# Patient Record
Sex: MALE | Race: BLACK OR AFRICAN AMERICAN | NOT HISPANIC OR LATINO | Employment: OTHER | URBAN - METROPOLITAN AREA
[De-identification: names, ages, dates, MRNs, and addresses within clinical notes are randomized per-mention and may not be internally consistent; named-entity substitution may affect disease eponyms.]

---

## 2017-01-06 ENCOUNTER — LAB VISIT (OUTPATIENT)
Dept: LAB | Facility: HOSPITAL | Age: 67
End: 2017-01-06
Attending: INTERNAL MEDICINE
Payer: MEDICARE

## 2017-01-06 DIAGNOSIS — D75.839 THROMBOCYTOSIS: ICD-10-CM

## 2017-01-06 LAB
ALBUMIN SERPL BCP-MCNC: 4.2 G/DL
ALP SERPL-CCNC: 54 U/L
ALT SERPL W/O P-5'-P-CCNC: 24 U/L
ANION GAP SERPL CALC-SCNC: 10 MMOL/L
AST SERPL-CCNC: 31 U/L
BASOPHILS # BLD AUTO: 0.01 K/UL
BASOPHILS NFR BLD: 0.1 %
BILIRUB SERPL-MCNC: 0.5 MG/DL
BUN SERPL-MCNC: 16 MG/DL
CALCIUM SERPL-MCNC: 10 MG/DL
CHLORIDE SERPL-SCNC: 97 MMOL/L
CO2 SERPL-SCNC: 34 MMOL/L
CREAT SERPL-MCNC: 0.8 MG/DL
DIFFERENTIAL METHOD: ABNORMAL
EOSINOPHIL # BLD AUTO: 0.4 K/UL
EOSINOPHIL NFR BLD: 4.5 %
ERYTHROCYTE [DISTWIDTH] IN BLOOD BY AUTOMATED COUNT: 14.6 %
EST. GFR  (AFRICAN AMERICAN): >60 ML/MIN/1.73 M^2
EST. GFR  (NON AFRICAN AMERICAN): >60 ML/MIN/1.73 M^2
GLUCOSE SERPL-MCNC: 91 MG/DL
HCT VFR BLD AUTO: 42.4 %
HGB BLD-MCNC: 14.3 G/DL
LDH SERPL L TO P-CCNC: 217 U/L
LYMPHOCYTES # BLD AUTO: 2.1 K/UL
LYMPHOCYTES NFR BLD: 22.4 %
MCH RBC QN AUTO: 27 PG
MCHC RBC AUTO-ENTMCNC: 33.7 %
MCV RBC AUTO: 80 FL
MONOCYTES # BLD AUTO: 0.9 K/UL
MONOCYTES NFR BLD: 9.2 %
NEUTROPHILS # BLD AUTO: 5.9 K/UL
NEUTROPHILS NFR BLD: 63.4 %
PLATELET # BLD AUTO: 616 K/UL
PMV BLD AUTO: 10.3 FL
POTASSIUM SERPL-SCNC: 3.1 MMOL/L
PROT SERPL-MCNC: 7.9 G/DL
RBC # BLD AUTO: 5.3 M/UL
SODIUM SERPL-SCNC: 141 MMOL/L
URATE SERPL-MCNC: 3.3 MG/DL
WBC # BLD AUTO: 9.36 K/UL

## 2017-01-06 PROCEDURE — 88291 CYTO/MOLECULAR REPORT: CPT | Mod: 91

## 2017-01-06 PROCEDURE — 85025 COMPLETE CBC W/AUTO DIFF WBC: CPT

## 2017-01-06 PROCEDURE — 80053 COMPREHEN METABOLIC PANEL: CPT

## 2017-01-06 PROCEDURE — 81270 JAK2 GENE: CPT

## 2017-01-06 PROCEDURE — 36415 COLL VENOUS BLD VENIPUNCTURE: CPT

## 2017-01-06 PROCEDURE — 88274 CYTOGENETICS 25-99: CPT | Mod: 91

## 2017-01-06 PROCEDURE — 88275 CYTOGENETICS 100-300: CPT

## 2017-01-06 PROCEDURE — 88274 CYTOGENETICS 25-99: CPT

## 2017-01-06 PROCEDURE — 88271 CYTOGENETICS DNA PROBE: CPT

## 2017-01-06 PROCEDURE — 83615 LACTATE (LD) (LDH) ENZYME: CPT

## 2017-01-06 PROCEDURE — 88271 CYTOGENETICS DNA PROBE: CPT | Mod: 91

## 2017-01-06 PROCEDURE — 84550 ASSAY OF BLOOD/URIC ACID: CPT

## 2017-01-10 ENCOUNTER — TELEPHONE (OUTPATIENT)
Dept: OPTOMETRY | Facility: CLINIC | Age: 67
End: 2017-01-10

## 2017-01-10 DIAGNOSIS — H40.9 GLAUCOMA: ICD-10-CM

## 2017-01-10 LAB
JAK2 P.V617F BLD/T QL: NORMAL
JAK2 V617F MUTATION DETECTION BLD RESULT: NORMAL

## 2017-01-10 RX ORDER — TIMOLOL MALEATE 5 MG/ML
1 SOLUTION/ DROPS OPHTHALMIC 2 TIMES DAILY
Qty: 15 ML | Refills: 2 | Status: SHIPPED | OUTPATIENT
Start: 2017-01-10 | End: 2017-03-08 | Stop reason: SDUPTHER

## 2017-01-10 RX ORDER — LATANOPROST 50 UG/ML
1 SOLUTION/ DROPS OPHTHALMIC NIGHTLY
Qty: 7.5 ML | Refills: 2 | Status: SHIPPED | OUTPATIENT
Start: 2017-01-10 | End: 2017-09-25 | Stop reason: SDUPTHER

## 2017-01-12 LAB
CLINICAL CYTOGENETICIST REVIEW: NORMAL
CLINICAL CYTOGENETICIST REVIEW: NORMAL
MBCR DISCLAIMER: NORMAL
MBCR REASON FOR REFERRAL: NORMAL
MBCR RELEASED BY: NORMAL
MBCR RESULT SUMMARY: NORMAL
MBCR RESULT TABLE: NORMAL
MBCR SPECIMEN: NORMAL
PDGFRB/TEL  FOR CMML, REASON FOR REFERRAL: NORMAL
PDGFRB/TEL  FOR CMML, SPECIMEN: NORMAL
PDGFRB/TEL DISCLAIMER: NORMAL
PDGFRB/TEL RELEASED BY: NORMAL
REF LAB TEST METHOD: NORMAL
REF LAB TEST METHOD: NORMAL
SERVICE CMNT-IMP: NORMAL
SERVICE CMNT-IMP: NORMAL
SPECIMEN SOURCE: NORMAL
SPECIMEN SOURCE: NORMAL
T(5;12)(PDGFRB,ETV6) BLD/T QL: NORMAL
T(9;22)(ABL1,BCR) BLD/T FISH: NORMAL

## 2017-01-13 LAB
CHIC2, 4Q12 DELETION DISCLAIMER: NORMAL
CHIC2, 4Q12 DELETION RELEASED BY: NORMAL
CHIC2, 4Q12 DELETION RESULT SUMMARY: NORMAL
CHIC2, 4Q12 DELETION RESULT TABLE: NORMAL
CHIC2, 4Q12 DELETION, INTERPRETATION: NORMAL
CHIC2, 4Q12 DELETION, REASON FOR REFERRAL: NORMAL
CHIC2, 4Q12 DELETION, RESULTS: NORMAL
CHIC2, 4Q12 DELETION, SPECIMEN: NORMAL
REF LAB TEST METHOD: NORMAL
REF LAB TEST METHOD: NORMAL
SPECIMEN SOURCE: NORMAL

## 2017-03-08 RX ORDER — TIMOLOL MALEATE 5 MG/ML
SOLUTION/ DROPS OPHTHALMIC
Qty: 15 ML | Refills: 2 | Status: SHIPPED | OUTPATIENT
Start: 2017-03-08 | End: 2017-05-17 | Stop reason: SDUPTHER

## 2017-03-23 ENCOUNTER — OFFICE VISIT (OUTPATIENT)
Dept: OPHTHALMOLOGY | Facility: CLINIC | Age: 67
End: 2017-03-23
Payer: MEDICARE

## 2017-03-23 DIAGNOSIS — H40.1133 PRIMARY OPEN ANGLE GLAUCOMA OF BOTH EYES, SEVERE STAGE: Primary | ICD-10-CM

## 2017-03-23 DIAGNOSIS — H25.13 NUCLEAR SCLEROSIS, BILATERAL: ICD-10-CM

## 2017-03-23 DIAGNOSIS — H52.7 REFRACTIVE ERROR: ICD-10-CM

## 2017-03-23 DIAGNOSIS — H54.40 BLIND LEFT EYE: ICD-10-CM

## 2017-03-23 PROBLEM — G89.4 CHRONIC PAIN SYNDROME: Status: ACTIVE | Noted: 2017-03-23

## 2017-03-23 PROBLEM — M15.3 POST-TRAUMATIC OSTEOARTHRITIS OF MULTIPLE JOINTS: Status: ACTIVE | Noted: 2017-03-23

## 2017-03-23 PROCEDURE — 99499 UNLISTED E&M SERVICE: CPT | Mod: S$GLB,,, | Performed by: OPHTHALMOLOGY

## 2017-03-23 PROCEDURE — 99999 PR PBB SHADOW E&M-EST. PATIENT-LVL II: CPT | Mod: PBBFAC,,, | Performed by: OPHTHALMOLOGY

## 2017-03-23 PROCEDURE — 92012 INTRM OPH EXAM EST PATIENT: CPT | Mod: S$GLB,,, | Performed by: OPHTHALMOLOGY

## 2017-03-23 NOTE — MR AVS SNAPSHOT
[facility]  [address]  [place] [address]  Phone: [phone]  Fax: [phone]                  Mark Church   3/23/2017 10:30 AM   Office Visit    Description:  Male : 1950   Provider:  Rikki Tyson MD   Department:  [facility]           Reason for Visit     Glaucoma           Diagnoses this Visit        Comments    Primary open angle glaucoma of both eyes, severe stage    -  Primary     Nuclear sclerosis, bilateral         Blind left eye         Refractive error                To Do List           Goals (5 Years of Data)     None      Follow-Up and Disposition     Return in about 6 months (around 2017) for IOP's, HVF's & DFE..      [facility]     [facility] On Call Nurse Care Line -  Assistance  Registered nurses in the [facility] On Call Center provide clinical advisement, health education, appointment booking, and other advisory services.  Call for this free service at [phone].             Medications           Message regarding Medications     Verify the changes and/or additions to your medication regime listed below are the same as discussed with your clinician today.  If any of these changes or additions are incorrect, please notify your healthcare provider.             Verify that the below list of medications is an accurate representation of the medications you are currently taking.  If none reported, the list may be blank. If incorrect, please contact your healthcare provider. Carry this list with you in case of emergency.           Current Medications     latanoprost 0.005 % ophthalmic solution Place 1 drop into both eyes every evening.    potassium chloride (MICRO-K) 10 MEQ CpSR Two capsules BID    timolol maleate 0.5% (TIMOPTIC) 0.5 % Drop INSTILL 1 DROP INTO BOTH EYES TWICE DAILY    celecoxib (CELEBREX) 100 MG capsule Take 1 capsule (100 mg total) by mouth 2 (two) times daily.    cephALEXin (KEFLEX) 500 MG capsule      cyclobenzaprine (FLEXERIL) 10 MG tablet     hydrocodone-acetaminophen 10-325mg (NORCO)  mg Tab Take 1 tablet by mouth every 8 (eight) hours as needed for Pain.    ketoconazole (NIZORAL) 2 % shampoo Apply topically twice a week.           Clinical Reference Information           Allergies as of 3/23/2017     Compazine [Prochlorperazine Edisylate]      Immunizations Administered on Date of Encounter - 3/23/2017     None      Orders Placed During Today's Visit     Future Labs/Procedures Expected by Expires    Ruth Visual Field - OU - Extended - Both Eyes  As directed 3/23/2018      [facility] Sign-Up     Activating your [facility] account is as easy as 1-2-3!     1) Visit [email or web address], select Sign Up Now, enter this activation code and your date of birth, then select Next.  Activation code not generated  Current Patient Portal Status: Account disabled      2) Create a username and password to use when you visit [facility] in the future and select a security question in case you lose your password and select Next.    3) Enter your e-mail address and click Sign Up!    Additional Information  If you have questions, please e-mail [email or web address] or call [phone] to talk to our [facility] staff. Remember, [facility] is NOT to be used for urgent needs. For medical emergencies, dial 911.         Language Assistance Services     ATTENTION: Language assistance services are available, free of charge. Please call [phone].      ATENCIÓN: Si habla donaldo, tiene a sanchez disposición servicios gratuitos de asistencia lingüística. Llame al [phone].     CHÚ Ý: N?u b?n nói Ti?ng Vi?t, có các d?ch v? h? tr? ngôn ng? mi?n phí dành cho b?n. G?i s? [phone].         [facility] complies with applicable Federal civil rights laws and does not discriminate on the basis of race, color, national origin, age, disability, or sex.

## 2017-03-23 NOTE — PROGRESS NOTES
Subjective:       Patient ID: Mark Church is a 67 y.o. male.    Chief Complaint: Glaucoma (POAG both eyes, severe stage.)    HPI  Review of Systems    Objective:      Physical Exam    Assessment:       1. Primary open angle glaucoma of both eyes, severe stage    2. Nuclear sclerosis, bilateral    3. Blind left eye    4. Refractive error        Plan:       POAG OS>>OD-IOP's acceptable OU.  Cataracts- Not visually significant.  Blind eye OS-Due to POAG.  RE-Pt wants MRx.        Cont Xalatan & T1/2 OU.  RTC 6 mos for IOP's, HVF's & DFE.

## 2017-05-17 RX ORDER — TIMOLOL MALEATE 5 MG/ML
SOLUTION/ DROPS OPHTHALMIC
Qty: 25 ML | Refills: 2 | Status: SHIPPED | OUTPATIENT
Start: 2017-05-17 | End: 2017-12-08 | Stop reason: SDUPTHER

## 2017-07-12 DIAGNOSIS — E26.81 BARTTER SYNDROME: ICD-10-CM

## 2017-07-12 RX ORDER — POTASSIUM CHLORIDE 750 MG/1
CAPSULE, EXTENDED RELEASE ORAL
Qty: 360 CAPSULE | Refills: 3 | Status: SHIPPED | OUTPATIENT
Start: 2017-07-12 | End: 2018-05-02 | Stop reason: SDUPTHER

## 2017-07-24 PROBLEM — R60.0 BILATERAL LEG EDEMA: Status: ACTIVE | Noted: 2017-07-24

## 2017-08-31 PROBLEM — M25.462 SWELLING OF LEFT KNEE JOINT: Status: ACTIVE | Noted: 2017-08-31

## 2017-08-31 PROBLEM — M19.012 PRIMARY OSTEOARTHRITIS OF LEFT SHOULDER: Status: ACTIVE | Noted: 2017-08-31

## 2017-08-31 PROBLEM — D75.839 THROMBOCYTHEMIA: Status: ACTIVE | Noted: 2017-08-31

## 2017-08-31 PROBLEM — M19.90 JOINT INFLAMMATION: Status: ACTIVE | Noted: 2017-08-31

## 2017-09-25 DIAGNOSIS — H40.9 GLAUCOMA: ICD-10-CM

## 2017-09-26 RX ORDER — LATANOPROST 50 UG/ML
SOLUTION/ DROPS OPHTHALMIC
Qty: 3 BOTTLE | Refills: 2 | Status: SHIPPED | OUTPATIENT
Start: 2017-09-26 | End: 2018-04-18 | Stop reason: SDUPTHER

## 2017-11-07 PROBLEM — Z23 FLU VACCINE NEED: Status: ACTIVE | Noted: 2017-11-07

## 2017-12-09 RX ORDER — TIMOLOL MALEATE 5 MG/ML
SOLUTION/ DROPS OPHTHALMIC
Qty: 15 ML | Refills: 2 | Status: SHIPPED | OUTPATIENT
Start: 2017-12-09 | End: 2018-02-12 | Stop reason: SDUPTHER

## 2017-12-14 ENCOUNTER — OFFICE VISIT (OUTPATIENT)
Dept: FAMILY MEDICINE | Facility: CLINIC | Age: 67
End: 2017-12-14
Payer: MEDICARE

## 2017-12-14 VITALS
SYSTOLIC BLOOD PRESSURE: 116 MMHG | OXYGEN SATURATION: 99 % | BODY MASS INDEX: 24.19 KG/M2 | WEIGHT: 145.19 LBS | HEART RATE: 76 BPM | HEIGHT: 65 IN | DIASTOLIC BLOOD PRESSURE: 70 MMHG

## 2017-12-14 DIAGNOSIS — D75.839 THROMBOCYTHEMIA: ICD-10-CM

## 2017-12-14 DIAGNOSIS — E26.81 BARTTER SYNDROME: ICD-10-CM

## 2017-12-14 DIAGNOSIS — B34.9 ACUTE VIRAL SYNDROME: ICD-10-CM

## 2017-12-14 DIAGNOSIS — Z00.00 ENCOUNTER FOR PREVENTIVE HEALTH EXAMINATION: Primary | ICD-10-CM

## 2017-12-14 DIAGNOSIS — H40.1130 PRIMARY OPEN ANGLE GLAUCOMA OF BOTH EYES, UNSPECIFIED GLAUCOMA STAGE: ICD-10-CM

## 2017-12-14 DIAGNOSIS — Z85.46 H/O PROSTATE CANCER: ICD-10-CM

## 2017-12-14 PROBLEM — Z23 FLU VACCINE NEED: Status: RESOLVED | Noted: 2017-11-07 | Resolved: 2017-12-14

## 2017-12-14 PROCEDURE — G0438 PPPS, INITIAL VISIT: HCPCS | Mod: S$GLB,,, | Performed by: NURSE PRACTITIONER

## 2017-12-14 PROCEDURE — 99999 PR PBB SHADOW E&M-EST. PATIENT-LVL IV: CPT | Mod: PBBFAC,,, | Performed by: NURSE PRACTITIONER

## 2017-12-14 PROCEDURE — 99499 UNLISTED E&M SERVICE: CPT | Mod: S$GLB,,, | Performed by: NURSE PRACTITIONER

## 2017-12-14 NOTE — PROGRESS NOTES
"Mark Church presented for a  Medicare AWV and comprehensive Health Risk Assessment today. The following components were reviewed and updated:    · Medical history  · Family History  · Social history  · Allergies and Current Medications  · Health Risk Assessment  · Health Maintenance  · Care Team     ** See Completed Assessments for Annual Wellness Visit within the encounter summary.**       The following assessments were completed:  · Living Situation  · CAGE  · Depression Screening  · Timed Get Up and Go  · Whisper Test  · Cognitive Function Screening  · Nutrition Screening  · ADL Screening  · PAQ Screening    Vitals:    12/14/17 1115   BP: 116/70   BP Location: Left arm   Patient Position: Sitting   BP Method: Medium (Automatic)   Pulse: 76   SpO2: 99%   Weight: 65.9 kg (145 lb 2.8 oz)   Height: 5' 5" (1.651 m)     Body mass index is 24.16 kg/m².  Physical Exam   Constitutional: He is oriented to person, place, and time.   Cardiovascular: Normal rate, regular rhythm and normal heart sounds.    Pulmonary/Chest: Effort normal. He has wheezes (bilateral expiratory wheeze auscultated ).   Musculoskeletal: Normal range of motion.   Neurological: He is alert and oriented to person, place, and time.   Skin: Skin is warm.   Vitals reviewed.        Diagnoses and health risks identified today and associated recommendations/orders:    1. Encounter for preventive health examination  Education provided about preventive health examinations and procedures; addressed and discussed patient's health concerns. Additionally, reviewed medical record for risk factors and documented the results during this encounter.    2. 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  He's taking potassium supplements. Continue as advised.     3. Thrombocythemia  Stable, monitored by hematology dept, he has orders for follow up CBC.  His most recent CBC was completed August 2017. " "    4. Primary open angle glaucoma of both eyes, unspecified glaucoma stage  Stable, followed by [facility], denies worsening symptoms; continue as advised.     5. H/O prostate cancer  Stable, followed by [facility] hematology/oncology dept. Denies worsening symptoms; continue as advised.     6. Acute viral syndrome  Discussed with patient his concerns regarding sinus congestion, intermittent coughing with gastric irritation, feeling "sick at times and at other times I'm ok."  Advised about symptoms to seek emergency medical attention.     Reviewed health maintenance with patient, educated about recommended examinations, procedures (labs & images), and immunizations.     Provided Mark with a 5-10 year written screening schedule and personal prevention plan. Recommendations were developed using the USPSTF age appropriate recommendations. Education, counseling, and referrals were provided as needed. After Visit Summary printed and given to patient which includes a list of additional screenings\tests needed.    Return in about 1 year (around 12/14/2018) for risk assessment .    Eugenio Sanz Jr NP  "

## 2017-12-14 NOTE — PATIENT INSTRUCTIONS
Counseling and Referral of Other Preventative  (Italic type indicates deductible and co-insurance are waived)    Patient Name: Mark Church  Today's Date: 12/14/2017      SERVICE LIMITATIONS RECOMMENDATION    Vaccines    · Pneumococcal (once after 65)    · Influenza (annually)    · Hepatitis B (if medium/high risk)    · Prevnar 13      Hepatitis B medium/high risk factors:       - End-stage renal disease       - Hemophiliacs who received Factor VII or         IX concentrates       - Clients of institutions for the mentally             retarded       - Persons who live in the same house as          a HepB carrier       - Homosexual men       - Illicit injectable drug abusers     Pneumococcal: vaccination due March 2018     Influenza: Done, repeat in one year     Hepatitis B: N/A     Prevnar 13: Done, no repeat necessary    Prostate cancer screening (annually to age 75)     Prostate specific antigen (PSA) Shared decision making with Provider. Sometimes a co-pay may be required if the patient decides to have this test. The USPSTF no longer recommends prostate cancer screening routinely in medicine:     Colorectal cancer screening (to age 75)    · Fecal occult blood test (annual)  · Flexible sigmoidoscopy (5y)  · Screening colonoscopy (10y)  · Barium enema   discussed with patient      Diabetes self-management training (no USPSTF recommendations)  Requires referral by treating physician for patient with diabetes or renal disease. 10 hours of initial DSMT sessions of no less than 30 minutes each in a continuous 12-month period. 2 hours of follow-up DSMT in subsequent years.  N/A    Glaucoma screening (no USPSTF recommendation)  Diabetes mellitus, family history   , age 50 or over    American, age 65 or over  Done this year, repeat every year    Medical nutrition therapy for diabetes or renal disease (no recommended schedule)  Requires referral by treating physician for patient with diabetes or  renal disease or kidney transplant within the past 3 years.  Can be provided in same year as diabetes self-management training (DSMT), and CMS recommends medical nutrition therapy take place after DSMT. Up to 3 hours for initial year and 2 hours in subsequent years.  N/A    Cardiovascular screening blood tests (every 5 years)  · Fasting lipid panel  Order as a panel if possible  Last done 3/2017, recommend to repeat every 5  years    Diabetes screening tests (at least every 3 years, Medicare covers annually or at 6-month intervals for prediabetic patients)  · Fasting blood sugar (FBS) or glucose tolerance test (GTT)  Patient must be diagnosed with one of the following:       - Hypertension       - Dyslipidemia       - Obesity (BMI 30kg/m2)       - Previous elevated impaired FBS or GTT       ... or any two of the following:       - Overweight (BMI 25 but <30)       - Family history of diabetes       - Age 65 or older       - History of gestational diabetes or birth of baby weighing more than 9 pounds      Abdominal aortic aneurysm screening (once)  · Sonogram   Limited to patients who meet one of the following criteria:       - Men who are 65-75 years old and have smoked more than 100 cigarette in their lifetime       - Anyone with a family history of abdominal aortic aneurysm       - Anyone recommended for screening by the USPSTF      HIV screening (annually for increased risk patients)  · HIV-1 and HIV-2 by EIA, or JOO, rapid antibody test or oral mucosa transudate  Patients must be at increased risk for HIV infection per USPSTF guidelines or pregnant. Tests covered annually for patient at increased risk or as requested by the patient. Pregnant patients may receive up to 3 tests during pregnancy.  no clinical risk factors      Smoking cessation counseling (up to 8 sessions per year)  Patients must be asymptomatic of tobacco-related conditions to receive as a preventative service.  Non-smoker    Subsequent annual  wellness visit  At least 12 months since last AWV  Return in one year     The following information is provided to all patients.  This information is to help you find resources for any of the problems found today that may be affecting your health:                Living healthy guide: www.[facility]      Understanding Diabetes: www.diabetes.org      Eating healthy: www.cdc.gov/healthyweight      CDC home safety checklist: www.cdc.gov/steadi/patient.html      Agency on Aging: www.goea.[place].[facility]      Alcoholics anonymous (AA): www.aa.org      Physical Activity: www.cally.nih.gov/av8ucog      Tobacco use: www.quitwithusla.org

## 2018-01-08 PROBLEM — M25.462 EFFUSION OF LEFT KNEE: Status: ACTIVE | Noted: 2018-01-08

## 2018-02-12 RX ORDER — TIMOLOL MALEATE 5 MG/ML
SOLUTION/ DROPS OPHTHALMIC
Qty: 15 ML | Refills: 2 | Status: SHIPPED | OUTPATIENT
Start: 2018-02-12 | End: 2018-04-18 | Stop reason: SDUPTHER

## 2018-03-07 PROBLEM — M25.552 LEFT HIP PAIN: Status: ACTIVE | Noted: 2018-03-07

## 2018-04-18 DIAGNOSIS — H40.9 GLAUCOMA: ICD-10-CM

## 2018-04-18 RX ORDER — TIMOLOL MALEATE 5 MG/ML
1 SOLUTION/ DROPS OPHTHALMIC 2 TIMES DAILY
Qty: 15 ML | Refills: 2 | Status: SHIPPED | OUTPATIENT
Start: 2018-04-18 | End: 2018-05-02 | Stop reason: SDUPTHER

## 2018-04-18 RX ORDER — TIMOLOL MALEATE 5 MG/ML
SOLUTION/ DROPS OPHTHALMIC
Qty: 25 ML | Refills: 2 | OUTPATIENT
Start: 2018-04-18

## 2018-04-18 RX ORDER — LATANOPROST 50 UG/ML
SOLUTION/ DROPS OPHTHALMIC
Qty: 3 BOTTLE | Refills: 2 | Status: SHIPPED | OUTPATIENT
Start: 2018-04-18 | End: 2018-05-18 | Stop reason: SDUPTHER

## 2018-05-02 DIAGNOSIS — E26.81 BARTTER SYNDROME: ICD-10-CM

## 2018-05-02 RX ORDER — POTASSIUM CHLORIDE 750 MG/1
CAPSULE, EXTENDED RELEASE ORAL
Qty: 360 CAPSULE | Refills: 3 | Status: SHIPPED | OUTPATIENT
Start: 2018-05-02 | End: 2019-02-11 | Stop reason: SDUPTHER

## 2018-05-02 RX ORDER — TIMOLOL MALEATE 5 MG/ML
SOLUTION/ DROPS OPHTHALMIC
Qty: 15 ML | Refills: 2 | Status: SHIPPED | OUTPATIENT
Start: 2018-05-02 | End: 2018-07-07 | Stop reason: SDUPTHER

## 2018-05-18 ENCOUNTER — OFFICE VISIT (OUTPATIENT)
Dept: OPHTHALMOLOGY | Facility: CLINIC | Age: 68
End: 2018-05-18
Payer: MEDICARE

## 2018-05-18 ENCOUNTER — CLINICAL SUPPORT (OUTPATIENT)
Dept: OPHTHALMOLOGY | Facility: CLINIC | Age: 68
End: 2018-05-18
Payer: MEDICARE

## 2018-05-18 DIAGNOSIS — H40.1133 PRIMARY OPEN ANGLE GLAUCOMA (POAG) OF BOTH EYES, SEVERE STAGE: ICD-10-CM

## 2018-05-18 DIAGNOSIS — H25.13 NUCLEAR SCLEROSIS, BILATERAL: ICD-10-CM

## 2018-05-18 DIAGNOSIS — H54.40 BLIND LEFT EYE: ICD-10-CM

## 2018-05-18 DIAGNOSIS — H40.1133 PRIMARY OPEN ANGLE GLAUCOMA OF BOTH EYES, SEVERE STAGE: Primary | ICD-10-CM

## 2018-05-18 DIAGNOSIS — H52.7 REFRACTIVE ERROR: ICD-10-CM

## 2018-05-18 PROCEDURE — 99999 PR PBB SHADOW E&M-EST. PATIENT-LVL II: CPT | Mod: PBBFAC,,, | Performed by: OPHTHALMOLOGY

## 2018-05-18 PROCEDURE — 99499 UNLISTED E&M SERVICE: CPT | Mod: S$GLB,,, | Performed by: OPHTHALMOLOGY

## 2018-05-18 PROCEDURE — 92014 COMPRE OPH EXAM EST PT 1/>: CPT | Mod: S$GLB,,, | Performed by: OPHTHALMOLOGY

## 2018-05-18 PROCEDURE — 92083 EXTENDED VISUAL FIELD XM: CPT | Mod: S$GLB,,, | Performed by: OPHTHALMOLOGY

## 2018-05-18 RX ORDER — LATANOPROST 50 UG/ML
SOLUTION/ DROPS OPHTHALMIC
Qty: 3 BOTTLE | Refills: 2 | Status: SHIPPED | OUTPATIENT
Start: 2018-05-18 | End: 2018-12-05 | Stop reason: SDUPTHER

## 2018-05-18 NOTE — PROGRESS NOTES
Subjective:       Patient ID: Mrak Church is a 68 y.o. male.    Chief Complaint: Glaucoma (POAG of both eyes, severe stage. )    HPI     Glaucoma    Additional comments: POAG of both eyes, severe stage.            Comments   68 y.o. Male is here today for 6 months IOP's, HVF and DFE. H/o POAG of   both eyes, severe stage. Denies eye pain and f/f. No noticeable VA changes   since last visit. Do have trouble with glare. No itching, burning or   tearing.     Meds: Latanoprost qhs OU             T 1/2 BID OU        Last edited by JEFFRY Mayo on 5/18/2018  2:59 PM. (History)             Assessment:       1. Primary open angle glaucoma of both eyes, severe stage    2. Primary open angle glaucoma (POAG) of both eyes, severe stage    3. Nuclear sclerosis, bilateral    4. Blind left eye    5. Refractive error        Plan:       POAG OS>>OD-IOP's excellent OU. ON's are stable OU. HVF OD with nonspecific defects (stable).  Cataracts- Not visually significant.  Blind eye OS-Due to POAG. Stable.  RE-Pt does not want MRx.        Cont T1/2 & Xalatan OU.  RTC 6 mos for IOP's.

## 2018-07-07 RX ORDER — TIMOLOL MALEATE 5 MG/ML
SOLUTION/ DROPS OPHTHALMIC
Qty: 15 ML | Refills: 2 | Status: SHIPPED | OUTPATIENT
Start: 2018-07-07 | End: 2018-09-11 | Stop reason: SDUPTHER

## 2018-07-10 ENCOUNTER — PES CALL (OUTPATIENT)
Dept: ADMINISTRATIVE | Facility: CLINIC | Age: 68
End: 2018-07-10

## 2018-07-24 ENCOUNTER — OFFICE VISIT (OUTPATIENT)
Dept: FAMILY MEDICINE | Facility: CLINIC | Age: 68
End: 2018-07-24
Payer: MEDICARE

## 2018-07-24 VITALS
HEIGHT: 65 IN | HEART RATE: 68 BPM | BODY MASS INDEX: 25.23 KG/M2 | DIASTOLIC BLOOD PRESSURE: 70 MMHG | OXYGEN SATURATION: 99 % | WEIGHT: 151.44 LBS | SYSTOLIC BLOOD PRESSURE: 110 MMHG

## 2018-07-24 DIAGNOSIS — D75.839 THROMBOCYTHEMIA: ICD-10-CM

## 2018-07-24 DIAGNOSIS — H40.1130 PRIMARY OPEN ANGLE GLAUCOMA OF BOTH EYES, UNSPECIFIED GLAUCOMA STAGE: ICD-10-CM

## 2018-07-24 DIAGNOSIS — E26.81 BARTTER SYNDROME: ICD-10-CM

## 2018-07-24 DIAGNOSIS — G89.4 CHRONIC PAIN SYNDROME: ICD-10-CM

## 2018-07-24 DIAGNOSIS — Z00.00 ENCOUNTER FOR PREVENTIVE HEALTH EXAMINATION: Primary | ICD-10-CM

## 2018-07-24 PROBLEM — M25.462 SWELLING OF LEFT KNEE JOINT: Status: RESOLVED | Noted: 2017-08-31 | Resolved: 2018-07-24

## 2018-07-24 PROBLEM — M25.462 EFFUSION OF LEFT KNEE: Status: RESOLVED | Noted: 2018-01-08 | Resolved: 2018-07-24

## 2018-07-24 PROCEDURE — G0439 PPPS, SUBSEQ VISIT: HCPCS | Mod: S$GLB,,, | Performed by: NURSE PRACTITIONER

## 2018-07-24 PROCEDURE — 99999 PR PBB SHADOW E&M-EST. PATIENT-LVL III: CPT | Mod: PBBFAC,,, | Performed by: NURSE PRACTITIONER

## 2018-07-24 PROCEDURE — 99499 UNLISTED E&M SERVICE: CPT | Mod: S$GLB,,, | Performed by: NURSE PRACTITIONER

## 2018-07-24 NOTE — PATIENT INSTRUCTIONS
Counseling and Referral of Other Preventative  (Italic type indicates deductible and co-insurance are waived)    Patient Name: Mark Church  Today's Date: 7/24/2018    Health Maintenance       Date Due Completion Date    Pneumococcal (65+) (2 of 2 - PPSV23) 03/20/2018 3/20/2017, Patient aware of recommendation for pneumococcal vaccine.     Influenza Vaccine 08/01/2018 11/7/2017    Override on 3/14/2016: Declined    Lipid Panel 05/06/2021 5/6/2016    TETANUS VACCINE 12/14/2027 12/14/2017 (Declined)    Override on 12/14/2017: Declined    Colonoscopy 12/14/2027 12/14/2017 (ClinicallyNA)    Override on 12/14/2017: Not Clinically Appropriate (patient mailed FOBT Kit)        No orders of the defined types were placed in this encounter.    The following information is provided to all patients.  This information is to help you find resources for any of the problems found today that may be affecting your health:                Living healthy guide: www.[facility].[place].[facility]      Understanding Diabetes: www.diabetes.org      Eating healthy: www.cdc.gov/healthyweight      CDC home safety checklist: www.cdc.gov/steadi/patient.html      Agency on Aging: www.goea.[place].gov      Alcoholics anonymous (AA): www.aa.org      Physical Activity: www.cally.nih.gov/yi8kpac      Tobacco use: www.quitwithusla.org

## 2018-07-24 NOTE — PROGRESS NOTES
"Mark Church presented for a  Medicare AWV and comprehensive Health Risk Assessment today. The following components were reviewed and updated:    · Medical history  · Family History  · Social history  · Allergies and Current Medications  · Health Risk Assessment  · Health Maintenance  · Care Team         ** See Completed Assessments for Annual Wellness Visit within the encounter summary.**       The following assessments were completed:  · Living Situation  · CAGE  · Depression Screening  · Timed Get Up and Go  · Whisper Test  · Cognitive Function Screening  · Nutrition Screening  · ADL Screening  · PAQ Screening    Vitals:    07/24/18 0854   BP: 110/70   BP Location: Left arm   Patient Position: Sitting   BP Method: Large (Manual)   Pulse: 68   SpO2: 99%   Weight: 68.7 kg (151 lb 7.3 oz)   Height: 5' 5" (1.651 m)     Body mass index is 25.2 kg/m².  Physical Exam   Constitutional: He is oriented to person, place, and time.   Cardiovascular: Normal rate, regular rhythm and normal heart sounds.    Pulmonary/Chest: Effort normal and breath sounds normal.   Musculoskeletal: Normal range of motion.   Neurological: He is alert and oriented to person, place, and time.   Ambulating with cane    Skin: Skin is warm.   Psychiatric: He has a normal mood and affect. His behavior is normal. Thought content normal.   Vitals reviewed.        Diagnoses and health risks identified today and associated recommendations/orders:    1. Encounter for preventive health examination  Education provided about preventive health examinations and procedures; addressed and discussed patient's health concerns. Additionally, reviewed medical record for risk factors and documented the results during this encounter.    2. 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  He's taking potassium supplements. Continue as advised.     3. Thrombocythemia  Stable, monitored by hematology dept, he " has orders for follow up CBC.  His most recent CBC was completed March 2018.     4. Chronic pain syndrome  Stable, patient evaluated/monitored by pain management (Kapil Browning MD); continue as advised.     5. Primary open angle glaucoma of both eyes, unspecified glaucoma stage  Stable, followed by [facility] de[facility], denies worsening symptoms; continue as advised.     Reviewed health maintenance with patient, educated about recommended examinations, procedures (labs & images), and immunizations.     Patient aware of recommendation for pneumococcal vaccination.    Provided Mark with a 5-10 year written screening schedule and personal prevention plan. Recommendations were developed using the USPSTF age appropriate recommendations. Education, counseling, and referrals were provided as needed. After Visit Summary printed and given to patient which includes a list of additional screenings\tests needed.    Follow-up in about 1 year (around 7/24/2019) for assessment .    Eugenio Sanz Jr, NP

## 2018-09-11 RX ORDER — TIMOLOL MALEATE 5 MG/ML
SOLUTION/ DROPS OPHTHALMIC
Qty: 15 ML | Refills: 2 | Status: SHIPPED | OUTPATIENT
Start: 2018-09-11 | End: 2018-11-14 | Stop reason: SDUPTHER

## 2018-11-14 RX ORDER — TIMOLOL MALEATE 5 MG/ML
SOLUTION/ DROPS OPHTHALMIC
Qty: 15 ML | Refills: 2 | Status: SHIPPED | OUTPATIENT
Start: 2018-11-14 | End: 2019-01-16 | Stop reason: SDUPTHER

## 2018-12-05 DIAGNOSIS — H40.1133 PRIMARY OPEN ANGLE GLAUCOMA (POAG) OF BOTH EYES, SEVERE STAGE: ICD-10-CM

## 2018-12-05 RX ORDER — LATANOPROST 50 UG/ML
SOLUTION/ DROPS OPHTHALMIC
Qty: 3 BOTTLE | Refills: 2 | Status: SHIPPED | OUTPATIENT
Start: 2018-12-05 | End: 2021-08-19 | Stop reason: SDUPTHER

## 2019-01-16 RX ORDER — TIMOLOL MALEATE 5 MG/ML
SOLUTION/ DROPS OPHTHALMIC
Qty: 25 ML | Refills: 2 | Status: SHIPPED | OUTPATIENT
Start: 2019-01-16 | End: 2019-08-19 | Stop reason: SDUPTHER

## 2019-02-11 DIAGNOSIS — E26.81 BARTTER SYNDROME: ICD-10-CM

## 2019-02-12 RX ORDER — POTASSIUM CHLORIDE 750 MG/1
CAPSULE, EXTENDED RELEASE ORAL
Qty: 360 CAPSULE | Refills: 3 | Status: SHIPPED | OUTPATIENT
Start: 2019-02-12

## 2019-03-21 ENCOUNTER — PES CALL (OUTPATIENT)
Dept: ADMINISTRATIVE | Facility: CLINIC | Age: 69
End: 2019-03-21

## 2019-05-17 ENCOUNTER — TELEPHONE (OUTPATIENT)
Dept: OPHTHALMOLOGY | Facility: CLINIC | Age: 69
End: 2019-05-17

## 2019-05-23 ENCOUNTER — OFFICE VISIT (OUTPATIENT)
Dept: OPHTHALMOLOGY | Facility: CLINIC | Age: 69
End: 2019-05-23
Payer: MEDICARE

## 2019-05-23 DIAGNOSIS — H52.7 REFRACTIVE ERROR: ICD-10-CM

## 2019-05-23 DIAGNOSIS — H40.1133 PRIMARY OPEN ANGLE GLAUCOMA OF BOTH EYES, SEVERE STAGE: Primary | ICD-10-CM

## 2019-05-23 DIAGNOSIS — H25.13 NUCLEAR SCLEROSIS, BILATERAL: ICD-10-CM

## 2019-05-23 DIAGNOSIS — H54.40 BLIND LEFT EYE: ICD-10-CM

## 2019-05-23 PROCEDURE — 99499 UNLISTED E&M SERVICE: CPT | Mod: S$GLB,,, | Performed by: OPHTHALMOLOGY

## 2019-05-23 PROCEDURE — 99999 PR PBB SHADOW E&M-EST. PATIENT-LVL I: ICD-10-PCS | Mod: PBBFAC,,, | Performed by: OPHTHALMOLOGY

## 2019-05-23 PROCEDURE — 99499 RISK ADDL DX/OHS AUDIT: ICD-10-PCS | Mod: S$GLB,,, | Performed by: OPHTHALMOLOGY

## 2019-05-23 PROCEDURE — 92014 COMPRE OPH EXAM EST PT 1/>: CPT | Mod: S$GLB,,, | Performed by: OPHTHALMOLOGY

## 2019-05-23 PROCEDURE — 99999 PR PBB SHADOW E&M-EST. PATIENT-LVL I: CPT | Mod: PBBFAC,,, | Performed by: OPHTHALMOLOGY

## 2019-05-23 PROCEDURE — 92014 PR EYE EXAM, EST PATIENT,COMPREHESV: ICD-10-PCS | Mod: S$GLB,,, | Performed by: OPHTHALMOLOGY

## 2019-05-23 NOTE — PROGRESS NOTES
Subjective:       Patient ID: Mark Church is a 69 y.o. male.    Chief Complaint: Glaucoma (Pt is here for POAG of both eyes, severe stage. IOP check and DFE. )    HPI     Glaucoma      Additional comments: Pt is here for POAG of both eyes, severe stage. IOP   check and DFE.               Comments     69 y.o. Male is here for POAG of both eyes, severe stage. IOP check and   DFE. Denies eye pain and f/f. No noticeable VA changes right eye. Eyes   burn and tear sometimes. No itching. Glare is bothersome.     Eye Meds: T 1/2 BID OU                     Latanoprost qhs OU           Last edited by JEFFRY Mayo on 5/23/2019  8:20 AM. (History)             Assessment:       1. Primary open angle glaucoma of both eyes, severe stage    2. Nuclear sclerosis, bilateral    3. Blind left eye    4. Refractive error        Plan:       POAG OS>>OD-IOP's excellent OU. ON's appear stable OU.  Cataracts- Not visually significant.  Blind eye OS from POAG-Stable.  RE-Pt does not want MRx.      Cont Xalatan & T1/2 OU.  RTC 6 mos for IOP's, HVF's & OCT's.

## 2019-07-29 ENCOUNTER — PES CALL (OUTPATIENT)
Dept: ADMINISTRATIVE | Facility: CLINIC | Age: 69
End: 2019-07-29

## 2019-08-20 RX ORDER — TIMOLOL MALEATE 5 MG/ML
SOLUTION/ DROPS OPHTHALMIC
Qty: 15 ML | Refills: 1 | Status: SHIPPED | OUTPATIENT
Start: 2019-08-20 | End: 2019-08-26 | Stop reason: SDUPTHER

## 2019-08-26 RX ORDER — TIMOLOL MALEATE 5 MG/ML
SOLUTION/ DROPS OPHTHALMIC
Qty: 15 ML | Refills: 1 | Status: SHIPPED | OUTPATIENT
Start: 2019-08-26 | End: 2021-07-16 | Stop reason: SDUPTHER

## 2019-08-28 ENCOUNTER — TELEPHONE (OUTPATIENT)
Dept: OPHTHALMOLOGY | Facility: CLINIC | Age: 69
End: 2019-08-28

## 2019-08-28 NOTE — TELEPHONE ENCOUNTER
----- Message from Jaja Dixon sent at 8/28/2019  4:01 PM CDT -----  Contact: ALEX Church   The pharmacy([facility])  would like to speak with  nurse about the eye drop prescription ,can be reached at [phone] please thank you .

## 2019-10-07 ENCOUNTER — TELEPHONE (OUTPATIENT)
Dept: OPHTHALMOLOGY | Facility: CLINIC | Age: 69
End: 2019-10-07

## 2019-10-07 NOTE — TELEPHONE ENCOUNTER
----- Message from Giselle Correa sent at 10/7/2019  7:48 AM CDT -----  Contact: Mark   tel:   [phone]  Caller wants to schedule his testing and f/u w/ Dr. Tyson .   Pls call to set this up.

## 2019-11-14 ENCOUNTER — TELEPHONE (OUTPATIENT)
Dept: OPHTHALMOLOGY | Facility: CLINIC | Age: 69
End: 2019-11-14

## 2020-01-27 DIAGNOSIS — E26.81 BARTTER SYNDROME: ICD-10-CM

## 2020-01-28 RX ORDER — POTASSIUM CHLORIDE 750 MG/1
CAPSULE, EXTENDED RELEASE ORAL
Qty: 360 CAPSULE | Refills: 3 | OUTPATIENT
Start: 2020-01-28

## 2020-02-14 ENCOUNTER — OFFICE VISIT (OUTPATIENT)
Dept: OPHTHALMOLOGY | Facility: CLINIC | Age: 70
End: 2020-02-14
Payer: MEDICARE

## 2020-02-14 ENCOUNTER — CLINICAL SUPPORT (OUTPATIENT)
Dept: OPHTHALMOLOGY | Facility: CLINIC | Age: 70
End: 2020-02-14
Payer: MEDICARE

## 2020-02-14 DIAGNOSIS — H54.40 BLINDNESS OF LEFT EYE WITH NORMAL VISION IN CONTRALATERAL EYE: ICD-10-CM

## 2020-02-14 DIAGNOSIS — H40.1133 PRIMARY OPEN ANGLE GLAUCOMA OF BOTH EYES, SEVERE STAGE: Primary | ICD-10-CM

## 2020-02-14 DIAGNOSIS — H25.13 NUCLEAR SCLEROSIS, BILATERAL: ICD-10-CM

## 2020-02-14 PROCEDURE — 92133 POSTERIOR SEGMENT OCT OPTIC NERVE(OCULAR COHERENCE TOMOGRAPHY) - OU - BOTH EYES: ICD-10-PCS | Mod: S$GLB,,, | Performed by: OPHTHALMOLOGY

## 2020-02-14 PROCEDURE — 92083 EXTENDED VISUAL FIELD XM: CPT | Mod: S$GLB,,, | Performed by: OPHTHALMOLOGY

## 2020-02-14 PROCEDURE — 99999 PR PBB SHADOW E&M-EST. PATIENT-LVL II: CPT | Mod: PBBFAC,,, | Performed by: OPHTHALMOLOGY

## 2020-02-14 PROCEDURE — 92133 CPTRZD OPH DX IMG PST SGM ON: CPT | Mod: S$GLB,,, | Performed by: OPHTHALMOLOGY

## 2020-02-14 PROCEDURE — 92083 HUMPHREY VISUAL FIELD - OU - BOTH EYES: ICD-10-PCS | Mod: S$GLB,,, | Performed by: OPHTHALMOLOGY

## 2020-02-14 PROCEDURE — 92012 INTRM OPH EXAM EST PATIENT: CPT | Mod: S$GLB,,, | Performed by: OPHTHALMOLOGY

## 2020-02-14 PROCEDURE — 99999 PR PBB SHADOW E&M-EST. PATIENT-LVL II: ICD-10-PCS | Mod: PBBFAC,,, | Performed by: OPHTHALMOLOGY

## 2020-02-14 PROCEDURE — 92012 PR EYE EXAM, EST PATIENT,INTERMED: ICD-10-PCS | Mod: S$GLB,,, | Performed by: OPHTHALMOLOGY

## 2020-02-14 RX ORDER — OXYBUTYNIN CHLORIDE 5 MG/1
5 TABLET ORAL DAILY
COMMUNITY
Start: 2020-01-20

## 2020-02-14 NOTE — PROGRESS NOTES
Subjective:       Patient ID: Mark Church is a 69 y.o. male.    Chief Complaint: Glaucoma    HPI     DLS: 5/23/2019 Dr. Tyson    69 y.o. Male is here for 6 months for IOP's, HVF's & OCT's. Denies eye   pain and f/f. No noticeable VA changes since last visit.     Eye Meds: Latanoprost qhs OU                    T 1/2 BID OU     Last edited by JEFFRY Mayo on 2/14/2020  2:11 PM. (History)             Assessment:       1. Primary open angle glaucoma of both eyes, severe stage    2. Nuclear sclerosis, bilateral    3. Blindness of left eye with normal vision in contralateral eye        Plan:       POAG OS>>OD-IOP's are a little higher OU today but Pt forgot to use his T1/2 OU this AM. OCT & HVF are abnl but stable OD.  Cataracts- Not visually significant.  Blind eye OS from POAG-Stable.      CPM OU.  RTC 6 mos for IOP's & DFE.

## 2021-05-03 ENCOUNTER — TELEPHONE (OUTPATIENT)
Dept: OPHTHALMOLOGY | Facility: CLINIC | Age: 71
End: 2021-05-03

## 2021-06-07 ENCOUNTER — OFFICE VISIT (OUTPATIENT)
Dept: OPHTHALMOLOGY | Facility: CLINIC | Age: 71
End: 2021-06-07
Payer: MEDICARE

## 2021-06-07 DIAGNOSIS — H54.40 BLINDNESS OF LEFT EYE WITH NORMAL VISION IN CONTRALATERAL EYE: ICD-10-CM

## 2021-06-07 DIAGNOSIS — H52.7 REFRACTIVE ERROR: ICD-10-CM

## 2021-06-07 DIAGNOSIS — H40.1133 PRIMARY OPEN ANGLE GLAUCOMA OF BOTH EYES, SEVERE STAGE: Primary | ICD-10-CM

## 2021-06-07 DIAGNOSIS — H25.13 NUCLEAR SCLEROSIS, BILATERAL: ICD-10-CM

## 2021-06-07 PROCEDURE — 92014 COMPRE OPH EXAM EST PT 1/>: CPT | Mod: S$GLB,,, | Performed by: OPHTHALMOLOGY

## 2021-06-07 PROCEDURE — 1100F PR PT FALLS ASSESS DOC 2+ FALLS/FALL W/INJURY/YR: ICD-10-PCS | Mod: CPTII,S$GLB,, | Performed by: OPHTHALMOLOGY

## 2021-06-07 PROCEDURE — 1126F PR PAIN SEVERITY QUANTIFIED, NO PAIN PRESENT: ICD-10-PCS | Mod: S$GLB,,, | Performed by: OPHTHALMOLOGY

## 2021-06-07 PROCEDURE — 1126F AMNT PAIN NOTED NONE PRSNT: CPT | Mod: S$GLB,,, | Performed by: OPHTHALMOLOGY

## 2021-06-07 PROCEDURE — 99999 PR PBB SHADOW E&M-EST. PATIENT-LVL II: CPT | Mod: PBBFAC,,, | Performed by: OPHTHALMOLOGY

## 2021-06-07 PROCEDURE — 92014 PR EYE EXAM, EST PATIENT,COMPREHESV: ICD-10-PCS | Mod: S$GLB,,, | Performed by: OPHTHALMOLOGY

## 2021-06-07 PROCEDURE — 99999 PR PBB SHADOW E&M-EST. PATIENT-LVL II: ICD-10-PCS | Mod: PBBFAC,,, | Performed by: OPHTHALMOLOGY

## 2021-06-07 PROCEDURE — 3288F FALL RISK ASSESSMENT DOCD: CPT | Mod: CPTII,S$GLB,, | Performed by: OPHTHALMOLOGY

## 2021-06-07 PROCEDURE — 1100F PTFALLS ASSESS-DOCD GE2>/YR: CPT | Mod: CPTII,S$GLB,, | Performed by: OPHTHALMOLOGY

## 2021-06-07 PROCEDURE — 3288F PR FALLS RISK ASSESSMENT DOCUMENTED: ICD-10-PCS | Mod: CPTII,S$GLB,, | Performed by: OPHTHALMOLOGY

## 2021-07-16 RX ORDER — TIMOLOL MALEATE 5 MG/ML
1 SOLUTION/ DROPS OPHTHALMIC 2 TIMES DAILY
Qty: 15 ML | Refills: 1 | Status: SHIPPED | OUTPATIENT
Start: 2021-07-16 | End: 2022-06-29

## 2021-08-19 DIAGNOSIS — H40.1133 PRIMARY OPEN ANGLE GLAUCOMA (POAG) OF BOTH EYES, SEVERE STAGE: ICD-10-CM

## 2021-08-19 RX ORDER — LATANOPROST 50 UG/ML
1 SOLUTION/ DROPS OPHTHALMIC NIGHTLY
Qty: 7.5 ML | Refills: 2 | Status: SHIPPED | OUTPATIENT
Start: 2021-08-19 | End: 2022-04-29 | Stop reason: SDUPTHER

## 2022-04-22 ENCOUNTER — CLINICAL SUPPORT (OUTPATIENT)
Dept: OPHTHALMOLOGY | Facility: CLINIC | Age: 72
End: 2022-04-22
Payer: MEDICARE

## 2022-04-22 ENCOUNTER — OFFICE VISIT (OUTPATIENT)
Dept: OPHTHALMOLOGY | Facility: CLINIC | Age: 72
End: 2022-04-22
Payer: MEDICARE

## 2022-04-22 DIAGNOSIS — H40.1133 PRIMARY OPEN ANGLE GLAUCOMA OF BOTH EYES, SEVERE STAGE: ICD-10-CM

## 2022-04-22 DIAGNOSIS — H54.40 BLINDNESS OF LEFT EYE WITH NORMAL VISION IN CONTRALATERAL EYE: ICD-10-CM

## 2022-04-22 DIAGNOSIS — H25.13 NUCLEAR SCLEROSIS, BILATERAL: ICD-10-CM

## 2022-04-22 DIAGNOSIS — H40.1133 PRIMARY OPEN ANGLE GLAUCOMA OF BOTH EYES, SEVERE STAGE: Primary | ICD-10-CM

## 2022-04-22 DIAGNOSIS — H52.7 REFRACTIVE ERROR: ICD-10-CM

## 2022-04-22 PROCEDURE — 1160F PR REVIEW ALL MEDS BY PRESCRIBER/CLIN PHARMACIST DOCUMENTED: ICD-10-PCS | Mod: CPTII,S$GLB,, | Performed by: OPHTHALMOLOGY

## 2022-04-22 PROCEDURE — 3288F FALL RISK ASSESSMENT DOCD: CPT | Mod: CPTII,S$GLB,, | Performed by: OPHTHALMOLOGY

## 2022-04-22 PROCEDURE — 1126F PR PAIN SEVERITY QUANTIFIED, NO PAIN PRESENT: ICD-10-PCS | Mod: CPTII,S$GLB,, | Performed by: OPHTHALMOLOGY

## 2022-04-22 PROCEDURE — 3288F PR FALLS RISK ASSESSMENT DOCUMENTED: ICD-10-PCS | Mod: CPTII,S$GLB,, | Performed by: OPHTHALMOLOGY

## 2022-04-22 PROCEDURE — 99999 PR PBB SHADOW E&M-EST. PATIENT-LVL II: ICD-10-PCS | Mod: PBBFAC,,, | Performed by: OPHTHALMOLOGY

## 2022-04-22 PROCEDURE — 1101F PT FALLS ASSESS-DOCD LE1/YR: CPT | Mod: CPTII,S$GLB,, | Performed by: OPHTHALMOLOGY

## 2022-04-22 PROCEDURE — 1159F MED LIST DOCD IN RCRD: CPT | Mod: CPTII,S$GLB,, | Performed by: OPHTHALMOLOGY

## 2022-04-22 PROCEDURE — 1101F PR PT FALLS ASSESS DOC 0-1 FALLS W/OUT INJ PAST YR: ICD-10-PCS | Mod: CPTII,S$GLB,, | Performed by: OPHTHALMOLOGY

## 2022-04-22 PROCEDURE — 1160F RVW MEDS BY RX/DR IN RCRD: CPT | Mod: CPTII,S$GLB,, | Performed by: OPHTHALMOLOGY

## 2022-04-22 PROCEDURE — 1159F PR MEDICATION LIST DOCUMENTED IN MEDICAL RECORD: ICD-10-PCS | Mod: CPTII,S$GLB,, | Performed by: OPHTHALMOLOGY

## 2022-04-22 PROCEDURE — 99999 PR PBB SHADOW E&M-EST. PATIENT-LVL II: CPT | Mod: PBBFAC,,, | Performed by: OPHTHALMOLOGY

## 2022-04-22 PROCEDURE — 92014 PR EYE EXAM, EST PATIENT,COMPREHESV: ICD-10-PCS | Mod: S$GLB,,, | Performed by: OPHTHALMOLOGY

## 2022-04-22 PROCEDURE — 1126F AMNT PAIN NOTED NONE PRSNT: CPT | Mod: CPTII,S$GLB,, | Performed by: OPHTHALMOLOGY

## 2022-04-22 PROCEDURE — 92014 COMPRE OPH EXAM EST PT 1/>: CPT | Mod: S$GLB,,, | Performed by: OPHTHALMOLOGY

## 2022-04-22 NOTE — PROGRESS NOTES
Visual field test done.  Patient stated no latex allergies used coverlet      Prescription     Sphere Cylinder Axis Add   Right -0.50 +2.00 077 +2.75   Left Balance             Ordered, Authorized, and Signed By: Rikki Tyson MD

## 2022-04-22 NOTE — PROGRESS NOTES
Subjective:       Patient ID: Mark Church is a 72 y.o. male.    Chief Complaint: Glaucoma    HPI     DSL- 6/7/2021     73 y/o male is here for HVF/OCT review. H/o of POAG, bilateral. Pt reports   no vision change. Occasional flashes of light. Refills needed.     Eyemeds  Latanoprost OU QHS  T 1/2 BID OU     Last edited by Cecile Taylor on 4/22/2022  2:47 PM. (History)             Assessment:       1. Primary open angle glaucoma of both eyes, severe stage    2. Blindness of left eye with normal vision in contralateral eye    3. Nuclear sclerosis, bilateral    4. Refractive error        Plan:       POAG OS>>OD-IOP's are acceptable OU. ON's appear stable OU. OCT is abnl OD & unattainable OS. HVF OD show nonspecific defectcts. HVF OS is unattainable due to blindness.  Cataracts- Not visually significant.  RE-Pt does not want MRx.      CPM OU.  RTC 6 mos for IOP's.

## 2022-04-29 DIAGNOSIS — H40.1133 PRIMARY OPEN ANGLE GLAUCOMA (POAG) OF BOTH EYES, SEVERE STAGE: ICD-10-CM

## 2022-04-29 RX ORDER — LATANOPROST 50 UG/ML
1 SOLUTION/ DROPS OPHTHALMIC NIGHTLY
Qty: 7.5 ML | Refills: 2 | Status: SHIPPED | OUTPATIENT
Start: 2022-04-29 | End: 2022-12-05

## 2022-08-10 RX ORDER — TIMOLOL MALEATE 5 MG/ML
1 SOLUTION/ DROPS OPHTHALMIC DAILY
Qty: 20 ML | Refills: 1 | Status: SHIPPED | OUTPATIENT
Start: 2022-08-10 | End: 2022-08-12 | Stop reason: SDUPTHER

## 2022-08-12 ENCOUNTER — TELEPHONE (OUTPATIENT)
Dept: OPHTHALMOLOGY | Facility: CLINIC | Age: 72
End: 2022-08-12
Payer: MEDICARE

## 2022-08-12 RX ORDER — TIMOLOL MALEATE 5 MG/ML
1 SOLUTION/ DROPS OPHTHALMIC 2 TIMES DAILY
Qty: 20 ML | Refills: 1 | Status: SHIPPED | OUTPATIENT
Start: 2022-08-12 | End: 2022-12-08 | Stop reason: SDUPTHER

## 2022-08-12 NOTE — TELEPHONE ENCOUNTER
Clinic spoke to Mrs. Church who stated that [facility] informed them that dosage for Timolol 0.5% BID OU has been cut in half. Clinic informed Mrs. Church that Dr. Tyson would like for him to continue taking medication as instructed and for pharmacy to contact clinic with any questions. Mrs. Church noted understanding. Mrs. Church stated that she would have pt to call back to schedule October 2022 6 month IOP check at Located within [facility] location.

## 2022-08-12 NOTE — TELEPHONE ENCOUNTER
----- Message from Pearl Bose sent at 8/12/2022  4:31 PM CDT -----  Regarding: FW: Prescription Inquiry    ----- Message -----  From: Ramya Kimbrough  Sent: 8/12/2022   1:49 PM CDT  To: [facility] Staff  Subject: Prescription Inquiry                             Pt asked to speak to office about his timolol maleate 0.5% (TIMOPTIC) 0.5 % Drop. Pt states that [facility] pharmacy informed him his dosage was cut in half.     Requesting Call back: [phone]

## 2022-10-02 ENCOUNTER — HOSPITAL ENCOUNTER (INPATIENT)
Facility: HOSPITAL | Age: 72
LOS: 19 days | Discharge: HOME HEALTH | DRG: 854 | End: 2022-10-21
Attending: EMERGENCY MEDICINE | Admitting: EMERGENCY MEDICINE
Payer: MEDICARE

## 2022-10-02 DIAGNOSIS — R00.0 TACHYCARDIA: ICD-10-CM

## 2022-10-02 DIAGNOSIS — E87.6 HYPOKALEMIA: ICD-10-CM

## 2022-10-02 DIAGNOSIS — L03.90 CELLULITIS, UNSPECIFIED CELLULITIS SITE: ICD-10-CM

## 2022-10-02 DIAGNOSIS — L03.90 CELLULITIS: Primary | ICD-10-CM

## 2022-10-02 DIAGNOSIS — M79.89 LEG SWELLING: ICD-10-CM

## 2022-10-02 DIAGNOSIS — I73.9 PAD (PERIPHERAL ARTERY DISEASE): ICD-10-CM

## 2022-10-02 DIAGNOSIS — L03.90 CELLULITIS: ICD-10-CM

## 2022-10-02 DIAGNOSIS — D75.839 THROMBOCYTHEMIA: ICD-10-CM

## 2022-10-02 PROBLEM — R05.3 CHRONIC COUGH: Status: ACTIVE | Noted: 2022-10-02

## 2022-10-02 PROBLEM — L03.116 CELLULITIS OF LEFT LOWER EXTREMITY: Status: ACTIVE | Noted: 2022-10-02

## 2022-10-02 PROBLEM — D64.9 NORMOCYTIC ANEMIA: Status: ACTIVE | Noted: 2022-10-02

## 2022-10-02 PROBLEM — M1A.9XX0 CHRONIC GOUT: Status: ACTIVE | Noted: 2022-10-02

## 2022-10-02 PROBLEM — A41.9 SEPSIS: Status: ACTIVE | Noted: 2022-10-02

## 2022-10-02 PROBLEM — R53.81 DEBILITY: Status: ACTIVE | Noted: 2022-10-02

## 2022-10-02 PROBLEM — E87.1 HYPONATREMIA: Status: ACTIVE | Noted: 2022-10-02

## 2022-10-02 LAB
ALBUMIN SERPL BCP-MCNC: 2.2 G/DL (ref 3.5–5.2)
ALLENS TEST: NORMAL
ALP SERPL-CCNC: 89 U/L (ref 55–135)
ALT SERPL W/O P-5'-P-CCNC: 18 U/L (ref 10–44)
AMPHET+METHAMPHET UR QL: NEGATIVE
ANION GAP SERPL CALC-SCNC: 7 MMOL/L (ref 8–16)
ANISOCYTOSIS BLD QL SMEAR: SLIGHT
AST SERPL-CCNC: 19 U/L (ref 10–40)
BACTERIA #/AREA URNS HPF: NORMAL /HPF
BARBITURATES UR QL SCN>200 NG/ML: NEGATIVE
BASOPHILS # BLD AUTO: ABNORMAL K/UL (ref 0–0.2)
BASOPHILS NFR BLD: 0 % (ref 0–1.9)
BENZODIAZ UR QL SCN>200 NG/ML: NEGATIVE
BILIRUB SERPL-MCNC: 1.7 MG/DL (ref 0.1–1)
BILIRUB UR QL STRIP: NEGATIVE
BNP SERPL-MCNC: 229 PG/ML (ref 0–99)
BUN SERPL-MCNC: 26 MG/DL (ref 8–23)
BZE UR QL SCN: NEGATIVE
CALCIUM SERPL-MCNC: 8.9 MG/DL (ref 8.7–10.5)
CANNABINOIDS UR QL SCN: NEGATIVE
CHLORIDE SERPL-SCNC: 101 MMOL/L (ref 95–110)
CLARITY UR: CLEAR
CO2 SERPL-SCNC: 26 MMOL/L (ref 23–29)
COLOR UR: YELLOW
CREAT SERPL-MCNC: 0.9 MG/DL (ref 0.5–1.4)
CREAT UR-MCNC: 43.8 MG/DL (ref 23–375)
CRP SERPL-MCNC: 397 MG/L (ref 0–8.2)
CTP QC/QA: YES
CTP QC/QA: YES
DIFFERENTIAL METHOD: ABNORMAL
EOSINOPHIL # BLD AUTO: ABNORMAL K/UL (ref 0–0.5)
EOSINOPHIL NFR BLD: 0 % (ref 0–8)
ERYTHROCYTE [DISTWIDTH] IN BLOOD BY AUTOMATED COUNT: 13.1 % (ref 11.5–14.5)
ERYTHROCYTE [SEDIMENTATION RATE] IN BLOOD BY WESTERGREN METHOD: 117 MM/HR (ref 0–10)
EST. GFR  (NO RACE VARIABLE): >60 ML/MIN/1.73 M^2
FERRITIN SERPL-MCNC: 1939 NG/ML (ref 20–300)
GLUCOSE SERPL-MCNC: 125 MG/DL (ref 70–110)
GLUCOSE UR QL STRIP: NEGATIVE
HCT VFR BLD AUTO: 34.8 % (ref 40–54)
HGB BLD-MCNC: 12.1 G/DL (ref 14–18)
HGB UR QL STRIP: NEGATIVE
HYALINE CASTS #/AREA URNS LPF: 0 /LPF
HYPOCHROMIA BLD QL SMEAR: ABNORMAL
IMM GRANULOCYTES # BLD AUTO: ABNORMAL K/UL (ref 0–0.04)
IMM GRANULOCYTES NFR BLD AUTO: ABNORMAL % (ref 0–0.5)
IRON SERPL-MCNC: 16 UG/DL (ref 45–160)
KETONES UR QL STRIP: NEGATIVE
LACTATE SERPL-SCNC: 1.7 MMOL/L (ref 0.5–2.2)
LACTATE SERPL-SCNC: 2 MMOL/L (ref 0.5–2.2)
LDH SERPL L TO P-CCNC: 1.31 MMOL/L (ref 0.5–2.2)
LEUKOCYTE ESTERASE UR QL STRIP: NEGATIVE
LYMPHOCYTES # BLD AUTO: ABNORMAL K/UL (ref 1–4.8)
LYMPHOCYTES NFR BLD: 4 % (ref 18–48)
MAGNESIUM SERPL-MCNC: 1.1 MG/DL (ref 1.6–2.6)
MCH RBC QN AUTO: 28.1 PG (ref 27–31)
MCHC RBC AUTO-ENTMCNC: 34.8 G/DL (ref 32–36)
MCV RBC AUTO: 81 FL (ref 82–98)
METAMYELOCYTES NFR BLD MANUAL: 3 %
METHADONE UR QL SCN>300 NG/ML: NEGATIVE
MICROSCOPIC COMMENT: NORMAL
MONOCYTES # BLD AUTO: ABNORMAL K/UL (ref 0.3–1)
MONOCYTES NFR BLD: 0 % (ref 4–15)
MYELOCYTES NFR BLD MANUAL: 1 %
NEUTROPHILS NFR BLD: 53 % (ref 38–73)
NEUTS BAND NFR BLD MANUAL: 39 %
NITRITE UR QL STRIP: NEGATIVE
NRBC BLD-RTO: 0 /100 WBC
OPIATES UR QL SCN: ABNORMAL
PCP UR QL SCN>25 NG/ML: NEGATIVE
PH UR STRIP: 6 [PH] (ref 5–8)
PLATELET # BLD AUTO: 311 K/UL (ref 150–450)
PLATELET BLD QL SMEAR: ABNORMAL
PMV BLD AUTO: 11 FL (ref 9.2–12.9)
POC MOLECULAR INFLUENZA A AGN: NEGATIVE
POC MOLECULAR INFLUENZA B AGN: NEGATIVE
POTASSIUM SERPL-SCNC: 3.1 MMOL/L (ref 3.5–5.1)
PROCALCITONIN SERPL IA-MCNC: 3.06 NG/ML
PROT SERPL-MCNC: 6.6 G/DL (ref 6–8.4)
PROT UR QL STRIP: ABNORMAL
RBC # BLD AUTO: 4.31 M/UL (ref 4.6–6.2)
RBC #/AREA URNS HPF: 1 /HPF (ref 0–4)
SAMPLE: NORMAL
SARS-COV-2 RDRP RESP QL NAA+PROBE: NEGATIVE
SATURATED IRON: 12 % (ref 20–50)
SITE: NORMAL
SODIUM SERPL-SCNC: 134 MMOL/L (ref 136–145)
SP GR UR STRIP: 1.03 (ref 1–1.03)
TOTAL IRON BINDING CAPACITY: 135 UG/DL (ref 250–450)
TOXIC GRANULES BLD QL SMEAR: PRESENT
TOXICOLOGY INFORMATION: ABNORMAL
TRANSFERRIN SERPL-MCNC: 91 MG/DL (ref 200–375)
TSH SERPL DL<=0.005 MIU/L-ACNC: 1.06 UIU/ML (ref 0.4–4)
URATE SERPL-MCNC: 1.3 MG/DL (ref 3.4–7)
URATE SERPL-MCNC: 1.8 MG/DL (ref 3.4–7)
URN SPEC COLLECT METH UR: ABNORMAL
UROBILINOGEN UR STRIP-ACNC: NEGATIVE EU/DL
WBC # BLD AUTO: 25.54 K/UL (ref 3.9–12.7)
WBC #/AREA URNS HPF: 2 /HPF (ref 0–5)
WBC TOXIC VACUOLES BLD QL SMEAR: PRESENT

## 2022-10-02 PROCEDURE — 83735 ASSAY OF MAGNESIUM: CPT | Performed by: HOSPITALIST

## 2022-10-02 PROCEDURE — 82607 VITAMIN B-12: CPT | Performed by: HOSPITALIST

## 2022-10-02 PROCEDURE — 93010 ELECTROCARDIOGRAM REPORT: CPT | Mod: ,,, | Performed by: INTERNAL MEDICINE

## 2022-10-02 PROCEDURE — 85007 BL SMEAR W/DIFF WBC COUNT: CPT | Performed by: EMERGENCY MEDICINE

## 2022-10-02 PROCEDURE — 84443 ASSAY THYROID STIM HORMONE: CPT | Performed by: HOSPITALIST

## 2022-10-02 PROCEDURE — 25000003 PHARM REV CODE 250: Performed by: HOSPITALIST

## 2022-10-02 PROCEDURE — 82746 ASSAY OF FOLIC ACID SERUM: CPT | Performed by: HOSPITALIST

## 2022-10-02 PROCEDURE — 83605 ASSAY OF LACTIC ACID: CPT | Performed by: EMERGENCY MEDICINE

## 2022-10-02 PROCEDURE — 83605 ASSAY OF LACTIC ACID: CPT | Mod: 91 | Performed by: HOSPITALIST

## 2022-10-02 PROCEDURE — 84145 PROCALCITONIN (PCT): CPT | Performed by: EMERGENCY MEDICINE

## 2022-10-02 PROCEDURE — 87040 BLOOD CULTURE FOR BACTERIA: CPT | Mod: 59 | Performed by: EMERGENCY MEDICINE

## 2022-10-02 PROCEDURE — 63600175 PHARM REV CODE 636 W HCPCS: Performed by: EMERGENCY MEDICINE

## 2022-10-02 PROCEDURE — 80307 DRUG TEST PRSMV CHEM ANLYZR: CPT | Performed by: HOSPITALIST

## 2022-10-02 PROCEDURE — 96365 THER/PROPH/DIAG IV INF INIT: CPT

## 2022-10-02 PROCEDURE — 82728 ASSAY OF FERRITIN: CPT | Performed by: HOSPITALIST

## 2022-10-02 PROCEDURE — 83605 ASSAY OF LACTIC ACID: CPT

## 2022-10-02 PROCEDURE — 83036 HEMOGLOBIN GLYCOSYLATED A1C: CPT | Performed by: HOSPITALIST

## 2022-10-02 PROCEDURE — 84550 ASSAY OF BLOOD/URIC ACID: CPT | Mod: 91 | Performed by: HOSPITALIST

## 2022-10-02 PROCEDURE — 63600175 PHARM REV CODE 636 W HCPCS: Performed by: HOSPITALIST

## 2022-10-02 PROCEDURE — 80053 COMPREHEN METABOLIC PANEL: CPT | Performed by: EMERGENCY MEDICINE

## 2022-10-02 PROCEDURE — 11000001 HC ACUTE MED/SURG PRIVATE ROOM

## 2022-10-02 PROCEDURE — 93010 EKG 12-LEAD: ICD-10-PCS | Mod: ,,, | Performed by: INTERNAL MEDICINE

## 2022-10-02 PROCEDURE — 81000 URINALYSIS NONAUTO W/SCOPE: CPT | Mod: 59 | Performed by: EMERGENCY MEDICINE

## 2022-10-02 PROCEDURE — 85027 COMPLETE CBC AUTOMATED: CPT | Performed by: EMERGENCY MEDICINE

## 2022-10-02 PROCEDURE — 99291 CRITICAL CARE FIRST HOUR: CPT | Mod: 25

## 2022-10-02 PROCEDURE — 93005 ELECTROCARDIOGRAM TRACING: CPT

## 2022-10-02 PROCEDURE — 99900035 HC TECH TIME PER 15 MIN (STAT)

## 2022-10-02 PROCEDURE — 36415 COLL VENOUS BLD VENIPUNCTURE: CPT | Performed by: HOSPITALIST

## 2022-10-02 PROCEDURE — 84466 ASSAY OF TRANSFERRIN: CPT | Performed by: HOSPITALIST

## 2022-10-02 PROCEDURE — 86140 C-REACTIVE PROTEIN: CPT | Performed by: EMERGENCY MEDICINE

## 2022-10-02 PROCEDURE — U0002 COVID-19 LAB TEST NON-CDC: HCPCS | Performed by: EMERGENCY MEDICINE

## 2022-10-02 PROCEDURE — 85652 RBC SED RATE AUTOMATED: CPT | Performed by: EMERGENCY MEDICINE

## 2022-10-02 PROCEDURE — 25500020 PHARM REV CODE 255: Performed by: HOSPITALIST

## 2022-10-02 PROCEDURE — 96375 TX/PRO/DX INJ NEW DRUG ADDON: CPT | Mod: 59

## 2022-10-02 PROCEDURE — 25000003 PHARM REV CODE 250: Performed by: EMERGENCY MEDICINE

## 2022-10-02 PROCEDURE — 83880 ASSAY OF NATRIURETIC PEPTIDE: CPT | Performed by: HOSPITALIST

## 2022-10-02 RX ORDER — SODIUM CHLORIDE 9 MG/ML
INJECTION, SOLUTION INTRAVENOUS CONTINUOUS
Status: DISCONTINUED | OUTPATIENT
Start: 2022-10-02 | End: 2022-10-04

## 2022-10-02 RX ORDER — HYDROCODONE BITARTRATE AND ACETAMINOPHEN 5; 325 MG/1; MG/1
1 TABLET ORAL EVERY 8 HOURS PRN
Status: ON HOLD | COMMUNITY
Start: 2022-09-30 | End: 2022-10-17 | Stop reason: HOSPADM

## 2022-10-02 RX ORDER — CETIRIZINE HYDROCHLORIDE 5 MG/1
5 TABLET ORAL NIGHTLY
Status: DISCONTINUED | OUTPATIENT
Start: 2022-10-02 | End: 2022-10-21 | Stop reason: HOSPADM

## 2022-10-02 RX ORDER — NAPROXEN SODIUM 220 MG/1
81 TABLET, FILM COATED ORAL EVERY OTHER DAY
Status: DISCONTINUED | OUTPATIENT
Start: 2022-10-03 | End: 2022-10-21 | Stop reason: HOSPADM

## 2022-10-02 RX ORDER — GLUCAGON 1 MG
1 KIT INJECTION
Status: DISCONTINUED | OUTPATIENT
Start: 2022-10-02 | End: 2022-10-21 | Stop reason: HOSPADM

## 2022-10-02 RX ORDER — HYDROCODONE BITARTRATE AND ACETAMINOPHEN 5; 325 MG/1; MG/1
1 TABLET ORAL
Status: COMPLETED | OUTPATIENT
Start: 2022-10-02 | End: 2022-10-02

## 2022-10-02 RX ORDER — TALC
6 POWDER (GRAM) TOPICAL NIGHTLY PRN
Status: DISCONTINUED | OUTPATIENT
Start: 2022-10-02 | End: 2022-10-21 | Stop reason: HOSPADM

## 2022-10-02 RX ORDER — AZELASTINE 1 MG/ML
1 SPRAY, METERED NASAL 2 TIMES DAILY
Status: DISCONTINUED | OUTPATIENT
Start: 2022-10-02 | End: 2022-10-21 | Stop reason: HOSPADM

## 2022-10-02 RX ORDER — DOCUSATE SODIUM 100 MG/1
100 CAPSULE, LIQUID FILLED ORAL 2 TIMES DAILY
Status: DISCONTINUED | OUTPATIENT
Start: 2022-10-02 | End: 2022-10-07

## 2022-10-02 RX ORDER — ALLOPURINOL 100 MG/1
100 TABLET ORAL DAILY
Status: DISCONTINUED | OUTPATIENT
Start: 2022-10-02 | End: 2022-10-21 | Stop reason: HOSPADM

## 2022-10-02 RX ORDER — CEFEPIME HYDROCHLORIDE 1 G/50ML
2 INJECTION, SOLUTION INTRAVENOUS
Status: DISCONTINUED | OUTPATIENT
Start: 2022-10-02 | End: 2022-10-05

## 2022-10-02 RX ORDER — TIMOLOL MALEATE 5 MG/ML
1 SOLUTION/ DROPS OPHTHALMIC 2 TIMES DAILY
Status: DISCONTINUED | OUTPATIENT
Start: 2022-10-02 | End: 2022-10-21 | Stop reason: HOSPADM

## 2022-10-02 RX ORDER — HYDROCODONE BITARTRATE AND ACETAMINOPHEN 10; 325 MG/1; MG/1
1 TABLET ORAL EVERY 6 HOURS PRN
Status: DISCONTINUED | OUTPATIENT
Start: 2022-10-02 | End: 2022-10-12

## 2022-10-02 RX ORDER — POTASSIUM CHLORIDE 750 MG/1
50 TABLET, EXTENDED RELEASE ORAL ONCE
Status: COMPLETED | OUTPATIENT
Start: 2022-10-02 | End: 2022-10-02

## 2022-10-02 RX ORDER — CLINDAMYCIN HYDROCHLORIDE 300 MG/1
300 CAPSULE ORAL EVERY 12 HOURS
Status: ON HOLD | COMMUNITY
Start: 2022-09-30 | End: 2022-10-17 | Stop reason: HOSPADM

## 2022-10-02 RX ORDER — HYDROCODONE BITARTRATE AND ACETAMINOPHEN 5; 325 MG/1; MG/1
1 TABLET ORAL EVERY 6 HOURS PRN
Status: DISCONTINUED | OUTPATIENT
Start: 2022-10-02 | End: 2022-10-15

## 2022-10-02 RX ORDER — ENOXAPARIN SODIUM 100 MG/ML
40 INJECTION SUBCUTANEOUS EVERY 24 HOURS
Status: DISCONTINUED | OUTPATIENT
Start: 2022-10-02 | End: 2022-10-21 | Stop reason: HOSPADM

## 2022-10-02 RX ORDER — SODIUM CHLORIDE 0.9 % (FLUSH) 0.9 %
10 SYRINGE (ML) INJECTION
Status: DISCONTINUED | OUTPATIENT
Start: 2022-10-02 | End: 2022-10-10

## 2022-10-02 RX ORDER — IBUPROFEN 200 MG
16 TABLET ORAL
Status: DISCONTINUED | OUTPATIENT
Start: 2022-10-02 | End: 2022-10-21 | Stop reason: HOSPADM

## 2022-10-02 RX ORDER — ACETAMINOPHEN 325 MG/1
325 TABLET ORAL
Status: COMPLETED | OUTPATIENT
Start: 2022-10-02 | End: 2022-10-02

## 2022-10-02 RX ORDER — MUPIROCIN 20 MG/G
OINTMENT TOPICAL 3 TIMES DAILY
Status: ON HOLD | COMMUNITY
Start: 2022-09-30 | End: 2022-10-17 | Stop reason: HOSPADM

## 2022-10-02 RX ORDER — ACETAMINOPHEN 325 MG/1
650 TABLET ORAL EVERY 6 HOURS PRN
Status: DISCONTINUED | OUTPATIENT
Start: 2022-10-02 | End: 2022-10-15

## 2022-10-02 RX ORDER — L. ACIDOPHILUS/L.BULGARICUS 100MM CELL
1 GRANULES IN PACKET (EA) ORAL 2 TIMES DAILY
Status: DISCONTINUED | OUTPATIENT
Start: 2022-10-02 | End: 2022-10-21 | Stop reason: HOSPADM

## 2022-10-02 RX ORDER — OXYBUTYNIN CHLORIDE 5 MG/1
5 TABLET ORAL 2 TIMES DAILY
Status: DISCONTINUED | OUTPATIENT
Start: 2022-10-02 | End: 2022-10-21 | Stop reason: HOSPADM

## 2022-10-02 RX ORDER — CLINDAMYCIN PHOSPHATE 600 MG/50ML
600 INJECTION, SOLUTION INTRAVENOUS
Status: DISCONTINUED | OUTPATIENT
Start: 2022-10-02 | End: 2022-10-05

## 2022-10-02 RX ORDER — LOPERAMIDE HYDROCHLORIDE 2 MG/1
2 CAPSULE ORAL 4 TIMES DAILY PRN
Status: DISCONTINUED | OUTPATIENT
Start: 2022-10-02 | End: 2022-10-21 | Stop reason: HOSPADM

## 2022-10-02 RX ORDER — LATANOPROST 50 UG/ML
1 SOLUTION/ DROPS OPHTHALMIC NIGHTLY
Status: DISCONTINUED | OUTPATIENT
Start: 2022-10-02 | End: 2022-10-21 | Stop reason: HOSPADM

## 2022-10-02 RX ORDER — BENZONATATE 100 MG/1
100 CAPSULE ORAL 3 TIMES DAILY PRN
Status: DISCONTINUED | OUTPATIENT
Start: 2022-10-02 | End: 2022-10-21 | Stop reason: HOSPADM

## 2022-10-02 RX ORDER — ONDANSETRON 2 MG/ML
4 INJECTION INTRAMUSCULAR; INTRAVENOUS EVERY 4 HOURS PRN
Status: DISCONTINUED | OUTPATIENT
Start: 2022-10-02 | End: 2022-10-21 | Stop reason: HOSPADM

## 2022-10-02 RX ORDER — IBUPROFEN 200 MG
24 TABLET ORAL
Status: DISCONTINUED | OUTPATIENT
Start: 2022-10-02 | End: 2022-10-21 | Stop reason: HOSPADM

## 2022-10-02 RX ADMIN — IOHEXOL 75 ML: 350 INJECTION, SOLUTION INTRAVENOUS at 05:10

## 2022-10-02 RX ADMIN — PIPERACILLIN AND TAZOBACTAM 4.5 G: 4; .5 INJECTION, POWDER, LYOPHILIZED, FOR SOLUTION INTRAVENOUS; PARENTERAL at 12:10

## 2022-10-02 RX ADMIN — CETIRIZINE HYDROCHLORIDE 5 MG: 5 TABLET ORAL at 09:10

## 2022-10-02 RX ADMIN — LATANOPROST 1 DROP: 50 SOLUTION OPHTHALMIC at 09:10

## 2022-10-02 RX ADMIN — SODIUM CHLORIDE: 0.9 INJECTION, SOLUTION INTRAVENOUS at 04:10

## 2022-10-02 RX ADMIN — ENOXAPARIN SODIUM 40 MG: 100 INJECTION SUBCUTANEOUS at 04:10

## 2022-10-02 RX ADMIN — CEFEPIME 2 G: 2 INJECTION, POWDER, FOR SOLUTION INTRAVENOUS at 04:10

## 2022-10-02 RX ADMIN — TIMOLOL MALEATE 1 DROP: 5 SOLUTION/ DROPS OPHTHALMIC at 09:10

## 2022-10-02 RX ADMIN — CEFEPIME 2 G: 2 INJECTION, POWDER, FOR SOLUTION INTRAVENOUS at 11:10

## 2022-10-02 RX ADMIN — HYDROCODONE BITARTRATE AND ACETAMINOPHEN 1 TABLET: 5; 325 TABLET ORAL at 12:10

## 2022-10-02 RX ADMIN — OXYBUTYNIN CHLORIDE 5 MG: 5 TABLET ORAL at 09:10

## 2022-10-02 RX ADMIN — AZELASTINE 137 MCG: 1 SPRAY, METERED NASAL at 10:10

## 2022-10-02 RX ADMIN — POTASSIUM CHLORIDE 50 MEQ: 750 TABLET, EXTENDED RELEASE ORAL at 04:10

## 2022-10-02 RX ADMIN — SODIUM CHLORIDE, SODIUM LACTATE, POTASSIUM CHLORIDE, AND CALCIUM CHLORIDE 1770 ML: .6; .31; .03; .02 INJECTION, SOLUTION INTRAVENOUS at 02:10

## 2022-10-02 RX ADMIN — VANCOMYCIN HYDROCHLORIDE 1500 MG: 1.5 INJECTION, POWDER, LYOPHILIZED, FOR SOLUTION INTRAVENOUS at 01:10

## 2022-10-02 RX ADMIN — DOCUSATE SODIUM 100 MG: 100 CAPSULE, LIQUID FILLED ORAL at 09:10

## 2022-10-02 RX ADMIN — LACTOBACILLUS ACIDOPHILUS / LACTOBACILLUS BULGARICUS 1 EACH: 100 MILLION CFU STRENGTH GRANULES at 09:10

## 2022-10-02 RX ADMIN — CLINDAMYCIN IN 5 PERCENT DEXTROSE 600 MG: 12 INJECTION, SOLUTION INTRAVENOUS at 06:10

## 2022-10-02 RX ADMIN — ACETAMINOPHEN 325 MG: 325 TABLET ORAL at 01:10

## 2022-10-02 RX ADMIN — ALLOPURINOL 100 MG: 100 TABLET ORAL at 04:10

## 2022-10-02 NOTE — CONSULTS
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  He states this all started about 4 days ago when he was walking in a field. He states he felt some significant itching in his lower leg.  Later that night his leg started to swell and hurt.  He assumed this was caused by an insect bite on his leg, but he never saw a specific bite or insect.  He states that the 2nd night the swelling and pain became severe and he was having fevers and chills which prompted him to visit his primary care provider the next day.  He was prescribed an antibiotic, not sure which one, and despite taking this his leg has continued to get worse.  He notes he has been having fevers and chills and last night he "slid or fell" out of his chair when he was trying to get up because the pain was so bad.  He notes he uses a walker to ambulate at home.     At arrival to the ED he was tachycardic, labs showed a WBC of 25, elevated CPR, normal sodium, normal Cr, and normal lactate. A Leg x-ray was done that didn't show any air, just diffuse soft tissue thickening.     He received Abx and IVF, since then he is not longer tachycardic and his blood pressure has stabalized. General surgery was consulted for evaluation. He states that he is still really active and walks around his neighborhood multiples times a week to  trash. States that he feels better since the start of the abx.     Does not take any blood thinners    No current facility-administered medications on file prior to encounter.     Current Outpatient Medications on File Prior to Encounter   Medication Sig "    allopurinol (ZYLOPRIM) 100 MG tablet TAKE 1 TABLET EVERY DAY    clindamycin (CLEOCIN) 300 MG capsule Take 300 mg by mouth every 12 (twelve) hours.    HYDROcodone-acetaminophen (NORCO) 5-325 mg per tablet Take 1 tablet by mouth every 8 (eight) hours as needed.    latanoprost 0.005 % ophthalmic solution Place 1 drop into both eyes every evening.    multivit-minerals/folic acid (MULTIVITAMIN GUMMIES ORAL) Take by mouth.    mupirocin (BACTROBAN) 2 % ointment Apply topically 3 (three) times daily.    oxybutynin (DITROPAN) 5 MG Tab 5 mg once daily.     potassium chloride (MICRO-K) 10 MEQ CpSR TAKE 2 CAPSULES TWICE DAILY    timolol maleate 0.5% (TIMOPTIC) 0.5 % Drop Place 1 drop into both eyes 2 (two) times daily.    aspirin 81 MG Chew Take 81 mg by mouth every other day.     azelastine (ASTELIN) 137 mcg (0.1 %) nasal spray 1 spray (137 mcg total) by Nasal route 2 (two) times daily.    levocetirizine (XYZAL) 5 MG tablet Take 1 tablet (5 mg total) by mouth every evening.       Review of patient's allergies indicates:   Allergen Reactions    Compazine [prochlorperazine edisylate]        Past Medical History:   Diagnosis Date    Arthritis     Bartter syndrome     Blind left eye     Cancer     Cataract     Glaucoma     Normocytic anemia 10/2/2022    Prostate cancer     Urinary incontinence      Past Surgical History:   Procedure Laterality Date    CHOLECYSTECTOMY      JOINT REPLACEMENT      PROSTATE SURGERY      REFRACTIVE SURGERY Bilateral     ROTATOR CUFF REPAIR Right     sinus polyp       Family History       Problem Relation (Age of Onset)    Blindness Maternal Aunt    Cataracts Father    Glaucoma Maternal Aunt    No Known Problems Mother, Sister, Brother, Sister, Sister, Brother, Maternal Uncle, Paternal Aunt, Paternal Uncle, Maternal Grandmother, Maternal Grandfather, Paternal Grandmother, Paternal Grandfather          Tobacco Use    Smoking status: Never    Smokeless tobacco: Never   Substance and Sexual Activity     Alcohol use: No     Alcohol/week: 0.0 standard drinks    Drug use: No    Sexual activity: Not on file     Review of Systems   Constitutional:  Negative for activity change and appetite change.   HENT:  Negative for congestion and dental problem.    Respiratory:  Negative for apnea and chest tightness.    Cardiovascular:  Negative for chest pain and leg swelling.   Gastrointestinal:  Negative for abdominal distention and abdominal pain.   Objective:     Vital Signs (Most Recent):  Temp: 98.2 °F (36.8 °C) (10/02/22 1558)  Pulse: 77 (10/02/22 1558)  Resp: 18 (10/02/22 1558)  BP: (!) 103/51 (10/02/22 1558)  SpO2: 97 % (10/02/22 1558)   Vital Signs (24h Range):  Temp:  [98.2 °F (36.8 °C)-100.2 °F (37.9 °C)] 98.2 °F (36.8 °C)  Pulse:  [] 77  Resp:  [18-33] 18  SpO2:  [97 %-99 %] 97 %  BP: ()/(51-59) 103/51     Weight: 61.6 kg (135 lb 12.9 oz)  Body mass index is 21.27 kg/m².      Intake/Output Summary (Last 24 hours) at 10/2/2022 1721  Last data filed at 10/2/2022 1349  Gross per 24 hour   Intake 200 ml   Output --   Net 200 ml       Physical Exam  Constitutional:       Appearance: Normal appearance.   Cardiovascular:      Rate and Rhythm: Normal rate.   Pulmonary:      Effort: Pulmonary effort is normal. No respiratory distress.   Abdominal:      General: Abdomen is flat. There is no distension.      Palpations: Abdomen is soft.      Tenderness: There is no abdominal tenderness. There is no guarding.   Skin:     Comments: LLE:   Pitting edema all the way up to his knee  Erythema   No crepitus  No skin sloughing  A small blister on the medial side of the ankle  Dopplerable signals      Neurological:      General: No focal deficit present.      Mental Status: He is alert.       Significant Labs:  BMP:   Recent Labs   Lab 10/02/22  1209   *   *   K 3.1*      CO2 26   BUN 26*   CREATININE 0.9   CALCIUM 8.9   MG 1.1*     CBC:   Recent Labs   Lab 10/02/22  1209   WBC 25.54*   RBC 4.31*   HGB  12.1*   HCT 34.8*      MCV 81*   MCH 28.1   MCHC 34.8     CMP:   Recent Labs   Lab 10/02/22  1209   *   CALCIUM 8.9   ALBUMIN 2.2*   PROT 6.6   *   K 3.1*   CO2 26      BUN 26*   CREATININE 0.9   ALKPHOS 89   ALT 18   AST 19   BILITOT 1.7*       Significant Diagnostics:  I have reviewed all pertinent imaging results/findings within the past 24 hours.    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    - Edema and blister on the medial side of the leg probably related to cellulitis  - Labs are worrisome, however physical exam (no crepitus, no foul smelling, no sloughing of the skin) and CT scan findings of just edema of the superficial compartments, no abscess or air, support the diagnosis of cellulitis.   - Patient already improving with first dose of antibiotic therapy and IVF  - Low concern for compartment syndrome, no paresthesia, poikilothermia, pallor, paralysis, or pulselessness.  - Will try to continue with antibiotic therapy   - If he deteriorates please call general surgery.   - If his exam changes will plan for I&D vs. amputation  - Continue abx, please add clindamycin     Thank you for your consult. I will follow-up with patient. Please contact us if you have any additional questions.    Mynor Tenorio MD  General Surgery  [facility] - Med Surg

## 2022-10-02 NOTE — HPI
"Mr. 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  He states this all started about 4 days ago when he was walking in a field. He states he felt some significant itching in his lower leg.  Later that night his leg started to swell and hurt.  He assumed this was caused by an insect bite on his leg, but he never saw a specific bite or insect.  He states that the 2nd night the swelling and pain became severe and he was having fevers and chills which prompted him to visit his primary care provider the next day.  He was prescribed an antibiotic, not sure which one, and despite taking this his leg has continued to get worse.  He notes he has been having fevers and chills and last night he "slid or fell" out of his chair when he was trying to get up because the pain was so bad.  He notes he uses a walker to ambulate at home.  He also notes a chronic cough ascociated wiith allergic rhinitis and had an episode of nausea and vomiting one week ago.  He reports diminished oral intake due to decreased appetite, but no nausea or vomiting since this all started 4 days ago.  He denies chest pain, shortness of breath, headache, palpitations, dysuria and diarrhea.  "

## 2022-10-02 NOTE — ED PROVIDER NOTES
Encounter Date: 10/2/2022       History     Chief Complaint   Patient presents with    Insect Bite     EMS called to 71yo male that was bit by unknown insect on Wednesday. Has warmth, swelling and redness to right lower extremity since the bite but the pain is increasing. Saw  on Friday and was given oral antibiotics but no improvement.      72-year-old male with history of Bartter syndrome and glaucoma presents to the emergency department with possible skin infection.  The patient thinks he was bit by an insect.  He saw his primary care physician on Friday and was started on antibiotics.  Unfortunately, he does not know the name of the antibiotic.  The patient states he has had progressive redness to the left lower extremity since that time.  He has been having some vomiting, fever and chills.  He denies any fresh water or salt water exposure to his legs.      Review of patient's allergies indicates:   Allergen Reactions    Compazine [prochlorperazine edisylate]      Past Medical History:   Diagnosis Date    Arthritis     Bartter syndrome     Blind left eye     Cancer     Cataract     Glaucoma     Normocytic anemia 10/2/2022    Prostate cancer     Urinary incontinence      Past Surgical History:   Procedure Laterality Date    CHOLECYSTECTOMY      JOINT REPLACEMENT      PROSTATE SURGERY      REFRACTIVE SURGERY Bilateral     ROTATOR CUFF REPAIR Right     sinus polyp       Family History   Problem Relation Age of Onset    Cataracts Father     No Known Problems Mother     Blindness Maternal Aunt     Glaucoma Maternal Aunt     No Known Problems Sister     No Known Problems Brother     No Known Problems Sister     No Known Problems Sister     No Known Problems Brother     No Known Problems Maternal Uncle     No Known Problems Paternal Aunt     No Known Problems Paternal Uncle     No Known Problems Maternal Grandmother     No Known Problems Maternal Grandfather     No Known Problems Paternal Grandmother     No Known  Problems Paternal Grandfather     Amblyopia Neg Hx     Macular degeneration Neg Hx     Retinal detachment Neg Hx     Strabismus Neg Hx     Cancer Neg Hx     Diabetes Neg Hx     Hypertension Neg Hx     Stroke Neg Hx     Thyroid disease Neg Hx      Social History     Tobacco Use    Smoking status: Never    Smokeless tobacco: Never   Substance Use Topics    Alcohol use: No     Alcohol/week: 0.0 standard drinks    Drug use: No     Review of Systems   Constitutional:  Positive for chills and fever.   HENT:  Negative for congestion.    Eyes:  Negative for discharge.   Respiratory:  Positive for cough (patient has had a cough but states this is not new).    Cardiovascular:  Positive for leg swelling. Negative for chest pain.   Gastrointestinal:  Positive for nausea and vomiting. Negative for abdominal pain and diarrhea.   Genitourinary:  Negative for dysuria.   Skin:  Positive for color change and wound.   Neurological:  Negative for headaches.     Physical Exam     Initial Vitals [10/02/22 1132]   BP Pulse Resp Temp SpO2   (!) 123/59 (!) 120 (!) 28 99.1 °F (37.3 °C) 99 %      MAP       --         Physical Exam    Constitutional: He appears well-developed and well-nourished. He is not diaphoretic. No distress.   HENT:   Head: Normocephalic and atraumatic.   Eyes: Conjunctivae are normal.   Neck: Neck supple.   Cardiovascular:  Regular rhythm.   Tachycardia present.         Pulses:       Dorsalis pedis pulses are 2+ on the right side.   Unable to palpate left DP pulse but it is found easily with Doppler.   Pulmonary/Chest: Breath sounds normal. No respiratory distress.   Abdominal: Abdomen is soft. Bowel sounds are normal. He exhibits no distension. There is no abdominal tenderness.   Musculoskeletal:         General: Edema present.      Cervical back: Neck supple.      Comments: For left lower extremity is diffusely swollen. patient is able to range the left knee.  The left lower extremity is tender to palpation.       Neurological: He is alert. GCS score is 15. GCS eye subscore is 4. GCS verbal subscore is 5. GCS motor subscore is 6.   Sensation to light touch is intact in bilateral lower extremities   Skin:   Erythema extends from ankle to just above the left knee.  There is no crepitus.  The patient does have bullae noted to the medial and lateral ankle.  There is some abrasions noted to the anterior ankle.  There is no active drainage.     Psychiatric: He has a normal mood and affect.         ED Course   Critical Care    Date/Time: 10/2/2022 2:09 PM  Performed by: Rajwinder Tam MD  Authorized by: Braydon Abbott MD   Total critical care time (exclusive of procedural time) : 0 minutes  Comments: Please put in 40 minutes of critical care due to patient having a high risk of sepsis.   Separate from teaching and exclusive of procedure and ekg time  Includes:  Time at bedside  Time reviewing test results  Time discussing case with staff  Time documenting the medical record  Time spent with family members  Time spent with consults  Management          Labs Reviewed   CBC W/ AUTO DIFFERENTIAL - Abnormal; Notable for the following components:       Result Value    WBC 25.54 (*)     RBC 4.31 (*)     Hemoglobin 12.1 (*)     Hematocrit 34.8 (*)     MCV 81 (*)     Lymph % 4.0 (*)     Mono % 0.0 (*)     All other components within normal limits   COMPREHENSIVE METABOLIC PANEL - Abnormal; Notable for the following components:    Sodium 134 (*)     Potassium 3.1 (*)     Glucose 125 (*)     BUN 26 (*)     Albumin 2.2 (*)     Total Bilirubin 1.7 (*)     Anion Gap 7 (*)     All other components within normal limits   PROCALCITONIN - Abnormal; Notable for the following components:    Procalcitonin 3.06 (*)     All other components within normal limits   LACTIC ACID, PLASMA   POCT INFLUENZA A/B MOLECULAR   SARS-COV-2 RDRP GENE   ISTAT LACTATE   POCT GLUCOSE MONITORING CONTINUOUS          Imaging Results              US Lower Extremity  Arteries Bilateral (Final result)  Result time 10/02/22 18:22:05   Procedure changed from US Lower Extrem Arteries Bilat with WILLIE (xpd)     Final result by Atilio Clement DO (10/02/22 18:22:05)                   Impression:      No vessel occlusion.    Mildly elevated velocity within the left popliteal artery with monophasic waveforms in the calf arteries, concerning for stenosis of the popliteal artery.      Electronically signed by: Atilio Clement  Date:    10/02/2022  Time:    18:22               Narrative:    EXAMINATION:  US LOWER EXTREMITY ARTERIES BILATERAL    CLINICAL HISTORY:  Eval for PVD;    TECHNIQUE:  Ultrasound arterial lower extremity bilateral.    COMPARISON:  None    FINDINGS:  Right lower extremity.    CFA: 138 cm/sec, triphasic.    DFA: 100 cm/sec, biphasic.    Prox SFA: 110 cm/sec, triphasic.    Mid SFA: 113 cm/sec, triphasic.    Dist SFA: 97 cm/sec, triphasic.    Prox pop A: 8 cm/sec, triphasic.    Distal pop A: 76 cm/sec, triphasic.    ALLA: 112 cm/sec, triphasic.    Peroneal A: 54 cm/sec, biphasic.    PTA: 65 cm/sec, triphasic.    DPA: 94 cm/sec, triphasic    Left lower extremity    CFA: 160 cm/sec, triphasic.    DFA: 123 cm/sec, biphasic.    Prox SFA: 131 cm/sec, triphasic.    Mid SFA: 140 cm/sec, triphasic.    Dist SFA: 109 cm/sec, triphasic.    Prox pop A: 190 cm/sec, triphasic.    Distal pop A: 123 cm/sec, triphasic.    ALLA: 139 cm/sec, monophasic.    Peroneal A: 34 cm/sec, monophasic.    PTA: 104 cm/sec, monophasic.    DPA: 167 cm/sec, monophasic.                                       US Lower Extremity Veins Left (Final result)  Result time 10/02/22 13:19:03      Final result by Jose Gregory MD (10/02/22 13:19:03)                   Impression:      No evidence of deep venous thrombosis in the left lower extremity.      Electronically signed by: Jose Gregory MD  Date:    10/02/2022  Time:    13:19               Narrative:    EXAMINATION:  US LOWER EXTREMITY VEINS  LEFT    CLINICAL HISTORY:  Other specified soft tissue disorders    TECHNIQUE:  Duplex and color flow Doppler evaluation and graded compression of the left lower extremity veins was performed.    COMPARISON:  None    FINDINGS:  Left thigh veins: The common femoral, femoral, popliteal, upper greater saphenous, and deep femoral veins are patent and free of thrombus. The veins are normally compressible and have normal phasic flow and augmentation response.    Left calf veins: The visualized calf veins are patent.    Contralateral CFV: The contralateral (right) common femoral vein is patent and free of thrombus.    Miscellaneous: Edema in the left lower extremity soft tissue.                                       X-Ray Tibia Fibula 2 View Left (Final result)  Result time 10/02/22 12:40:15      Final result by Jose Gregory MD (10/02/22 12:40:15)                   Impression:      Diffuse soft tissue thickening throughout the left lower extremity which may be seen with cellulitis.  No large amount of air is visualized.  Follow-up/further evaluation as clinically indicated.      Electronically signed by: Jose Gregory MD  Date:    10/02/2022  Time:    12:40               Narrative:    EXAMINATION:  XR TIBIA FIBULA 2 VIEW LEFT    CLINICAL HISTORY:  Cellulitis, unspecified    TECHNIQUE:  AP and lateral views of the left tibia and fibula were performed.    COMPARISON:  None.    FINDINGS:  Diffuse soft tissue thickening throughout the left lower extremity.  No large amount of air is visualized.  Small amount of subcutaneous emphysema is not excluded.  No evidence of acute fracture or dislocation.  Spurring of the inferior and posterior calcaneus.  Atherosclerosis noted.                                       X-Ray Chest AP Portable (Final result)  Result time 10/02/22 12:20:54      Final result by Jose Gregory MD (10/02/22 12:20:54)                   Impression:      No acute abnormality.      Electronically  signed by: Jose Gregory MD  Date:    10/02/2022  Time:    12:20               Narrative:    EXAMINATION:  XR CHEST AP PORTABLE    CLINICAL HISTORY:  Sepsis;    TECHNIQUE:  Single frontal view of the chest was performed.    COMPARISON:  Chest radiograph from 07/01/2010    FINDINGS:  The lungs are clear, with normal appearance of pulmonary vasculature and no pleural effusion or pneumothorax.    The cardiac silhouette is normal in size. The hilar and mediastinal contours are unremarkable.    Bones are intact.                                       Medications   sodium chloride 0.9% bolus 1,770 mL (1,770 mLs Intravenous Not Given 10/2/22 1145)   vancomycin - pharmacy to dose (has no administration in time range)   allopurinoL tablet 100 mg (100 mg Oral Given 10/2/22 1600)   aspirin chewable tablet 81 mg (has no administration in time range)   azelastine 137 mcg (0.1 %) nasal spray 137 mcg (has no administration in time range)   latanoprost 0.005 % ophthalmic solution 1 drop (has no administration in time range)   cetirizine tablet 5 mg (has no administration in time range)   timolol maleate 0.5% ophthalmic solution 1 drop (has no administration in time range)   sodium chloride 0.9% flush 10 mL (has no administration in time range)   glucose chewable tablet 16 g (has no administration in time range)   glucose chewable tablet 24 g (has no administration in time range)   glucagon (human recombinant) injection 1 mg (has no administration in time range)   dextrose 10% bolus 125 mL (has no administration in time range)   dextrose 10% bolus 250 mL (has no administration in time range)   0.9%  NaCl infusion ( Intravenous Restarted 10/2/22 1900)   enoxaparin injection 40 mg (40 mg Subcutaneous Given 10/2/22 1601)   melatonin tablet 6 mg (has no administration in time range)   loperamide capsule 2 mg (has no administration in time range)   acetaminophen tablet 650 mg (has no administration in time range)   ondansetron  injection 4 mg (has no administration in time range)   vancomycin - pharmacy to dose (has no administration in time range)   cefepime in dextrose 5 % IVPB 2 g (0 g Intravenous Stopped 10/2/22 1631)   HYDROcodone-acetaminophen 5-325 mg per tablet 1 tablet (has no administration in time range)   lactobacillus acidophilus & bulgar 100 million cell packet 1 each (has no administration in time range)   docusate sodium capsule 100 mg (has no administration in time range)   HYDROcodone-acetaminophen  mg per tablet 1 tablet (has no administration in time range)   vancomycin 1.5 g in dextrose 5 % 250 mL IVPB (ready to mix) (has no administration in time range)   benzonatate capsule 100 mg (has no administration in time range)   oxybutynin tablet 5 mg (has no administration in time range)   clindamycin in D5W 600 mg/50 mL IVPB 600 mg ( Intravenous Verify Only 10/2/22 1845)   piperacillin-tazobactam 4.5 g in dextrose 5 % 100 mL IVPB (ready to mix system) (0 g Intravenous Stopped 10/2/22 1349)   vancomycin 1.5 g in dextrose 5 % 250 mL IVPB (ready to mix) (0 mg Intravenous Stopped 10/2/22 1523)   HYDROcodone-acetaminophen 5-325 mg per tablet 1 tablet (1 tablet Oral Given 10/2/22 1252)   acetaminophen tablet 325 mg (325 mg Oral Given 10/2/22 1317)   lactated ringers bolus 1,770 mL (0 mLs Intravenous Stopped 10/2/22 1535)   potassium chloride SA CR tablet 50 mEq (50 mEq Oral Given 10/2/22 1600)   iohexoL (OMNIPAQUE 350) injection 75 mL (75 mLs Intravenous Given 10/2/22 1726)     Medical Decision Making:   Initial Assessment:   72-year-old male presents the emergency department with left lower leg swelling, redness.  The patient was started on antibiotic on Friday but symptoms have worsened.  On exam, his left lower extremity is diffusely swollen, tender, erythematous.  No crepitus noted but he does have some scattered bullae about the ankle.  My overall impression is patient is cellulitis versus necrotizing fasciitis  "(patient does not appear toxic), low suspicion for DVT.  Sepsis alert initiated pawn arrival.  Will cover with vanc, Zosyn.  Get ultrasound of the left lower extremity to assess for DVT, x-rays to assess for any subcutaneous emphysema.  Anticipate admission.  Clinical Tests:   Sepsis Perfusion Assessment: "I attest a sepsis perfusion exam was performed within 6 hours of sepsis, severe sepsis, or septic shock presentation, following fluid resuscitation."           ED Course as of 10/02/22 1921   [facility] Oct 02, 2022   1405 Case reviewed with Dr. Abbott for hospital admission. Patient reassessed, feeling improved, resting comfortably. Reviewed lab and imaging and care plan. . [LH]      ED Course User Index  [LH] Rajwinder Tam MD                 Clinical Impression:   Final diagnoses:  [R00.0] Tachycardia  [L03.90] Cellulitis (Primary)  [M79.89] Leg swelling  [L03.90] Cellulitis - assess for subcutaneous emphysema  [E87.6] Hypokalemia  [L03.90] Cellulitis, unspecified cellulitis site   This dictation has been generated using M-Modal Fluency Direct dictation; some phonetic errors may occur.             Rajwinder Tam MD  10/02/22 1921    "

## 2022-10-02 NOTE — ASSESSMENT & PLAN NOTE
-Admitted to inpatient status  -On admit he was tachycardic and tachypneic with WBC 25k and normal lactic acid  -Source of sepsis is obviously a severe cellulitis of his left lower extremity.  -In the ER blood cultures were collected and he received sepsis bolus of fluids, vancomycin, zosyn and norco.  -Pulses in left foot are dopplerable, but not palpable  -I'm concerned he is at risk for necrotizing infection and possible compartment syndrome so called and discussed case with General Surgery and they will see him today.  Given that this spans both his ankle and knee, we could end up needing orthopedics involved, but will start with general surgery evaluation.  -Sepsis reperfusion exam completed - will continue IV fluids  -Monitor pulses in leg q4h with doppler and check arterial US of his leg with mae.  -Will treat with vancomycin and cefepime for now.

## 2022-10-02 NOTE — ASSESSMENT & PLAN NOTE
"-At home ambulates with a walker  -Notes he "Fell or slid" out of a chair on night prior to admit  -When more stable will consult PT/OT for evaluation.  "

## 2022-10-02 NOTE — H&P
"[facility] Medicine  History & Physical    Patient Name: Mark Church  MRN: [id number]  Patient Class: IP- Inpatient  Admission Date: 10/2/2022  Attending Physician: Braydon Abbott MD  Primary Care Provider: Mathew Koenig MD         Patient information was obtained from patient, relative(s), past medical records and ER records.     Subjective:     Principal Problem:Sepsis    Chief Complaint:   Chief Complaint   Patient presents with    Insect Bite     EMS called to 71yo male that was bit by unknown insect on Wednesday. Has warmth, swelling and redness to right lower extremity since the bite but the pain is increasing. Saw dr on Friday and was given oral antibiotics but no improvement.         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  He states this all started about 4 days ago when he was walking in a field. He states he felt some significant itching in his lower leg.  Later that night his leg started to swell and hurt.  He assumed this was caused by an insect bite on his leg, but he never saw a specific bite or insect.  He states that the 2nd night the swelling and pain became severe and he was having fevers and chills which prompted him to visit his primary care provider the next day.  He was prescribed an antibiotic, not sure which one, and despite taking this his leg has continued to get worse.  He notes he has been having fevers and chills and last night he "slid or fell" out of his chair when he was trying to get up because the pain was so bad.  He notes he uses a walker to ambulate at home.  He also notes a chronic cough ascociated wiith allergic rhinitis and had an episode of nausea and vomiting one week ago.  He reports diminished oral intake due to decreased appetite, but no nausea or vomiting since this all started 4 days ago.  He denies chest pain, shortness of " breath, headache, palpitations, dysuria and diarrhea.      Past Medical History:   Diagnosis Date    Arthritis     Bartter syndrome     Blind left eye     Cancer     Cataract     Glaucoma     Normocytic anemia 10/2/2022    Prostate cancer     Urinary incontinence        Past Surgical History:   Procedure Laterality Date    CHOLECYSTECTOMY      JOINT REPLACEMENT      PROSTATE SURGERY      REFRACTIVE SURGERY Bilateral     ROTATOR CUFF REPAIR Right     sinus polyp         Review of patient's allergies indicates:   Allergen Reactions    Compazine [prochlorperazine edisylate]        No current facility-administered medications on file prior to encounter.     Current Outpatient Medications on File Prior to Encounter   Medication Sig    allopurinol (ZYLOPRIM) 100 MG tablet TAKE 1 TABLET EVERY DAY    clindamycin (CLEOCIN) 300 MG capsule Take 300 mg by mouth every 12 (twelve) hours.    HYDROcodone-acetaminophen (NORCO) 5-325 mg per tablet Take 1 tablet by mouth every 8 (eight) hours as needed.    latanoprost 0.005 % ophthalmic solution Place 1 drop into both eyes every evening.    multivit-minerals/folic acid (MULTIVITAMIN GUMMIES ORAL) Take by mouth.    mupirocin (BACTROBAN) 2 % ointment Apply topically 3 (three) times daily.    oxybutynin (DITROPAN) 5 MG Tab 5 mg once daily.     potassium chloride (MICRO-K) 10 MEQ CpSR TAKE 2 CAPSULES TWICE DAILY    timolol maleate 0.5% (TIMOPTIC) 0.5 % Drop Place 1 drop into both eyes 2 (two) times daily.    aspirin 81 MG Chew Take 81 mg by mouth every other day.     azelastine (ASTELIN) 137 mcg (0.1 %) nasal spray 1 spray (137 mcg total) by Nasal route 2 (two) times daily.    levocetirizine (XYZAL) 5 MG tablet Take 1 tablet (5 mg total) by mouth every evening.     Family History       Problem Relation (Age of Onset)    Blindness Maternal Aunt    Cataracts Father    Glaucoma Maternal Aunt    No Known Problems Mother, Sister, Brother, Sister, Sister, Brother, Maternal Uncle, Paternal Aunt,  Paternal Uncle, Maternal Grandmother, Maternal Grandfather, Paternal Grandmother, Paternal Grandfather          Tobacco Use    Smoking status: Never    Smokeless tobacco: Never   Substance and Sexual Activity    Alcohol use: No     Alcohol/week: 0.0 standard drinks    Drug use: No    Sexual activity: Not on file     Review of Systems   Constitutional:  Positive for activity change, appetite change, chills, fatigue and fever.   HENT:  Negative for congestion and dental problem.    Eyes:  Negative for discharge and itching.   Respiratory:  Positive for cough. Negative for apnea, chest tightness and shortness of breath.    Cardiovascular:  Negative for chest pain and leg swelling.   Gastrointestinal:  Negative for abdominal distention and abdominal pain.   Endocrine: Negative for cold intolerance and heat intolerance.   Genitourinary:  Negative for difficulty urinating and dysuria.   Musculoskeletal:  Positive for joint swelling and myalgias. Negative for arthralgias and back pain.   Skin:  Positive for color change and rash.   Allergic/Immunologic: Negative for environmental allergies and food allergies.   Neurological:  Positive for weakness. Negative for dizziness, facial asymmetry and light-headedness.   Hematological:  Negative for adenopathy. Does not bruise/bleed easily.   Psychiatric/Behavioral:  Negative for agitation and behavioral problems.    Objective:     Vital Signs (Most Recent):  Temp: 98.2 °F (36.8 °C) (10/02/22 1558)  Pulse: 77 (10/02/22 1558)  Resp: 18 (10/02/22 1558)  BP: (!) 103/51 (10/02/22 1558)  SpO2: 97 % (10/02/22 1558)   Vital Signs (24h Range):  Temp:  [98.2 °F (36.8 °C)-100.2 °F (37.9 °C)] 98.2 °F (36.8 °C)  Pulse:  [] 77  Resp:  [18-33] 18  SpO2:  [97 %-99 %] 97 %  BP: ()/(51-59) 103/51     Weight: 59 kg (130 lb)  Body mass index is 20.36 kg/m².    Physical Exam  Vitals and nursing note reviewed.   Constitutional:       General: He is not in acute distress.     Appearance: He  "is well-developed. He is ill-appearing and toxic-appearing. He is not diaphoretic.   HENT:      Head: Normocephalic and atraumatic.      Right Ear: External ear normal.      Left Ear: External ear normal.      Nose: Nose normal.      Mouth/Throat:      Mouth: Mucous membranes are moist.   Eyes:      Extraocular Movements: Extraocular movements intact.      Conjunctiva/sclera: Conjunctivae normal.   Cardiovascular:      Rate and Rhythm: Normal rate and regular rhythm.      Heart sounds: Normal heart sounds.   Pulmonary:      Effort: Pulmonary effort is normal. No respiratory distress.      Breath sounds: Normal breath sounds.   Abdominal:      General: Bowel sounds are normal. There is no distension.      Palpations: Abdomen is soft.      Tenderness: There is no abdominal tenderness.   Musculoskeletal:      Cervical back: Normal range of motion. No rigidity.      Comments: Severe swelling, redness and warmth to left leg from just above knee to tips of toes.  There is a 1" bullae near medial malleolus and an area of hyperpigmentation on anterior angle of ankle which looks like there was a bullae there as well which has already drained.  There are a few tiny marks on his shin which may be puncture marks but otherwise no clear insect bite or etiology.  There is no purulent drainage.  I cannot palpate his DP or PT pulses in his left leg.  The leg is quite tender to touch, but otherwise he is comfortable and pain does not appear out of proportion   Skin:     General: Skin is warm and dry.   Neurological:      Mental Status: He is alert and oriented to person, place, and time.      Cranial Nerves: No cranial nerve deficit.      Coordination: Coordination normal.   Psychiatric:         Behavior: Behavior normal.                         Significant Labs: All pertinent labs within the past 24 hours have been reviewed.    Significant Imaging: I have reviewed all pertinent imaging results/findings within the past 24 " "hours.    Assessment/Plan:     * Sepsis  -Admitted to inpatient status  -On admit he was tachycardic and tachypneic with WBC 25k and normal lactic acid  -Source of sepsis is obviously a severe cellulitis of his left lower extremity.  -In the ER blood cultures were collected and he received sepsis bolus of fluids, vancomycin, zosyn and norco.  -Pulses in left foot are dopplerable, but not palpable  -I'm concerned he is at risk for necrotizing infection and possible compartment syndrome so called and discussed case with General Surgery and they will see him today.  Given that this spans both his ankle and knee, we could end up needing orthopedics involved, but will start with general surgery evaluation.  -Sepsis reperfusion exam completed - will continue IV fluids  -Monitor pulses in leg q4h with doppler and check arterial US of his leg with mae.  -Will treat with vancomycin and cefepime for now.    Cellulitis of left lower extremity  -Treatment as above.    Normocytic anemia  -Hb 12.1 on admit  -No bleeding  -Check iron, ferritin, b12 and folate  -Repeat cbc in AM    Hyponatremia  -On admit mild and likely due to diminished oral intake and mild dehydration  -Continue IV fluids  -Repeat BMP in AM.    Hypokalemia  -Due to Bartter syndrome and diminished oral intake  -Replace potassium today.  -Will need daily replacement  -Check BMP and Mag in AM.    Bartter syndrome  -At home takes KCl 10mEq daily and prior labs show his K is usually normal on this  -Treatment as above.    Debility  -At home ambulates with a walker  -Notes he "Fell or slid" out of a chair on night prior to admit  -When more stable will consult PT/OT for evaluation.    Chronic cough  -Presumed due to allergic rhinitis  -Continue home anti-histamines  -Add tessalon PRN    Chronic gout  -Check uric acid now  -Continue home allopurinol.    Primary open angle glaucoma of both eyes, severe stage  -History noted  -Continue home timolol and lantanoprost " drops    H/O prostate cancer  -history noted  -Continue home oxybutynin    VTE Risk Mitigation (From admission, onward)           Ordered     enoxaparin injection 40 mg  Daily         10/02/22 1416     IP VTE HIGH RISK PATIENT  Once         10/02/22 1416     Place sequential compression device  Until discontinued         10/02/22 1416                       Braydon Abbott MD  Department of Hospital Medicine   [facility] [place] [facility]

## 2022-10-02 NOTE — NURSING
MEWS monitoring with MEWS score of 4 but pt just arrived to room 418 from ED. I will con't to monitor.

## 2022-10-02 NOTE — ASSESSMENT & PLAN NOTE
-Due to Bartter syndrome and diminished oral intake  -Replace potassium today.  -Will need daily replacement  -Check BMP and Mag in AM.

## 2022-10-02 NOTE — NURSING
Patient arrived to floor via transporter tech from ED. Patient transferred to bed independently. AAOX4. Patient was oriented to room, information on communication board, and medication regimen. Bed low adequate lighting provided, side rails x2 up, call bell in reach. Admission assessment completed. IV fluids started. Tele monitor on and monitor tech notified. [address]. Vitals per chart. Patient denied having any acute distress at this time. None observed. Sister at bedside. Dr. Abbott at bedside discussing plan of care with patient.

## 2022-10-02 NOTE — ED TRIAGE NOTES
BIB EMS after presumed insect bite 4 days ago. Pt presents with left dorsum foot swellin, redness and pain. Pt went to his Dr and was commenced on oral a/bs but symptoms have continued. Pt now also febrile and nauseated.

## 2022-10-02 NOTE — SUBJECTIVE & OBJECTIVE
Past Medical History:   Diagnosis Date    Arthritis     Bartter syndrome     Blind left eye     Cancer     Cataract     Glaucoma     Normocytic anemia 10/2/2022    Prostate cancer     Urinary incontinence        Past Surgical History:   Procedure Laterality Date    CHOLECYSTECTOMY      JOINT REPLACEMENT      PROSTATE SURGERY      REFRACTIVE SURGERY Bilateral     ROTATOR CUFF REPAIR Right     sinus polyp         Review of patient's allergies indicates:   Allergen Reactions    Compazine [prochlorperazine edisylate]        No current facility-administered medications on file prior to encounter.     Current Outpatient Medications on File Prior to Encounter   Medication Sig    allopurinol (ZYLOPRIM) 100 MG tablet TAKE 1 TABLET EVERY DAY    clindamycin (CLEOCIN) 300 MG capsule Take 300 mg by mouth every 12 (twelve) hours.    HYDROcodone-acetaminophen (NORCO) 5-325 mg per tablet Take 1 tablet by mouth every 8 (eight) hours as needed.    latanoprost 0.005 % ophthalmic solution Place 1 drop into both eyes every evening.    multivit-minerals/folic acid (MULTIVITAMIN GUMMIES ORAL) Take by mouth.    mupirocin (BACTROBAN) 2 % ointment Apply topically 3 (three) times daily.    oxybutynin (DITROPAN) 5 MG Tab 5 mg once daily.     potassium chloride (MICRO-K) 10 MEQ CpSR TAKE 2 CAPSULES TWICE DAILY    timolol maleate 0.5% (TIMOPTIC) 0.5 % Drop Place 1 drop into both eyes 2 (two) times daily.    aspirin 81 MG Chew Take 81 mg by mouth every other day.     azelastine (ASTELIN) 137 mcg (0.1 %) nasal spray 1 spray (137 mcg total) by Nasal route 2 (two) times daily.    levocetirizine (XYZAL) 5 MG tablet Take 1 tablet (5 mg total) by mouth every evening.     Family History       Problem Relation (Age of Onset)    Blindness Maternal Aunt    Cataracts Father    Glaucoma Maternal Aunt    No Known Problems Mother, Sister, Brother, Sister, Sister, Brother, Maternal Uncle, Paternal Aunt, Paternal Uncle, Maternal Grandmother, Maternal  Grandfather, Paternal Grandmother, Paternal Grandfather          Tobacco Use    Smoking status: Never    Smokeless tobacco: Never   Substance and Sexual Activity    Alcohol use: No     Alcohol/week: 0.0 standard drinks    Drug use: No    Sexual activity: Not on file     Review of Systems   Constitutional:  Positive for activity change, appetite change, chills, fatigue and fever.   HENT:  Negative for congestion and dental problem.    Eyes:  Negative for discharge and itching.   Respiratory:  Positive for cough. Negative for apnea, chest tightness and shortness of breath.    Cardiovascular:  Negative for chest pain and leg swelling.   Gastrointestinal:  Negative for abdominal distention and abdominal pain.   Endocrine: Negative for cold intolerance and heat intolerance.   Genitourinary:  Negative for difficulty urinating and dysuria.   Musculoskeletal:  Positive for joint swelling and myalgias. Negative for arthralgias and back pain.   Skin:  Positive for color change and rash.   Allergic/Immunologic: Negative for environmental allergies and food allergies.   Neurological:  Positive for weakness. Negative for dizziness, facial asymmetry and light-headedness.   Hematological:  Negative for adenopathy. Does not bruise/bleed easily.   Psychiatric/Behavioral:  Negative for agitation and behavioral problems.    Objective:     Vital Signs (Most Recent):  Temp: 98.2 °F (36.8 °C) (10/02/22 1558)  Pulse: 77 (10/02/22 1558)  Resp: 18 (10/02/22 1558)  BP: (!) 103/51 (10/02/22 1558)  SpO2: 97 % (10/02/22 1558)   Vital Signs (24h Range):  Temp:  [98.2 °F (36.8 °C)-100.2 °F (37.9 °C)] 98.2 °F (36.8 °C)  Pulse:  [] 77  Resp:  [18-33] 18  SpO2:  [97 %-99 %] 97 %  BP: ()/(51-59) 103/51     Weight: 59 kg (130 lb)  Body mass index is 20.36 kg/m².    Physical Exam  Vitals and nursing note reviewed.   Constitutional:       General: He is not in acute distress.     Appearance: He is well-developed. He is ill-appearing and  "toxic-appearing. He is not diaphoretic.   HENT:      Head: Normocephalic and atraumatic.      Right Ear: External ear normal.      Left Ear: External ear normal.      Nose: Nose normal.      Mouth/Throat:      Mouth: Mucous membranes are moist.   Eyes:      Extraocular Movements: Extraocular movements intact.      Conjunctiva/sclera: Conjunctivae normal.   Cardiovascular:      Rate and Rhythm: Normal rate and regular rhythm.      Heart sounds: Normal heart sounds.   Pulmonary:      Effort: Pulmonary effort is normal. No respiratory distress.      Breath sounds: Normal breath sounds.   Abdominal:      General: Bowel sounds are normal. There is no distension.      Palpations: Abdomen is soft.      Tenderness: There is no abdominal tenderness.   Musculoskeletal:      Cervical back: Normal range of motion. No rigidity.      Comments: Severe swelling, redness and warmth to left leg from just above knee to tips of toes.  There is a 1" bullae near medial malleolus and an area of hyperpigmentation on anterior angle of ankle which looks like there was a bullae there as well which has already drained.  There are a few tiny marks on his shin which may be puncture marks but otherwise no clear insect bite or etiology.  There is no purulent drainage.  I cannot palpate his DP or PT pulses in his left leg.  The leg is quite tender to touch, but otherwise he is comfortable and pain does not appear out of proportion   Skin:     General: Skin is warm and dry.   Neurological:      Mental Status: He is alert and oriented to person, place, and time.      Cranial Nerves: No cranial nerve deficit.      Coordination: Coordination normal.   Psychiatric:         Behavior: Behavior normal.           Significant Labs: All pertinent labs within the past 24 hours have been reviewed.    Significant Imaging: I have reviewed all pertinent imaging results/findings within the past 24 hours.  "

## 2022-10-02 NOTE — ASSESSMENT & PLAN NOTE
-On admit mild and likely due to diminished oral intake and mild dehydration  -Continue IV fluids  -Repeat BMP in AM.

## 2022-10-02 NOTE — PROGRESS NOTES
Pharmacokinetic Initial Assessment: IV Vancomycin    Assessment/Plan:    Initiate intravenous vancomycin with loading dose of 1500 mg once followed by a maintenance dose of vancomycin 1500 mg IV every 24 hours.  Desired empiric serum trough concentration is 10 to 20 mcg/mL.  Draw vancomycin trough level 60 min prior to third dose on Tuesday 10/4 at approximately 1300.   Pharmacy will continue to follow and monitor vancomycin.      Please contact pharmacy at extension [phone] with any questions regarding this assessment.     Thank you for the consult,   Alexis Martinez       Patient brief summary:  Mark Church is a 72 y.o. male initiated on antimicrobial therapy with IV Vancomycin for treatment of suspected sepsis    Drug Allergies:   Review of patient's allergies indicates:   Allergen Reactions    Compazine [prochlorperazine edisylate]        Actual Body Weight:   59 kg    Renal Function:   Estimated Creatinine Clearance: 61.9 mL/min (based on SCr of 0.9 mg/dL).,     Dialysis Method (if applicable):  N/A    CBC (last 72 hours):  Recent Labs   Lab Result Units 10/02/22  1209   WBC K/uL 25.54*   Hemoglobin g/dL 12.1*   Hematocrit % 34.8*   Platelets K/uL 311       Metabolic Panel (last 72 hours):  Recent Labs   Lab Result Units 10/02/22  1209   Sodium mmol/L 134*   Potassium mmol/L 3.1*   Chloride mmol/L 101   CO2 mmol/L 26   Glucose mg/dL 125*   BUN mg/dL 26*   Creatinine mg/dL 0.9   Albumin g/dL 2.2*   Total Bilirubin mg/dL 1.7*   Alkaline Phosphatase U/L 89   AST U/L 19   ALT U/L 18       Drug levels (last 3 results):  No results for input(s): VANCOMYCINRA, VANCORANDOM, VANCOMYCINPE, VANCOPEAK, VANCOMYCINTR, VANCOTROUGH in the last 72 hours.    Microbiologic Results:  Microbiology Results (last 7 days)       Procedure Component Value Units Date/Time    Blood culture x two cultures. Draw prior to antibiotics. [[id number]] Collected: 10/02/22 1216    Order Status: Sent Specimen: Blood from Peripheral, Antecubital,  Right Updated: 10/02/22 1228    Blood culture x two cultures. Draw prior to antibiotics. [[id number]] Collected: 10/02/22 1210    Order Status: Sent Specimen: Blood from Peripheral, Foot, Right Updated: 10/02/22 1228

## 2022-10-02 NOTE — ASSESSMENT & PLAN NOTE
-At home takes KCl 10mEq daily and prior labs show his K is usually normal on this  -Treatment as above.

## 2022-10-02 NOTE — PHARMACY MED REC
"Admission Medication History     The home medication history was taken by Mamie Mccabe.    You may go to "Admission" then "Reconcile Home Medications" tabs to review and/or act upon these items.     The home medication list has been updated by the Pharmacy department.   Please read ALL comments highlighted in yellow.   Please address this information as you see fit.    Feel free to contact us if you have any questions or require assistance.      The medications listed below were removed from the home medication list. Please reorder if appropriate:  Patient reports no longer taking the following medication(s):  Aspirin  Astelin  Xyzal    Medications listed below were obtained from: Patient/family and Analytic software- [employer] and added to home medication:   Clindamycin   Norco 5-325mg   Mupirocin ointment   Multivitamin gummies    The medication reconciliation was completed by the patient's bedside.      Mamie Mccabe  [phone]                    .        "

## 2022-10-03 PROBLEM — L03.90 CELLULITIS: Status: ACTIVE | Noted: 2022-10-03

## 2022-10-03 LAB
ALBUMIN SERPL BCP-MCNC: 1.5 G/DL (ref 3.5–5.2)
ALP SERPL-CCNC: 68 U/L (ref 55–135)
ALT SERPL W/O P-5'-P-CCNC: 15 U/L (ref 10–44)
ANION GAP SERPL CALC-SCNC: 5 MMOL/L (ref 8–16)
ANISOCYTOSIS BLD QL SMEAR: SLIGHT
AST SERPL-CCNC: 22 U/L (ref 10–40)
BASOPHILS NFR BLD: 0 % (ref 0–1.9)
BILIRUB SERPL-MCNC: 1.4 MG/DL (ref 0.1–1)
BUN SERPL-MCNC: 18 MG/DL (ref 8–23)
BURR CELLS BLD QL SMEAR: ABNORMAL
CALCIUM SERPL-MCNC: 7.5 MG/DL (ref 8.7–10.5)
CHLORIDE SERPL-SCNC: 106 MMOL/L (ref 95–110)
CO2 SERPL-SCNC: 23 MMOL/L (ref 23–29)
CREAT SERPL-MCNC: 0.7 MG/DL (ref 0.5–1.4)
DIFFERENTIAL METHOD: ABNORMAL
DOHLE BOD BLD QL SMEAR: PRESENT
EOSINOPHIL NFR BLD: 0 % (ref 0–8)
ERYTHROCYTE [DISTWIDTH] IN BLOOD BY AUTOMATED COUNT: 13.2 % (ref 11.5–14.5)
EST. GFR  (NO RACE VARIABLE): >60 ML/MIN/1.73 M^2
ESTIMATED AVG GLUCOSE: 105 MG/DL (ref 68–131)
FOLATE SERPL-MCNC: 6.7 NG/ML (ref 4–24)
GLUCOSE SERPL-MCNC: 99 MG/DL (ref 70–110)
HBA1C MFR BLD: 5.3 % (ref 4–5.6)
HCT VFR BLD AUTO: 26.8 % (ref 40–54)
HGB BLD-MCNC: 9.2 G/DL (ref 14–18)
HYPOCHROMIA BLD QL SMEAR: ABNORMAL
IMM GRANULOCYTES # BLD AUTO: ABNORMAL K/UL (ref 0–0.04)
IMM GRANULOCYTES NFR BLD AUTO: ABNORMAL % (ref 0–0.5)
LYMPHOCYTES NFR BLD: 12 % (ref 18–48)
MAGNESIUM SERPL-MCNC: 1.2 MG/DL (ref 1.6–2.6)
MCH RBC QN AUTO: 28 PG (ref 27–31)
MCHC RBC AUTO-ENTMCNC: 34.3 G/DL (ref 32–36)
MCV RBC AUTO: 82 FL (ref 82–98)
METAMYELOCYTES NFR BLD MANUAL: 3 %
MONOCYTES NFR BLD: 3 % (ref 4–15)
MYELOCYTES NFR BLD MANUAL: 1 %
NEUTROPHILS NFR BLD: 49 % (ref 38–73)
NEUTS BAND NFR BLD MANUAL: 32 %
NRBC BLD-RTO: 0 /100 WBC
PLATELET # BLD AUTO: ABNORMAL K/UL (ref 150–450)
PLATELET BLD QL SMEAR: ABNORMAL
PMV BLD AUTO: ABNORMAL FL (ref 9.2–12.9)
POCT GLUCOSE: 87 MG/DL (ref 70–110)
POIKILOCYTOSIS BLD QL SMEAR: SLIGHT
POTASSIUM SERPL-SCNC: 2.6 MMOL/L (ref 3.5–5.1)
PROT SERPL-MCNC: 4.8 G/DL (ref 6–8.4)
RBC # BLD AUTO: 3.28 M/UL (ref 4.6–6.2)
SODIUM SERPL-SCNC: 134 MMOL/L (ref 136–145)
TOXIC GRANULES BLD QL SMEAR: PRESENT
VIT B12 SERPL-MCNC: 1590 PG/ML (ref 210–950)
WBC # BLD AUTO: 21.64 K/UL (ref 3.9–12.7)

## 2022-10-03 PROCEDURE — 99232 SBSQ HOSP IP/OBS MODERATE 35: CPT | Mod: ,,, | Performed by: SURGERY

## 2022-10-03 PROCEDURE — 94761 N-INVAS EAR/PLS OXIMETRY MLT: CPT

## 2022-10-03 PROCEDURE — 36415 COLL VENOUS BLD VENIPUNCTURE: CPT | Performed by: HOSPITALIST

## 2022-10-03 PROCEDURE — 83735 ASSAY OF MAGNESIUM: CPT | Performed by: HOSPITALIST

## 2022-10-03 PROCEDURE — 80053 COMPREHEN METABOLIC PANEL: CPT | Performed by: HOSPITALIST

## 2022-10-03 PROCEDURE — 25000003 PHARM REV CODE 250: Performed by: STUDENT IN AN ORGANIZED HEALTH CARE EDUCATION/TRAINING PROGRAM

## 2022-10-03 PROCEDURE — 99233 SBSQ HOSP IP/OBS HIGH 50: CPT | Mod: ,,, | Performed by: SURGERY

## 2022-10-03 PROCEDURE — 85007 BL SMEAR W/DIFF WBC COUNT: CPT | Performed by: HOSPITALIST

## 2022-10-03 PROCEDURE — 99232 PR SUBSEQUENT HOSPITAL CARE,LEVL II: ICD-10-PCS | Mod: ,,, | Performed by: SURGERY

## 2022-10-03 PROCEDURE — 97116 GAIT TRAINING THERAPY: CPT

## 2022-10-03 PROCEDURE — 63600175 PHARM REV CODE 636 W HCPCS: Performed by: HOSPITALIST

## 2022-10-03 PROCEDURE — 99233 PR SUBSEQUENT HOSPITAL CARE,LEVL III: ICD-10-PCS | Mod: ,,, | Performed by: SURGERY

## 2022-10-03 PROCEDURE — 11000001 HC ACUTE MED/SURG PRIVATE ROOM

## 2022-10-03 PROCEDURE — 85027 COMPLETE CBC AUTOMATED: CPT | Performed by: HOSPITALIST

## 2022-10-03 PROCEDURE — 63600175 PHARM REV CODE 636 W HCPCS: Performed by: NURSE PRACTITIONER

## 2022-10-03 PROCEDURE — 25000003 PHARM REV CODE 250: Performed by: HOSPITALIST

## 2022-10-03 PROCEDURE — 97161 PT EVAL LOW COMPLEX 20 MIN: CPT

## 2022-10-03 RX ORDER — POTASSIUM CHLORIDE 7.45 MG/ML
10 INJECTION INTRAVENOUS
Status: COMPLETED | OUTPATIENT
Start: 2022-10-03 | End: 2022-10-03

## 2022-10-03 RX ORDER — MAGNESIUM SULFATE HEPTAHYDRATE 40 MG/ML
2 INJECTION, SOLUTION INTRAVENOUS ONCE
Status: COMPLETED | OUTPATIENT
Start: 2022-10-03 | End: 2022-10-03

## 2022-10-03 RX ORDER — POTASSIUM CHLORIDE 750 MG/1
10 TABLET, EXTENDED RELEASE ORAL DAILY
Status: DISCONTINUED | OUTPATIENT
Start: 2022-10-03 | End: 2022-10-05

## 2022-10-03 RX ADMIN — CEFEPIME 2 G: 2 INJECTION, POWDER, FOR SOLUTION INTRAVENOUS at 08:10

## 2022-10-03 RX ADMIN — ENOXAPARIN SODIUM 40 MG: 100 INJECTION SUBCUTANEOUS at 04:10

## 2022-10-03 RX ADMIN — POTASSIUM CHLORIDE 10 MEQ: 7.46 INJECTION, SOLUTION INTRAVENOUS at 08:10

## 2022-10-03 RX ADMIN — CLINDAMYCIN IN 5 PERCENT DEXTROSE 600 MG: 12 INJECTION, SOLUTION INTRAVENOUS at 10:10

## 2022-10-03 RX ADMIN — OXYBUTYNIN CHLORIDE 5 MG: 5 TABLET ORAL at 08:10

## 2022-10-03 RX ADMIN — SODIUM CHLORIDE: 0.9 INJECTION, SOLUTION INTRAVENOUS at 03:10

## 2022-10-03 RX ADMIN — LACTOBACILLUS ACIDOPHILUS / LACTOBACILLUS BULGARICUS 1 EACH: 100 MILLION CFU STRENGTH GRANULES at 08:10

## 2022-10-03 RX ADMIN — CEFEPIME 2 G: 2 INJECTION, POWDER, FOR SOLUTION INTRAVENOUS at 11:10

## 2022-10-03 RX ADMIN — HYDROCODONE BITARTRATE AND ACETAMINOPHEN 1 TABLET: 10; 325 TABLET ORAL at 04:10

## 2022-10-03 RX ADMIN — TIMOLOL MALEATE 1 DROP: 5 SOLUTION/ DROPS OPHTHALMIC at 08:10

## 2022-10-03 RX ADMIN — ASPIRIN 81 MG CHEWABLE TABLET 81 MG: 81 TABLET CHEWABLE at 08:10

## 2022-10-03 RX ADMIN — LATANOPROST 1 DROP: 50 SOLUTION OPHTHALMIC at 09:10

## 2022-10-03 RX ADMIN — SODIUM CHLORIDE: 0.9 INJECTION, SOLUTION INTRAVENOUS at 10:10

## 2022-10-03 RX ADMIN — VANCOMYCIN HYDROCHLORIDE 1500 MG: 1.5 INJECTION, POWDER, LYOPHILIZED, FOR SOLUTION INTRAVENOUS at 02:10

## 2022-10-03 RX ADMIN — HYDROCODONE BITARTRATE AND ACETAMINOPHEN 1 TABLET: 10; 325 TABLET ORAL at 09:10

## 2022-10-03 RX ADMIN — CEFEPIME 2 G: 2 INJECTION, POWDER, FOR SOLUTION INTRAVENOUS at 04:10

## 2022-10-03 RX ADMIN — AZELASTINE 137 MCG: 1 SPRAY, METERED NASAL at 09:10

## 2022-10-03 RX ADMIN — DOCUSATE SODIUM 100 MG: 100 CAPSULE, LIQUID FILLED ORAL at 09:10

## 2022-10-03 RX ADMIN — POTASSIUM CHLORIDE 10 MEQ: 7.46 INJECTION, SOLUTION INTRAVENOUS at 09:10

## 2022-10-03 RX ADMIN — MAGNESIUM SULFATE HEPTAHYDRATE 2 G: 40 INJECTION, SOLUTION INTRAVENOUS at 05:10

## 2022-10-03 RX ADMIN — ACETAMINOPHEN 650 MG: 325 TABLET ORAL at 12:10

## 2022-10-03 RX ADMIN — OXYBUTYNIN CHLORIDE 5 MG: 5 TABLET ORAL at 09:10

## 2022-10-03 RX ADMIN — POTASSIUM CHLORIDE 10 MEQ: 7.46 INJECTION, SOLUTION INTRAVENOUS at 05:10

## 2022-10-03 RX ADMIN — AZELASTINE 137 MCG: 1 SPRAY, METERED NASAL at 08:10

## 2022-10-03 RX ADMIN — ALLOPURINOL 100 MG: 100 TABLET ORAL at 08:10

## 2022-10-03 RX ADMIN — POTASSIUM CHLORIDE 10 MEQ: 750 TABLET, EXTENDED RELEASE ORAL at 11:10

## 2022-10-03 RX ADMIN — LACTOBACILLUS ACIDOPHILUS / LACTOBACILLUS BULGARICUS 1 EACH: 100 MILLION CFU STRENGTH GRANULES at 09:10

## 2022-10-03 RX ADMIN — CLINDAMYCIN IN 5 PERCENT DEXTROSE 600 MG: 12 INJECTION, SOLUTION INTRAVENOUS at 02:10

## 2022-10-03 RX ADMIN — TIMOLOL MALEATE 1 DROP: 5 SOLUTION/ DROPS OPHTHALMIC at 09:10

## 2022-10-03 RX ADMIN — POTASSIUM CHLORIDE 10 MEQ: 7.46 INJECTION, SOLUTION INTRAVENOUS at 06:10

## 2022-10-03 RX ADMIN — DOCUSATE SODIUM 100 MG: 100 CAPSULE, LIQUID FILLED ORAL at 08:10

## 2022-10-03 RX ADMIN — CETIRIZINE HYDROCHLORIDE 5 MG: 5 TABLET ORAL at 09:10

## 2022-10-03 NOTE — SUBJECTIVE & OBJECTIVE
Interval History: Patient seen and examined this morning. No acute events overnight. WBC downtrending. LLE with full range of motion, doppler signals at DP, PT.     Medications:  Continuous Infusions:   sodium chloride 0.9% 125 mL/hr at 10/03/22 0351     Scheduled Meds:   allopurinoL  100 mg Oral Daily    aspirin  81 mg Oral Every other day    azelastine  1 spray Nasal BID    ceFEPime (MAXIPIME) IVPB  2 g Intravenous Q8H    cetirizine  5 mg Oral QHS    clindamycin (CLEOCIN) IVPB  600 mg Intravenous Q8H    docusate sodium  100 mg Oral BID    enoxaparin  40 mg Subcutaneous Daily    lactobacillus acidophilus & bulgar  1 packet Oral BID    latanoprost  1 drop Both Eyes QHS    magnesium sulfate IVPB  2 g Intravenous Once    oxybutynin  5 mg Oral BID    potassium chloride  10 mEq Intravenous Q1H    timolol maleate 0.5%  1 drop Both Eyes BID    vancomycin (VANCOCIN) IVPB  1,500 mg Intravenous Q24H     PRN Meds:acetaminophen, benzonatate, dextrose 10%, dextrose 10%, glucagon (human recombinant), glucose, glucose, HYDROcodone-acetaminophen, HYDROcodone-acetaminophen, loperamide, melatonin, ondansetron, sodium chloride 0.9%, Pharmacy to dose Vancomycin consult **AND** vancomycin - pharmacy to dose     Review of patient's allergies indicates:   Allergen Reactions    Compazine [prochlorperazine edisylate]      Objective:     Vital Signs (Most Recent):  Temp: 98.6 °F (37 °C) (10/03/22 0732)  Pulse: 77 (10/03/22 0732)  Resp: 19 (10/03/22 0732)  BP: (!) 99/55 (10/03/22 0732)  SpO2: 97 % (10/03/22 0732)   Vital Signs (24h Range):  Temp:  [98.2 °F (36.8 °C)-101.3 °F (38.5 °C)] 98.6 °F (37 °C)  Pulse:  [] 77  Resp:  [18-33] 19  SpO2:  [95 %-99 %] 97 %  BP: ()/(50-62) 99/55     Weight: 61.6 kg (135 lb 12.9 oz)  Body mass index is 21.27 kg/m².    Intake/Output - Last 3 Shifts         10/01 0700  10/02 0659 10/02 0700  10/03 0659 10/03 0700  10/04 0659    P.O.  300     I.V. (mL/kg)  41.7 (0.7)     IV Piggyback  290     Total  Intake(mL/kg)  631.7 (10.3)     Urine (mL/kg/hr)  1075     Stool  0     Total Output  1075     Net  -443.3                    Physical Exam  Vitals and nursing note reviewed.   Constitutional:       General: He is not in acute distress.     Appearance: He is ill-appearing.   HENT:      Head: Normocephalic and atraumatic.      Nose: Nose normal.      Mouth/Throat:      Mouth: Mucous membranes are moist.   Cardiovascular:      Rate and Rhythm: Normal rate and regular rhythm.   Pulmonary:      Effort: Pulmonary effort is normal. No respiratory distress.   Abdominal:      General: Abdomen is flat. There is no distension.      Palpations: Abdomen is soft.      Tenderness: There is no abdominal tenderness.   Musculoskeletal:      Comments: Left lower extremity with erythema and swelling from knee down to the foot  No expansion of erythema beyond previously marked area  Doppler signals to DP, PT of LLE  Bullae noted to medial aspect near medial malleolus  Full range of motion of LLE without pain  Cellulitic skin warm to the touch, no appreciable fluctuance   Skin:     General: Skin is warm.   Neurological:      General: No focal deficit present.      Mental Status: He is alert.       Significant Labs:  I have reviewed all pertinent lab results within the past 24 hours.  CBC:   Recent Labs   Lab 10/03/22  0433   WBC 21.64*   RBC 3.28*   HGB 9.2*   HCT 26.8*   PLT SEE COMMENT   MCV 82   MCH 28.0   MCHC 34.3     CMP:   Recent Labs   Lab 10/03/22  0433   GLU 99   CALCIUM 7.5*   ALBUMIN 1.5*   PROT 4.8*   *   K 2.6*   CO2 23      BUN 18   CREATININE 0.7   ALKPHOS 68   ALT 15   AST 22   BILITOT 1.4*       Significant Diagnostics:  I have reviewed all pertinent imaging results/findings within the past 24 hours.

## 2022-10-03 NOTE — ASSESSMENT & PLAN NOTE
-Admitted to inpatient status  -On admit he was tachycardic and tachypneic with WBC 25k and normal lactic acid  -Source of sepsis is obviously a severe cellulitis of his left lower extremity.  -In the ER blood cultures were collected and he received sepsis bolus of fluids, vancomycin, zosyn and norco.  -Pulses in left foot are dopplerable, but not palpable  - General surgery evaluated - CT does not show evidence of abscess or gas  - continued with IV antibiotics  -Monitor pulses in leg q4h with doppler and check arterial US of his leg with mae. - reviewed  -Will treat with vancomycin, clindamycin and cefepime  - patient is improving

## 2022-10-03 NOTE — PLAN OF CARE
Problem: Physical Therapy  Goal: Physical Therapy Goal  Description: Goals to be met by: 10/10/22     Patient will increase functional independence with mobility by performin. Pt to be mod I with bed mobility.  2. Pt to transfer with mod I.  3. Pt to ambulate 150' w/RW and supervision.  4. Pt to be (I) with written HEP.    Outcome: Ongoing, Progressing   Intial eval completed.  See in chart for details.

## 2022-10-03 NOTE — PLAN OF CARE
Pt is AAOx4. Room air. Tele maintained.   Peripheral neurovascular checks performed q4.  No falls or new injuries reported during shift, safety precautions maintained.     Problem: Adult Inpatient Plan of Care  Goal: Plan of Care Review  Outcome: Ongoing, Progressing     Problem: Adult Inpatient Plan of Care  Goal: Optimal Comfort and Wellbeing  Outcome: Ongoing, Progressing

## 2022-10-03 NOTE — PROGRESS NOTES
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  He states this all started about 4 days ago when he was walking in a field. He states he felt some significant itching in his lower leg.  Later that night his leg started to swell and hurt.  He assumed this was caused by an insect bite on his leg, but he never saw a specific bite or insect.  He states that the 2nd night the swelling and pain became severe and he was having fevers and chills which prompted him to visit his primary care provider the next day.  He was prescribed an antibiotic, not sure which one, and despite taking this his leg has continued to get worse.  He notes he has been having fevers and chills and last night he "slid or fell" out of his chair when he was trying to get up because the pain was so bad.  He notes he uses a walker to ambulate at home.  He also notes a chronic cough ascociated wiith allergic rhinitis and had an episode of nausea and vomiting one week ago.  He reports diminished oral intake due to decreased appetite, but no nausea or vomiting since this all started 4 days ago.  He denies chest pain, shortness of breath, headache, palpitations, dysuria and diarrhea.      Overview/Hospital Course:  No notes on file    Interval History: feeling a little bit better, still having significant pain but swelling appears better.    Review of Systems   Constitutional:  Negative for chills and fever.   Cardiovascular:  Positive for leg swelling.   Musculoskeletal:  Positive for arthralgias and joint swelling.   Skin: "  Positive for color change.   Objective:     Vital Signs (Most Recent):  Temp: 98.6 °F (37 °C) (10/03/22 0732)  Pulse: 77 (10/03/22 0732)  Resp: 18 (10/03/22 0954)  BP: (!) 99/55 (10/03/22 0732)  SpO2: 97 % (10/03/22 0732)   Vital Signs (24h Range):  Temp:  [98.2 °F (36.8 °C)-101.3 °F (38.5 °C)] 98.6 °F (37 °C)  Pulse:  [] 77  Resp:  [18-33] 18  SpO2:  [95 %-99 %] 97 %  BP: ()/(50-62) 99/55     Weight: 61.6 kg (135 lb 12.9 oz)  Body mass index is 21.27 kg/m².    Intake/Output Summary (Last 24 hours) at 10/3/2022 1042  Last data filed at 10/3/2022 0946  Gross per 24 hour   Intake 631.69 ml   Output 1275 ml   Net -643.31 ml      Physical Exam  Vitals and nursing note reviewed.   Constitutional:       General: He is not in acute distress.     Appearance: Normal appearance.   HENT:      Head: Normocephalic and atraumatic.      Nose: Nose normal. No congestion or rhinorrhea.      Mouth/Throat:      Mouth: Mucous membranes are dry.   Eyes:      General: No scleral icterus.     Conjunctiva/sclera: Conjunctivae normal.   Cardiovascular:      Rate and Rhythm: Normal rate and regular rhythm.      Pulses: Normal pulses.      Heart sounds: Normal heart sounds. No murmur heard.  Pulmonary:      Effort: Pulmonary effort is normal. No respiratory distress.      Breath sounds: Normal breath sounds. No wheezing, rhonchi or rales.   Abdominal:      General: Bowel sounds are normal. There is no distension.      Palpations: Abdomen is soft.      Tenderness: There is no abdominal tenderness. There is no guarding.   Musculoskeletal:         General: No swelling or tenderness. Normal range of motion.      Cervical back: Neck supple.   Lymphadenopathy:      Cervical: No cervical adenopathy.   Skin:     General: Skin is warm and dry.      Capillary Refill: Capillary refill takes less than 2 seconds.      Coloration: Skin is not jaundiced or pale.      Comments: Left leg with significant swelling, erythema and noted blister to  "medial aspect of ankle. Warm and painful to touch   Neurological:      General: No focal deficit present.      Mental Status: He is alert and oriented to person, place, and time. Mental status is at baseline.      Sensory: No sensory deficit.      Motor: No weakness.   Psychiatric:         Mood and Affect: Mood normal.         Behavior: Behavior normal.         Thought Content: Thought content normal.         Judgment: Judgment normal.       Significant Labs: All pertinent labs within the past 24 hours have been reviewed.    Significant Imaging: I have reviewed all pertinent imaging results/findings within the past 24 hours.      Assessment/Plan:      * Sepsis  -Admitted to inpatient status  -On admit he was tachycardic and tachypneic with WBC 25k and normal lactic acid  -Source of sepsis is obviously a severe cellulitis of his left lower extremity.  -In the ER blood cultures were collected and he received sepsis bolus of fluids, vancomycin, zosyn and norco.  -Pulses in left foot are dopplerable, but not palpable  - General surgery evaluated - CT does not show evidence of abscess or gas  - continued with IV antibiotics  -Monitor pulses in leg q4h with doppler and check arterial US of his leg with mae. - reviewed  -Will treat with vancomycin, clindamycin and cefepime  - patient is improving    Debility  -At home ambulates with a walker  -Notes he "Fell or slid" out of a chair on night prior to admit  -When more stable will consult PT/OT for evaluation.    Chronic cough  -Presumed due to allergic rhinitis  -Continue home anti-histamines  -Add tessalon PRN    Chronic gout  -Check uric acid now  -Continue home allopurinol.    Hypokalemia  -Due to Bartter syndrome and diminished oral intake  -Replace potassium today.  -Will need daily replacement  -Check BMP and Mag in AM.        Hyponatremia  -On admit mild and likely due to diminished oral intake and mild dehydration  -Continue IV fluids  -stable    Normocytic " anemia  -Hb 12.1 on admit  -No bleeding  -Check iron, ferritin, b12 and folate - appears more ACD at this time with elevated ferritin  -Repeat cbc in AM    Cellulitis of left lower extremity  -Treatment as above.    Primary open angle glaucoma of both eyes, severe stage  -History noted  -Continue home timolol and lantanoprost drops    Bartter syndrome  -At home takes KCl 10mEq daily and prior labs show his K is usually normal on this  - received IV KCl this morning, restart home dose   -Treatment as above.    H/O prostate cancer  -history noted  -Continue home oxybutynin      VTE Risk Mitigation (From admission, onward)         Ordered     enoxaparin injection 40 mg  Daily         10/02/22 1416     IP VTE HIGH RISK PATIENT  Once         10/02/22 1416     Place sequential compression device  Until discontinued         10/02/22 1416                Discharge Planning   ROS: 10/7/2022     Code Status: Full Code   Is the patient medically ready for discharge?:     Reason for patient still in hospital (select all that apply): Treatment                     Christian Esquivel MD  Department of Hospital Medicine   [facility]urg

## 2022-10-03 NOTE — ASSESSMENT & PLAN NOTE
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     - patient seen and examined  - stable neurovascular exam to LLE  - Low concern for compartment syndrome, no paresthesia, poikilothermia, pallor, paralysis, or pulselessness.  - continue with conservative management  - If he deteriorates please call general surgery.   - If his exam changes will plan for I&D vs. amputation  - Continue abx

## 2022-10-03 NOTE — PT/OT/SLP EVAL
Physical Therapy Evaluation    Patient Name:  Mark Church   MRN:  [id number]    Recommendations:     Discharge Recommendations:  home   Discharge Equipment Recommendations: none   Assessment:     Mark Church is a 72 y.o. male admitted with a medical diagnosis of Sepsis.  He presents with the following impairments/functional limitations:  impaired functional mobility, gait instability, decreased lower extremity function, pain, decreased ROM, impaired skin .    Rehab Prognosis: Good; patient would benefit from acute skilled PT services to address these deficits and reach maximum level of function.    Recent Surgery: * No surgery found *      Plan:     During this hospitalization, patient to be seen 5 x/week to address the identified rehab impairments via gait training, therapeutic activities, therapeutic exercises and progress toward the following goals:    Plan of Care Expires:  10/10/22    Subjective     Chief Complaint: Fatigue; pt asleep upon arrival.  Patient/Family Comments/goals: Pt agreeable to PT eval and treat.  Pain/Comfort:  Pain Rating 1: 5/10  Location - Side 1: Left  Location 1: ankle (and calf)  Pain Addressed 1: Reposition    Patients cultural, spiritual, Zoroastrian conflicts given the current situation: no    Living Environment:  PTA pt lived with his wife and grandson in a 1 story house with ramp entry.  Prior to admission, patients level of function was mod I.  Equipment used at home: walker, rolling.  DME owned (not currently used): single point cane.  Upon discharge, patient will have assistance from family.    Objective:     Communicated with nursemony prior to session.  Patient found supine with peripheral IV  upon PT entry to room.    General Precautions: Standard,     Orthopedic Precautions:N/A   Braces: N/A  Respiratory Status: Room air    Exams:  Cognitive Exam:  Patient is oriented to Person, Place, and Situation  RLE ROM: WFL  RLE Strength: WFL  LLE ROM: WFL except ankle; edematous  LLE  Strength: WFL except ankle NT    Functional Mobility:  Bed Mobility:     Supine to Sit: supervision  Transfers:     Sit to Stand:  stand by assistance with rolling walker  Gait: 15' w/RW SBA; requested urinal during gait training, pt stood with CGA while using urinal.    Therapeutic Activities and Exercises:   Educated on role of PT and POC.    AM-PAC 6 CLICK MOBILITY  Total Score:20     Patient left  reclined in chair  with all lines intact, call button in reach, nurse notified, and case management present.    GOALS:   Multidisciplinary Problems       Physical Therapy Goals          Problem: Physical Therapy    Goal Priority Disciplines Outcome Goal Variances Interventions   Physical Therapy Goal     PT, PT/OT Ongoing, Progressing     Description: Goals to be met by: 10/10/22     Patient will increase functional independence with mobility by performin. Pt to be mod I with bed mobility.  2. Pt to transfer with mod I.  3. Pt to ambulate 150' w/RW and supervision.  4. Pt to be (I) with written HEP.                         History:     Past Medical History:   Diagnosis Date    Arthritis     Bartter syndrome     Blind left eye     Cancer     Cataract     Glaucoma     Normocytic anemia 10/2/2022    Prostate cancer     Urinary incontinence        Past Surgical History:   Procedure Laterality Date    CHOLECYSTECTOMY      JOINT REPLACEMENT      PROSTATE SURGERY      REFRACTIVE SURGERY Bilateral     ROTATOR CUFF REPAIR Right     sinus polyp         Time Tracking:     PT Received On: 10/03/22  PT Start Time: 1415     PT Stop Time: 1440  PT Total Time (min): 25 min     Billable Minutes: Evaluation 15 and Gait Training 10      10/03/2022

## 2022-10-03 NOTE — PROGRESS NOTES
Vancomycin consult follow-up:    Patient reviewed, renal function stable, no new levels, continue current therapy; Next levels due: trough due 10/4/2022 at 1300

## 2022-10-03 NOTE — ASSESSMENT & PLAN NOTE
-On admit mild and likely due to diminished oral intake and mild dehydration  -Continue IV fluids  -stable

## 2022-10-03 NOTE — ASSESSMENT & PLAN NOTE
-Hb 12.1 on admit  -No bleeding  -Check iron, ferritin, b12 and folate - appears more ACD at this time with elevated ferritin  -Repeat cbc in AM

## 2022-10-03 NOTE — ASSESSMENT & PLAN NOTE
-At home takes KCl 10mEq daily and prior labs show his K is usually normal on this  - received IV KCl this morning, restart home dose   -Treatment as above.

## 2022-10-03 NOTE — PROGRESS NOTES
[facility] Surg  General Surgery  Progress Note    Subjective:     History of Present Illness:  No notes on file    Post-Op Info:  * No surgery found *         Interval History: Patient seen and examined this morning. No acute events overnight. WBC downtrending. LLE with full range of motion, doppler signals at DP, PT.     Medications:  Continuous Infusions:   sodium chloride 0.9% 125 mL/hr at 10/03/22 0351     Scheduled Meds:   allopurinoL  100 mg Oral Daily    aspirin  81 mg Oral Every other day    azelastine  1 spray Nasal BID    ceFEPime (MAXIPIME) IVPB  2 g Intravenous Q8H    cetirizine  5 mg Oral QHS    clindamycin (CLEOCIN) IVPB  600 mg Intravenous Q8H    docusate sodium  100 mg Oral BID    enoxaparin  40 mg Subcutaneous Daily    lactobacillus acidophilus & bulgar  1 packet Oral BID    latanoprost  1 drop Both Eyes QHS    magnesium sulfate IVPB  2 g Intravenous Once    oxybutynin  5 mg Oral BID    potassium chloride  10 mEq Intravenous Q1H    timolol maleate 0.5%  1 drop Both Eyes BID    vancomycin (VANCOCIN) IVPB  1,500 mg Intravenous Q24H     PRN Meds:acetaminophen, benzonatate, dextrose 10%, dextrose 10%, glucagon (human recombinant), glucose, glucose, HYDROcodone-acetaminophen, HYDROcodone-acetaminophen, loperamide, melatonin, ondansetron, sodium chloride 0.9%, Pharmacy to dose Vancomycin consult **AND** vancomycin - pharmacy to dose     Review of patient's allergies indicates:   Allergen Reactions    Compazine [prochlorperazine edisylate]      Objective:     Vital Signs (Most Recent):  Temp: 98.6 °F (37 °C) (10/03/22 0732)  Pulse: 77 (10/03/22 0732)  Resp: 19 (10/03/22 0732)  BP: (!) 99/55 (10/03/22 0732)  SpO2: 97 % (10/03/22 0732)   Vital Signs (24h Range):  Temp:  [98.2 °F (36.8 °C)-101.3 °F (38.5 °C)] 98.6 °F (37 °C)  Pulse:  [] 77  Resp:  [18-33] 19  SpO2:  [95 %-99 %] 97 %  BP: ()/(50-62) 99/55     Weight: 61.6 kg (135 lb 12.9 oz)  Body mass index is 21.27  kg/m².    Intake/Output - Last 3 Shifts         10/01 0700  10/02 0659 10/02 0700  10/03 0659 10/03 0700  10/04 0659    P.O.  300     I.V. (mL/kg)  41.7 (0.7)     IV Piggyback  290     Total Intake(mL/kg)  631.7 (10.3)     Urine (mL/kg/hr)  1075     Stool  0     Total Output  1075     Net  -443.3                    Physical Exam  Vitals and nursing note reviewed.   Constitutional:       General: He is not in acute distress.     Appearance: He is ill-appearing.   HENT:      Head: Normocephalic and atraumatic.      Nose: Nose normal.      Mouth/Throat:      Mouth: Mucous membranes are moist.   Cardiovascular:      Rate and Rhythm: Normal rate and regular rhythm.   Pulmonary:      Effort: Pulmonary effort is normal. No respiratory distress.   Abdominal:      General: Abdomen is flat. There is no distension.      Palpations: Abdomen is soft.      Tenderness: There is no abdominal tenderness.   Musculoskeletal:      Comments: Left lower extremity with erythema and swelling from knee down to the foot  No expansion of erythema beyond previously marked area  Doppler signals to DP, PT of LLE  Bullae noted to medial aspect near medial malleolus  Full range of motion of LLE without pain  Cellulitic skin warm to the touch, no appreciable fluctuance   Skin:     General: Skin is warm.   Neurological:      General: No focal deficit present.      Mental Status: He is alert.       Significant Labs:  I have reviewed all pertinent lab results within the past 24 hours.  CBC:   Recent Labs   Lab 10/03/22  0433   WBC 21.64*   RBC 3.28*   HGB 9.2*   HCT 26.8*   PLT SEE COMMENT   MCV 82   MCH 28.0   MCHC 34.3     CMP:   Recent Labs   Lab 10/03/22  0433   GLU 99   CALCIUM 7.5*   ALBUMIN 1.5*   PROT 4.8*   *   K 2.6*   CO2 23      BUN 18   CREATININE 0.7   ALKPHOS 68   ALT 15   AST 22   BILITOT 1.4*       Significant Diagnostics:  I have reviewed all pertinent imaging results/findings within the past 24 hours.    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     - patient seen and examined  - stable neurovascular exam to LLE  - Low concern for compartment syndrome, no paresthesia, poikilothermia, pallor, paralysis, or pulselessness.  - continue with conservative management  - If he deteriorates please call general surgery.   - If his exam changes will plan for I&D vs. amputation  - Continue abx        Alexis Worthington MD  General Surgery  [facility] [place] [facility]

## 2022-10-03 NOTE — SUBJECTIVE & OBJECTIVE
Interval History: feeling a little bit better, still having significant pain but swelling appears better.    Review of Systems   Constitutional:  Negative for chills and fever.   Cardiovascular:  Positive for leg swelling.   Musculoskeletal:  Positive for arthralgias and joint swelling.   Skin:  Positive for color change.   Objective:     Vital Signs (Most Recent):  Temp: 98.6 °F (37 °C) (10/03/22 0732)  Pulse: 77 (10/03/22 0732)  Resp: 18 (10/03/22 0954)  BP: (!) 99/55 (10/03/22 0732)  SpO2: 97 % (10/03/22 0732)   Vital Signs (24h Range):  Temp:  [98.2 °F (36.8 °C)-101.3 °F (38.5 °C)] 98.6 °F (37 °C)  Pulse:  [] 77  Resp:  [18-33] 18  SpO2:  [95 %-99 %] 97 %  BP: ()/(50-62) 99/55     Weight: 61.6 kg (135 lb 12.9 oz)  Body mass index is 21.27 kg/m².    Intake/Output Summary (Last 24 hours) at 10/3/2022 1042  Last data filed at 10/3/2022 0946  Gross per 24 hour   Intake 631.69 ml   Output 1275 ml   Net -643.31 ml      Physical Exam  Vitals and nursing note reviewed.   Constitutional:       General: He is not in acute distress.     Appearance: Normal appearance.   HENT:      Head: Normocephalic and atraumatic.      Nose: Nose normal. No congestion or rhinorrhea.      Mouth/Throat:      Mouth: Mucous membranes are dry.   Eyes:      General: No scleral icterus.     Conjunctiva/sclera: Conjunctivae normal.   Cardiovascular:      Rate and Rhythm: Normal rate and regular rhythm.      Pulses: Normal pulses.      Heart sounds: Normal heart sounds. No murmur heard.  Pulmonary:      Effort: Pulmonary effort is normal. No respiratory distress.      Breath sounds: Normal breath sounds. No wheezing, rhonchi or rales.   Abdominal:      General: Bowel sounds are normal. There is no distension.      Palpations: Abdomen is soft.      Tenderness: There is no abdominal tenderness. There is no guarding.   Musculoskeletal:         General: No swelling or tenderness. Normal range of motion.      Cervical back: Neck supple.    Lymphadenopathy:      Cervical: No cervical adenopathy.   Skin:     General: Skin is warm and dry.      Capillary Refill: Capillary refill takes less than 2 seconds.      Coloration: Skin is not jaundiced or pale.      Comments: Left leg with significant swelling, erythema and noted blister to medial aspect of ankle. Warm and painful to touch   Neurological:      General: No focal deficit present.      Mental Status: He is alert and oriented to person, place, and time. Mental status is at baseline.      Sensory: No sensory deficit.      Motor: No weakness.   Psychiatric:         Mood and Affect: Mood normal.         Behavior: Behavior normal.         Thought Content: Thought content normal.         Judgment: Judgment normal.       Significant Labs: All pertinent labs within the past 24 hours have been reviewed.    Significant Imaging: I have reviewed all pertinent imaging results/findings within the past 24 hours.

## 2022-10-03 NOTE — PLAN OF CARE
[place] [facility]  Initial Discharge Assessment  SW Completed assessment with patient by his bedside. Patient shared that he does not receive home health care he does not have any DME equipments.       Primary Care Provider: Mathew Koenig MD    Admission Diagnosis: Hypokalemia [E87.6]  Cellulitis [L03.90]  Tachycardia [R00.0]  Leg swelling [M79.89]  Cellulitis, unspecified cellulitis site [L03.90]    Admission Date: 10/2/2022  Expected Discharge Date: 10/7/2022    Discharge Barriers Identified: (P) None    Payor: [employer] MANAGED MEDICARE / Plan: [email or web address] MEDICARE HMO / Product Type: Capitation /     Extended Emergency Contact Information  Primary Emergency Contact: Winifred Church  Address: [address] [place] [address] [facility]  Home Phone: [phone]  Relation: Spouse    Discharge Plan A: (P) Home with family  Discharge Plan B: (P) Home with family      [facility] [address] [address]  [address] [address]  Phone: [phone] Fax: [phone]    [facility] - [facility] [address]  [address] [facility] [address]  Phone: [phone] Fax: [phone]      Initial Assessment (most recent)       Adult Discharge Assessment - 10/03/22 1553          Discharge Assessment    Assessment Type Discharge Planning Assessment     Confirmed/corrected address, phone number and insurance Yes     Confirmed Demographics Correct on Facesheet     Source of Information patient     When was your last doctors appointment? 09/30/22     Communicated ROS with patient/caregiver Yes     Reason For Admission Sepsis (P)      Lives With other relative(s) (P)      Do you expect to return to your current living situation? Yes (P)      Do you have help at home or someone to help you manage your care at home? Yes (P)      Who are your caregiver(s) and their phone number(s)? Winifred Church (Spouse)   [phone] (Home  Phone (P)      Prior to hospitilization cognitive status: Alert/Oriented (P)      Current cognitive status: Alert/Oriented (P)      Walking or Climbing Stairs Difficulty none (P)      Dressing/Bathing Difficulty none (P)      Equipment Currently Used at Home none (P)      Readmission within 30 days? No (P)      Patient currently being followed by outpatient case management? No (P)      Do you currently have service(s) that help you manage your care at home? No (P)      Is the pt/caregiver preference to resume services with current agency No (P)      Do you take prescription medications? Yes (P)      Do you have prescription coverage? Yes (P)      Coverage [employer] (P)      Do you have any problems affording any of your prescribed medications? No (P)      Is the patient taking medications as prescribed? yes (P)      Who is going to help you get home at discharge? RigotierraWinifred (Spouse)   [phone] (Home Phone (P)      How do you get to doctors appointments? car, drives self (P)      Are you on dialysis? No (P)      Do you take coumadin? No (P)      Discharge Plan A Home with family (P)      Discharge Plan B Home with family (P)      Discharge Plan discussed with: Patient (P)      Discharge Barriers Identified None (P)         Physical Activity    On average, how many days per week do you engage in moderate to strenuous exercise (like a brisk walk)? 7 days (P)      On average, how many minutes do you engage in exercise at this level? 30 min (P)         Financial Resource Strain    How hard is it for you to pay for the very basics like food, housing, medical care, and heating? Somewhat hard (P)         Housing Stability    In the last 12 months, was there a time when you were not able to pay the mortgage or rent on time? No (P)      In the last 12 months, how many places have you lived? 1 (P)      In the last 12 months, was there a time when you did not have a steady place to sleep or slept in a shelter (including now)?  No (P)         Transportation Needs    In the past 12 months, has lack of transportation kept you from medical appointments or from getting medications? No (P)         Food Insecurity    Within the past 12 months, you worried that your food would run out before you got the money to buy more. Never true (P)      Within the past 12 months, the food you bought just didn't last and you didn't have money to get more. Never true (P)         Stress    Do you feel stress - tense, restless, nervous, or anxious, or unable to sleep at night because your mind is troubled all the time - these days? Very much (P)         Social Connections    In a typical week, how many times do you talk on the phone with family, friends, or neighbors? Twice a week (P)      How often do you get together with friends or relatives? Three times a week (P)      Do you belong to any clubs or organizations such as Scientologist groups, unions, fraternal or athletic groups, or school groups? No (P)      How often do you attend meetings of the clubs or organizations you belong to? Never (P)      Are you , , , , never , or living with a partner?  (P)

## 2022-10-04 LAB
ALBUMIN SERPL BCP-MCNC: 1.4 G/DL (ref 3.5–5.2)
ALP SERPL-CCNC: 109 U/L (ref 55–135)
ALT SERPL W/O P-5'-P-CCNC: 19 U/L (ref 10–44)
ANION GAP SERPL CALC-SCNC: 10 MMOL/L (ref 8–16)
ANISOCYTOSIS BLD QL SMEAR: SLIGHT
AST SERPL-CCNC: 30 U/L (ref 10–40)
BASOPHILS NFR BLD: 0 % (ref 0–1.9)
BILIRUB SERPL-MCNC: 1 MG/DL (ref 0.1–1)
BUN SERPL-MCNC: 19 MG/DL (ref 8–23)
CALCIUM SERPL-MCNC: 7.8 MG/DL (ref 8.7–10.5)
CHLORIDE SERPL-SCNC: 103 MMOL/L (ref 95–110)
CO2 SERPL-SCNC: 21 MMOL/L (ref 23–29)
CREAT SERPL-MCNC: 0.8 MG/DL (ref 0.5–1.4)
DIFFERENTIAL METHOD: ABNORMAL
EOSINOPHIL NFR BLD: 2 % (ref 0–8)
ERYTHROCYTE [DISTWIDTH] IN BLOOD BY AUTOMATED COUNT: 13.5 % (ref 11.5–14.5)
EST. GFR  (NO RACE VARIABLE): >60 ML/MIN/1.73 M^2
GLUCOSE SERPL-MCNC: 113 MG/DL (ref 70–110)
HCT VFR BLD AUTO: 29.4 % (ref 40–54)
HGB BLD-MCNC: 9.7 G/DL (ref 14–18)
IMM GRANULOCYTES # BLD AUTO: ABNORMAL K/UL (ref 0–0.04)
IMM GRANULOCYTES NFR BLD AUTO: ABNORMAL % (ref 0–0.5)
LYMPHOCYTES NFR BLD: 12 % (ref 18–48)
MAGNESIUM SERPL-MCNC: 1.4 MG/DL (ref 1.6–2.6)
MCH RBC QN AUTO: 26.9 PG (ref 27–31)
MCHC RBC AUTO-ENTMCNC: 33 G/DL (ref 32–36)
MCV RBC AUTO: 81 FL (ref 82–98)
METAMYELOCYTES NFR BLD MANUAL: 2 %
MONOCYTES NFR BLD: 3 % (ref 4–15)
MYELOCYTES NFR BLD MANUAL: 1 %
NEUTROPHILS NFR BLD: 63 % (ref 38–73)
NEUTS BAND NFR BLD MANUAL: 17 %
NRBC BLD-RTO: 0 /100 WBC
PLATELET # BLD AUTO: ABNORMAL K/UL (ref 150–450)
PLATELET BLD QL SMEAR: ABNORMAL
PMV BLD AUTO: ABNORMAL FL (ref 9.2–12.9)
POLYCHROMASIA BLD QL SMEAR: ABNORMAL
POTASSIUM SERPL-SCNC: 2.7 MMOL/L (ref 3.5–5.1)
PROT SERPL-MCNC: 5.2 G/DL (ref 6–8.4)
RBC # BLD AUTO: 3.61 M/UL (ref 4.6–6.2)
SODIUM SERPL-SCNC: 134 MMOL/L (ref 136–145)
TOXIC GRANULES BLD QL SMEAR: PRESENT
VANCOMYCIN TROUGH SERPL-MCNC: 6.2 UG/ML (ref 10–22)
WBC # BLD AUTO: 18.97 K/UL (ref 3.9–12.7)

## 2022-10-04 PROCEDURE — 94761 N-INVAS EAR/PLS OXIMETRY MLT: CPT

## 2022-10-04 PROCEDURE — 36415 COLL VENOUS BLD VENIPUNCTURE: CPT | Performed by: HOSPITALIST

## 2022-10-04 PROCEDURE — 85027 COMPLETE CBC AUTOMATED: CPT | Performed by: HOSPITALIST

## 2022-10-04 PROCEDURE — 99233 PR SUBSEQUENT HOSPITAL CARE,LEVL III: ICD-10-PCS | Mod: GC,,, | Performed by: SURGERY

## 2022-10-04 PROCEDURE — 99233 SBSQ HOSP IP/OBS HIGH 50: CPT | Mod: GC,,, | Performed by: SURGERY

## 2022-10-04 PROCEDURE — 85007 BL SMEAR W/DIFF WBC COUNT: CPT | Performed by: HOSPITALIST

## 2022-10-04 PROCEDURE — 11000001 HC ACUTE MED/SURG PRIVATE ROOM

## 2022-10-04 PROCEDURE — 83735 ASSAY OF MAGNESIUM: CPT | Performed by: HOSPITALIST

## 2022-10-04 PROCEDURE — 63600175 PHARM REV CODE 636 W HCPCS: Performed by: STUDENT IN AN ORGANIZED HEALTH CARE EDUCATION/TRAINING PROGRAM

## 2022-10-04 PROCEDURE — 25000003 PHARM REV CODE 250: Performed by: HOSPITALIST

## 2022-10-04 PROCEDURE — 80053 COMPREHEN METABOLIC PANEL: CPT | Performed by: HOSPITALIST

## 2022-10-04 PROCEDURE — 99223 1ST HOSP IP/OBS HIGH 75: CPT | Mod: ,,, | Performed by: INTERNAL MEDICINE

## 2022-10-04 PROCEDURE — 25000003 PHARM REV CODE 250: Performed by: STUDENT IN AN ORGANIZED HEALTH CARE EDUCATION/TRAINING PROGRAM

## 2022-10-04 PROCEDURE — 63600175 PHARM REV CODE 636 W HCPCS: Performed by: HOSPITALIST

## 2022-10-04 PROCEDURE — 80202 ASSAY OF VANCOMYCIN: CPT | Performed by: HOSPITALIST

## 2022-10-04 PROCEDURE — 99223 PR INITIAL HOSPITAL CARE,LEVL III: ICD-10-PCS | Mod: ,,, | Performed by: INTERNAL MEDICINE

## 2022-10-04 RX ORDER — SODIUM CHLORIDE AND POTASSIUM CHLORIDE 150; 900 MG/100ML; MG/100ML
INJECTION, SOLUTION INTRAVENOUS CONTINUOUS
Status: DISCONTINUED | OUTPATIENT
Start: 2022-10-04 | End: 2022-10-05

## 2022-10-04 RX ORDER — POTASSIUM CHLORIDE 7.45 MG/ML
10 INJECTION INTRAVENOUS
Status: COMPLETED | OUTPATIENT
Start: 2022-10-04 | End: 2022-10-04

## 2022-10-04 RX ORDER — MAGNESIUM SULFATE HEPTAHYDRATE 40 MG/ML
2 INJECTION, SOLUTION INTRAVENOUS ONCE
Status: COMPLETED | OUTPATIENT
Start: 2022-10-04 | End: 2022-10-04

## 2022-10-04 RX ADMIN — LACTOBACILLUS ACIDOPHILUS / LACTOBACILLUS BULGARICUS 1 EACH: 100 MILLION CFU STRENGTH GRANULES at 08:10

## 2022-10-04 RX ADMIN — AZELASTINE 137 MCG: 1 SPRAY, METERED NASAL at 08:10

## 2022-10-04 RX ADMIN — DOCUSATE SODIUM 100 MG: 100 CAPSULE, LIQUID FILLED ORAL at 08:10

## 2022-10-04 RX ADMIN — TIMOLOL MALEATE 1 DROP: 5 SOLUTION/ DROPS OPHTHALMIC at 08:10

## 2022-10-04 RX ADMIN — CEFEPIME 2 G: 2 INJECTION, POWDER, FOR SOLUTION INTRAVENOUS at 08:10

## 2022-10-04 RX ADMIN — SODIUM CHLORIDE AND POTASSIUM CHLORIDE: 9; 1.49 INJECTION, SOLUTION INTRAVENOUS at 06:10

## 2022-10-04 RX ADMIN — POTASSIUM CHLORIDE 10 MEQ: 7.46 INJECTION, SOLUTION INTRAVENOUS at 09:10

## 2022-10-04 RX ADMIN — MAGNESIUM SULFATE HEPTAHYDRATE 2 G: 40 INJECTION, SOLUTION INTRAVENOUS at 09:10

## 2022-10-04 RX ADMIN — POTASSIUM CHLORIDE 10 MEQ: 7.46 INJECTION, SOLUTION INTRAVENOUS at 08:10

## 2022-10-04 RX ADMIN — ACETAMINOPHEN 650 MG: 325 TABLET ORAL at 06:10

## 2022-10-04 RX ADMIN — POTASSIUM CHLORIDE 10 MEQ: 7.46 INJECTION, SOLUTION INTRAVENOUS at 11:10

## 2022-10-04 RX ADMIN — HYDROCODONE BITARTRATE AND ACETAMINOPHEN 1 TABLET: 10; 325 TABLET ORAL at 08:10

## 2022-10-04 RX ADMIN — OXYBUTYNIN CHLORIDE 5 MG: 5 TABLET ORAL at 08:10

## 2022-10-04 RX ADMIN — LATANOPROST 1 DROP: 50 SOLUTION OPHTHALMIC at 08:10

## 2022-10-04 RX ADMIN — CLINDAMYCIN IN 5 PERCENT DEXTROSE 600 MG: 12 INJECTION, SOLUTION INTRAVENOUS at 06:10

## 2022-10-04 RX ADMIN — SODIUM CHLORIDE AND POTASSIUM CHLORIDE: 9; 1.49 INJECTION, SOLUTION INTRAVENOUS at 09:10

## 2022-10-04 RX ADMIN — CEFEPIME 2 G: 2 INJECTION, POWDER, FOR SOLUTION INTRAVENOUS at 05:10

## 2022-10-04 RX ADMIN — ENOXAPARIN SODIUM 40 MG: 100 INJECTION SUBCUTANEOUS at 05:10

## 2022-10-04 RX ADMIN — ALLOPURINOL 100 MG: 100 TABLET ORAL at 08:10

## 2022-10-04 RX ADMIN — VANCOMYCIN HYDROCHLORIDE 1500 MG: 1.5 INJECTION, POWDER, LYOPHILIZED, FOR SOLUTION INTRAVENOUS at 03:10

## 2022-10-04 RX ADMIN — POTASSIUM CHLORIDE 10 MEQ: 750 TABLET, EXTENDED RELEASE ORAL at 08:10

## 2022-10-04 RX ADMIN — POTASSIUM CHLORIDE 10 MEQ: 7.46 INJECTION, SOLUTION INTRAVENOUS at 06:10

## 2022-10-04 RX ADMIN — CLINDAMYCIN IN 5 PERCENT DEXTROSE 600 MG: 12 INJECTION, SOLUTION INTRAVENOUS at 11:10

## 2022-10-04 RX ADMIN — CLINDAMYCIN IN 5 PERCENT DEXTROSE 600 MG: 12 INJECTION, SOLUTION INTRAVENOUS at 03:10

## 2022-10-04 RX ADMIN — CETIRIZINE HYDROCHLORIDE 5 MG: 5 TABLET ORAL at 08:10

## 2022-10-04 RX ADMIN — CEFEPIME 2 G: 2 INJECTION, POWDER, FOR SOLUTION INTRAVENOUS at 11:10

## 2022-10-04 NOTE — PROGRESS NOTES
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  He states this all started about 4 days ago when he was walking in a field. He states he felt some significant itching in his lower leg.  Later that night his leg started to swell and hurt.  He assumed this was caused by an insect bite on his leg, but he never saw a specific bite or insect.  He states that the 2nd night the swelling and pain became severe and he was having fevers and chills which prompted him to visit his primary care provider the next day.  He was prescribed an antibiotic, not sure which one, and despite taking this his leg has continued to get worse.  He notes he has been having fevers and chills and last night he "slid or fell" out of his chair when he was trying to get up because the pain was so bad.  He notes he uses a walker to ambulate at home.  He also notes a chronic cough ascociated wiith allergic rhinitis and had an episode of nausea and vomiting one week ago.  He reports diminished oral intake due to decreased appetite, but no nausea or vomiting since this all started 4 days ago.  He denies chest pain, shortness of breath, headache, palpitations, dysuria and diarrhea.      Overview/Hospital Course:  Admitted with sepsis secondary to left lower extremity cellulitis. Started on IV antibiotics, IVF and potassium replacement (has Bartter syndrome). Surgery consulted for concern for nec fasc/bone involvement. CT did not show any evidence of abscess/gas. Seems to be improving though still significant " swelling and blistering. ID consulted for antibiotic recommendations.      Interval History: note larger blistering on heel but still appears serous fluid and not purulent. Wife at bedside and updated.    Review of Systems   Constitutional:  Negative for chills and fever.   Cardiovascular:  Positive for leg swelling.   Musculoskeletal:  Positive for arthralgias and joint swelling.   Skin:  Positive for color change.   Objective:     Vital Signs (Most Recent):  Temp: 98.1 °F (36.7 °C) (10/04/22 1127)  Pulse: 78 (10/04/22 1127)  Resp: 16 (10/04/22 1127)  BP: (!) 108/56 (10/04/22 1127)  SpO2: (!) 94 % (10/04/22 1127)   Vital Signs (24h Range):  Temp:  [98.1 °F (36.7 °C)-100.5 °F (38.1 °C)] 98.1 °F (36.7 °C)  Pulse:  [] 78  Resp:  [16-28] 16  SpO2:  [94 %-97 %] 94 %  BP: (105-127)/(51-58) 108/56     Weight: 61.6 kg (135 lb 12.9 oz)  Body mass index is 21.27 kg/m².    Intake/Output Summary (Last 24 hours) at 10/4/2022 1140  Last data filed at 10/4/2022 0830  Gross per 24 hour   Intake 560 ml   Output 1100 ml   Net -540 ml        Physical Exam  Vitals and nursing note reviewed.   Constitutional:       General: He is not in acute distress.     Appearance: Normal appearance.   HENT:      Head: Normocephalic and atraumatic.      Nose: Nose normal. No congestion or rhinorrhea.      Mouth/Throat:      Mouth: Mucous membranes are dry.   Eyes:      General: No scleral icterus.     Conjunctiva/sclera: Conjunctivae normal.   Cardiovascular:      Rate and Rhythm: Normal rate and regular rhythm.      Pulses: Normal pulses.      Heart sounds: Normal heart sounds. No murmur heard.  Pulmonary:      Effort: Pulmonary effort is normal. No respiratory distress.      Breath sounds: Normal breath sounds. No wheezing, rhonchi or rales.   Abdominal:      General: Bowel sounds are normal. There is no distension.      Palpations: Abdomen is soft.      Tenderness: There is no abdominal tenderness. There is no guarding.   Musculoskeletal:          General: No swelling or tenderness. Normal range of motion.      Cervical back: Neck supple.   Lymphadenopathy:      Cervical: No cervical adenopathy.   Skin:     General: Skin is warm and dry.      Capillary Refill: Capillary refill takes less than 2 seconds.      Coloration: Skin is not jaundiced or pale.      Comments: Left leg with significant swelling, erythema and noted blister to medial aspect of ankle. Warm and painful to touch   Neurological:      General: No focal deficit present.      Mental Status: He is alert and oriented to person, place, and time. Mental status is at baseline.      Sensory: No sensory deficit.      Motor: No weakness.   Psychiatric:         Mood and Affect: Mood normal.         Behavior: Behavior normal.         Thought Content: Thought content normal.         Judgment: Judgment normal.         Significant Labs: All pertinent labs within the past 24 hours have been reviewed.    Significant Imaging: I have reviewed all pertinent imaging results/findings within the past 24 hours.      Assessment/Plan:      * Sepsis  -Admitted to inpatient status  -On admit he was tachycardic and tachypneic with WBC 25k and normal lactic acid  -Source of sepsis is obviously a severe cellulitis of his left lower extremity.  -In the ER blood cultures were collected and he received sepsis bolus of fluids, vancomycin, zosyn and norco.  -Pulses in left foot are dopplerable, but not palpable  - General surgery evaluated - CT does not show evidence of abscess or gas  - continued with IV antibiotics  -Monitor pulses in leg q4h with doppler and check arterial US of his leg with mae. - reviewed  -Will treat with vancomycin, clindamycin and cefepime    - Clinically, he is improving with improvement in WBC, decrease in fevers and slight improvement in pain. His blistering does appear slightly larger but no spread of erythema. ID consulted for recommendations.    Debility  -At home ambulates with a  "walker  -Notes he "Fell or slid" out of a chair on night prior to admit  -When more stable will consult PT/OT for evaluation.    Chronic cough  -Presumed due to allergic rhinitis  -Continue home anti-histamines  -Add tessalon PRN    Chronic gout  -Check uric acid now  -Continue home allopurinol.    Hypokalemia  -Due to Bartter syndrome and diminished oral intake  -Replace potassium today.  -Will need daily replacement  -Check BMP and Mag in AM.        Hyponatremia  -On admit mild and likely due to diminished oral intake and mild dehydration  -Continue IV fluids  -stable    Normocytic anemia  -Hb 12.1 on admit  -No bleeding  -Check iron, ferritin, b12 and folate - appears more ACD at this time with elevated ferritin  -Repeat cbc in AM    Cellulitis of left lower extremity  -Treatment as above.    Primary open angle glaucoma of both eyes, severe stage  -History noted  -Continue home timolol and lantanoprost drops    Bartter syndrome  -At home takes KCl 10mEq daily and prior labs show his K is usually normal on this  - received IV KCl this morning, restart home dose   -Treatment as above.    H/O prostate cancer  -history noted  -Continue home oxybutynin      VTE Risk Mitigation (From admission, onward)         Ordered     enoxaparin injection 40 mg  Daily         10/02/22 1416     IP VTE HIGH RISK PATIENT  Once         10/02/22 1416     Place sequential compression device  Until discontinued         10/02/22 1416                Discharge Planning   ROS: 10/7/2022     Code Status: Full Code   Is the patient medically ready for discharge?:     Reason for patient still in hospital (select all that apply): Treatment  Discharge Plan A: Home with family                  Christian Esquivel MD  Department of Hospital Medicine   [facility] [place] [facility]    "

## 2022-10-04 NOTE — PROGRESS NOTES
Pharmacokinetic Assessment Follow Up: IV Vancomycin    Vancomycin serum concentration assessment(s):    The trough level was drawn correctly and can be used to guide therapy at this time. The measurement is below the desired definitive target range of 10 to 20 mcg/mL.    Vancomycin Regimen Plan:    Change regimen to Vancomycin 1500 mg IV every 12 hours with next serum trough concentration measured at 1400 prior to 3rd dose on 10/5/2022    Drug levels (last 3 results):  Recent Labs   Lab Result Units 10/04/22  1309   Vancomycin-Trough ug/mL 6.2*       Pharmacy will continue to follow and monitor vancomycin.    Please contact pharmacy at extension   [phone] for questions regarding this assessment.    Thank you for the consult,   Dirk West Jr       Patient brief summary:  Mark Church is a 72 y.o. male initiated on antimicrobial therapy with IV Vancomycin for treatment of skin & soft tissue infection    Drug Allergies:   Review of patient's allergies indicates:   Allergen Reactions    Compazine [prochlorperazine edisylate]        Actual Body Weight:   61.6 kg    Renal Function:   Estimated Creatinine Clearance: 72.7 mL/min (based on SCr of 0.8 mg/dL).,     Dialysis Method (if applicable):  N/A    CBC (last 72 hours):  Recent Labs   Lab Result Units 10/02/22  1209 10/02/22  1647 10/03/22  0433 10/04/22  0351   WBC K/uL 25.54*  --  21.64* 18.97*   Hemoglobin g/dL 12.1*  --  9.2* 9.7*   Hemoglobin A1C %  --  5.3  --   --    Hematocrit % 34.8*  --  26.8* 29.4*   Platelets K/uL 311  --  SEE COMMENT SEE COMMENT   Gran % % 53.0  --  49.0 63.0   Lymph % % 4.0*  --  12.0* 12.0*   Mono % % 0.0*  --  3.0* 3.0*   Eosinophil % % 0.0  --  0.0 2.0   Basophil % % 0.0  --  0.0 0.0   Differential Method  Manual  --  Manual Manual       Metabolic Panel (last 72 hours):  Recent Labs   Lab Result Units 10/02/22  1209 10/02/22  1823 10/03/22  0433 10/04/22  0351   Sodium mmol/L 134*  --  134* 134*   Potassium mmol/L 3.1*  --  2.6* 2.7*    Chloride mmol/L 101  --  106 103   CO2 mmol/L 26  --  23 21*   Glucose mg/dL 125*  --  99 113*   Glucose, UA   --  Negative  --   --    BUN mg/dL 26*  --  18 19   Creatinine mg/dL 0.9  --  0.7 0.8   Creatinine, Urine mg/dL  --  43.8  --   --    Albumin g/dL 2.2*  --  1.5* 1.4*   Total Bilirubin mg/dL 1.7*  --  1.4* 1.0   Alkaline Phosphatase U/L 89  --  68 109   AST U/L 19  --  22 30   ALT U/L 18  --  15 19   Magnesium mg/dL 1.1*  --  1.2* 1.4*       Vancomycin Administrations:  vancomycin given in the last 96 hours                     vancomycin 1.5 g in dextrose 5 % 250 mL IVPB (ready to mix) (mg) 1,500 mg New Bag 10/03/22 1405    vancomycin 1.5 g in dextrose 5 % 250 mL IVPB (ready to mix) (mg) 1,500 mg New Bag 10/02/22 1353                    Microbiologic Results:  Microbiology Results (last 7 days)       Procedure Component Value Units Date/Time    Blood culture x two cultures. Draw prior to antibiotics. [[id number]] Collected: 10/02/22 1210    Order Status: Completed Specimen: Blood from Peripheral, Foot, Right Updated: 10/04/22 1303     Blood Culture, Routine No Growth to date      No Growth to date      No Growth to date    Narrative:      Aerobic and anaerobic    Blood culture x two cultures. Draw prior to antibiotics. [[id number]] Collected: 10/02/22 1216    Order Status: Completed Specimen: Blood from Peripheral, Antecubital, Right Updated: 10/04/22 1303     Blood Culture, Routine No Growth to date      No Growth to date      No Growth to date    Narrative:      Aerobic and anaerobic

## 2022-10-04 NOTE — HOSPITAL COURSE
"Admitted with sepsis secondary to left lower extremity cellulitis. Started on IV antibiotics, IVF and potassium replacement (has Bartter syndrome). Surgery consulted for concern for nec fasc/bone involvement. CT did not show any evidence of abscess/gas. Seems to be improving though still significant swelling and blistering. ID consulted for antibiotic recommendations. Repeat CT ordered for worsening white count which did not show a clear abscess. Patient still with poor improvement, he underwent I&D with Orthopedic surgery and however no abscess was found but he did have "murky fluid" in tissue planes which were irrigated. He has been doing well since, WBC resolved and pain improving. PT/OT has been consulted and patient has been having difficulty participating due to orthostatic hypotension. Fluid ws given and low dose midodrine added which seems to have improved some but still limited pt. We continued with ivf and were able to stop midodrine with adequate improvement in pts bp. Pt then developed encephalopathy and was aggressive toward nursing staff requiring restraint placement. Pts encephalopathy self resolved without need for medications and likely related to hospital acquired delirium which pt had in the past. Psychiatry evaluated pt. On day of discharge pt a&ox3 and wanting to go home. Pt d/c home with magnesium and potassium supplementation as well as augmentin to complete his abx course. Pt to have home health on discharge.     " will obtain EKG from PCP done in Sept 2018

## 2022-10-04 NOTE — ASSESSMENT & PLAN NOTE
-Admitted to inpatient status  -On admit he was tachycardic and tachypneic with WBC 25k and normal lactic acid  -Source of sepsis is obviously a severe cellulitis of his left lower extremity.  -In the ER blood cultures were collected and he received sepsis bolus of fluids, vancomycin, zosyn and norco.  -Pulses in left foot are dopplerable, but not palpable  - General surgery evaluated - CT does not show evidence of abscess or gas  - continued with IV antibiotics  -Monitor pulses in leg q4h with doppler and check arterial US of his leg with mae. - reviewed  -Will treat with vancomycin, clindamycin and cefepime    - Clinically, he is improving with improvement in WBC, decrease in fevers and slight improvement in pain. His blistering does appear slightly larger but no spread of erythema. ID consulted for recommendations.

## 2022-10-04 NOTE — PT/OT/SLP PROGRESS
Physical Therapy      Patient Name:  Mark Church   MRN:  [id number]    9:47 am Patient not seen today secondary to Other (Comment) (LOW Potassium level 2.7). Nurse Leia is working on administer medications to pt. Will follow-up when appropriate.

## 2022-10-04 NOTE — SUBJECTIVE & OBJECTIVE
Interval History: Patient seen and examined this morning. LLE erythema improving. WBC continues to downtrending. Remains neurovascularly intact.     Medications:  Continuous Infusions:   0/9% NACL & POTASSIUM CHLORIDE 20 MEQ/L 75 mL/hr at 10/04/22 0645     Scheduled Meds:   allopurinoL  100 mg Oral Daily    aspirin  81 mg Oral Every other day    azelastine  1 spray Nasal BID    ceFEPime (MAXIPIME) IVPB  2 g Intravenous Q8H    cetirizine  5 mg Oral QHS    clindamycin (CLEOCIN) IVPB  600 mg Intravenous Q8H    docusate sodium  100 mg Oral BID    enoxaparin  40 mg Subcutaneous Daily    lactobacillus acidophilus & bulgar  1 packet Oral BID    latanoprost  1 drop Both Eyes QHS    magnesium sulfate IVPB  2 g Intravenous Once    oxybutynin  5 mg Oral BID    potassium chloride  10 mEq Intravenous Q1H    potassium chloride  10 mEq Oral Daily    timolol maleate 0.5%  1 drop Both Eyes BID    vancomycin (VANCOCIN) IVPB  1,500 mg Intravenous Q24H     PRN Meds:acetaminophen, benzonatate, dextrose 10%, dextrose 10%, glucagon (human recombinant), glucose, glucose, HYDROcodone-acetaminophen, HYDROcodone-acetaminophen, loperamide, melatonin, ondansetron, sodium chloride 0.9%, Pharmacy to dose Vancomycin consult **AND** vancomycin - pharmacy to dose     Review of patient's allergies indicates:   Allergen Reactions    Compazine [prochlorperazine edisylate]      Objective:     Vital Signs (Most Recent):  Temp: (!) 100.5 °F (38.1 °C) (10/04/22 0502)  Pulse: 98 (10/04/22 0502)  Resp: (!) 28 (10/04/22 0502)  BP: (!) 118/58 (10/04/22 0502)  SpO2: 96 % (10/04/22 0502)   Vital Signs (24h Range):  Temp:  [98.1 °F (36.7 °C)-100.5 °F (38.1 °C)] 100.5 °F (38.1 °C)  Pulse:  [77-98] 98  Resp:  [18-28] 28  SpO2:  [94 %-97 %] 96 %  BP: ()/(51-58) 118/58     Weight: 61.6 kg (135 lb 12.9 oz)  Body mass index is 21.27 kg/m².    Intake/Output - Last 3 Shifts         10/02 0700  10/03 0659 10/03 0700  10/04 0659 10/04 0700  10/05 0659    P.O. 300 560      I.V. (mL/kg) 41.7 (0.7)      IV Piggyback 290      Total Intake(mL/kg) 631.7 (10.3) 560 (9.1)     Urine (mL/kg/hr) 1075 700 (0.5)     Stool 0      Total Output 1075 700     Net -443.3 -140                    Physical Exam  Vitals and nursing note reviewed.   Constitutional:       General: He is not in acute distress.     Appearance: Normal appearance.   HENT:      Head: Normocephalic and atraumatic.      Nose: Nose normal.      Mouth/Throat:      Mouth: Mucous membranes are moist.   Cardiovascular:      Rate and Rhythm: Normal rate and regular rhythm.   Pulmonary:      Effort: Pulmonary effort is normal. No respiratory distress.   Abdominal:      General: Abdomen is flat. There is no distension.      Palpations: Abdomen is soft.      Tenderness: There is no abdominal tenderness.   Musculoskeletal:      Comments: Left lower extremity with erythema and swelling from knee down to the foot  Erythema resolving  Palpable pulses to DP, PT of LLE  Bullae noted to medial aspect near medial malleolus  Full range of motion of LLE without pain  Cellulitic skin warm to the touch, no appreciable fluctuance   Skin:     General: Skin is warm.   Neurological:      General: No focal deficit present.      Mental Status: He is alert.       Significant Labs:  I have reviewed all pertinent lab results within the past 24 hours.  CBC:   Recent Labs   Lab 10/04/22  0351   WBC 18.97*   RBC 3.61*   HGB 9.7*   HCT 29.4*   PLT SEE COMMENT   MCV 81*   MCH 26.9*   MCHC 33.0     CMP:   Recent Labs   Lab 10/04/22  0351   *   CALCIUM 7.8*   ALBUMIN 1.4*   PROT 5.2*   *   K 2.7*   CO2 21*      BUN 19   CREATININE 0.8   ALKPHOS 109   ALT 19   AST 30   BILITOT 1.0       Significant Diagnostics:  I have reviewed all pertinent imaging results/findings within the past 24 hours.

## 2022-10-04 NOTE — PROGRESS NOTES
[facility] Surg  General Surgery  Progress Note    Subjective:     History of Present Illness:  No notes on file    Post-Op Info:  * No surgery found *         Interval History: Patient seen and examined this morning. LLE erythema improving. WBC continues to downtrending. Remains neurovascularly intact.     Medications:  Continuous Infusions:   0/9% NACL & POTASSIUM CHLORIDE 20 MEQ/L 75 mL/hr at 10/04/22 0645     Scheduled Meds:   allopurinoL  100 mg Oral Daily    aspirin  81 mg Oral Every other day    azelastine  1 spray Nasal BID    ceFEPime (MAXIPIME) IVPB  2 g Intravenous Q8H    cetirizine  5 mg Oral QHS    clindamycin (CLEOCIN) IVPB  600 mg Intravenous Q8H    docusate sodium  100 mg Oral BID    enoxaparin  40 mg Subcutaneous Daily    lactobacillus acidophilus & bulgar  1 packet Oral BID    latanoprost  1 drop Both Eyes QHS    magnesium sulfate IVPB  2 g Intravenous Once    oxybutynin  5 mg Oral BID    potassium chloride  10 mEq Intravenous Q1H    potassium chloride  10 mEq Oral Daily    timolol maleate 0.5%  1 drop Both Eyes BID    vancomycin (VANCOCIN) IVPB  1,500 mg Intravenous Q24H     PRN Meds:acetaminophen, benzonatate, dextrose 10%, dextrose 10%, glucagon (human recombinant), glucose, glucose, HYDROcodone-acetaminophen, HYDROcodone-acetaminophen, loperamide, melatonin, ondansetron, sodium chloride 0.9%, Pharmacy to dose Vancomycin consult **AND** vancomycin - pharmacy to dose     Review of patient's allergies indicates:   Allergen Reactions    Compazine [prochlorperazine edisylate]      Objective:     Vital Signs (Most Recent):  Temp: (!) 100.5 °F (38.1 °C) (10/04/22 0502)  Pulse: 98 (10/04/22 0502)  Resp: (!) 28 (10/04/22 0502)  BP: (!) 118/58 (10/04/22 0502)  SpO2: 96 % (10/04/22 0502)   Vital Signs (24h Range):  Temp:  [98.1 °F (36.7 °C)-100.5 °F (38.1 °C)] 100.5 °F (38.1 °C)  Pulse:  [77-98] 98  Resp:  [18-28] 28  SpO2:  [94 %-97 %] 96 %  BP: ()/(51-58) 118/58     Weight:  61.6 kg (135 lb 12.9 oz)  Body mass index is 21.27 kg/m².    Intake/Output - Last 3 Shifts         10/02 0700  10/03 0659 10/03 0700  10/04 0659 10/04 0700  10/05 0659    P.O. 300 560     I.V. (mL/kg) 41.7 (0.7)      IV Piggyback 290      Total Intake(mL/kg) 631.7 (10.3) 560 (9.1)     Urine (mL/kg/hr) 1075 700 (0.5)     Stool 0      Total Output 1075 700     Net -443.3 -140                    Physical Exam  Vitals and nursing note reviewed.   Constitutional:       General: He is not in acute distress.     Appearance: Normal appearance.   HENT:      Head: Normocephalic and atraumatic.      Nose: Nose normal.      Mouth/Throat:      Mouth: Mucous membranes are moist.   Cardiovascular:      Rate and Rhythm: Normal rate and regular rhythm.   Pulmonary:      Effort: Pulmonary effort is normal. No respiratory distress.   Abdominal:      General: Abdomen is flat. There is no distension.      Palpations: Abdomen is soft.      Tenderness: There is no abdominal tenderness.   Musculoskeletal:      Comments: Left lower extremity with erythema and swelling from knee down to the foot  Erythema resolving  Palpable pulses to DP, PT of LLE  Bullae noted to medial aspect near medial malleolus  Full range of motion of LLE without pain  Cellulitic skin warm to the touch, no appreciable fluctuance   Skin:     General: Skin is warm.   Neurological:      General: No focal deficit present.      Mental Status: He is alert.       Significant Labs:  I have reviewed all pertinent lab results within the past 24 hours.  CBC:   Recent Labs   Lab 10/04/22  0351   WBC 18.97*   RBC 3.61*   HGB 9.7*   HCT 29.4*   PLT SEE COMMENT   MCV 81*   MCH 26.9*   MCHC 33.0     CMP:   Recent Labs   Lab 10/04/22  0351   *   CALCIUM 7.8*   ALBUMIN 1.4*   PROT 5.2*   *   K 2.7*   CO2 21*      BUN 19   CREATININE 0.8   ALKPHOS 109   ALT 19   AST 30   BILITOT 1.0       Significant Diagnostics:  I have reviewed all pertinent imaging results/findings  within the past 24 hours.    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     - patient seen and examined  - WBC downtrending  - stable neurovascular exam to LLE  - Low concern for compartment syndrome-- no paresthesia, poikilothermia, pallor, paralysis, or pulselessness.  - continue with conservative management  - If he deteriorates please call general surgery.   - Continue abx        Alexis Worthington MD  General Surgery  [facility] - Med Surg

## 2022-10-04 NOTE — NURSING
Wound notified to sacral fold. Rash and skin tear wound, cleansed and applied meriplex foam dressing as ordered. Turned and repositoned.

## 2022-10-04 NOTE — ASSESSMENT & PLAN NOTE
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     - patient seen and examined  - WBC downtrending  - stable neurovascular exam to LLE  - Low concern for compartment syndrome-- no paresthesia, poikilothermia, pallor, paralysis, or pulselessness.  - continue with conservative management  - If he deteriorates please call general surgery.   - Continue abx

## 2022-10-04 NOTE — SUBJECTIVE & OBJECTIVE
Interval History: note larger blistering on heel but still appears serous fluid and not purulent. Wife at bedside and updated.    Review of Systems   Constitutional:  Negative for chills and fever.   Cardiovascular:  Positive for leg swelling.   Musculoskeletal:  Positive for arthralgias and joint swelling.   Skin:  Positive for color change.   Objective:     Vital Signs (Most Recent):  Temp: 98.1 °F (36.7 °C) (10/04/22 1127)  Pulse: 78 (10/04/22 1127)  Resp: 16 (10/04/22 1127)  BP: (!) 108/56 (10/04/22 1127)  SpO2: (!) 94 % (10/04/22 1127)   Vital Signs (24h Range):  Temp:  [98.1 °F (36.7 °C)-100.5 °F (38.1 °C)] 98.1 °F (36.7 °C)  Pulse:  [] 78  Resp:  [16-28] 16  SpO2:  [94 %-97 %] 94 %  BP: (105-127)/(51-58) 108/56     Weight: 61.6 kg (135 lb 12.9 oz)  Body mass index is 21.27 kg/m².    Intake/Output Summary (Last 24 hours) at 10/4/2022 1140  Last data filed at 10/4/2022 0830  Gross per 24 hour   Intake 560 ml   Output 1100 ml   Net -540 ml        Physical Exam  Vitals and nursing note reviewed.   Constitutional:       General: He is not in acute distress.     Appearance: Normal appearance.   HENT:      Head: Normocephalic and atraumatic.      Nose: Nose normal. No congestion or rhinorrhea.      Mouth/Throat:      Mouth: Mucous membranes are dry.   Eyes:      General: No scleral icterus.     Conjunctiva/sclera: Conjunctivae normal.   Cardiovascular:      Rate and Rhythm: Normal rate and regular rhythm.      Pulses: Normal pulses.      Heart sounds: Normal heart sounds. No murmur heard.  Pulmonary:      Effort: Pulmonary effort is normal. No respiratory distress.      Breath sounds: Normal breath sounds. No wheezing, rhonchi or rales.   Abdominal:      General: Bowel sounds are normal. There is no distension.      Palpations: Abdomen is soft.      Tenderness: There is no abdominal tenderness. There is no guarding.   Musculoskeletal:         General: No swelling or tenderness. Normal range of motion.       Cervical back: Neck supple.   Lymphadenopathy:      Cervical: No cervical adenopathy.   Skin:     General: Skin is warm and dry.      Capillary Refill: Capillary refill takes less than 2 seconds.      Coloration: Skin is not jaundiced or pale.      Comments: Left leg with significant swelling, erythema and noted blister to medial aspect of ankle. Warm and painful to touch   Neurological:      General: No focal deficit present.      Mental Status: He is alert and oriented to person, place, and time. Mental status is at baseline.      Sensory: No sensory deficit.      Motor: No weakness.   Psychiatric:         Mood and Affect: Mood normal.         Behavior: Behavior normal.         Thought Content: Thought content normal.         Judgment: Judgment normal.         Significant Labs: All pertinent labs within the past 24 hours have been reviewed.    Significant Imaging: I have reviewed all pertinent imaging results/findings within the past 24 hours.

## 2022-10-04 NOTE — HPI
"72M with h/o Bartter syndrome, gout admitted 10/2 with swelling and pain in his left leg. Reports about 4 days of progressive swelling and redness to L leg. Suspects he may have had an insect bite, but doesn't recall. Reports swelling and redeness worse and started having fevers so came to hospital. Usually ambulatory with walker. Denies indwelling hardware.     Bcx NGTD x 2 days    CT  1. Diffuse, circumferential subcutaneous soft tissue edema throughout the left tibia and fibula.  No soft tissue gas or rim enhancing fluid collection.  Correlate for cellulitis.  2. Cutaneous collection at the skin surface of the medial ankle compatible with a fluid-filled blister.  ID consulted for "antibx recommendations for cellulitis"    Day 4 vanc/cefepime/clindamycin    Tmax 101    Arterial studies- Mildly elevated velocity within the left popliteal artery with monophasic waveforms in the calf arteries, concerning for stenosis of the popliteal artery.    ID consulted for "antibx recommendations for cellulitis"  "

## 2022-10-04 NOTE — NURSING
Wife at bedside . Plan of care discussed with patient,wife and doctor. New orders for Infectious Disease r/o LLE and blister noted. Clindamycin abx continues as ordered

## 2022-10-04 NOTE — NURSING
Patient has a critical lab- potassium 2.7 per lab dept. He also had a low grade temp of 100.5. KRYSTYNA Tsang NP/ Dr. DENNYS Esquivel.

## 2022-10-04 NOTE — PLAN OF CARE
Problem: Adult Inpatient Plan of Care  Goal: Plan of Care Review  Outcome: Ongoing, Progressing  Goal: Absence of Hospital-Acquired Illness or Injury  Outcome: Ongoing, Progressing  Goal: Optimal Comfort and Wellbeing  Outcome: Ongoing, Progressing  Goal: Readiness for Transition of Care  Outcome: Ongoing, Progressing     Problem: Infection Progression (Sepsis/Septic Shock)  Goal: Absence of Infection Signs and Symptoms  Outcome: Ongoing, Progressing

## 2022-10-05 LAB
ALBUMIN SERPL BCP-MCNC: 1.3 G/DL (ref 3.5–5.2)
ALP SERPL-CCNC: 93 U/L (ref 55–135)
ALT SERPL W/O P-5'-P-CCNC: 24 U/L (ref 10–44)
ANION GAP SERPL CALC-SCNC: 9 MMOL/L (ref 8–16)
ANISOCYTOSIS BLD QL SMEAR: SLIGHT
AST SERPL-CCNC: 32 U/L (ref 10–40)
BASOPHILS NFR BLD: 0 % (ref 0–1.9)
BILIRUB SERPL-MCNC: 0.8 MG/DL (ref 0.1–1)
BUN SERPL-MCNC: 18 MG/DL (ref 8–23)
CALCIUM SERPL-MCNC: 7.6 MG/DL (ref 8.7–10.5)
CHLORIDE SERPL-SCNC: 101 MMOL/L (ref 95–110)
CO2 SERPL-SCNC: 24 MMOL/L (ref 23–29)
CREAT SERPL-MCNC: 0.7 MG/DL (ref 0.5–1.4)
DIFFERENTIAL METHOD: ABNORMAL
DOHLE BOD BLD QL SMEAR: PRESENT
EOSINOPHIL NFR BLD: 2 % (ref 0–8)
ERYTHROCYTE [DISTWIDTH] IN BLOOD BY AUTOMATED COUNT: 13.6 % (ref 11.5–14.5)
EST. GFR  (NO RACE VARIABLE): >60 ML/MIN/1.73 M^2
GLUCOSE SERPL-MCNC: 136 MG/DL (ref 70–110)
HCT VFR BLD AUTO: 28.6 % (ref 40–54)
HGB BLD-MCNC: 9.7 G/DL (ref 14–18)
IMM GRANULOCYTES # BLD AUTO: ABNORMAL K/UL (ref 0–0.04)
IMM GRANULOCYTES NFR BLD AUTO: ABNORMAL % (ref 0–0.5)
LYMPHOCYTES NFR BLD: 9 % (ref 18–48)
MAGNESIUM SERPL-MCNC: 1.6 MG/DL (ref 1.6–2.6)
MCH RBC QN AUTO: 27.8 PG (ref 27–31)
MCHC RBC AUTO-ENTMCNC: 33.9 G/DL (ref 32–36)
MCV RBC AUTO: 82 FL (ref 82–98)
METAMYELOCYTES NFR BLD MANUAL: 2 %
MONOCYTES NFR BLD: 3 % (ref 4–15)
NEUTROPHILS NFR BLD: 77 % (ref 38–73)
NEUTS BAND NFR BLD MANUAL: 7 %
NRBC BLD-RTO: 0 /100 WBC
PLATELET # BLD AUTO: 300 K/UL (ref 150–450)
PMV BLD AUTO: 10.7 FL (ref 9.2–12.9)
POLYCHROMASIA BLD QL SMEAR: ABNORMAL
POTASSIUM SERPL-SCNC: 2.6 MMOL/L (ref 3.5–5.1)
PROT SERPL-MCNC: 4.9 G/DL (ref 6–8.4)
RBC # BLD AUTO: 3.49 M/UL (ref 4.6–6.2)
SODIUM SERPL-SCNC: 134 MMOL/L (ref 136–145)
TOXIC GRANULES BLD QL SMEAR: PRESENT
VANCOMYCIN TROUGH SERPL-MCNC: 24.8 UG/ML (ref 10–22)
WBC # BLD AUTO: 22.5 K/UL (ref 3.9–12.7)

## 2022-10-05 PROCEDURE — 85007 BL SMEAR W/DIFF WBC COUNT: CPT | Performed by: HOSPITALIST

## 2022-10-05 PROCEDURE — 36415 COLL VENOUS BLD VENIPUNCTURE: CPT | Performed by: STUDENT IN AN ORGANIZED HEALTH CARE EDUCATION/TRAINING PROGRAM

## 2022-10-05 PROCEDURE — 83735 ASSAY OF MAGNESIUM: CPT | Performed by: HOSPITALIST

## 2022-10-05 PROCEDURE — 36415 COLL VENOUS BLD VENIPUNCTURE: CPT | Performed by: HOSPITALIST

## 2022-10-05 PROCEDURE — 25000003 PHARM REV CODE 250: Performed by: INTERNAL MEDICINE

## 2022-10-05 PROCEDURE — 80202 ASSAY OF VANCOMYCIN: CPT | Performed by: STUDENT IN AN ORGANIZED HEALTH CARE EDUCATION/TRAINING PROGRAM

## 2022-10-05 PROCEDURE — 85027 COMPLETE CBC AUTOMATED: CPT | Performed by: HOSPITALIST

## 2022-10-05 PROCEDURE — 97110 THERAPEUTIC EXERCISES: CPT | Mod: CQ

## 2022-10-05 PROCEDURE — 97530 THERAPEUTIC ACTIVITIES: CPT | Mod: CQ

## 2022-10-05 PROCEDURE — 63600175 PHARM REV CODE 636 W HCPCS: Performed by: STUDENT IN AN ORGANIZED HEALTH CARE EDUCATION/TRAINING PROGRAM

## 2022-10-05 PROCEDURE — 63600175 PHARM REV CODE 636 W HCPCS: Performed by: HOSPITALIST

## 2022-10-05 PROCEDURE — 99232 SBSQ HOSP IP/OBS MODERATE 35: CPT | Mod: ,,, | Performed by: SURGERY

## 2022-10-05 PROCEDURE — 25000003 PHARM REV CODE 250: Performed by: STUDENT IN AN ORGANIZED HEALTH CARE EDUCATION/TRAINING PROGRAM

## 2022-10-05 PROCEDURE — 63600175 PHARM REV CODE 636 W HCPCS: Performed by: INTERNAL MEDICINE

## 2022-10-05 PROCEDURE — 99233 PR SUBSEQUENT HOSPITAL CARE,LEVL III: ICD-10-PCS | Mod: ,,, | Performed by: INTERNAL MEDICINE

## 2022-10-05 PROCEDURE — 25000003 PHARM REV CODE 250: Performed by: HOSPITALIST

## 2022-10-05 PROCEDURE — 80053 COMPREHEN METABOLIC PANEL: CPT | Performed by: HOSPITALIST

## 2022-10-05 PROCEDURE — 11000001 HC ACUTE MED/SURG PRIVATE ROOM

## 2022-10-05 PROCEDURE — 99232 PR SUBSEQUENT HOSPITAL CARE,LEVL II: ICD-10-PCS | Mod: ,,, | Performed by: SURGERY

## 2022-10-05 PROCEDURE — 99233 SBSQ HOSP IP/OBS HIGH 50: CPT | Mod: ,,, | Performed by: INTERNAL MEDICINE

## 2022-10-05 RX ORDER — POTASSIUM CHLORIDE 20 MEQ/1
40 TABLET, EXTENDED RELEASE ORAL 2 TIMES DAILY
Status: DISCONTINUED | OUTPATIENT
Start: 2022-10-05 | End: 2022-10-07

## 2022-10-05 RX ORDER — CEFEPIME HYDROCHLORIDE 1 G/50ML
2 INJECTION, SOLUTION INTRAVENOUS
Status: DISCONTINUED | OUTPATIENT
Start: 2022-10-05 | End: 2022-10-13

## 2022-10-05 RX ORDER — POTASSIUM CHLORIDE 20 MEQ/1
40 TABLET, EXTENDED RELEASE ORAL DAILY
Status: DISCONTINUED | OUTPATIENT
Start: 2022-10-05 | End: 2022-10-05

## 2022-10-05 RX ORDER — SIMETHICONE 80 MG
1 TABLET,CHEWABLE ORAL 3 TIMES DAILY PRN
Status: DISCONTINUED | OUTPATIENT
Start: 2022-10-05 | End: 2022-10-21 | Stop reason: HOSPADM

## 2022-10-05 RX ORDER — LANOLIN ALCOHOL/MO/W.PET/CERES
400 CREAM (GRAM) TOPICAL DAILY
Status: DISCONTINUED | OUTPATIENT
Start: 2022-10-05 | End: 2022-10-13

## 2022-10-05 RX ADMIN — TIMOLOL MALEATE 1 DROP: 5 SOLUTION/ DROPS OPHTHALMIC at 08:10

## 2022-10-05 RX ADMIN — DOCUSATE SODIUM 100 MG: 100 CAPSULE, LIQUID FILLED ORAL at 08:10

## 2022-10-05 RX ADMIN — Medication 400 MG: at 09:10

## 2022-10-05 RX ADMIN — SODIUM CHLORIDE AND POTASSIUM CHLORIDE: 9; 1.49 INJECTION, SOLUTION INTRAVENOUS at 09:10

## 2022-10-05 RX ADMIN — AZELASTINE 137 MCG: 1 SPRAY, METERED NASAL at 08:10

## 2022-10-05 RX ADMIN — LATANOPROST 1 DROP: 50 SOLUTION OPHTHALMIC at 08:10

## 2022-10-05 RX ADMIN — CEFEPIME 2 G: 2 INJECTION, POWDER, FOR SOLUTION INTRAVENOUS at 04:10

## 2022-10-05 RX ADMIN — LACTOBACILLUS ACIDOPHILUS / LACTOBACILLUS BULGARICUS 1 EACH: 100 MILLION CFU STRENGTH GRANULES at 08:10

## 2022-10-05 RX ADMIN — CEFEPIME 2 G: 2 INJECTION, POWDER, FOR SOLUTION INTRAVENOUS at 08:10

## 2022-10-05 RX ADMIN — OXYBUTYNIN CHLORIDE 5 MG: 5 TABLET ORAL at 08:10

## 2022-10-05 RX ADMIN — ENOXAPARIN SODIUM 40 MG: 100 INJECTION SUBCUTANEOUS at 04:10

## 2022-10-05 RX ADMIN — HYDROCODONE BITARTRATE AND ACETAMINOPHEN 1 TABLET: 10; 325 TABLET ORAL at 12:10

## 2022-10-05 RX ADMIN — POTASSIUM CHLORIDE 40 MEQ: 1500 TABLET, EXTENDED RELEASE ORAL at 08:10

## 2022-10-05 RX ADMIN — ASPIRIN 81 MG CHEWABLE TABLET 81 MG: 81 TABLET CHEWABLE at 08:10

## 2022-10-05 RX ADMIN — CLINDAMYCIN IN 5 PERCENT DEXTROSE 600 MG: 12 INJECTION, SOLUTION INTRAVENOUS at 10:10

## 2022-10-05 RX ADMIN — VANCOMYCIN HYDROCHLORIDE 1500 MG: 1.5 INJECTION, POWDER, LYOPHILIZED, FOR SOLUTION INTRAVENOUS at 03:10

## 2022-10-05 RX ADMIN — SIMETHICONE 80 MG: 80 TABLET, CHEWABLE ORAL at 10:10

## 2022-10-05 RX ADMIN — CLINDAMYCIN IN 5 PERCENT DEXTROSE 600 MG: 12 INJECTION, SOLUTION INTRAVENOUS at 02:10

## 2022-10-05 RX ADMIN — CEFEPIME 2 G: 2 INJECTION, POWDER, FOR SOLUTION INTRAVENOUS at 11:10

## 2022-10-05 RX ADMIN — ALLOPURINOL 100 MG: 100 TABLET ORAL at 08:10

## 2022-10-05 RX ADMIN — CETIRIZINE HYDROCHLORIDE 5 MG: 5 TABLET ORAL at 08:10

## 2022-10-05 NOTE — ASSESSMENT & PLAN NOTE
-Admitted to inpatient status  -On admit he was tachycardic and tachypneic with WBC 25k and normal lactic acid  -Source of sepsis is obviously a severe cellulitis of his left lower extremity.  -In the ER blood cultures were collected and he received sepsis bolus of fluids, vancomycin, zosyn and norco.  -Pulses in left foot are dopplerable, but not palpable  - General surgery evaluated - CT does not show evidence of abscess or gas  - continued with IV antibiotics  -Monitor pulses in leg q4h with doppler and check arterial US of his leg with mae. - reviewed  -Will treat with vancomycin, clindamycin and cefepime    - Clinically, he is improving with improvement in WBC, decrease in fevers and slight improvement in pain. His blistering does appear slightly larger but no spread of erythema. ID consulted for recommendations.  - Stop clinda, continue vanc/cefepime for now. Appears to be improve but if worsens, plan to change antibx to meropenem and repeat imaging

## 2022-10-05 NOTE — ASSESSMENT & PLAN NOTE
"72M with h/o Bartter syndrome, gout admitted 10/2 with erythema, swelling, pain to L leg. Febrile 101. Bcx NGTD x 2 days. On vanc/zosyn/clindmycin and erythema receeding from demarcated margins. CT- no abscess. Arterial studies- Mildly elevated velocity within the left popliteal artery with monophasic waveforms in the calf arteries, concerning for stenosis of the popliteal artery. ID consulted for "antibx recommendations for cellulitis"    Unclear if just cellulitis or he has a vascular component to it as well. Most likely culprit strep vs polymicrobial/GNR infection. Fortunately erythema seems to be improving/receeding from demarcated margins, so unlikely nec fascitis. Typically best to avoid vanc/zosyn combo 2/2 VU risk. clindmycin can be used for strep antitoxin effect, but benefit is typically in first 48-72h    Recommendations:   - continue vanc/cefepime. Stop clinda/zosyn. If continues to improve, would consider stopping vancomycin in next day or two. If worsens, would consider changing cefepime to meropenem. If worsens, would also consider repeat imaging to eval for any interval development of fluid collection or gas  - consider vascular evaluation  - monitor for clinical improvement  - repeat procal tomorrow    "

## 2022-10-05 NOTE — SUBJECTIVE & OBJECTIVE
Past Medical History:   Diagnosis Date    Arthritis     Bartter syndrome     Blind left eye     Cancer     Cataract     Glaucoma     Normocytic anemia 10/2/2022    Prostate cancer     Urinary incontinence        Past Surgical History:   Procedure Laterality Date    CHOLECYSTECTOMY      JOINT REPLACEMENT      PROSTATE SURGERY      REFRACTIVE SURGERY Bilateral     ROTATOR CUFF REPAIR Right     sinus polyp         Review of patient's allergies indicates:   Allergen Reactions    Compazine [prochlorperazine edisylate]        No current facility-administered medications on file prior to encounter.     Current Outpatient Medications on File Prior to Encounter   Medication Sig    allopurinol (ZYLOPRIM) 100 MG tablet TAKE 1 TABLET EVERY DAY    clindamycin (CLEOCIN) 300 MG capsule Take 300 mg by mouth every 12 (twelve) hours.    HYDROcodone-acetaminophen (NORCO) 5-325 mg per tablet Take 1 tablet by mouth every 8 (eight) hours as needed.    latanoprost 0.005 % ophthalmic solution Place 1 drop into both eyes every evening.    multivit-minerals/folic acid (MULTIVITAMIN GUMMIES ORAL) Take by mouth.    mupirocin (BACTROBAN) 2 % ointment Apply topically 3 (three) times daily.    oxybutynin (DITROPAN) 5 MG Tab 5 mg once daily.     potassium chloride (MICRO-K) 10 MEQ CpSR TAKE 2 CAPSULES TWICE DAILY    timolol maleate 0.5% (TIMOPTIC) 0.5 % Drop Place 1 drop into both eyes 2 (two) times daily.    aspirin 81 MG Chew Take 81 mg by mouth every other day.     azelastine (ASTELIN) 137 mcg (0.1 %) nasal spray 1 spray (137 mcg total) by Nasal route 2 (two) times daily.    levocetirizine (XYZAL) 5 MG tablet Take 1 tablet (5 mg total) by mouth every evening.     Family History       Problem Relation (Age of Onset)    Blindness Maternal Aunt    Cataracts Father    Glaucoma Maternal Aunt    No Known Problems Mother, Sister, Brother, Sister, Sister, Brother, Maternal Uncle, Paternal Aunt, Paternal Uncle, Maternal Grandmother, Maternal  Grandfather, Paternal Grandmother, Paternal Grandfather          Tobacco Use    Smoking status: Never    Smokeless tobacco: Never   Substance and Sexual Activity    Alcohol use: No     Alcohol/week: 0.0 standard drinks    Drug use: No    Sexual activity: Not on file     Review of Systems   Constitutional:  Positive for activity change, appetite change, chills, fatigue and fever.   HENT:  Negative for congestion and dental problem.    Eyes:  Negative for discharge and itching.   Respiratory:  Positive for cough. Negative for apnea, chest tightness and shortness of breath.    Cardiovascular:  Negative for chest pain and leg swelling.   Gastrointestinal:  Negative for abdominal distention and abdominal pain.   Endocrine: Negative for cold intolerance and heat intolerance.   Genitourinary:  Negative for difficulty urinating and dysuria.   Musculoskeletal:  Positive for joint swelling and myalgias. Negative for arthralgias and back pain.   Skin:  Positive for color change and rash.   Allergic/Immunologic: Negative for environmental allergies and food allergies.   Neurological:  Positive for weakness. Negative for dizziness, facial asymmetry and light-headedness.   Hematological:  Negative for adenopathy. Does not bruise/bleed easily.   Psychiatric/Behavioral:  Negative for agitation and behavioral problems.    Objective:     Vital Signs (Most Recent):  Temp: 99.3 °F (37.4 °C) (10/05/22 0727)  Pulse: 73 (10/05/22 0727)  Resp: 18 (10/05/22 0727)  BP: (!) 117/58 (10/05/22 0727)  SpO2: (!) 93 % (10/05/22 0727)   Vital Signs (24h Range):  Temp:  [98.1 °F (36.7 °C)-100.7 °F (38.2 °C)] 99.3 °F (37.4 °C)  Pulse:  [] 73  Resp:  [16-18] 18  SpO2:  [93 %-96 %] 93 %  BP: ()/(51-63) 117/58     Weight: 61.6 kg (135 lb 12.9 oz)  Body mass index is 21.27 kg/m².    Physical Exam  Vitals and nursing note reviewed.   Constitutional:       General: He is not in acute distress.     Appearance: He is well-developed. He is  "ill-appearing and toxic-appearing. He is not diaphoretic.   HENT:      Head: Normocephalic and atraumatic.      Right Ear: External ear normal.      Left Ear: External ear normal.      Nose: Nose normal.      Mouth/Throat:      Mouth: Mucous membranes are moist.   Eyes:      Extraocular Movements: Extraocular movements intact.      Conjunctiva/sclera: Conjunctivae normal.   Cardiovascular:      Rate and Rhythm: Normal rate and regular rhythm.      Heart sounds: Normal heart sounds.   Pulmonary:      Effort: Pulmonary effort is normal. No respiratory distress.      Breath sounds: Normal breath sounds.   Abdominal:      General: Bowel sounds are normal. There is no distension.      Palpations: Abdomen is soft.      Tenderness: There is no abdominal tenderness.   Musculoskeletal:      Cervical back: Normal range of motion. No rigidity.      Comments: Severe swelling, redness and warmth to left leg from just above knee to tips of toes.  There is a 1" bullae near medial malleolus and an area of hyperpigmentation on anterior angle of ankle which looks like there was a bullae there as well which has already drained.  There are a few tiny marks on his shin which may be puncture marks but otherwise no clear insect bite or etiology.  There is no purulent drainage.  I cannot palpate his DP or PT pulses in his left leg.  The leg is quite tender to touch, but otherwise he is comfortable and pain does not appear out of proportion   Skin:     General: Skin is warm and dry.   Neurological:      Mental Status: He is alert and oriented to person, place, and time.      Cranial Nerves: No cranial nerve deficit.      Coordination: Coordination normal.   Psychiatric:         Behavior: Behavior normal.           Erythema receeding from demarcated margins    Significant Labs: All pertinent labs within the past 24 hours have been reviewed.    Significant Imaging: I have reviewed all pertinent imaging results/findings within the past 24 " hours.

## 2022-10-05 NOTE — PLAN OF CARE
Problem: Physical Therapy  Goal: Physical Therapy Goal  Description: Goals to be met by: 10/10/22     Patient will increase functional independence with mobility by performin. Pt to be mod I with bed mobility.  2. Pt to transfer with mod I.  3. Pt to ambulate 150' w/RW and supervision.  4. Pt to be (I) with written HEP.    Outcome: Ongoing, Progressing

## 2022-10-05 NOTE — CONSULTS
"[facility]  Infectious Disease  Consult Note    Patient Name: Mark Church  MRN: [id number]  Admission Date: 10/2/2022  Hospital Length of Stay: 3 days  Attending Physician: Chirstian Esquivel MD  Primary Care Provider: [facility] [place]      Isolation Status: No active isolations    Patient information was obtained from patient and ER records.      Inpatient consult to Infectious Diseases  Consult performed by: Mónica Miller MD  Consult ordered by: Christian Esquivel MD        Assessment/Plan:     Cellulitis of left lower extremity  72M with h/o Bartter syndrome, gout admitted 10/2 with erythema, swelling, pain to L leg. Febrile 101. Bcx NGTD x 2 days. On vanc/zosyn/clindmycin and erythema receeding from demarcated margins. CT- no abscess. Arterial studies- Mildly elevated velocity within the left popliteal artery with monophasic waveforms in the calf arteries, concerning for stenosis of the popliteal artery. ID consulted for "antibx recommendations for cellulitis"    Unclear if just cellulitis or he has a vascular component to it as well. Most likely culprit strep vs polymicrobial/GNR infection. Fortunately erythema seems to be improving/receeding from demarcated margins, so unlikely nec fascitis. Typically best to avoid vanc/zosyn combo 2/2 VU risk. clindmycin can be used for strep antitoxin effect, but benefit is typically in first 48-72h    Recommendations:   - continue vanc/cefepime. Stop clinda/zosyn. If continues to improve, would consider stopping vancomycin in next day or two. If worsens, would consider changing cefepime to meropenem. If worsens, would also consider repeat imaging to eval for any interval development of fluid collection or gas  - consider vascular evaluation  - monitor for clinical improvement  - repeat procal tomorrow          Thank you for your consult. I will follow-up with patient. Please contact us if you have any additional questions.    Mónica Miller MD  Infectious " "Disease  [facility] - Med Surg    Subjective:     Principal Problem: Sepsis    HPI: 72M with h/o Bartter syndrome, gout admitted 10/2 with swelling and pain in his left leg. Reports about 4 days of progressive swelling and redness to L leg. Suspects he may have had an insect bite, but doesn't recall. Reports swelling and redeness worse and started having fevers so came to hospital. Usually ambulatory with walker. Denies indwelling hardware.     Bcx NGTD x 2 days    CT  1. Diffuse, circumferential subcutaneous soft tissue edema throughout the left tibia and fibula.  No soft tissue gas or rim enhancing fluid collection.  Correlate for cellulitis.  2. Cutaneous collection at the skin surface of the medial ankle compatible with a fluid-filled blister.  ID consulted for "antibx recommendations for cellulitis"    Day 4 vanc/cefepime/clindamycin    Tmax 101    Arterial studies- Mildly elevated velocity within the left popliteal artery with monophasic waveforms in the calf arteries, concerning for stenosis of the popliteal artery.    ID consulted for "antibx recommendations for cellulitis"      Past Medical History:   Diagnosis Date    Arthritis     Bartter syndrome     Blind left eye     Cancer     Cataract     Glaucoma     Normocytic anemia 10/2/2022    Prostate cancer     Urinary incontinence        Past Surgical History:   Procedure Laterality Date    CHOLECYSTECTOMY      JOINT REPLACEMENT      PROSTATE SURGERY      REFRACTIVE SURGERY Bilateral     ROTATOR CUFF REPAIR Right     sinus polyp         Review of patient's allergies indicates:   Allergen Reactions    Compazine [prochlorperazine edisylate]        No current facility-administered medications on file prior to encounter.     Current Outpatient Medications on File Prior to Encounter   Medication Sig    allopurinol (ZYLOPRIM) 100 MG tablet TAKE 1 TABLET EVERY DAY    clindamycin (CLEOCIN) 300 MG capsule Take 300 mg by mouth every 12 (twelve) hours. "    HYDROcodone-acetaminophen (NORCO) 5-325 mg per tablet Take 1 tablet by mouth every 8 (eight) hours as needed.    latanoprost 0.005 % ophthalmic solution Place 1 drop into both eyes every evening.    multivit-minerals/folic acid (MULTIVITAMIN GUMMIES ORAL) Take by mouth.    mupirocin (BACTROBAN) 2 % ointment Apply topically 3 (three) times daily.    oxybutynin (DITROPAN) 5 MG Tab 5 mg once daily.     potassium chloride (MICRO-K) 10 MEQ CpSR TAKE 2 CAPSULES TWICE DAILY    timolol maleate 0.5% (TIMOPTIC) 0.5 % Drop Place 1 drop into both eyes 2 (two) times daily.    aspirin 81 MG Chew Take 81 mg by mouth every other day.     azelastine (ASTELIN) 137 mcg (0.1 %) nasal spray 1 spray (137 mcg total) by Nasal route 2 (two) times daily.    levocetirizine (XYZAL) 5 MG tablet Take 1 tablet (5 mg total) by mouth every evening.     Family History       Problem Relation (Age of Onset)    Blindness Maternal Aunt    Cataracts Father    Glaucoma Maternal Aunt    No Known Problems Mother, Sister, Brother, Sister, Sister, Brother, Maternal Uncle, Paternal Aunt, Paternal Uncle, Maternal Grandmother, Maternal Grandfather, Paternal Grandmother, Paternal Grandfather          Tobacco Use    Smoking status: Never    Smokeless tobacco: Never   Substance and Sexual Activity    Alcohol use: No     Alcohol/week: 0.0 standard drinks    Drug use: No    Sexual activity: Not on file     Review of Systems   Constitutional:  Positive for activity change, appetite change, chills, fatigue and fever.   HENT:  Negative for congestion and dental problem.    Eyes:  Negative for discharge and itching.   Respiratory:  Positive for cough. Negative for apnea, chest tightness and shortness of breath.    Cardiovascular:  Negative for chest pain and leg swelling.   Gastrointestinal:  Negative for abdominal distention and abdominal pain.   Endocrine: Negative for cold intolerance and heat intolerance.   Genitourinary:  Negative for difficulty  urinating and dysuria.   Musculoskeletal:  Positive for joint swelling and myalgias. Negative for arthralgias and back pain.   Skin:  Positive for color change and rash.   Allergic/Immunologic: Negative for environmental allergies and food allergies.   Neurological:  Positive for weakness. Negative for dizziness, facial asymmetry and light-headedness.   Hematological:  Negative for adenopathy. Does not bruise/bleed easily.   Psychiatric/Behavioral:  Negative for agitation and behavioral problems.    Objective:     Vital Signs (Most Recent):  Temp: 99.3 °F (37.4 °C) (10/05/22 0727)  Pulse: 73 (10/05/22 0727)  Resp: 18 (10/05/22 0727)  BP: (!) 117/58 (10/05/22 0727)  SpO2: (!) 93 % (10/05/22 0727)   Vital Signs (24h Range):  Temp:  [98.1 °F (36.7 °C)-100.7 °F (38.2 °C)] 99.3 °F (37.4 °C)  Pulse:  [] 73  Resp:  [16-18] 18  SpO2:  [93 %-96 %] 93 %  BP: ()/(51-63) 117/58     Weight: 61.6 kg (135 lb 12.9 oz)  Body mass index is 21.27 kg/m².    Physical Exam  Vitals and nursing note reviewed.   Constitutional:       General: He is not in acute distress.     Appearance: He is well-developed. He is ill-appearing and toxic-appearing. He is not diaphoretic.   HENT:      Head: Normocephalic and atraumatic.      Right Ear: External ear normal.      Left Ear: External ear normal.      Nose: Nose normal.      Mouth/Throat:      Mouth: Mucous membranes are moist.   Eyes:      Extraocular Movements: Extraocular movements intact.      Conjunctiva/sclera: Conjunctivae normal.   Cardiovascular:      Rate and Rhythm: Normal rate and regular rhythm.      Heart sounds: Normal heart sounds.   Pulmonary:      Effort: Pulmonary effort is normal. No respiratory distress.      Breath sounds: Normal breath sounds.   Abdominal:      General: Bowel sounds are normal. There is no distension.      Palpations: Abdomen is soft.      Tenderness: There is no abdominal tenderness.   Musculoskeletal:      Cervical back: Normal range of  "motion. No rigidity.      Comments: Severe swelling, redness and warmth to left leg from just above knee to tips of toes.  There is a 1" bullae near medial malleolus and an area of hyperpigmentation on anterior angle of ankle which looks like there was a bullae there as well which has already drained.  There are a few tiny marks on his shin which may be puncture marks but otherwise no clear insect bite or etiology.  There is no purulent drainage.  I cannot palpate his DP or PT pulses in his left leg.  The leg is quite tender to touch, but otherwise he is comfortable and pain does not appear out of proportion   Skin:     General: Skin is warm and dry.   Neurological:      Mental Status: He is alert and oriented to person, place, and time.      Cranial Nerves: No cranial nerve deficit.      Coordination: Coordination normal.   Psychiatric:         Behavior: Behavior normal.           Erythema receeding from demarcated margins    Significant Labs: All pertinent labs within the past 24 hours have been reviewed.    Significant Imaging: I have reviewed all pertinent imaging results/findings within the past 24 hours.              "

## 2022-10-05 NOTE — SUBJECTIVE & OBJECTIVE
Interval History: Patient seen and examined. No acute events overnight. LLE erythema continues to improve; bullae developing on anterior aspect. WBC bumped this morning.     Medications:  Continuous Infusions:   0/9% NACL & POTASSIUM CHLORIDE 20 MEQ/L 75 mL/hr at 10/05/22 0923     Scheduled Meds:   allopurinoL  100 mg Oral Daily    aspirin  81 mg Oral Every other day    azelastine  1 spray Nasal BID    cetirizine  5 mg Oral QHS    clindamycin (CLEOCIN) IVPB  600 mg Intravenous Q8H    docusate sodium  100 mg Oral BID    enoxaparin  40 mg Subcutaneous Daily    lactobacillus acidophilus & bulgar  1 packet Oral BID    latanoprost  1 drop Both Eyes QHS    magnesium oxide  400 mg Oral Daily    oxybutynin  5 mg Oral BID    piperacillin-tazobactam (ZOSYN) IVPB  4.5 g Intravenous Q8H    potassium chloride  40 mEq Oral BID    timolol maleate 0.5%  1 drop Both Eyes BID    vancomycin (VANCOCIN) IVPB  1,500 mg Intravenous Q12H     PRN Meds:acetaminophen, benzonatate, dextrose 10%, dextrose 10%, glucagon (human recombinant), glucose, glucose, HYDROcodone-acetaminophen, HYDROcodone-acetaminophen, loperamide, melatonin, ondansetron, sodium chloride 0.9%, Pharmacy to dose Vancomycin consult **AND** vancomycin - pharmacy to dose     Review of patient's allergies indicates:   Allergen Reactions    Compazine [prochlorperazine edisylate]      Objective:     Vital Signs (Most Recent):  Temp: 99.3 °F (37.4 °C) (10/05/22 0727)  Pulse: 73 (10/05/22 0727)  Resp: 18 (10/05/22 0727)  BP: (!) 117/58 (10/05/22 0727)  SpO2: (!) 93 % (10/05/22 0727)   Vital Signs (24h Range):  Temp:  [98.1 °F (36.7 °C)-100.7 °F (38.2 °C)] 99.3 °F (37.4 °C)  Pulse:  [] 73  Resp:  [16-18] 18  SpO2:  [93 %-96 %] 93 %  BP: ()/(51-63) 117/58     Weight: 61.6 kg (135 lb 12.9 oz)  Body mass index is 21.27 kg/m².    Intake/Output - Last 3 Shifts         10/03 0700  10/04 0659 10/04 0700  10/05 0659 10/05 0700  10/06 0659    P.O. 560 840     I.V. (mL/kg)  264.5  (4.3)     IV Piggyback  868.6     Total Intake(mL/kg) 560 (9.1) 1973 (32)     Urine (mL/kg/hr) 1000 (0.7) 2000 (1.4)     Stool       Total Output 1000 2000     Net -440 -27            Urine Occurrence  2 x             Physical Exam  Vitals and nursing note reviewed.   Constitutional:       General: He is not in acute distress.     Appearance: Normal appearance.   HENT:      Head: Normocephalic and atraumatic.      Nose: Nose normal.      Mouth/Throat:      Mouth: Mucous membranes are moist.   Cardiovascular:      Rate and Rhythm: Normal rate and regular rhythm.   Pulmonary:      Effort: Pulmonary effort is normal. No respiratory distress.   Abdominal:      General: Abdomen is flat. There is no distension.      Palpations: Abdomen is soft.      Tenderness: There is no abdominal tenderness.   Musculoskeletal:      Comments: Left lower extremity with erythema and swelling below knee down to foot; erythema resolving  Palpable pulses to DP, PT of LLE  Bullae noted to medial aspect near medial malleolus and anterior aspect of mid-shin; filled with serous fluid  Full range of motion of LLE without pain  Cellulitic skin warm to the touch, no appreciable fluctuance   Skin:     General: Skin is warm.   Neurological:      General: No focal deficit present.      Mental Status: He is alert.       Significant Labs:  I have reviewed all pertinent lab results within the past 24 hours.  CBC:   Recent Labs   Lab 10/05/22  0454   WBC 22.50*   RBC 3.49*   HGB 9.7*   HCT 28.6*      MCV 82   MCH 27.8   MCHC 33.9     CMP:   Recent Labs   Lab 10/05/22  0454   *   CALCIUM 7.6*   ALBUMIN 1.3*   PROT 4.9*   *   K 2.6*   CO2 24      BUN 18   CREATININE 0.7   ALKPHOS 93   ALT 24   AST 32   BILITOT 0.8       Significant Diagnostics:  I have reviewed all pertinent imaging results/findings within the past 24 hours.

## 2022-10-05 NOTE — CONSULTS
"[facility]  Infectious Disease  Consult Note    Patient Name: Mark Church  MRN: [id number]  Admission Date: 10/2/2022  Hospital Length of Stay: 3 days  Attending Physician: Christian Esquivel MD  Primary Care Provider: [facility]      Isolation Status: No active isolations    Patient information was obtained from patient and ER records.      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 Febrile 101. Bcx NGTD x 2 days. On vanc/zosyn/clindmycin and erythema receeding from demarcated margins. CT- no abscess. Arterial studies- Mildly elevated velocity within the left popliteal artery with monophasic waveforms in the calf arteries, concerning for stenosis of the popliteal artery. ID consulted for "antibx recommendations for cellulitis"    Unclear if just cellulitis or he has a vascular component to it as well. Most likely culprit strep vs polymicrobial/GNR infection. Fortunately erythema seems to be improving/receeding from demarcated margins, so unlikely nec fascitis. Typically best to avoid vanc/zosyn combo 2/2 VU risk. clindmycin can be used for strep antitoxin effect, but benefit is typically in first 48-72h    Recommendations:   - continue cefepime. stop vancomycin, unlikely mrsa  - consider vascular evaluation  - monitor for clinical improvement  - considering de-escalation to po levaquin on d/c, so hold off on picc   - anticipated duration of abx 10-14 days (est end date 10/11- 10/14)  - wound care as per primary team        discussed with hospitalist    Thank you for your consult. I will follow-up with patient. Please contact us if you have any additional questions.    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 Reports " "about 4 days of progressive swelling and redness to L leg. Suspects he may have had an insect bite, but doesn't recall. Reports swelling and redeness worse and started having fevers so came to hospital. Usually ambulatory with walker. Denies indwelling hardware.     Bcx NGTD x 2 days    CT  1. Diffuse, circumferential subcutaneous soft tissue edema throughout the left tibia and fibula.  No soft tissue gas or rim enhancing fluid collection.  Correlate for cellulitis.  2. Cutaneous collection at the skin surface of the medial ankle compatible with a fluid-filled blister.  ID consulted for "antibx recommendations for cellulitis"    Day 4 vanc/cefepime/clindamycin    Tmax 101    Arterial studies- Mildly elevated velocity within the left popliteal artery with monophasic waveforms in the calf arteries, concerning for stenosis of the popliteal artery.    ID consulted for "antibx recommendations for cellulitis"      Interval history: NAEO. Pain and swelling and erythema improving. Pt reports symtpoms started after walking his dog on an empty lot. Thinks he was bit by some ants and then had been scratching at ant bites with a back scratcher and fingernails.     Past Surgical History:   Procedure Laterality Date    CHOLECYSTECTOMY      JOINT REPLACEMENT      PROSTATE SURGERY      REFRACTIVE SURGERY Bilateral     ROTATOR CUFF REPAIR Right     sinus polyp         Review of patient's allergies indicates:   Allergen Reactions    Compazine [prochlorperazine edisylate]        No current facility-administered medications on file prior to encounter.     Current Outpatient Medications on File Prior to Encounter   Medication Sig    allopurinol (ZYLOPRIM) 100 MG tablet TAKE 1 TABLET EVERY DAY    clindamycin (CLEOCIN) 300 MG capsule Take 300 mg by mouth every 12 (twelve) hours.    HYDROcodone-acetaminophen (NORCO) 5-325 mg per tablet Take 1 tablet by mouth every 8 (eight) hours as needed.    latanoprost 0.005 % ophthalmic " solution Place 1 drop into both eyes every evening.    multivit-minerals/folic acid (MULTIVITAMIN GUMMIES ORAL) Take by mouth.    mupirocin (BACTROBAN) 2 % ointment Apply topically 3 (three) times daily.    oxybutynin (DITROPAN) 5 MG Tab 5 mg once daily.     potassium chloride (MICRO-K) 10 MEQ CpSR TAKE 2 CAPSULES TWICE DAILY    timolol maleate 0.5% (TIMOPTIC) 0.5 % Drop Place 1 drop into both eyes 2 (two) times daily.    aspirin 81 MG Chew Take 81 mg by mouth every other day.     azelastine (ASTELIN) 137 mcg (0.1 %) nasal spray 1 spray (137 mcg total) by Nasal route 2 (two) times daily.    levocetirizine (XYZAL) 5 MG tablet Take 1 tablet (5 mg total) by mouth every evening.     Family History       Problem Relation (Age of Onset)    Blindness Maternal Aunt    Cataracts Father    Glaucoma Maternal Aunt    No Known Problems Mother, Sister, Brother, Sister, Sister, Brother, Maternal Uncle, Paternal Aunt, Paternal Uncle, Maternal Grandmother, Maternal Grandfather, Paternal Grandmother, Paternal Grandfather          Tobacco Use    Smoking status: Never    Smokeless tobacco: Never   Substance and Sexual Activity    Alcohol use: No     Alcohol/week: 0.0 standard drinks    Drug use: No    Sexual activity: Not on file     Review of Systems   Constitutional:  Positive for activity change, appetite change, chills, fatigue and fever.   HENT:  Negative for congestion and dental problem.    Eyes:  Negative for discharge and itching.   Respiratory:  Positive for cough. Negative for apnea, chest tightness and shortness of breath.    Cardiovascular:  Negative for chest pain and leg swelling.   Gastrointestinal:  Negative for abdominal distention and abdominal pain.   Endocrine: Negative for cold intolerance and heat intolerance.   Genitourinary:  Negative for difficulty urinating and dysuria.   Musculoskeletal:  Positive for joint swelling and myalgias. Negative for arthralgias and back pain.   Skin:  Positive for  "color change and rash.   Allergic/Immunologic: Negative for environmental allergies and food allergies.   Neurological:  Positive for weakness. Negative for dizziness, facial asymmetry and light-headedness.   Hematological:  Negative for adenopathy. Does not bruise/bleed easily.   Psychiatric/Behavioral:  Negative for agitation and behavioral problems.    Objective:     Vital Signs (Most Recent):  Temp: 99.5 °F (37.5 °C) (10/05/22 1614)  Pulse: 92 (10/05/22 1614)  Resp: 18 (10/05/22 1614)  BP: (!) 124/57 (10/05/22 1614)  SpO2: 98 % (10/05/22 1614)   Vital Signs (24h Range):  Temp:  [98.5 °F (36.9 °C)-99.5 °F (37.5 °C)] 99.5 °F (37.5 °C)  Pulse:  [] 92  Resp:  [16-18] 18  SpO2:  [93 %-98 %] 98 %  BP: ()/(51-59) 124/57     Weight: 61.6 kg (135 lb 12.9 oz)  Body mass index is 21.27 kg/m².    Physical Exam  Vitals and nursing note reviewed.   Constitutional:       General: He is not in acute distress.     Appearance: He is well-developed. He is ill-appearing and toxic-appearing. He is not diaphoretic.   HENT:      Head: Normocephalic and atraumatic.      Right Ear: External ear normal.      Left Ear: External ear normal.      Nose: Nose normal.      Mouth/Throat:      Mouth: Mucous membranes are moist.   Eyes:      Extraocular Movements: Extraocular movements intact.      Conjunctiva/sclera: Conjunctivae normal.   Cardiovascular:      Rate and Rhythm: Normal rate and regular rhythm.      Heart sounds: Normal heart sounds.   Pulmonary:      Effort: Pulmonary effort is normal. No respiratory distress.      Breath sounds: Normal breath sounds.   Abdominal:      General: Bowel sounds are normal. There is no distension.      Palpations: Abdomen is soft.      Tenderness: There is no abdominal tenderness.   Musculoskeletal:      Cervical back: Normal range of motion. No rigidity.      Comments: Severe swelling, redness and warmth to left leg from just above knee to tips of toes.  There is a 1" bullae near medial " malleolus and an area of hyperpigmentation on anterior angle of ankle which looks like there was a bullae there as well which has already drained.  There are a few tiny marks on his shin which may be puncture marks but otherwise no clear insect bite or etiology.  There is no purulent drainage.  I cannot palpate his DP or PT pulses in his left leg.  The leg is quite tender to touch, but otherwise he is comfortable and pain does not appear out of proportion   Skin:     General: Skin is warm and dry.   Neurological:      Mental Status: He is alert and oriented to person, place, and time.      Cranial Nerves: No cranial nerve deficit.      Coordination: Coordination normal.   Psychiatric:         Behavior: Behavior normal.           Erythema receeding from demarcated margins    Significant Labs: All pertinent labs within the past 24 hours have been reviewed.    Significant Imaging: I have reviewed all pertinent imaging results/findings within the past 24 hours.

## 2022-10-05 NOTE — PLAN OF CARE
Problem: Adult Inpatient Plan of Care  Goal: Plan of Care Review  Outcome: Ongoing, Progressing     Problem: Infection Progression (Sepsis/Septic Shock)  Goal: Absence of Infection Signs and Symptoms  Outcome: Ongoing, Progressing     Problem: Impaired Wound Healing  Goal: Optimal Wound Healing  Outcome: Ongoing, Progressing

## 2022-10-05 NOTE — PROGRESS NOTES
"[facility] Medicine  Progress Note    Patient Name: Mark Church  MRN: [id number]  Patient Class: IP- Inpatient   Admission Date: 10/2/2022  Length of Stay: 3 days  Attending Physician: Christian Esquivel MD  Primary Care Provider: [facility]         Subjective:     Principal Problem:Sepsis        HPI:  Mr. 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  He states this all started about 4 days ago when he was walking in a field. He states he felt some significant itching in his lower leg.  Later that night his leg started to swell and hurt.  He assumed this was caused by an insect bite on his leg, but he never saw a specific bite or insect.  He states that the 2nd night the swelling and pain became severe and he was having fevers and chills which prompted him to visit his primary care provider the next day.  He was prescribed an antibiotic, not sure which one, and despite taking this his leg has continued to get worse.  He notes he has been having fevers and chills and last night he "slid or fell" out of his chair when he was trying to get up because the pain was so bad.  He notes he uses a walker to ambulate at home.  He also notes a chronic cough ascociated wiith allergic rhinitis and had an episode of nausea and vomiting one week ago.  He reports diminished oral intake due to decreased appetite, but no nausea or vomiting since this all started 4 days ago.  He denies chest pain, shortness of breath, headache, palpitations, dysuria and diarrhea.      Overview/Hospital Course:  Admitted with sepsis secondary to left lower extremity cellulitis. Started on IV antibiotics, IVF and potassium replacement (has Bartter syndrome). Surgery consulted for concern for nec fasc/bone involvement. CT did not show any evidence of abscess/gas. Seems to be improving though still significant " swelling and blistering. ID consulted for antibiotic recommendations.      Interval History: blister ruptured and swelling actually appears to be improving. Pain is similar    Review of Systems   Constitutional:  Negative for chills and fever.   Cardiovascular:  Positive for leg swelling.   Musculoskeletal:  Positive for arthralgias and joint swelling.   Skin:  Positive for color change.   Objective:     Vital Signs (Most Recent):  Temp: 99.3 °F (37.4 °C) (10/05/22 0727)  Pulse: 73 (10/05/22 0727)  Resp: 18 (10/05/22 0727)  BP: (!) 117/58 (10/05/22 0727)  SpO2: (!) 93 % (10/05/22 0727)   Vital Signs (24h Range):  Temp:  [98.1 °F (36.7 °C)-100.7 °F (38.2 °C)] 99.3 °F (37.4 °C)  Pulse:  [] 73  Resp:  [16-18] 18  SpO2:  [93 %-96 %] 93 %  BP: ()/(51-63) 117/58     Weight: 61.6 kg (135 lb 12.9 oz)  Body mass index is 21.27 kg/m².    Intake/Output Summary (Last 24 hours) at 10/5/2022 1120  Last data filed at 10/5/2022 0830  Gross per 24 hour   Intake 2453.02 ml   Output 1700 ml   Net 753.02 ml        Physical Exam  Vitals and nursing note reviewed.   Constitutional:       General: He is not in acute distress.     Appearance: Normal appearance.   HENT:      Head: Normocephalic and atraumatic.      Nose: Nose normal. No congestion or rhinorrhea.      Mouth/Throat:      Mouth: Mucous membranes are dry.   Eyes:      General: No scleral icterus.     Conjunctiva/sclera: Conjunctivae normal.   Cardiovascular:      Rate and Rhythm: Normal rate and regular rhythm.      Pulses: Normal pulses.      Heart sounds: Normal heart sounds. No murmur heard.  Pulmonary:      Effort: Pulmonary effort is normal. No respiratory distress.      Breath sounds: Normal breath sounds. No wheezing, rhonchi or rales.   Abdominal:      General: Bowel sounds are normal. There is no distension.      Palpations: Abdomen is soft.      Tenderness: There is no abdominal tenderness. There is no guarding.   Musculoskeletal:         General: No  swelling or tenderness. Normal range of motion.      Cervical back: Neck supple.   Lymphadenopathy:      Cervical: No cervical adenopathy.   Skin:     General: Skin is warm and dry.      Capillary Refill: Capillary refill takes less than 2 seconds.      Coloration: Skin is not jaundiced or pale.      Comments: Left leg with significant swelling, erythema and noted blister to medial aspect of ankle. Warm and painful to touch   Neurological:      General: No focal deficit present.      Mental Status: He is alert and oriented to person, place, and time. Mental status is at baseline.      Sensory: No sensory deficit.      Motor: No weakness.   Psychiatric:         Mood and Affect: Mood normal.         Behavior: Behavior normal.         Thought Content: Thought content normal.         Judgment: Judgment normal.         Significant Labs: All pertinent labs within the past 24 hours have been reviewed.    Significant Imaging: I have reviewed all pertinent imaging results/findings within the past 24 hours.      Assessment/Plan:      * Sepsis  -Admitted to inpatient status  -On admit he was tachycardic and tachypneic with WBC 25k and normal lactic acid  -Source of sepsis is obviously a severe cellulitis of his left lower extremity.  -In the ER blood cultures were collected and he received sepsis bolus of fluids, vancomycin, zosyn and norco.  -Pulses in left foot are dopplerable, but not palpable  - General surgery evaluated - CT does not show evidence of abscess or gas  - continued with IV antibiotics  -Monitor pulses in leg q4h with doppler and check arterial US of his leg with mae. - reviewed  -Will treat with vancomycin, clindamycin and cefepime    - Clinically, he is improving with improvement in WBC, decrease in fevers and slight improvement in pain. His blistering does appear slightly larger but no spread of erythema. ID consulted for recommendations.  - Stop clinda, continue vanc/cefepime for now. Appears to be improve  "but if worsens, plan to change antibx to meropenem and repeat imaging    Debility  -At home ambulates with a walker  -Notes he "Fell or slid" out of a chair on night prior to admit  -When more stable will consult PT/OT for evaluation.    Chronic cough  -Presumed due to allergic rhinitis  -Continue home anti-histamines  -Add tessalon PRN    Chronic gout  -Check uric acid now  -Continue home allopurinol.    Hypokalemia  -Due to Bartter syndrome and diminished oral intake  -Replace potassium today.  -Will need daily replacement  -Check BMP and Mag in AM.        Hyponatremia  -On admit mild and likely due to diminished oral intake and mild dehydration  -Continue IV fluids  -stable    Normocytic anemia  -Hb 12.1 on admit  -No bleeding  -Check iron, ferritin, b12 and folate - appears more ACD at this time with elevated ferritin  -Repeat cbc in AM    Cellulitis of left lower extremity  -Treatment as above.    Primary open angle glaucoma of both eyes, severe stage  -History noted  -Continue home timolol and lantanoprost drops    Bartter syndrome  -At home takes KCl 10mEq daily and prior labs show his K is usually normal on this  - received IV KCl yesterday and added to fluids  - Start KCl 40 mg PO bid for now, replace Mg  -Treatment as above.    H/O prostate cancer  -history noted  -Continue home oxybutynin      VTE Risk Mitigation (From admission, onward)         Ordered     enoxaparin injection 40 mg  Daily         10/02/22 1416     IP VTE HIGH RISK PATIENT  Once         10/02/22 1416     Place sequential compression device  Until discontinued         10/02/22 1416                Discharge Planning   ROS: 10/7/2022     Code Status: Full Code   Is the patient medically ready for discharge?:     Reason for patient still in hospital (select all that apply): Treatment  Discharge Plan A: Home with family                  Christian Esquivel MD  Department of Hospital Medicine   [facility] [place] [facility]    "

## 2022-10-05 NOTE — PLAN OF CARE
Problem: Infection Progression (Sepsis/Septic Shock)  Goal: Absence of Infection Signs and Symptoms  Intervention: Initiate Sepsis Management  Flowsheets (Taken 10/4/2022 1919)  Infection Management: aseptic technique maintained     Problem: Infection Progression (Sepsis/Septic Shock)  Goal: Absence of Infection Signs and Symptoms  Intervention: Promote Stabilization  Flowsheets (Taken 10/4/2022 1919)  Fluid/Electrolyte Management: intravenous fluids adjusted     Problem: Infection Progression (Sepsis/Septic Shock)  Goal: Absence of Infection Signs and Symptoms  Intervention: Promote Recovery  Flowsheets (Taken 10/4/2022 1919)  Sleep/Rest Enhancement: regular sleep/rest pattern promoted  Activity Management: Rolling - L1

## 2022-10-05 NOTE — SUBJECTIVE & OBJECTIVE
Interval History: blister ruptured and swelling actually appears to be improving. Pain is similar    Review of Systems   Constitutional:  Negative for chills and fever.   Cardiovascular:  Positive for leg swelling.   Musculoskeletal:  Positive for arthralgias and joint swelling.   Skin:  Positive for color change.   Objective:     Vital Signs (Most Recent):  Temp: 99.3 °F (37.4 °C) (10/05/22 0727)  Pulse: 73 (10/05/22 0727)  Resp: 18 (10/05/22 0727)  BP: (!) 117/58 (10/05/22 0727)  SpO2: (!) 93 % (10/05/22 0727)   Vital Signs (24h Range):  Temp:  [98.1 °F (36.7 °C)-100.7 °F (38.2 °C)] 99.3 °F (37.4 °C)  Pulse:  [] 73  Resp:  [16-18] 18  SpO2:  [93 %-96 %] 93 %  BP: ()/(51-63) 117/58     Weight: 61.6 kg (135 lb 12.9 oz)  Body mass index is 21.27 kg/m².    Intake/Output Summary (Last 24 hours) at 10/5/2022 1120  Last data filed at 10/5/2022 0830  Gross per 24 hour   Intake 2453.02 ml   Output 1700 ml   Net 753.02 ml        Physical Exam  Vitals and nursing note reviewed.   Constitutional:       General: He is not in acute distress.     Appearance: Normal appearance.   HENT:      Head: Normocephalic and atraumatic.      Nose: Nose normal. No congestion or rhinorrhea.      Mouth/Throat:      Mouth: Mucous membranes are dry.   Eyes:      General: No scleral icterus.     Conjunctiva/sclera: Conjunctivae normal.   Cardiovascular:      Rate and Rhythm: Normal rate and regular rhythm.      Pulses: Normal pulses.      Heart sounds: Normal heart sounds. No murmur heard.  Pulmonary:      Effort: Pulmonary effort is normal. No respiratory distress.      Breath sounds: Normal breath sounds. No wheezing, rhonchi or rales.   Abdominal:      General: Bowel sounds are normal. There is no distension.      Palpations: Abdomen is soft.      Tenderness: There is no abdominal tenderness. There is no guarding.   Musculoskeletal:         General: No swelling or tenderness. Normal range of motion.      Cervical back: Neck supple.    Lymphadenopathy:      Cervical: No cervical adenopathy.   Skin:     General: Skin is warm and dry.      Capillary Refill: Capillary refill takes less than 2 seconds.      Coloration: Skin is not jaundiced or pale.      Comments: Left leg with significant swelling, erythema and noted blister to medial aspect of ankle. Warm and painful to touch   Neurological:      General: No focal deficit present.      Mental Status: He is alert and oriented to person, place, and time. Mental status is at baseline.      Sensory: No sensory deficit.      Motor: No weakness.   Psychiatric:         Mood and Affect: Mood normal.         Behavior: Behavior normal.         Thought Content: Thought content normal.         Judgment: Judgment normal.         Significant Labs: All pertinent labs within the past 24 hours have been reviewed.    Significant Imaging: I have reviewed all pertinent imaging results/findings within the past 24 hours.

## 2022-10-05 NOTE — ASSESSMENT & PLAN NOTE
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     - patient seen and examined  - WBC bumped  - added zosyn; continue vanc, clindamycin  - stable neurovascular exam to LLE  - Low concern for compartment syndrome-- no paresthesia, poikilothermia, pallor, paralysis, or pulselessness.  - continue with conservative management  - If he deteriorates please call general surgery.

## 2022-10-05 NOTE — PROGRESS NOTES
Pharmacokinetic Assessment Follow Up: IV Vancomycin    Vancomycin serum concentration assessment(s):    The trough level was drawn correctly and can be used to guide therapy at this time. The measurement is above the desired definitive target range of 10 to 20 mcg/mL.    Vancomycin Regimen Plan:    Discontinue the scheduled vancomycin regimen and re-dose when the random level is less than 20 mcg/mL, next level to be drawn at 10/6 on 04:00.    Drug levels (last 3 results):  Recent Labs   Lab Result Units 10/04/22  1309 10/05/22  1357   Vancomycin-Trough ug/mL 6.2* 24.8*       Pharmacy will continue to follow and monitor vancomycin.    Please contact pharmacy at extension [phone] for questions regarding this assessment.    Thank you for the consult,   Charisma Coyne       Patient brief summary:  Mark Church is a 72 y.o. male initiated on antimicrobial therapy with IV Vancomycin for treatment of skin & soft tissue infection      Drug Allergies:   Review of patient's allergies indicates:   Allergen Reactions    Compazine [prochlorperazine edisylate]        Actual Body Weight:   61.6 kg    Renal Function:   Estimated Creatinine Clearance: 83.1 mL/min (based on SCr of 0.7 mg/dL).,     Dialysis Method (if applicable):  N/A    CBC (last 72 hours):  Recent Labs   Lab Result Units 10/02/22  1647 10/03/22  0433 10/04/22  0351 10/05/22  0454   WBC K/uL  --  21.64* 18.97* 22.50*   Hemoglobin g/dL  --  9.2* 9.7* 9.7*   Hemoglobin A1C % 5.3  --   --   --    Hematocrit %  --  26.8* 29.4* 28.6*   Platelets K/uL  --  SEE COMMENT SEE COMMENT 300   Gran % %  --  49.0 63.0 77.0*   Lymph % %  --  12.0* 12.0* 9.0*   Mono % %  --  3.0* 3.0* 3.0*   Eosinophil % %  --  0.0 2.0 2.0   Basophil % %  --  0.0 0.0 0.0   Differential Method   --  Manual Manual Manual       Metabolic Panel (last 72 hours):  Recent Labs   Lab Result Units 10/02/22  1823 10/03/22  0433 10/04/22  0351 10/05/22  0454   Sodium mmol/L  --  134* 134* 134*   Potassium mmol/L   --  2.6* 2.7* 2.6*   Chloride mmol/L  --  106 103 101   CO2 mmol/L  --  23 21* 24   Glucose mg/dL  --  99 113* 136*   Glucose, UA  Negative  --   --   --    BUN mg/dL  --  18 19 18   Creatinine mg/dL  --  0.7 0.8 0.7   Creatinine, Urine mg/dL 43.8  --   --   --    Albumin g/dL  --  1.5* 1.4* 1.3*   Total Bilirubin mg/dL  --  1.4* 1.0 0.8   Alkaline Phosphatase U/L  --  68 109 93   AST U/L  --  22 30 32   ALT U/L  --  15 19 24   Magnesium mg/dL  --  1.2* 1.4* 1.6       Vancomycin Administrations:  vancomycin given in the last 96 hours                     vancomycin 1.5 g in dextrose 5 % 250 mL IVPB (ready to mix) (mg) 1,500 mg New Bag 10/05/22 0312     1,500 mg New Bag 10/04/22 1500    vancomycin 1.5 g in dextrose 5 % 250 mL IVPB (ready to mix) (mg) 1,500 mg New Bag 10/03/22 1405    vancomycin 1.5 g in dextrose 5 % 250 mL IVPB (ready to mix) (mg) 1,500 mg New Bag 10/02/22 1353                    Microbiologic Results:  Microbiology Results (last 7 days)       Procedure Component Value Units Date/Time    Blood culture x two cultures. Draw prior to antibiotics. [[id number]] Collected: 10/02/22 1216    Order Status: Completed Specimen: Blood from Peripheral, Antecubital, Right Updated: 10/05/22 1303     Blood Culture, Routine No Growth to date      No Growth to date      No Growth to date      No Growth to date    Narrative:      Aerobic and anaerobic    Blood culture x two cultures. Draw prior to antibiotics. [[id number]] Collected: 10/02/22 1210    Order Status: Completed Specimen: Blood from Peripheral, Foot, Right Updated: 10/05/22 1303     Blood Culture, Routine No Growth to date      No Growth to date      No Growth to date      No Growth to date    Narrative:      Aerobic and anaerobic

## 2022-10-05 NOTE — PLAN OF CARE
Problem: Adult Inpatient Plan of Care  Goal: Plan of Care Review  Outcome: Ongoing, Progressing     Problem: Skin Injury Risk Increased  Goal: Skin Health and Integrity  Outcome: Ongoing, Progressing     Problem: Impaired Wound Healing  Goal: Optimal Wound Healing  Outcome: Ongoing, Progressing

## 2022-10-05 NOTE — ASSESSMENT & PLAN NOTE
-At home takes KCl 10mEq daily and prior labs show his K is usually normal on this  - received IV KCl yesterday and added to fluids  - Start KCl 40 mg PO bid for now, replace Mg  -Treatment as above.

## 2022-10-05 NOTE — SUBJECTIVE & OBJECTIVE
Interval history: NAEO. Pain and swelling and erythema improving. Pt reports symtpoms started after walking his dog on an empty lot. Thinks he was bit by some ants and then had been scratching at ant bites with a back scratcher and fingernails.     Past Surgical History:   Procedure Laterality Date    CHOLECYSTECTOMY      JOINT REPLACEMENT      PROSTATE SURGERY      REFRACTIVE SURGERY Bilateral     ROTATOR CUFF REPAIR Right     sinus polyp         Review of patient's allergies indicates:   Allergen Reactions    Compazine [prochlorperazine edisylate]        No current facility-administered medications on file prior to encounter.     Current Outpatient Medications on File Prior to Encounter   Medication Sig    allopurinol (ZYLOPRIM) 100 MG tablet TAKE 1 TABLET EVERY DAY    clindamycin (CLEOCIN) 300 MG capsule Take 300 mg by mouth every 12 (twelve) hours.    HYDROcodone-acetaminophen (NORCO) 5-325 mg per tablet Take 1 tablet by mouth every 8 (eight) hours as needed.    latanoprost 0.005 % ophthalmic solution Place 1 drop into both eyes every evening.    multivit-minerals/folic acid (MULTIVITAMIN GUMMIES ORAL) Take by mouth.    mupirocin (BACTROBAN) 2 % ointment Apply topically 3 (three) times daily.    oxybutynin (DITROPAN) 5 MG Tab 5 mg once daily.     potassium chloride (MICRO-K) 10 MEQ CpSR TAKE 2 CAPSULES TWICE DAILY    timolol maleate 0.5% (TIMOPTIC) 0.5 % Drop Place 1 drop into both eyes 2 (two) times daily.    aspirin 81 MG Chew Take 81 mg by mouth every other day.     azelastine (ASTELIN) 137 mcg (0.1 %) nasal spray 1 spray (137 mcg total) by Nasal route 2 (two) times daily.    levocetirizine (XYZAL) 5 MG tablet Take 1 tablet (5 mg total) by mouth every evening.     Family History       Problem Relation (Age of Onset)    Blindness Maternal Aunt    Cataracts Father    Glaucoma Maternal Aunt    No Known Problems Mother, Sister, Brother, Sister, Sister, Brother, Maternal Uncle, Paternal Aunt, Paternal Uncle,  Maternal Grandmother, Maternal Grandfather, Paternal Grandmother, Paternal Grandfather          Tobacco Use    Smoking status: Never    Smokeless tobacco: Never   Substance and Sexual Activity    Alcohol use: No     Alcohol/week: 0.0 standard drinks    Drug use: No    Sexual activity: Not on file     Review of Systems   Constitutional:  Positive for activity change, appetite change, chills, fatigue and fever.   HENT:  Negative for congestion and dental problem.    Eyes:  Negative for discharge and itching.   Respiratory:  Positive for cough. Negative for apnea, chest tightness and shortness of breath.    Cardiovascular:  Negative for chest pain and leg swelling.   Gastrointestinal:  Negative for abdominal distention and abdominal pain.   Endocrine: Negative for cold intolerance and heat intolerance.   Genitourinary:  Negative for difficulty urinating and dysuria.   Musculoskeletal:  Positive for joint swelling and myalgias. Negative for arthralgias and back pain.   Skin:  Positive for color change and rash.   Allergic/Immunologic: Negative for environmental allergies and food allergies.   Neurological:  Positive for weakness. Negative for dizziness, facial asymmetry and light-headedness.   Hematological:  Negative for adenopathy. Does not bruise/bleed easily.   Psychiatric/Behavioral:  Negative for agitation and behavioral problems.    Objective:     Vital Signs (Most Recent):  Temp: 99.5 °F (37.5 °C) (10/05/22 1614)  Pulse: 92 (10/05/22 1614)  Resp: 18 (10/05/22 1614)  BP: (!) 124/57 (10/05/22 1614)  SpO2: 98 % (10/05/22 1614)   Vital Signs (24h Range):  Temp:  [98.5 °F (36.9 °C)-99.5 °F (37.5 °C)] 99.5 °F (37.5 °C)  Pulse:  [] 92  Resp:  [16-18] 18  SpO2:  [93 %-98 %] 98 %  BP: ()/(51-59) 124/57     Weight: 61.6 kg (135 lb 12.9 oz)  Body mass index is 21.27 kg/m².    Physical Exam  Vitals and nursing note reviewed.   Constitutional:       General: He is not in acute distress.     Appearance: He is  "well-developed. He is ill-appearing and toxic-appearing. He is not diaphoretic.   HENT:      Head: Normocephalic and atraumatic.      Right Ear: External ear normal.      Left Ear: External ear normal.      Nose: Nose normal.      Mouth/Throat:      Mouth: Mucous membranes are moist.   Eyes:      Extraocular Movements: Extraocular movements intact.      Conjunctiva/sclera: Conjunctivae normal.   Cardiovascular:      Rate and Rhythm: Normal rate and regular rhythm.      Heart sounds: Normal heart sounds.   Pulmonary:      Effort: Pulmonary effort is normal. No respiratory distress.      Breath sounds: Normal breath sounds.   Abdominal:      General: Bowel sounds are normal. There is no distension.      Palpations: Abdomen is soft.      Tenderness: There is no abdominal tenderness.   Musculoskeletal:      Cervical back: Normal range of motion. No rigidity.      Comments: Severe swelling, redness and warmth to left leg from just above knee to tips of toes.  There is a 1" bullae near medial malleolus and an area of hyperpigmentation on anterior angle of ankle which looks like there was a bullae there as well which has already drained.  There are a few tiny marks on his shin which may be puncture marks but otherwise no clear insect bite or etiology.  There is no purulent drainage.  I cannot palpate his DP or PT pulses in his left leg.  The leg is quite tender to touch, but otherwise he is comfortable and pain does not appear out of proportion   Skin:     General: Skin is warm and dry.   Neurological:      Mental Status: He is alert and oriented to person, place, and time.      Cranial Nerves: No cranial nerve deficit.      Coordination: Coordination normal.   Psychiatric:         Behavior: Behavior normal.           Erythema receeding from demarcated margins    Significant Labs: All pertinent labs within the past 24 hours have been reviewed.    Significant Imaging: I have reviewed all pertinent imaging results/findings " within the past 24 hours.

## 2022-10-05 NOTE — PT/OT/SLP PROGRESS
Physical Therapy Treatment    Patient Name:  Mark Church   MRN:  [id number]    Recommendations:     Discharge Recommendations:  home health PT (family assistance)   Discharge Equipment Recommendations: none   Barriers to discharge: None    Assessment:     Mark Church is a 72 y.o. male admitted with a medical diagnosis of Sepsis.  He presents with the following impairments/functional limitations:  weakness, impaired endurance, impaired functional mobility, gait instability, impaired balance, decreased upper extremity function, decreased lower extremity function, decreased ROM, pain, decreased safety awareness, edema, impaired skin . Pt appears weak and fatigue easily . Pt with slow movementS , required extra time to complete tasks. Pt with c/o light HA upon sitting EOB ,BP : 124/57 ,HR : 92 , MAP 82.     Rehab Prognosis: Good; patient would benefit from acute skilled PT services to address these deficits and reach maximum level of function.    Recent Surgery: * No surgery found *      Plan:     During this hospitalization, patient to be seen 5 x/week to address the identified rehab impairments via gait training, therapeutic activities, therapeutic exercises and progress toward the following goals:    Plan of Care Expires:  10/10/22    Subjective     Chief Complaint: LLE pain and swollen , weakness and light HA   Patient/Family Comments/goals: Pt is pleasant and motivated to participate in therapy   Pain/Comfort:  Pain Rating 1: 6/10  Location - Side 1: Left  Location 1: leg  Pain Addressed 1: Pre-medicate for activity, Nurse notified, Reposition, Cessation of Activity  Pain Rating Post-Intervention 1: 6/10      Objective:     Communicated with nurse SHEIKH prior to session.  Patient found right sidelying with bed alarm, peripheral IV, telemetry, Condom Catheter upon PT entry to room.     General Precautions: Standard, fall   Orthopedic Precautions:N/A   Braces: N/A  Respiratory Status: Room air     Functional  Mobility:  Bed Mobility: performed rolling/turning in bed for brief placement 2* to pt afraid of condom cath will come out with OOB activity     Rolling Left:  stand by assistance  Rolling Right: stand by assistance  Scooting: stand by assistance and contact guard assistance  Supine to Sit: contact guard assistance and minimum assistance  Transfers:     Sit to Stand: X 2 trials from bed  contact guard assistance with rolling walker  Bed to Chair: contact guard assistance with  rolling walker  using  Step Transfer  Gait:  pt ambulated 8 steps from bed to chair with RW, CGA. Limited with further gait training 2* to pt fatigue and c/o light HA. Nurse notified.   Balance: good in sitting, fair in standing.       AM-PAC 6 CLICK MOBILITY  Turning over in bed (including adjusting bedclothes, sheets and blankets)?: 4  Sitting down on and standing up from a chair with arms (e.g., wheelchair, bedside commode, etc.): 3  Moving from lying on back to sitting on the side of the bed?: 3  Moving to and from a bed to a chair (including a wheelchair)?: 3  Need to walk in hospital room?: 3  Climbing 3-5 steps with a railing?: 3  Basic Mobility Total Score: 19       Therapeutic Activities and Exercises:   Lower Extremity Exercises.   Patient educated on the purpose of therapeutic exercise.    Patient verbalized acceptance/understanding of instructions, expectations, and limitations(for safety).  Patient performed: 2 sets of 10 reps (each) of B LE There Ex: AP, LAQ, Hip abd/add, Hip flexion while sitting up on EOB.       Patient required  verbal cues/tactile cues to ensure correct sequence, to maintain proper form, and to allow for self-correction.  Pt reported no complaints or problems with exercise activity.    BLE HEP given with instructions and demonstrations (pt verbalized understanding). Encouraged pt to perform BLE ex's per handout throughout the day.      Patient left up in reclined chair on green air cushion, BLE elevated,  heels offloading  with all lines intact, call button in reach, and nurse KORI present..    GOALS:   Multidisciplinary Problems       Physical Therapy Goals          Problem: Physical Therapy    Goal Priority Disciplines Outcome Goal Variances Interventions   Physical Therapy Goal     PT, PT/OT Ongoing, Progressing     Description: Goals to be met by: 10/10/22     Patient will increase functional independence with mobility by performin. Pt to be mod I with bed mobility.  2. Pt to transfer with mod I.  3. Pt to ambulate 150' w/RW and supervision.  4. Pt to be (I) with written HEP.                         Time Tracking:     PT Received On: 10/05/22  PT Start Time: 1556     PT Stop Time: 1634  PT Total Time (min): 38 min     Billable Minutes: Therapeutic Activity 23 and Therapeutic Exercise 15    Treatment Type: Treatment  PT/PTA: PTA     PTA Visit Number: 1     10/05/2022

## 2022-10-05 NOTE — ASSESSMENT & PLAN NOTE
"72M with h/o Bartter syndrome, gout admitted 10/2 with erythema, swelling, pain to L leg. Febrile 101. Bcx NGTD x 2 days. On vanc/zosyn/clindmycin and erythema receeding from demarcated margins. CT- no abscess. Arterial studies- Mildly elevated velocity within the left popliteal artery with monophasic waveforms in the calf arteries, concerning for stenosis of the popliteal artery. ID consulted for "antibx recommendations for cellulitis"    Unclear if just cellulitis or he has a vascular component to it as well. Most likely culprit strep vs polymicrobial/GNR infection. Fortunately erythema seems to be improving/receeding from demarcated margins, so unlikely nec fascitis. Typically best to avoid vanc/zosyn combo 2/2 VU risk. clindmycin can be used for strep antitoxin effect, but benefit is typically in first 48-72h    Recommendations:   - continue cefepime. stop vancomycin, unlikely mrsa  - consider vascular evaluation  - monitor for clinical improvement  - considering de-escalation to po levaquin on d/c, so hold off on picc   - anticipated duration of abx 10-14 days (est end date 10/11- 10/14)  - wound care as per primary team      "

## 2022-10-05 NOTE — PROGRESS NOTES
[facility]  General Surgery  Progress Note    Subjective:     History of Present Illness:  No notes on file    Post-Op Info:  * No surgery found *         Interval History: Patient seen and examined. No acute events overnight. LLE erythema continues to improve; bullae developing on anterior aspect. WBC bumped this morning.     Medications:  Continuous Infusions:   0/9% NACL & POTASSIUM CHLORIDE 20 MEQ/L 75 mL/hr at 10/05/22 0923     Scheduled Meds:   allopurinoL  100 mg Oral Daily    aspirin  81 mg Oral Every other day    azelastine  1 spray Nasal BID    cetirizine  5 mg Oral QHS    clindamycin (CLEOCIN) IVPB  600 mg Intravenous Q8H    docusate sodium  100 mg Oral BID    enoxaparin  40 mg Subcutaneous Daily    lactobacillus acidophilus & bulgar  1 packet Oral BID    latanoprost  1 drop Both Eyes QHS    magnesium oxide  400 mg Oral Daily    oxybutynin  5 mg Oral BID    piperacillin-tazobactam (ZOSYN) IVPB  4.5 g Intravenous Q8H    potassium chloride  40 mEq Oral BID    timolol maleate 0.5%  1 drop Both Eyes BID    vancomycin (VANCOCIN) IVPB  1,500 mg Intravenous Q12H     PRN Meds:acetaminophen, benzonatate, dextrose 10%, dextrose 10%, glucagon (human recombinant), glucose, glucose, HYDROcodone-acetaminophen, HYDROcodone-acetaminophen, loperamide, melatonin, ondansetron, sodium chloride 0.9%, Pharmacy to dose Vancomycin consult **AND** vancomycin - pharmacy to dose     Review of patient's allergies indicates:   Allergen Reactions    Compazine [prochlorperazine edisylate]      Objective:     Vital Signs (Most Recent):  Temp: 99.3 °F (37.4 °C) (10/05/22 0727)  Pulse: 73 (10/05/22 0727)  Resp: 18 (10/05/22 0727)  BP: (!) 117/58 (10/05/22 0727)  SpO2: (!) 93 % (10/05/22 0727)   Vital Signs (24h Range):  Temp:  [98.1 °F (36.7 °C)-100.7 °F (38.2 °C)] 99.3 °F (37.4 °C)  Pulse:  [] 73  Resp:  [16-18] 18  SpO2:  [93 %-96 %] 93 %  BP: ()/(51-63) 117/58     Weight: 61.6 kg (135 lb 12.9  oz)  Body mass index is 21.27 kg/m².    Intake/Output - Last 3 Shifts         10/03 0700  10/04 0659 10/04 0700  10/05 0659 10/05 0700  10/06 0659    P.O. 560 840     I.V. (mL/kg)  264.5 (4.3)     IV Piggyback  868.6     Total Intake(mL/kg) 560 (9.1) 1973 (32)     Urine (mL/kg/hr) 1000 (0.7) 2000 (1.4)     Stool       Total Output 1000 2000     Net -440 -27            Urine Occurrence  2 x             Physical Exam  Vitals and nursing note reviewed.   Constitutional:       General: He is not in acute distress.     Appearance: Normal appearance.   HENT:      Head: Normocephalic and atraumatic.      Nose: Nose normal.      Mouth/Throat:      Mouth: Mucous membranes are moist.   Cardiovascular:      Rate and Rhythm: Normal rate and regular rhythm.   Pulmonary:      Effort: Pulmonary effort is normal. No respiratory distress.   Abdominal:      General: Abdomen is flat. There is no distension.      Palpations: Abdomen is soft.      Tenderness: There is no abdominal tenderness.   Musculoskeletal:      Comments: Left lower extremity with erythema and swelling below knee down to foot; erythema resolving  Palpable pulses to DP, PT of LLE  Bullae noted to medial aspect near medial malleolus and anterior aspect of mid-shin; filled with serous fluid  Full range of motion of LLE without pain  Cellulitic skin warm to the touch, no appreciable fluctuance   Skin:     General: Skin is warm.   Neurological:      General: No focal deficit present.      Mental Status: He is alert.       Significant Labs:  I have reviewed all pertinent lab results within the past 24 hours.  CBC:   Recent Labs   Lab 10/05/22  0454   WBC 22.50*   RBC 3.49*   HGB 9.7*   HCT 28.6*      MCV 82   MCH 27.8   MCHC 33.9     CMP:   Recent Labs   Lab 10/05/22  0454   *   CALCIUM 7.6*   ALBUMIN 1.3*   PROT 4.9*   *   K 2.6*   CO2 24      BUN 18   CREATININE 0.7   ALKPHOS 93   ALT 24   AST 32   BILITOT 0.8       Significant Diagnostics:  I  have reviewed all pertinent imaging results/findings within the past 24 hours.    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     - patient seen and examined  - WBC bumped  - added zosyn; continue vanc, clindamycin  - stable neurovascular exam to LLE  - Low concern for compartment syndrome-- no paresthesia, poikilothermia, pallor, paralysis, or pulselessness.  - continue with conservative management  - If he deteriorates please call general surgery.         Alexis Worthington MD  General Surgery  [facility] [place] [facility]

## 2022-10-06 LAB
ALBUMIN SERPL BCP-MCNC: 1.3 G/DL (ref 3.5–5.2)
ALP SERPL-CCNC: 105 U/L (ref 55–135)
ALT SERPL W/O P-5'-P-CCNC: 35 U/L (ref 10–44)
ANION GAP SERPL CALC-SCNC: 8 MMOL/L (ref 8–16)
AST SERPL-CCNC: 51 U/L (ref 10–40)
BACTERIA BLD CULT: NORMAL
BACTERIA BLD CULT: NORMAL
BASOPHILS NFR BLD: 0 % (ref 0–1.9)
BILIRUB SERPL-MCNC: 0.8 MG/DL (ref 0.1–1)
BUN SERPL-MCNC: 20 MG/DL (ref 8–23)
CALCIUM SERPL-MCNC: 7.8 MG/DL (ref 8.7–10.5)
CHLORIDE SERPL-SCNC: 101 MMOL/L (ref 95–110)
CO2 SERPL-SCNC: 27 MMOL/L (ref 23–29)
CREAT SERPL-MCNC: 0.7 MG/DL (ref 0.5–1.4)
DIFFERENTIAL METHOD: ABNORMAL
EOSINOPHIL NFR BLD: 7 % (ref 0–8)
ERYTHROCYTE [DISTWIDTH] IN BLOOD BY AUTOMATED COUNT: 13.7 % (ref 11.5–14.5)
EST. GFR  (NO RACE VARIABLE): >60 ML/MIN/1.73 M^2
GLUCOSE SERPL-MCNC: 115 MG/DL (ref 70–110)
HCT VFR BLD AUTO: 27.5 % (ref 40–54)
HGB BLD-MCNC: 9.3 G/DL (ref 14–18)
IMM GRANULOCYTES # BLD AUTO: ABNORMAL K/UL (ref 0–0.04)
IMM GRANULOCYTES NFR BLD AUTO: ABNORMAL % (ref 0–0.5)
LYMPHOCYTES NFR BLD: 9 % (ref 18–48)
MAGNESIUM SERPL-MCNC: 1.6 MG/DL (ref 1.6–2.6)
MCH RBC QN AUTO: 27 PG (ref 27–31)
MCHC RBC AUTO-ENTMCNC: 33.8 G/DL (ref 32–36)
MCV RBC AUTO: 80 FL (ref 82–98)
METAMYELOCYTES NFR BLD MANUAL: 4 %
MONOCYTES NFR BLD: 3 % (ref 4–15)
MYELOCYTES NFR BLD MANUAL: 1 %
NEUTROPHILS NFR BLD: 72 % (ref 38–73)
NEUTS BAND NFR BLD MANUAL: 4 %
NRBC BLD-RTO: 0 /100 WBC
PLATELET # BLD AUTO: 371 K/UL (ref 150–450)
PMV BLD AUTO: 10.6 FL (ref 9.2–12.9)
POTASSIUM SERPL-SCNC: 3 MMOL/L (ref 3.5–5.1)
PROCALCITONIN SERPL IA-MCNC: 0.8 NG/ML
PROT SERPL-MCNC: 5.2 G/DL (ref 6–8.4)
RBC # BLD AUTO: 3.45 M/UL (ref 4.6–6.2)
SODIUM SERPL-SCNC: 136 MMOL/L (ref 136–145)
VANCOMYCIN SERPL-MCNC: 14.6 UG/ML
WBC # BLD AUTO: 24.45 K/UL (ref 3.9–12.7)

## 2022-10-06 PROCEDURE — 85027 COMPLETE CBC AUTOMATED: CPT | Performed by: HOSPITALIST

## 2022-10-06 PROCEDURE — 99232 PR SUBSEQUENT HOSPITAL CARE,LEVL II: ICD-10-PCS | Mod: ,,, | Performed by: SURGERY

## 2022-10-06 PROCEDURE — 83735 ASSAY OF MAGNESIUM: CPT | Performed by: HOSPITALIST

## 2022-10-06 PROCEDURE — 25000003 PHARM REV CODE 250: Performed by: INTERNAL MEDICINE

## 2022-10-06 PROCEDURE — 63600175 PHARM REV CODE 636 W HCPCS: Performed by: INTERNAL MEDICINE

## 2022-10-06 PROCEDURE — 25500020 PHARM REV CODE 255: Performed by: STUDENT IN AN ORGANIZED HEALTH CARE EDUCATION/TRAINING PROGRAM

## 2022-10-06 PROCEDURE — 80053 COMPREHEN METABOLIC PANEL: CPT | Performed by: HOSPITALIST

## 2022-10-06 PROCEDURE — 97110 THERAPEUTIC EXERCISES: CPT | Mod: CQ

## 2022-10-06 PROCEDURE — 63600175 PHARM REV CODE 636 W HCPCS: Performed by: STUDENT IN AN ORGANIZED HEALTH CARE EDUCATION/TRAINING PROGRAM

## 2022-10-06 PROCEDURE — 63600175 PHARM REV CODE 636 W HCPCS: Performed by: HOSPITALIST

## 2022-10-06 PROCEDURE — 25000003 PHARM REV CODE 250: Performed by: STUDENT IN AN ORGANIZED HEALTH CARE EDUCATION/TRAINING PROGRAM

## 2022-10-06 PROCEDURE — 80202 ASSAY OF VANCOMYCIN: CPT | Performed by: STUDENT IN AN ORGANIZED HEALTH CARE EDUCATION/TRAINING PROGRAM

## 2022-10-06 PROCEDURE — 97116 GAIT TRAINING THERAPY: CPT | Mod: CQ

## 2022-10-06 PROCEDURE — 25000003 PHARM REV CODE 250: Performed by: HOSPITALIST

## 2022-10-06 PROCEDURE — 84145 PROCALCITONIN (PCT): CPT | Performed by: INTERNAL MEDICINE

## 2022-10-06 PROCEDURE — 11000001 HC ACUTE MED/SURG PRIVATE ROOM

## 2022-10-06 PROCEDURE — 99232 SBSQ HOSP IP/OBS MODERATE 35: CPT | Mod: ,,, | Performed by: SURGERY

## 2022-10-06 PROCEDURE — 85007 BL SMEAR W/DIFF WBC COUNT: CPT | Performed by: HOSPITALIST

## 2022-10-06 RX ORDER — METRONIDAZOLE 500 MG/1
500 TABLET ORAL EVERY 8 HOURS
Status: DISCONTINUED | OUTPATIENT
Start: 2022-10-06 | End: 2022-10-13

## 2022-10-06 RX ADMIN — POTASSIUM CHLORIDE 40 MEQ: 1500 TABLET, EXTENDED RELEASE ORAL at 09:10

## 2022-10-06 RX ADMIN — AZELASTINE 137 MCG: 1 SPRAY, METERED NASAL at 09:10

## 2022-10-06 RX ADMIN — LATANOPROST 1 DROP: 50 SOLUTION OPHTHALMIC at 09:10

## 2022-10-06 RX ADMIN — OXYBUTYNIN CHLORIDE 5 MG: 5 TABLET ORAL at 09:10

## 2022-10-06 RX ADMIN — TIMOLOL MALEATE 1 DROP: 5 SOLUTION/ DROPS OPHTHALMIC at 09:10

## 2022-10-06 RX ADMIN — CETIRIZINE HYDROCHLORIDE 5 MG: 5 TABLET ORAL at 09:10

## 2022-10-06 RX ADMIN — LACTOBACILLUS ACIDOPHILUS / LACTOBACILLUS BULGARICUS 1 EACH: 100 MILLION CFU STRENGTH GRANULES at 09:10

## 2022-10-06 RX ADMIN — Medication 400 MG: at 09:10

## 2022-10-06 RX ADMIN — HYDROCODONE BITARTRATE AND ACETAMINOPHEN 1 TABLET: 10; 325 TABLET ORAL at 04:10

## 2022-10-06 RX ADMIN — CEFEPIME 2 G: 2 INJECTION, POWDER, FOR SOLUTION INTRAVENOUS at 04:10

## 2022-10-06 RX ADMIN — ENOXAPARIN SODIUM 40 MG: 100 INJECTION SUBCUTANEOUS at 04:10

## 2022-10-06 RX ADMIN — CEFEPIME 2 G: 2 INJECTION, POWDER, FOR SOLUTION INTRAVENOUS at 11:10

## 2022-10-06 RX ADMIN — HYDROCODONE BITARTRATE AND ACETAMINOPHEN 1 TABLET: 10; 325 TABLET ORAL at 06:10

## 2022-10-06 RX ADMIN — METRONIDAZOLE 500 MG: 500 TABLET ORAL at 09:10

## 2022-10-06 RX ADMIN — IOHEXOL 75 ML: 350 INJECTION, SOLUTION INTRAVENOUS at 08:10

## 2022-10-06 RX ADMIN — CEFEPIME 2 G: 2 INJECTION, POWDER, FOR SOLUTION INTRAVENOUS at 09:10

## 2022-10-06 RX ADMIN — VANCOMYCIN HYDROCHLORIDE 750 MG: 750 INJECTION, POWDER, LYOPHILIZED, FOR SOLUTION INTRAVENOUS at 06:10

## 2022-10-06 RX ADMIN — ALLOPURINOL 100 MG: 100 TABLET ORAL at 09:10

## 2022-10-06 RX ADMIN — DOCUSATE SODIUM 100 MG: 100 CAPSULE, LIQUID FILLED ORAL at 09:10

## 2022-10-06 NOTE — PROGRESS NOTES
Pharmacokinetic Assessment Follow Up: IV Vancomycin    Vancomycin serum concentration assessment(s):    The random level was drawn correctly and can be used to guide therapy at this time. The measurement is within the desired definitive target range of 10 to 20 mcg/mL.    Vancomycin Regimen Plan:    Change regimen to Vancomycin 750 mg IV every 12 hours with next serum trough concentration measured at 1830 prior to 4th dose on 10/7/22    Drug levels (last 3 results):  Recent Labs   Lab Result Units 10/04/22  1309 10/05/22  1357 10/06/22  0403   Vancomycin, Random ug/mL  --   --  14.6   Vancomycin-Trough ug/mL 6.2* 24.8*  --        Pharmacy will continue to follow and monitor vancomycin.    Please contact pharmacy at extension [phone] for questions regarding this assessment.    Thank you for the consult,   Geoffrey Hanna       Patient brief summary:  Mark Church is a 72 y.o. male initiated on antimicrobial therapy with IV Vancomycin for treatment of sepsis    The patient's current regimen is Vancomycin 750mg q12h    Drug Allergies:   Review of patient's allergies indicates:   Allergen Reactions    Compazine [prochlorperazine edisylate]        Actual Body Weight:   61.6 kg    Renal Function:   Estimated Creatinine Clearance: 83.1 mL/min (based on SCr of 0.7 mg/dL).,     Dialysis Method (if applicable):  N/A    CBC (last 72 hours):  Recent Labs   Lab Result Units 10/04/22  0351 10/05/22  0454 10/06/22  0403   WBC K/uL 18.97* 22.50* 24.45*   Hemoglobin g/dL 9.7* 9.7* 9.3*   Hematocrit % 29.4* 28.6* 27.5*   Platelets K/uL SEE COMMENT 300 371   Gran % % 63.0 77.0* 72.0   Lymph % % 12.0* 9.0* 9.0*   Mono % % 3.0* 3.0* 3.0*   Eosinophil % % 2.0 2.0 7.0   Basophil % % 0.0 0.0 0.0   Differential Method  Manual Manual Manual       Metabolic Panel (last 72 hours):  Recent Labs   Lab Result Units 10/04/22  0351 10/05/22  0454   Sodium mmol/L 134* 134*   Potassium mmol/L 2.7* 2.6*   Chloride mmol/L 103 101   CO2 mmol/L 21* 24    Glucose mg/dL 113* 136*   BUN mg/dL 19 18   Creatinine mg/dL 0.8 0.7   Albumin g/dL 1.4* 1.3*   Total Bilirubin mg/dL 1.0 0.8   Alkaline Phosphatase U/L 109 93   AST U/L 30 32   ALT U/L 19 24   Magnesium mg/dL 1.4* 1.6       Vancomycin Administrations:  vancomycin given in the last 96 hours                     vancomycin 1.5 g in dextrose 5 % 250 mL IVPB (ready to mix) (mg) 1,500 mg New Bag 10/05/22 0312     1,500 mg New Bag 10/04/22 1500    vancomycin 1.5 g in dextrose 5 % 250 mL IVPB (ready to mix) (mg) 1,500 mg New Bag 10/03/22 1405    vancomycin 1.5 g in dextrose 5 % 250 mL IVPB (ready to mix) (mg) 1,500 mg New Bag 10/02/22 1353                    Microbiologic Results:  Microbiology Results (last 7 days)       Procedure Component Value Units Date/Time    Blood culture x two cultures. Draw prior to antibiotics. [[id number]] Collected: 10/02/22 1216    Order Status: Completed Specimen: Blood from Peripheral, Antecubital, Right Updated: 10/05/22 1303     Blood Culture, Routine No Growth to date      No Growth to date      No Growth to date      No Growth to date    Narrative:      Aerobic and anaerobic    Blood culture x two cultures. Draw prior to antibiotics. [[id number]] Collected: 10/02/22 1210    Order Status: Completed Specimen: Blood from Peripheral, Foot, Right Updated: 10/05/22 1303     Blood Culture, Routine No Growth to date      No Growth to date      No Growth to date      No Growth to date    Narrative:      Aerobic and anaerobic

## 2022-10-06 NOTE — ASSESSMENT & PLAN NOTE
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     - patient seen and examined  - WBC climbing  - ID on board, guiding antibiotic management  - stable neurovascular exam to LLE  - Low concern for compartment syndrome-- no paresthesia, poikilothermia, pallor, paralysis, or pulselessness.  - continue with conservative management  - repeat CT scan to see if any organizing fluid collections are driving white count  - If he deteriorates please call general surgery.

## 2022-10-06 NOTE — PLAN OF CARE
Problem: Physical Therapy  Goal: Physical Therapy Goal  Description: Goals to be met by: 10/10/22     Patient will increase functional independence with mobility by performin. Pt to be mod I with bed mobility.  2. Pt to transfer with mod I.  3. Pt to ambulate 150' w/RW and supervision.  4. Pt to be (I) with written HEP.    Outcome: Ongoing, Progressing   Pt ambulated ~40 ft x 2 trials with RW, CGA.

## 2022-10-06 NOTE — SUBJECTIVE & OBJECTIVE
Interval History: Patient seen and examined. No acute events overnight. WBC continues to rise. Exam remains stable. Plan to repeat CT scan today.     Medications:  Continuous Infusions:  Scheduled Meds:   allopurinoL  100 mg Oral Daily    aspirin  81 mg Oral Every other day    azelastine  1 spray Nasal BID    ceFEPime (MAXIPIME) IVPB  2 g Intravenous Q8H    cetirizine  5 mg Oral QHS    docusate sodium  100 mg Oral BID    enoxaparin  40 mg Subcutaneous Daily    lactobacillus acidophilus & bulgar  1 packet Oral BID    latanoprost  1 drop Both Eyes QHS    magnesium oxide  400 mg Oral Daily    oxybutynin  5 mg Oral BID    potassium chloride  40 mEq Oral BID    timolol maleate 0.5%  1 drop Both Eyes BID    vancomycin (VANCOCIN) IVPB  750 mg Intravenous Q12H     PRN Meds:acetaminophen, benzonatate, dextrose 10%, dextrose 10%, glucagon (human recombinant), glucose, glucose, HYDROcodone-acetaminophen, HYDROcodone-acetaminophen, loperamide, melatonin, ondansetron, simethicone, sodium chloride 0.9%, Pharmacy to dose Vancomycin consult **AND** vancomycin - pharmacy to dose     Review of patient's allergies indicates:   Allergen Reactions    Compazine [prochlorperazine edisylate]      Objective:     Vital Signs (Most Recent):  Temp: 99.3 °F (37.4 °C) (10/06/22 0659)  Pulse: 87 (10/06/22 0659)  Resp: 18 (10/06/22 0659)  BP: (!) 148/67 (10/06/22 0659)  SpO2: 98 % (10/06/22 0659)   Vital Signs (24h Range):  Temp:  [98.5 °F (36.9 °C)-99.8 °F (37.7 °C)] 99.3 °F (37.4 °C)  Pulse:  [86-94] 87  Resp:  [18] 18  SpO2:  [92 %-98 %] 98 %  BP: (114-148)/(53-67) 148/67     Weight: 61.6 kg (135 lb 12.9 oz)  Body mass index is 21.27 kg/m².    Intake/Output - Last 3 Shifts         10/04 0700  10/05 0659 10/05 0700  10/06 0659 10/06 0700  10/07 0659    P.O. 840 1437     I.V. (mL/kg) 264.5 (4.3)      IV Piggyback 868.6      Total Intake(mL/kg) 1973 (32) 1437 (23.3)     Urine (mL/kg/hr) 2000 (1.4) 1200 (0.8)     Total Output 2000 1200     Net -27  +237            Urine Occurrence 2 x              Physical Exam  Vitals and nursing note reviewed.   Constitutional:       General: He is not in acute distress.     Appearance: Normal appearance.   HENT:      Head: Normocephalic and atraumatic.      Nose: Nose normal.      Mouth/Throat:      Mouth: Mucous membranes are moist.   Cardiovascular:      Rate and Rhythm: Normal rate and regular rhythm.   Pulmonary:      Effort: Pulmonary effort is normal. No respiratory distress.   Abdominal:      General: Abdomen is flat. There is no distension.      Palpations: Abdomen is soft.      Tenderness: There is no abdominal tenderness.   Musculoskeletal:      Comments: Left lower extremity with erythema and swelling below knee down to foot; erythema stable  Palpable pulses to DP, PT of LLE  Bullae noted to medial aspect near medial malleolus and anterior aspect of mid-shin; draining serous fluid  Full range of motion of LLE without pain  Cellulitic skin warm to the touch, no appreciable fluctuance   Skin:     General: Skin is warm.   Neurological:      General: No focal deficit present.      Mental Status: He is alert.       Significant Labs:  I have reviewed all pertinent lab results within the past 24 hours.  CBC:   Recent Labs   Lab 10/06/22  0403   WBC 24.45*   RBC 3.45*   HGB 9.3*   HCT 27.5*      MCV 80*   MCH 27.0   MCHC 33.8     CMP:   Recent Labs   Lab 10/06/22  0403   *   CALCIUM 7.8*   ALBUMIN 1.3*   PROT 5.2*      K 3.0*   CO2 27      BUN 20   CREATININE 0.7   ALKPHOS 105   ALT 35   AST 51*   BILITOT 0.8       Significant Diagnostics:  I have reviewed all pertinent imaging results/findings within the past 24 hours.

## 2022-10-06 NOTE — PT/OT/SLP PROGRESS
"Physical Therapy Treatment    Patient Name:  Mark Church   MRN:  [id number]    Recommendations:     Discharge Recommendations:  home health PT (with family assistance)   Discharge Equipment Recommendations: none   Barriers to discharge: None    Assessment:     Mark Church is a 72 y.o. male admitted with a medical diagnosis of Sepsis.  He presents with the following impairments/functional limitations:  weakness, impaired endurance, impaired balance, gait instability, decreased lower extremity function, decreased ROM, impaired functional mobility, decreased coordination, pain, decreased safety awareness, edema, impaired skin. Pt is pleasant and agreeable to therapy treatment. Pt appears fatigue and weakness  , pt will benefit from further therapy in order to return to PLOF.     Rehab Prognosis: Good; patient would benefit from acute skilled PT services to address these deficits and reach maximum level of function.    Recent Surgery: * No surgery found *      Plan:     During this hospitalization, patient to be seen 5 x/week to address the identified rehab impairments via gait training, therapeutic activities, therapeutic exercises and progress toward the following goals:    Plan of Care Expires:  10/10/22    Subjective     Chief Complaint: weakness , LLE edema/pain and constipation ( haven't had a BM for 8 days)  . Nurse Mancia notified.   Patient/Family Comments/goals: pt is pleasant and motivated to participate in therapy, " I had a rough morning ."    Pain/Comfort: " 12/10"  Pain Rating 1: other (see comments)  Location - Side 1: Left  Location 1: leg  Pain Addressed 1: Nurse notified, Reposition, Cessation of Activity      Objective:     Communicated with nurse Mancia  prior to session.  Patient found HOB elevated with BLE elevated  telemetry, bed alarm, peripheral IV, Condom Catheter, pressure relief boots , spouse and granddaughter present upon PT entry to room.     General Precautions: Standard, fall   Orthopedic " Precautions:N/A   Braces: N/A  Respiratory Status: Room air     Functional Mobility:  Bed Mobility:   Scooting: stand by assistance   Supine to Sit: contact guard assistance , HOB elevated, bedside rail   Sit to Supine: CGA/MIN A   Transfers:     Sit to Stand: x 2 trials from bed  contact guard assistance with rolling walker. V/T cues for safety, proper technique and walker management. Noted with increased BUE shakiness probably from weakness  when performing sit <>stand with BUE push up from bed from bed initially however subsided with further therapy activities .   Bed to Chair: contact guard assistance with  rolling walker  using  Step Transfer  Gait:  pt ambulated ~ 40 ft x 2 trials with RW (RLE non skid sock and shoe-cover on LLE 2* to  edema unable to place sock) , CGA. Noted with decreased zeynep, decreased steps length, decreased weight shifting and weight bearing to LLE 2* to pain, no LOB. Pt required VC's for safety , proper technique, walker management and to improve neck/trunk extension. SpO2: 97% on RA, HR : 117 bpm.   Balance: good in sitting, fair in standing.       AM-PAC 6 CLICK MOBILITY  Turning over in bed (including adjusting bedclothes, sheets and blankets)?: 4  Sitting down on and standing up from a chair with arms (e.g., wheelchair, bedside commode, etc.): 3  Moving from lying on back to sitting on the side of the bed?: 3  Moving to and from a bed to a chair (including a wheelchair)?: 3  Need to walk in hospital room?: 3  Climbing 3-5 steps with a railing?: 3  Basic Mobility Total Score: 19       Therapeutic Activities and Exercises:   Lower Extremity Exercises.   Patient educated on the purpose of therapeutic exercise.    Patient verbalized acceptance/understanding of instructions, expectations, and limitations(for safety).  Patient performed: 2 sets of 10 reps (each) of B LE There Ex: AP, LAQ, Hip abd/add, Hip flexion while sitting up on EOB.       Patient still requires verbal  cues/tactile cues to ensure correct sequence, to maintain proper form, and to allow for self-correction.  Encouraged pt to perform BLE ex's per handout throughout the day.      Patient left with bed in chair position (pt declined to sit up in chair today , requested to be back in bed for BLE elevation and to rest ) with BLE elevated , pressure relief boots to BLE  all lines intact, call button in reach, bed alarm on, nurse KORI  notified, and spouse and granddaughter  present..    GOALS:   Multidisciplinary Problems       Physical Therapy Goals          Problem: Physical Therapy    Goal Priority Disciplines Outcome Goal Variances Interventions   Physical Therapy Goal     PT, PT/OT Ongoing, Progressing     Description: Goals to be met by: 10/10/22     Patient will increase functional independence with mobility by performin. Pt to be mod I with bed mobility.  2. Pt to transfer with mod I.  3. Pt to ambulate 150' w/RW and supervision.  4. Pt to be (I) with written HEP.                         Time Tracking:     PT Received On: 10/06/22  PT Start Time: 1548     PT Stop Time: 1619  PT Total Time (min): 31 min     Billable Minutes: Gait Training 15 and Therapeutic Exercise 16    Treatment Type: Treatment  PT/PTA: PTA     PTA Visit Number: 2     10/06/2022

## 2022-10-06 NOTE — PLAN OF CARE
[facility] - Med Surg  Discharge Reassessment    Patient condition is not improving. Planning for him to go home with family upon discharge.    Primary Care Provider: [facility]     Expected Discharge Date: 10/7/2022    Reassessment (most recent)       Discharge Reassessment - 10/06/22 1514          Discharge Reassessment    Did the patient's condition or plan change since previous assessment? No (P)      Discharge Plan discussed with: Patient (P)      Communicated ROS with patient/caregiver Yes (P)      Discharge Plan A Home with family (P)      Discharge Plan B Home with family (P)      DME Needed Upon Discharge  none (P)      Discharge Barriers Identified None (P)      Why the patient remains in the hospital Requires continued medical care (P)         Post-Acute Status    Coverage Medicare ([employer]) (P)      Hospital Resources/Appts/Education Provided Provided patient/caregiver with written discharge plan information;Appointments scheduled and added to AVS (P)      Patient choice form signed by patient/caregiver List with quality metrics by geographic area provided (P)      Discharge Delays None known at this time (P)

## 2022-10-06 NOTE — ASSESSMENT & PLAN NOTE
Cellulitis, likely point of bacterial entry medial ankle. WBC uptrending despite appropriate antibiotic therapy. Repeat CT scan with possible early abscess formation.  - appreciate ID, general surgery involvement  - debridement of possible early abscess per general surgery  - on cefepime/vancomycin   - will add flagyl while clinical picture evolves  - abnormal arterial US noted   - ABIs ordered, will consider further workup/consultation pending the result

## 2022-10-06 NOTE — SUBJECTIVE & OBJECTIVE
Interval History: Swelling persists in LLE    Review of Systems   Constitutional:  Negative for chills and fever.   Respiratory:  Negative for cough and shortness of breath.    Cardiovascular:  Negative for chest pain and leg swelling.   Gastrointestinal:  Negative for abdominal pain.   Skin:  Positive for color change and wound.   Objective:     Vital Signs (Most Recent):  Temp: 100 °F (37.8 °C) (10/06/22 1545)  Pulse: 96 (10/06/22 1545)  Resp: 18 (10/06/22 1545)  BP: 132/61 (10/06/22 1545)  SpO2: 95 % (10/06/22 1545) Vital Signs (24h Range):  Temp:  [97.9 °F (36.6 °C)-100 °F (37.8 °C)] 100 °F (37.8 °C)  Pulse:  [82-96] 96  Resp:  [18] 18  SpO2:  [92 %-98 %] 95 %  BP: (114-148)/(53-67) 132/61     Weight: 61.6 kg (135 lb 12.9 oz)  Body mass index is 21.27 kg/m².    Intake/Output Summary (Last 24 hours) at 10/6/2022 1554  Last data filed at 10/6/2022 1330  Gross per 24 hour   Intake 840 ml   Output 1400 ml   Net -560 ml      Physical Exam  Constitutional:       General: He is not in acute distress.     Appearance: He is ill-appearing. He is not toxic-appearing or diaphoretic.   Cardiovascular:      Rate and Rhythm: Normal rate and regular rhythm.      Heart sounds: No murmur heard.    No gallop.   Pulmonary:      Effort: Pulmonary effort is normal. No respiratory distress.      Breath sounds: Normal breath sounds. No wheezing.   Abdominal:      General: Bowel sounds are normal. There is no distension.      Palpations: Abdomen is soft.      Tenderness: There is no abdominal tenderness.   Skin:     Comments: LLE swollen, erythematous. Mildly warm and tender to touch. Area of ulceration by R medial ankle. Appears to have receded from prior marking of margins       Significant Labs: All pertinent labs within the past 24 hours have been reviewed.    Significant Imaging: I have reviewed all pertinent imaging results/findings within the past 24 hours.

## 2022-10-06 NOTE — PROGRESS NOTES
"[facility] Medicine  Progress Note    Patient Name: Mark Church  MRN: [id number]  Patient Class: IP- Inpatient   Admission Date: 10/2/2022  Length of Stay: 4 days  Attending Physician: David Sadler MD  Primary Care Provider: 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  He states this all started about 4 days ago when he was walking in a field. He states he felt some significant itching in his lower leg.  Later that night his leg started to swell and hurt.  He assumed this was caused by an insect bite on his leg, but he never saw a specific bite or insect.  He states that the 2nd night the swelling and pain became severe and he was having fevers and chills which prompted him to visit his primary care provider the next day.  He was prescribed an antibiotic, not sure which one, and despite taking this his leg has continued to get worse.  He notes he has been having fevers and chills and last night he "slid or fell" out of his chair when he was trying to get up because the pain was so bad.  He notes he uses a walker to ambulate at home.  He also notes a chronic cough ascociated wiith allergic rhinitis and had an episode of nausea and vomiting one week ago.  He reports diminished oral intake due to decreased appetite, but no nausea or vomiting since this all started 4 days ago.  He denies chest pain, shortness of breath, headache, palpitations, dysuria and diarrhea.      Overview/Hospital Course:  Admitted with sepsis secondary to left lower extremity cellulitis. Started on IV antibiotics, IVF and potassium replacement (has Bartter syndrome). Surgery consulted for concern for nec fasc/bone involvement. CT did not show any evidence of abscess/gas. Seems to be improving though still significant " swelling and blistering. ID consulted for antibiotic recommendations. Repeat CT ordered for worsening white count.      Interval History: Swelling persists in LLE    Review of Systems   Constitutional:  Negative for chills and fever.   Respiratory:  Negative for cough and shortness of breath.    Cardiovascular:  Negative for chest pain and leg swelling.   Gastrointestinal:  Negative for abdominal pain.   Skin:  Positive for color change and wound.   Objective:     Vital Signs (Most Recent):  Temp: 100 °F (37.8 °C) (10/06/22 1545)  Pulse: 96 (10/06/22 1545)  Resp: 18 (10/06/22 1545)  BP: 132/61 (10/06/22 1545)  SpO2: 95 % (10/06/22 1545) Vital Signs (24h Range):  Temp:  [97.9 °F (36.6 °C)-100 °F (37.8 °C)] 100 °F (37.8 °C)  Pulse:  [82-96] 96  Resp:  [18] 18  SpO2:  [92 %-98 %] 95 %  BP: (114-148)/(53-67) 132/61     Weight: 61.6 kg (135 lb 12.9 oz)  Body mass index is 21.27 kg/m².    Intake/Output Summary (Last 24 hours) at 10/6/2022 1554  Last data filed at 10/6/2022 1330  Gross per 24 hour   Intake 840 ml   Output 1400 ml   Net -560 ml      Physical Exam  Constitutional:       General: He is not in acute distress.     Appearance: He is ill-appearing. He is not toxic-appearing or diaphoretic.   Cardiovascular:      Rate and Rhythm: Normal rate and regular rhythm.      Heart sounds: No murmur heard.    No gallop.   Pulmonary:      Effort: Pulmonary effort is normal. No respiratory distress.      Breath sounds: Normal breath sounds. No wheezing.   Abdominal:      General: Bowel sounds are normal. There is no distension.      Palpations: Abdomen is soft.      Tenderness: There is no abdominal tenderness.   Skin:     Comments: LLE swollen, erythematous. Mildly warm and tender to touch. Area of ulceration by R medial ankle. Appears to have receded from prior marking of margins       Significant Labs: All pertinent labs within the past 24 hours have been reviewed.    Significant Imaging: I have reviewed all pertinent  "imaging results/findings within the past 24 hours.      Assessment/Plan:      * Sepsis  Cellulitis, likely point of bacterial entry medial ankle. WBC uptrending despite appropriate antibiotic therapy. Repeat CT scan with possible early abscess formation.  - appreciate ID, general surgery involvement  - debridement of possible early abscess per general surgery  - on cefepime/vancomycin   - will add flagyl while clinical picture evolves  - abnormal arterial US noted   - ABIs ordered, will consider further workup/consultation pending the result      Cellulitis        Debility  -At home ambulates with a walker  -Notes he "Fell or slid" out of a chair on night prior to admit  -When more stable will consult PT/OT for evaluation.    Chronic cough  -Presumed due to allergic rhinitis  -Continue home anti-histamines  -Add tessalon PRN    Chronic gout  -Check uric acid now  -Continue home allopurinol.    Hypokalemia  -Due to Bartter syndrome and diminished oral intake  -Replace potassium today.  -Will need daily replacement  -Check BMP and Mag in AM.        Hyponatremia  -On admit mild and likely due to diminished oral intake and mild dehydration  -Continue IV fluids  -stable    Normocytic anemia  -Hb 12.1 on admit  -No bleeding  -Check iron, ferritin, b12 and folate - appears more ACD at this time with elevated ferritin  -Repeat cbc in AM    Cellulitis of left lower extremity  -Treatment as above.    Primary open angle glaucoma of both eyes, severe stage  -History noted  -Continue home timolol and lantanoprost drops    Bartter syndrome  -At home takes KCl 10mEq daily and prior labs show his K is usually normal on this  - received IV KCl yesterday and added to fluids  - Start KCl 40 mg PO bid for now, replace Mg  -Treatment as above.    H/O prostate cancer  -history noted  -Continue home oxybutynin      VTE Risk Mitigation (From admission, onward)         Ordered     enoxaparin injection 40 mg  Daily         10/02/22 1416     " IP VTE HIGH RISK PATIENT  Once         10/02/22 1416     Place sequential compression device  Until discontinued         10/02/22 1416                Discharge Planning   ROS: 10/7/2022     Code Status: Full Code   Is the patient medically ready for discharge?:     Reason for patient still in hospital (select all that apply): Patient trending condition, Laboratory test, Treatment, Consult recommendations and Pending disposition  Discharge Plan A: Home with family   Discharge Delays: None known at this time              David Sadler MD  Department of Hospital Medicine   [facility]

## 2022-10-06 NOTE — PROGRESS NOTES
[facility] Surg  General Surgery  Progress Note    Subjective:     History of Present Illness:  No notes on file    Post-Op Info:  * No surgery found *         Interval History: Patient seen and examined. No acute events overnight. WBC continues to rise. Exam remains stable. Plan to repeat CT scan today.     Medications:  Continuous Infusions:  Scheduled Meds:   allopurinoL  100 mg Oral Daily    aspirin  81 mg Oral Every other day    azelastine  1 spray Nasal BID    ceFEPime (MAXIPIME) IVPB  2 g Intravenous Q8H    cetirizine  5 mg Oral QHS    docusate sodium  100 mg Oral BID    enoxaparin  40 mg Subcutaneous Daily    lactobacillus acidophilus & bulgar  1 packet Oral BID    latanoprost  1 drop Both Eyes QHS    magnesium oxide  400 mg Oral Daily    oxybutynin  5 mg Oral BID    potassium chloride  40 mEq Oral BID    timolol maleate 0.5%  1 drop Both Eyes BID    vancomycin (VANCOCIN) IVPB  750 mg Intravenous Q12H     PRN Meds:acetaminophen, benzonatate, dextrose 10%, dextrose 10%, glucagon (human recombinant), glucose, glucose, HYDROcodone-acetaminophen, HYDROcodone-acetaminophen, loperamide, melatonin, ondansetron, simethicone, sodium chloride 0.9%, Pharmacy to dose Vancomycin consult **AND** vancomycin - pharmacy to dose     Review of patient's allergies indicates:   Allergen Reactions    Compazine [prochlorperazine edisylate]      Objective:     Vital Signs (Most Recent):  Temp: 99.3 °F (37.4 °C) (10/06/22 0659)  Pulse: 87 (10/06/22 0659)  Resp: 18 (10/06/22 0659)  BP: (!) 148/67 (10/06/22 0659)  SpO2: 98 % (10/06/22 0659)   Vital Signs (24h Range):  Temp:  [98.5 °F (36.9 °C)-99.8 °F (37.7 °C)] 99.3 °F (37.4 °C)  Pulse:  [86-94] 87  Resp:  [18] 18  SpO2:  [92 %-98 %] 98 %  BP: (114-148)/(53-67) 148/67     Weight: 61.6 kg (135 lb 12.9 oz)  Body mass index is 21.27 kg/m².    Intake/Output - Last 3 Shifts         10/04 0700  10/05 0659 10/05 0700  10/06 0659 10/06 0700  10/07 0659    P.O. 849 0685      I.V. (mL/kg) 264.5 (4.3)      IV Piggyback 868.6      Total Intake(mL/kg) 1973 (32) 1437 (23.3)     Urine (mL/kg/hr) 2000 (1.4) 1200 (0.8)     Total Output 2000 1200     Net -27 +237            Urine Occurrence 2 x              Physical Exam  Vitals and nursing note reviewed.   Constitutional:       General: He is not in acute distress.     Appearance: Normal appearance.   HENT:      Head: Normocephalic and atraumatic.      Nose: Nose normal.      Mouth/Throat:      Mouth: Mucous membranes are moist.   Cardiovascular:      Rate and Rhythm: Normal rate and regular rhythm.   Pulmonary:      Effort: Pulmonary effort is normal. No respiratory distress.   Abdominal:      General: Abdomen is flat. There is no distension.      Palpations: Abdomen is soft.      Tenderness: There is no abdominal tenderness.   Musculoskeletal:      Comments: Left lower extremity with erythema and swelling below knee down to foot; erythema stable  Palpable pulses to DP, PT of LLE  Bullae noted to medial aspect near medial malleolus and anterior aspect of mid-shin; draining serous fluid  Full range of motion of LLE without pain  Cellulitic skin warm to the touch, no appreciable fluctuance   Skin:     General: Skin is warm.   Neurological:      General: No focal deficit present.      Mental Status: He is alert.       Significant Labs:  I have reviewed all pertinent lab results within the past 24 hours.  CBC:   Recent Labs   Lab 10/06/22  0403   WBC 24.45*   RBC 3.45*   HGB 9.3*   HCT 27.5*      MCV 80*   MCH 27.0   MCHC 33.8     CMP:   Recent Labs   Lab 10/06/22  0403   *   CALCIUM 7.8*   ALBUMIN 1.3*   PROT 5.2*      K 3.0*   CO2 27      BUN 20   CREATININE 0.7   ALKPHOS 105   ALT 35   AST 51*   BILITOT 0.8       Significant Diagnostics:  I have reviewed all pertinent imaging results/findings within the past 24 hours.    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     - patient seen and examined  - WBC climbing  - ID on board, guiding antibiotic management  - stable neurovascular exam to LLE  - Low concern for compartment syndrome-- no paresthesia, poikilothermia, pallor, paralysis, or pulselessness.  - continue with conservative management  - repeat CT scan to see if any organizing fluid collections are driving white count  - If he deteriorates please call general surgery.         Alexis Worthington MD  General Surgery  [facility]

## 2022-10-07 LAB
ALBUMIN SERPL BCP-MCNC: 1.3 G/DL (ref 3.5–5.2)
ALP SERPL-CCNC: 100 U/L (ref 55–135)
ALT SERPL W/O P-5'-P-CCNC: 58 U/L (ref 10–44)
ANION GAP SERPL CALC-SCNC: 8 MMOL/L (ref 8–16)
AST SERPL-CCNC: 74 U/L (ref 10–40)
BASOPHILS NFR BLD: 0 % (ref 0–1.9)
BILIRUB SERPL-MCNC: 0.6 MG/DL (ref 0.1–1)
BUN SERPL-MCNC: 17 MG/DL (ref 8–23)
BURR CELLS BLD QL SMEAR: ABNORMAL
CALCIUM SERPL-MCNC: 8.1 MG/DL (ref 8.7–10.5)
CHLORIDE SERPL-SCNC: 99 MMOL/L (ref 95–110)
CO2 SERPL-SCNC: 29 MMOL/L (ref 23–29)
CREAT SERPL-MCNC: 0.7 MG/DL (ref 0.5–1.4)
DIFFERENTIAL METHOD: ABNORMAL
EOSINOPHIL NFR BLD: 6 % (ref 0–8)
ERYTHROCYTE [DISTWIDTH] IN BLOOD BY AUTOMATED COUNT: 13.7 % (ref 11.5–14.5)
EST. GFR  (NO RACE VARIABLE): >60 ML/MIN/1.73 M^2
GLUCOSE SERPL-MCNC: 146 MG/DL (ref 70–110)
HCT VFR BLD AUTO: 28.9 % (ref 40–54)
HGB BLD-MCNC: 9.6 G/DL (ref 14–18)
HYPOCHROMIA BLD QL SMEAR: ABNORMAL
IMM GRANULOCYTES # BLD AUTO: ABNORMAL K/UL (ref 0–0.04)
IMM GRANULOCYTES NFR BLD AUTO: ABNORMAL % (ref 0–0.5)
LEFT ABI: 1.18
LEFT ARM BP: 131 MMHG
LEFT DORSALIS PEDIS: 155 MMHG
LEFT POSTERIOR TIBIAL: 86 MMHG
LEFT TBI: 0.56
LEFT TOE PRESSURE: 73 MMHG
LYMPHOCYTES NFR BLD: 8 % (ref 18–48)
MAGNESIUM SERPL-MCNC: 1.5 MG/DL (ref 1.6–2.6)
MCH RBC QN AUTO: 26.7 PG (ref 27–31)
MCHC RBC AUTO-ENTMCNC: 33.2 G/DL (ref 32–36)
MCV RBC AUTO: 81 FL (ref 82–98)
METAMYELOCYTES NFR BLD MANUAL: 2 %
MONOCYTES NFR BLD: 3 % (ref 4–15)
NEUTROPHILS NFR BLD: 76 % (ref 38–73)
NEUTS BAND NFR BLD MANUAL: 5 %
NRBC BLD-RTO: 0 /100 WBC
PLATELET # BLD AUTO: 446 K/UL (ref 150–450)
PLATELET BLD QL SMEAR: ABNORMAL
PMV BLD AUTO: 10.5 FL (ref 9.2–12.9)
POIKILOCYTOSIS BLD QL SMEAR: SLIGHT
POLYCHROMASIA BLD QL SMEAR: ABNORMAL
POTASSIUM SERPL-SCNC: 3.7 MMOL/L (ref 3.5–5.1)
PROT SERPL-MCNC: 5.6 G/DL (ref 6–8.4)
RBC # BLD AUTO: 3.59 M/UL (ref 4.6–6.2)
RIGHT ABI: 1.28
RIGHT ARM BP: 130 MMHG
RIGHT DORSALIS PEDIS: 168 MMHG
RIGHT POSTERIOR TIBIAL: 88 MMHG
RIGHT TBI: 0.7
RIGHT TOE PRESSURE: 92 MMHG
SODIUM SERPL-SCNC: 136 MMOL/L (ref 136–145)
TOXIC GRANULES BLD QL SMEAR: PRESENT
VANCOMYCIN TROUGH SERPL-MCNC: 24.7 UG/ML (ref 10–22)
WBC # BLD AUTO: 22.62 K/UL (ref 3.9–12.7)

## 2022-10-07 PROCEDURE — 99233 PR SUBSEQUENT HOSPITAL CARE,LEVL III: ICD-10-PCS | Mod: GC,,, | Performed by: SURGERY

## 2022-10-07 PROCEDURE — 99232 PR SUBSEQUENT HOSPITAL CARE,LEVL II: ICD-10-PCS | Mod: ,,, | Performed by: INTERNAL MEDICINE

## 2022-10-07 PROCEDURE — 25000003 PHARM REV CODE 250: Performed by: HOSPITALIST

## 2022-10-07 PROCEDURE — 99233 SBSQ HOSP IP/OBS HIGH 50: CPT | Mod: GC,,, | Performed by: SURGERY

## 2022-10-07 PROCEDURE — 36415 COLL VENOUS BLD VENIPUNCTURE: CPT | Performed by: HOSPITALIST

## 2022-10-07 PROCEDURE — 25000003 PHARM REV CODE 250: Performed by: INTERNAL MEDICINE

## 2022-10-07 PROCEDURE — 63600175 PHARM REV CODE 636 W HCPCS: Performed by: INTERNAL MEDICINE

## 2022-10-07 PROCEDURE — 11000001 HC ACUTE MED/SURG PRIVATE ROOM

## 2022-10-07 PROCEDURE — 85027 COMPLETE CBC AUTOMATED: CPT | Performed by: HOSPITALIST

## 2022-10-07 PROCEDURE — 83735 ASSAY OF MAGNESIUM: CPT | Performed by: HOSPITALIST

## 2022-10-07 PROCEDURE — 94761 N-INVAS EAR/PLS OXIMETRY MLT: CPT

## 2022-10-07 PROCEDURE — 25000003 PHARM REV CODE 250: Performed by: STUDENT IN AN ORGANIZED HEALTH CARE EDUCATION/TRAINING PROGRAM

## 2022-10-07 PROCEDURE — 63600175 PHARM REV CODE 636 W HCPCS: Performed by: HOSPITALIST

## 2022-10-07 PROCEDURE — 80202 ASSAY OF VANCOMYCIN: CPT | Performed by: STUDENT IN AN ORGANIZED HEALTH CARE EDUCATION/TRAINING PROGRAM

## 2022-10-07 PROCEDURE — 85007 BL SMEAR W/DIFF WBC COUNT: CPT | Performed by: HOSPITALIST

## 2022-10-07 PROCEDURE — 80053 COMPREHEN METABOLIC PANEL: CPT | Performed by: HOSPITALIST

## 2022-10-07 PROCEDURE — 36415 COLL VENOUS BLD VENIPUNCTURE: CPT | Performed by: STUDENT IN AN ORGANIZED HEALTH CARE EDUCATION/TRAINING PROGRAM

## 2022-10-07 PROCEDURE — 63600175 PHARM REV CODE 636 W HCPCS: Performed by: STUDENT IN AN ORGANIZED HEALTH CARE EDUCATION/TRAINING PROGRAM

## 2022-10-07 PROCEDURE — 99232 SBSQ HOSP IP/OBS MODERATE 35: CPT | Mod: ,,, | Performed by: INTERNAL MEDICINE

## 2022-10-07 RX ORDER — SENNOSIDES 8.6 MG/1
8.6 TABLET ORAL 2 TIMES DAILY
Status: DISCONTINUED | OUTPATIENT
Start: 2022-10-07 | End: 2022-10-21 | Stop reason: HOSPADM

## 2022-10-07 RX ORDER — POLYETHYLENE GLYCOL 3350 17 G/17G
17 POWDER, FOR SOLUTION ORAL DAILY
Status: DISCONTINUED | OUTPATIENT
Start: 2022-10-07 | End: 2022-10-21 | Stop reason: HOSPADM

## 2022-10-07 RX ORDER — POTASSIUM CHLORIDE 20 MEQ/1
40 TABLET, EXTENDED RELEASE ORAL DAILY
Status: DISCONTINUED | OUTPATIENT
Start: 2022-10-08 | End: 2022-10-10

## 2022-10-07 RX ADMIN — METRONIDAZOLE 500 MG: 500 TABLET ORAL at 02:10

## 2022-10-07 RX ADMIN — SENNOSIDES 8.6 MG: 8.6 TABLET, FILM COATED ORAL at 09:10

## 2022-10-07 RX ADMIN — POTASSIUM CHLORIDE 40 MEQ: 1500 TABLET, EXTENDED RELEASE ORAL at 08:10

## 2022-10-07 RX ADMIN — HYDROCODONE BITARTRATE AND ACETAMINOPHEN 1 TABLET: 10; 325 TABLET ORAL at 05:10

## 2022-10-07 RX ADMIN — VANCOMYCIN HYDROCHLORIDE 750 MG: 750 INJECTION, POWDER, LYOPHILIZED, FOR SOLUTION INTRAVENOUS at 06:10

## 2022-10-07 RX ADMIN — LACTOBACILLUS ACIDOPHILUS / LACTOBACILLUS BULGARICUS 1 EACH: 100 MILLION CFU STRENGTH GRANULES at 08:10

## 2022-10-07 RX ADMIN — ENOXAPARIN SODIUM 40 MG: 100 INJECTION SUBCUTANEOUS at 04:10

## 2022-10-07 RX ADMIN — POLYETHYLENE GLYCOL 3350 17 G: 17 POWDER, FOR SOLUTION ORAL at 08:10

## 2022-10-07 RX ADMIN — CETIRIZINE HYDROCHLORIDE 5 MG: 5 TABLET ORAL at 09:10

## 2022-10-07 RX ADMIN — OXYBUTYNIN CHLORIDE 5 MG: 5 TABLET ORAL at 08:10

## 2022-10-07 RX ADMIN — DOCUSATE SODIUM 100 MG: 100 CAPSULE, LIQUID FILLED ORAL at 08:10

## 2022-10-07 RX ADMIN — OXYBUTYNIN CHLORIDE 5 MG: 5 TABLET ORAL at 09:10

## 2022-10-07 RX ADMIN — METRONIDAZOLE 500 MG: 500 TABLET ORAL at 09:10

## 2022-10-07 RX ADMIN — TIMOLOL MALEATE 1 DROP: 5 SOLUTION/ DROPS OPHTHALMIC at 08:10

## 2022-10-07 RX ADMIN — LACTOBACILLUS ACIDOPHILUS / LACTOBACILLUS BULGARICUS 1 EACH: 100 MILLION CFU STRENGTH GRANULES at 09:10

## 2022-10-07 RX ADMIN — AZELASTINE 137 MCG: 1 SPRAY, METERED NASAL at 09:10

## 2022-10-07 RX ADMIN — LATANOPROST 1 DROP: 50 SOLUTION OPHTHALMIC at 09:10

## 2022-10-07 RX ADMIN — Medication 400 MG: at 08:10

## 2022-10-07 RX ADMIN — ALLOPURINOL 100 MG: 100 TABLET ORAL at 08:10

## 2022-10-07 RX ADMIN — ASPIRIN 81 MG CHEWABLE TABLET 81 MG: 81 TABLET CHEWABLE at 08:10

## 2022-10-07 RX ADMIN — CEFEPIME 2 G: 2 INJECTION, POWDER, FOR SOLUTION INTRAVENOUS at 04:10

## 2022-10-07 RX ADMIN — AZELASTINE 137 MCG: 1 SPRAY, METERED NASAL at 08:10

## 2022-10-07 RX ADMIN — TIMOLOL MALEATE 1 DROP: 5 SOLUTION/ DROPS OPHTHALMIC at 09:10

## 2022-10-07 RX ADMIN — ACETAMINOPHEN 650 MG: 325 TABLET ORAL at 10:10

## 2022-10-07 RX ADMIN — SENNOSIDES 8.6 MG: 8.6 TABLET, FILM COATED ORAL at 08:10

## 2022-10-07 RX ADMIN — HYDROCODONE BITARTRATE AND ACETAMINOPHEN 1 TABLET: 10; 325 TABLET ORAL at 11:10

## 2022-10-07 RX ADMIN — CEFEPIME 2 G: 2 INJECTION, POWDER, FOR SOLUTION INTRAVENOUS at 08:10

## 2022-10-07 RX ADMIN — METRONIDAZOLE 500 MG: 500 TABLET ORAL at 05:10

## 2022-10-07 NOTE — SUBJECTIVE & OBJECTIVE
Interval History: Pt reports subjective improvement in wound    Review of Systems   Constitutional:  Negative for chills and fever.   Respiratory:  Negative for cough and shortness of breath.    Cardiovascular:  Negative for chest pain and leg swelling.   Gastrointestinal:  Negative for abdominal pain.   Skin:  Positive for color change and wound.   Objective:     Vital Signs (Most Recent):  Temp: 98.6 °F (37 °C) (10/07/22 0658)  Pulse: 89 (10/07/22 0658)  Resp: 16 (10/07/22 0658)  BP: 129/61 (10/07/22 0658)  SpO2: 95 % (10/07/22 0658) Vital Signs (24h Range):  Temp:  [97.9 °F (36.6 °C)-100 °F (37.8 °C)] 98.6 °F (37 °C)  Pulse:  [82-99] 89  Resp:  [16-24] 16  SpO2:  [95 %-97 %] 95 %  BP: (117-135)/(58-65) 129/61     Weight: 61.6 kg (135 lb 12.9 oz)  Body mass index is 21.27 kg/m².    Intake/Output Summary (Last 24 hours) at 10/7/2022 0828  Last data filed at 10/7/2022 0609  Gross per 24 hour   Intake 1440 ml   Output 4050 ml   Net -2610 ml        Physical Exam  Constitutional:       General: He is not in acute distress.     Appearance: He is ill-appearing. He is not toxic-appearing or diaphoretic.   Cardiovascular:      Rate and Rhythm: Normal rate and regular rhythm.      Heart sounds: No murmur heard.    No gallop.   Pulmonary:      Effort: Pulmonary effort is normal. No respiratory distress.      Breath sounds: Normal breath sounds. No wheezing.   Abdominal:      General: Bowel sounds are normal. There is no distension.      Palpations: Abdomen is soft.      Tenderness: There is no abdominal tenderness.   Skin:     Comments: LLE swollen, erythematous. Mildly warm and tender to touch. Area of ulceration by R medial ankle. Appears to have receded from prior marking of margins       Significant Labs: All pertinent labs within the past 24 hours have been reviewed.    Significant Imaging: I have reviewed all pertinent imaging results/findings within the past 24 hours.

## 2022-10-07 NOTE — PLAN OF CARE
Problem: Adult Inpatient Plan of Care  Goal: Plan of Care Review  Outcome: Ongoing, Progressing  Goal: Patient-Specific Goal (Individualized)  Outcome: Ongoing, Progressing  Goal: Absence of Hospital-Acquired Illness or Injury  Outcome: Ongoing, Progressing  Goal: Optimal Comfort and Wellbeing  Outcome: Ongoing, Progressing  Goal: Readiness for Transition of Care  Outcome: Ongoing, Progressing     Problem: Adjustment to Illness (Sepsis/Septic Shock)  Goal: Optimal Coping  Outcome: Ongoing, Progressing     Problem: Bleeding (Sepsis/Septic Shock)  Goal: Absence of Bleeding  Outcome: Ongoing, Progressing     Problem: Glycemic Control Impaired (Sepsis/Septic Shock)  Goal: Blood Glucose Level Within Desired Range  Outcome: Ongoing, Progressing     Problem: Infection Progression (Sepsis/Septic Shock)  Goal: Absence of Infection Signs and Symptoms  Outcome: Ongoing, Progressing     Problem: Nutrition Impaired (Sepsis/Septic Shock)  Goal: Optimal Nutrition Intake  Outcome: Ongoing, Progressing     Problem: Skin Injury Risk Increased  Goal: Skin Health and Integrity  Outcome: Ongoing, Progressing     Problem: Impaired Wound Healing  Goal: Optimal Wound Healing  Outcome: Ongoing, Progressing

## 2022-10-07 NOTE — ASSESSMENT & PLAN NOTE
Cellulitis, likely point of bacterial entry medial ankle. WBC uptrending despite appropriate antibiotic therapy. Repeat CT scan with possible early abscess formation on read, but surgery does not feel it is amenable to intervention. Ortho consulted by [facility] for second opinion, they are planning on taking pt to OR today.  - appreciate ID, general surgery, orthopedics involvement  - on cefepime/vancomycin/flagyl  - abnormal arterial US noted, but WILLIE normal  - elevate ankle>knee>hip to facilitate drainage

## 2022-10-07 NOTE — ASSESSMENT & PLAN NOTE
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     - patient seen and examined  - WBC downtrending  - ID on board, guiding antibiotic management  - stable neurovascular exam, physical exam to LLE  - Low concern for compartment syndrome-- no paresthesia, poikilothermia, pallor, paralysis, or pulselessness.  - continue with conservative management  - repeat CT scan does not demonstrate any organized fluid collections; no surgical intervention at this time  - If he deteriorates please call general surgery.

## 2022-10-07 NOTE — PLAN OF CARE
[facility] - Med Surg  Discharge Reassessment  Plan for patient to be going home with home health.    Primary Care Provider: [facility] [place]     Expected Discharge Date: 10/7/2022    Reassessment (most recent)       Discharge Reassessment - 10/07/22 1614          Discharge Reassessment    Assessment Type Discharge Planning Reassessment (P)      Discharge Plan discussed with: Patient (P)      Communicated ROS with patient/caregiver Yes (P)      Discharge Plan A Home Health (P)      Discharge Plan B Home Health (P)      DME Needed Upon Discharge  none (P)      Discharge Barriers Identified None (P)      Why the patient remains in the hospital Requires continued medical care (P)         Post-Acute Status    Coverage [employer] (P)      Hospital Resources/Appts/Education Provided Provided patient/caregiver with written discharge plan information;Appointments scheduled and added to AVS (P)      Patient choice form signed by patient/caregiver List with quality metrics by geographic area provided (P)      Discharge Delays None known at this time (P)

## 2022-10-07 NOTE — CONSULTS
"[facility] [place] [facility]  Infectious Disease  Consult Note    Patient Name: Mark Church  MRN: [id number]  Admission Date: 10/2/2022  Hospital Length of Stay: 5 days  Attending Physician: David Sadler MD  Primary Care Provider: [facility] [place]      Isolation Status: No active isolations    Patient information was obtained from patient and ER records.      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 Febrile 101. Bcx NGTD x 2 days. On vanc/zosyn/clindmycin and erythema receeding from demarcated margins. CT- no abscess. Arterial studies- Mildly elevated velocity within the left popliteal artery with monophasic waveforms in the calf arteries, concerning for stenosis of the popliteal artery. ID consulted for "antibx recommendations for cellulitis"    Unclear if just cellulitis or he has a vascular component to it as well. Most likely culprit strep vs polymicrobial/GNR infection. Fortunately erythema seems to be improving/receeding from demarcated margins, but pt now day 6 of antibiotic and would have suspected a more substantial improvement by now. Note that ct 10/6 with possible rim enhancement that could indicate developing abscess.     Recommendations:   - would discuss with IR if the fluid collection can be aspirated so that we can get cultures to guide antibiotic therapy  - low threshold for broadening cefepime to meropenem and or adding doxycycline and f/u clinical response to antibiotic changes  - consider vascular evaluation  - anticipated duration of abx 14 days (est end date 10/14)  - wound care as per primary team            Thank you for your consult. I will follow-up with patient. Please contact us if you have any additional questions.    Mónica Miller MD  Infectious Disease  [facility] [place] - Med Surg    Subjective:     Principal Problem: Sepsis    HPI: 72M with h/o Bartter syndrome, gout " "admitted 10/2 with swelling and pain in his left leg. Reports about 4 days of progressive swelling and redness to L leg. Suspects he may have had an insect bite, but doesn't recall. Reports swelling and redeness worse and started having fevers so came to hospital. Usually ambulatory with walker. Denies indwelling hardware.     Bcx NGTD x 2 days    CT  1. Diffuse, circumferential subcutaneous soft tissue edema throughout the left tibia and fibula.  No soft tissue gas or rim enhancing fluid collection.  Correlate for cellulitis.  2. Cutaneous collection at the skin surface of the medial ankle compatible with a fluid-filled blister.  ID consulted for "antibx recommendations for cellulitis"    Day 4 vanc/cefepime/clindamycin    Tmax 101    Arterial studies- Mildly elevated velocity within the left popliteal artery with monophasic waveforms in the calf arteries, concerning for stenosis of the popliteal artery.    ID consulted for "antibx recommendations for cellulitis"      Interval history: NAEO. Leg still painful, red and swollen. Now day 6 of abx    Ct was repeated  possible rim enhancement that could indicate developing abscess.     Past Surgical History:   Procedure Laterality Date    CHOLECYSTECTOMY      JOINT REPLACEMENT      PROSTATE SURGERY      REFRACTIVE SURGERY Bilateral     ROTATOR CUFF REPAIR Right     sinus polyp         Review of patient's allergies indicates:   Allergen Reactions    Compazine [prochlorperazine edisylate]        No current facility-administered medications on file prior to encounter.     Current Outpatient Medications on File Prior to Encounter   Medication Sig    allopurinol (ZYLOPRIM) 100 MG tablet TAKE 1 TABLET EVERY DAY    clindamycin (CLEOCIN) 300 MG capsule Take 300 mg by mouth every 12 (twelve) hours.    HYDROcodone-acetaminophen (NORCO) 5-325 mg per tablet Take 1 tablet by mouth every 8 (eight) hours as needed.    latanoprost 0.005 % ophthalmic solution Place 1 drop " into both eyes every evening.    multivit-minerals/folic acid (MULTIVITAMIN GUMMIES ORAL) Take by mouth.    mupirocin (BACTROBAN) 2 % ointment Apply topically 3 (three) times daily.    oxybutynin (DITROPAN) 5 MG Tab 5 mg once daily.     potassium chloride (MICRO-K) 10 MEQ CpSR TAKE 2 CAPSULES TWICE DAILY    timolol maleate 0.5% (TIMOPTIC) 0.5 % Drop Place 1 drop into both eyes 2 (two) times daily.    aspirin 81 MG Chew Take 81 mg by mouth every other day.     azelastine (ASTELIN) 137 mcg (0.1 %) nasal spray 1 spray (137 mcg total) by Nasal route 2 (two) times daily.    levocetirizine (XYZAL) 5 MG tablet Take 1 tablet (5 mg total) by mouth every evening.     Family History       Problem Relation (Age of Onset)    Blindness Maternal Aunt    Cataracts Father    Glaucoma Maternal Aunt    No Known Problems Mother, Sister, Brother, Sister, Sister, Brother, Maternal Uncle, Paternal Aunt, Paternal Uncle, Maternal Grandmother, Maternal Grandfather, Paternal Grandmother, Paternal Grandfather          Tobacco Use    Smoking status: Never    Smokeless tobacco: Never   Substance and Sexual Activity    Alcohol use: No     Alcohol/week: 0.0 standard drinks    Drug use: No    Sexual activity: Not on file     Review of Systems   Constitutional:  Positive for activity change, appetite change, chills, fatigue and fever.   HENT:  Negative for congestion and dental problem.    Eyes:  Negative for discharge and itching.   Respiratory:  Positive for cough. Negative for apnea, chest tightness and shortness of breath.    Cardiovascular:  Negative for chest pain and leg swelling.   Gastrointestinal:  Negative for abdominal distention and abdominal pain.   Endocrine: Negative for cold intolerance and heat intolerance.   Genitourinary:  Negative for difficulty urinating and dysuria.   Musculoskeletal:  Positive for joint swelling and myalgias. Negative for arthralgias and back pain.   Skin:  Positive for color change and rash.  "  Allergic/Immunologic: Negative for environmental allergies and food allergies.   Neurological:  Positive for weakness. Negative for dizziness, facial asymmetry and light-headedness.   Hematological:  Negative for adenopathy. Does not bruise/bleed easily.   Psychiatric/Behavioral:  Negative for agitation and behavioral problems.    Objective:     Vital Signs (Most Recent):  Temp: 98.1 °F (36.7 °C) (10/07/22 1112)  Pulse: 75 (10/07/22 1112)  Resp: 16 (10/07/22 1128)  BP: 135/61 (10/07/22 1112)  SpO2: 96 % (10/07/22 1112)   Vital Signs (24h Range):  Temp:  [98.1 °F (36.7 °C)-100 °F (37.8 °C)] 98.1 °F (36.7 °C)  Pulse:  [75-99] 75  Resp:  [16-24] 16  SpO2:  [95 %-97 %] 96 %  BP: (117-135)/(58-65) 135/61     Weight: 61.6 kg (135 lb 12.9 oz)  Body mass index is 21.27 kg/m².    Physical Exam  Vitals and nursing note reviewed.   Constitutional:       General: He is not in acute distress.     Appearance: He is well-developed. He is ill-appearing and toxic-appearing. He is not diaphoretic.   HENT:      Head: Normocephalic and atraumatic.      Right Ear: External ear normal.      Left Ear: External ear normal.      Nose: Nose normal.      Mouth/Throat:      Mouth: Mucous membranes are moist.   Eyes:      Extraocular Movements: Extraocular movements intact.      Conjunctiva/sclera: Conjunctivae normal.   Cardiovascular:      Rate and Rhythm: Normal rate and regular rhythm.      Heart sounds: Normal heart sounds.   Pulmonary:      Effort: Pulmonary effort is normal. No respiratory distress.      Breath sounds: Normal breath sounds.   Abdominal:      General: Bowel sounds are normal. There is no distension.      Palpations: Abdomen is soft.      Tenderness: There is no abdominal tenderness.   Musculoskeletal:      Cervical back: Normal range of motion. No rigidity.      Comments: Severe swelling, redness and warmth to left leg from just above knee to tips of toes.  There is a 1" bullae near medial malleolus and an area of " hyperpigmentation on anterior angle of ankle which looks like there was a bullae there as well which has already drained.  There are a few tiny marks on his shin which may be puncture marks but otherwise no clear insect bite or etiology.  There is no purulent drainage.  I cannot palpate his DP or PT pulses in his left leg.  The leg is quite tender to touch, but otherwise he is comfortable and pain does not appear out of proportion   Skin:     General: Skin is warm and dry.   Neurological:      Mental Status: He is alert and oriented to person, place, and time.      Cranial Nerves: No cranial nerve deficit.      Coordination: Coordination normal.   Psychiatric:         Behavior: Behavior normal.           Erythema receeding from demarcated margins    Significant Labs: All pertinent labs within the past 24 hours have been reviewed.    Significant Imaging: I have reviewed all pertinent imaging results/findings within the past 24 hours.

## 2022-10-07 NOTE — SUBJECTIVE & OBJECTIVE
Interval History: Patient seen and examined. No acute events overnight. WBC downtrending. Neurovascular exam remains stable. LLE remains with erythema and serous drainage from bullae. CT scan shows no organized collections-- no surgical intervention at this time.    Medications:  Continuous Infusions:  Scheduled Meds:   allopurinoL  100 mg Oral Daily    aspirin  81 mg Oral Every other day    azelastine  1 spray Nasal BID    ceFEPime (MAXIPIME) IVPB  2 g Intravenous Q8H    cetirizine  5 mg Oral QHS    enoxaparin  40 mg Subcutaneous Daily    lactobacillus acidophilus & bulgar  1 packet Oral BID    latanoprost  1 drop Both Eyes QHS    magnesium oxide  400 mg Oral Daily    metroNIDAZOLE  500 mg Oral Q8H    oxybutynin  5 mg Oral BID    polyethylene glycol  17 g Oral Daily    [START ON 10/8/2022] potassium chloride  40 mEq Oral Daily    senna  8.6 mg Oral BID    timolol maleate 0.5%  1 drop Both Eyes BID    vancomycin (VANCOCIN) IVPB  750 mg Intravenous Q12H     PRN Meds:acetaminophen, benzonatate, dextrose 10%, dextrose 10%, glucagon (human recombinant), glucose, glucose, HYDROcodone-acetaminophen, HYDROcodone-acetaminophen, loperamide, melatonin, ondansetron, simethicone, sodium chloride 0.9%, Pharmacy to dose Vancomycin consult **AND** vancomycin - pharmacy to dose     Review of patient's allergies indicates:   Allergen Reactions    Compazine [prochlorperazine edisylate]      Objective:     Vital Signs (Most Recent):  Temp: 98.6 °F (37 °C) (10/07/22 0658)  Pulse: 89 (10/07/22 0658)  Resp: 16 (10/07/22 0658)  BP: 129/61 (10/07/22 0658)  SpO2: 95 % (10/07/22 0658) Vital Signs (24h Range):  Temp:  [97.9 °F (36.6 °C)-100 °F (37.8 °C)] 98.6 °F (37 °C)  Pulse:  [82-99] 89  Resp:  [16-24] 16  SpO2:  [95 %-97 %] 95 %  BP: (117-135)/(58-65) 129/61     Weight: 61.6 kg (135 lb 12.9 oz)  Body mass index is 21.27 kg/m².    Intake/Output - Last 3 Shifts         10/05 0700  10/06 0659 10/06 0700  10/07 0659 10/07 0700  10/08 0659     P.O. 1437 1440     I.V. (mL/kg)       IV Piggyback       Total Intake(mL/kg) 1437 (23.3) 1440 (23.4)     Urine (mL/kg/hr) 1200 (0.8) 4050 (2.7)     Total Output 1200 4050     Net +237 -2610                    Physical Exam  Vitals and nursing note reviewed.   Constitutional:       General: He is not in acute distress.     Appearance: Normal appearance.   HENT:      Head: Normocephalic and atraumatic.      Nose: Nose normal.      Mouth/Throat:      Mouth: Mucous membranes are moist.   Cardiovascular:      Rate and Rhythm: Normal rate and regular rhythm.   Pulmonary:      Effort: Pulmonary effort is normal. No respiratory distress.   Abdominal:      General: Abdomen is flat. There is no distension.      Palpations: Abdomen is soft.      Tenderness: There is no abdominal tenderness.   Musculoskeletal:      Comments: Left lower extremity with erythema and swelling below knee down to foot; erythema stable  Palpable pulses to DP, PT of LLE  Bullae noted to medial aspect near medial malleolus and anterior aspect of mid-shin; draining serous fluid  Full range of motion of LLE without pain  Cellulitic skin warm to the touch, no appreciable fluctuance   Skin:     General: Skin is warm.   Neurological:      General: No focal deficit present.      Mental Status: He is alert.       Significant Labs:  I have reviewed all pertinent lab results within the past 24 hours.  CBC:   Recent Labs   Lab 10/07/22  0528   WBC 22.62*   RBC 3.59*   HGB 9.6*   HCT 28.9*      MCV 81*   MCH 26.7*   MCHC 33.2     CMP:   Recent Labs   Lab 10/07/22  0528   *   CALCIUM 8.1*   ALBUMIN 1.3*   PROT 5.6*      K 3.7   CO2 29   CL 99   BUN 17   CREATININE 0.7   ALKPHOS 100   ALT 58*   AST 74*   BILITOT 0.6       Significant Diagnostics:  I have reviewed all pertinent imaging results/findings within the past 24 hours.

## 2022-10-07 NOTE — ASSESSMENT & PLAN NOTE
Cellulitis, likely point of bacterial entry medial ankle. WBC uptrending despite appropriate antibiotic therapy. Repeat CT scan with possible early abscess formation on read, but surgery does not feel it is amenable to intervention  - appreciate ID, general surgery involvement  - on cefepime/vancomycin/flagyl  - abnormal arterial US noted   - ABIs ordered, will consider further workup/consultation pending the result  - elevate ankle>knee>hip to facilitate drainage

## 2022-10-07 NOTE — PROGRESS NOTES
[facility]  General Surgery  Progress Note    Subjective:     History of Present Illness:  No notes on file    Post-Op Info:  * No surgery found *         Interval History: Patient seen and examined. No acute events overnight. WBC downtrending. Neurovascular exam remains stable. LLE remains with erythema and serous drainage from bullae. CT scan shows no organized collections-- no surgical intervention at this time.    Medications:  Continuous Infusions:  Scheduled Meds:   allopurinoL  100 mg Oral Daily    aspirin  81 mg Oral Every other day    azelastine  1 spray Nasal BID    ceFEPime (MAXIPIME) IVPB  2 g Intravenous Q8H    cetirizine  5 mg Oral QHS    enoxaparin  40 mg Subcutaneous Daily    lactobacillus acidophilus & bulgar  1 packet Oral BID    latanoprost  1 drop Both Eyes QHS    magnesium oxide  400 mg Oral Daily    metroNIDAZOLE  500 mg Oral Q8H    oxybutynin  5 mg Oral BID    polyethylene glycol  17 g Oral Daily    [START ON 10/8/2022] potassium chloride  40 mEq Oral Daily    senna  8.6 mg Oral BID    timolol maleate 0.5%  1 drop Both Eyes BID    vancomycin (VANCOCIN) IVPB  750 mg Intravenous Q12H     PRN Meds:acetaminophen, benzonatate, dextrose 10%, dextrose 10%, glucagon (human recombinant), glucose, glucose, HYDROcodone-acetaminophen, HYDROcodone-acetaminophen, loperamide, melatonin, ondansetron, simethicone, sodium chloride 0.9%, Pharmacy to dose Vancomycin consult **AND** vancomycin - pharmacy to dose     Review of patient's allergies indicates:   Allergen Reactions    Compazine [prochlorperazine edisylate]      Objective:     Vital Signs (Most Recent):  Temp: 98.6 °F (37 °C) (10/07/22 0658)  Pulse: 89 (10/07/22 0658)  Resp: 16 (10/07/22 0658)  BP: 129/61 (10/07/22 0658)  SpO2: 95 % (10/07/22 0658) Vital Signs (24h Range):  Temp:  [97.9 °F (36.6 °C)-100 °F (37.8 °C)] 98.6 °F (37 °C)  Pulse:  [82-99] 89  Resp:  [16-24] 16  SpO2:  [95 %-97 %] 95 %  BP: (117-135)/(58-65) 129/61      Weight: 61.6 kg (135 lb 12.9 oz)  Body mass index is 21.27 kg/m².    Intake/Output - Last 3 Shifts         10/05 0700  10/06 0659 10/06 0700  10/07 0659 10/07 0700  10/08 0659    P.O. 1437 1440     I.V. (mL/kg)       IV Piggyback       Total Intake(mL/kg) 1437 (23.3) 1440 (23.4)     Urine (mL/kg/hr) 1200 (0.8) 4050 (2.7)     Total Output 1200 4050     Net +237 -2610                    Physical Exam  Vitals and nursing note reviewed.   Constitutional:       General: He is not in acute distress.     Appearance: Normal appearance.   HENT:      Head: Normocephalic and atraumatic.      Nose: Nose normal.      Mouth/Throat:      Mouth: Mucous membranes are moist.   Cardiovascular:      Rate and Rhythm: Normal rate and regular rhythm.   Pulmonary:      Effort: Pulmonary effort is normal. No respiratory distress.   Abdominal:      General: Abdomen is flat. There is no distension.      Palpations: Abdomen is soft.      Tenderness: There is no abdominal tenderness.   Musculoskeletal:      Comments: Left lower extremity with erythema and swelling below knee down to foot; erythema stable  Palpable pulses to DP, PT of LLE  Bullae noted to medial aspect near medial malleolus and anterior aspect of mid-shin; draining serous fluid  Full range of motion of LLE without pain  Cellulitic skin warm to the touch, no appreciable fluctuance   Skin:     General: Skin is warm.   Neurological:      General: No focal deficit present.      Mental Status: He is alert.       Significant Labs:  I have reviewed all pertinent lab results within the past 24 hours.  CBC:   Recent Labs   Lab 10/07/22  0528   WBC 22.62*   RBC 3.59*   HGB 9.6*   HCT 28.9*      MCV 81*   MCH 26.7*   MCHC 33.2     CMP:   Recent Labs   Lab 10/07/22  0528   *   CALCIUM 8.1*   ALBUMIN 1.3*   PROT 5.6*      K 3.7   CO2 29   CL 99   BUN 17   CREATININE 0.7   ALKPHOS 100   ALT 58*   AST 74*   BILITOT 0.6       Significant Diagnostics:  I have reviewed all  pertinent imaging results/findings within the past 24 hours.    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     - patient seen and examined  - WBC downtrending  - ID on board, guiding antibiotic management  - stable neurovascular exam, physical exam to E  - Low concern for compartment syndrome-- no paresthesia, poikilothermia, pallor, paralysis, or pulselessness.  - continue with conservative management  - repeat CT scan does not demonstrate any organized fluid collections; no surgical intervention at this time  - If he deteriorates please call general surgery.         Alexis Worthington MD  General Surgery  [facility] [place] [facility]

## 2022-10-07 NOTE — PROGRESS NOTES
"[facility] Medicine  Progress Note    Patient Name: Mark Church  MRN: [id number]  Patient Class: IP- Inpatient   Admission Date: 10/2/2022  Length of Stay: 5 days  Attending Physician: David Sadler MD  Primary Care Provider: [facility] [place]         Subjective:     Principal Problem:Sepsis        HPI:  Mr. 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  He states this all started about 4 days ago when he was walking in a field. He states he felt some significant itching in his lower leg.  Later that night his leg started to swell and hurt.  He assumed this was caused by an insect bite on his leg, but he never saw a specific bite or insect.  He states that the 2nd night the swelling and pain became severe and he was having fevers and chills which prompted him to visit his primary care provider the next day.  He was prescribed an antibiotic, not sure which one, and despite taking this his leg has continued to get worse.  He notes he has been having fevers and chills and last night he "slid or fell" out of his chair when he was trying to get up because the pain was so bad.  He notes he uses a walker to ambulate at home.  He also notes a chronic cough ascociated wiith allergic rhinitis and had an episode of nausea and vomiting one week ago.  He reports diminished oral intake due to decreased appetite, but no nausea or vomiting since this all started 4 days ago.  He denies chest pain, shortness of breath, headache, palpitations, dysuria and diarrhea.      Overview/Hospital Course:  Admitted with sepsis secondary to left lower extremity cellulitis. Started on IV antibiotics, IVF and potassium replacement (has Bartter syndrome). Surgery consulted for concern for nec fasc/bone involvement. CT did not show any evidence of abscess/gas. Seems to be improving though still significant " swelling and blistering. ID consulted for antibiotic recommendations. Repeat CT ordered for worsening white count which did not show a clear abscess.      Interval History: Pt reports subjective improvement in wound    Review of Systems   Constitutional:  Negative for chills and fever.   Respiratory:  Negative for cough and shortness of breath.    Cardiovascular:  Negative for chest pain and leg swelling.   Gastrointestinal:  Negative for abdominal pain.   Skin:  Positive for color change and wound.   Objective:     Vital Signs (Most Recent):  Temp: 98.6 °F (37 °C) (10/07/22 0658)  Pulse: 89 (10/07/22 0658)  Resp: 16 (10/07/22 0658)  BP: 129/61 (10/07/22 0658)  SpO2: 95 % (10/07/22 0658) Vital Signs (24h Range):  Temp:  [97.9 °F (36.6 °C)-100 °F (37.8 °C)] 98.6 °F (37 °C)  Pulse:  [82-99] 89  Resp:  [16-24] 16  SpO2:  [95 %-97 %] 95 %  BP: (117-135)/(58-65) 129/61     Weight: 61.6 kg (135 lb 12.9 oz)  Body mass index is 21.27 kg/m².    Intake/Output Summary (Last 24 hours) at 10/7/2022 0828  Last data filed at 10/7/2022 0609  Gross per 24 hour   Intake 1440 ml   Output 4050 ml   Net -2610 ml        Physical Exam  Constitutional:       General: He is not in acute distress.     Appearance: He is ill-appearing. He is not toxic-appearing or diaphoretic.   Cardiovascular:      Rate and Rhythm: Normal rate and regular rhythm.      Heart sounds: No murmur heard.    No gallop.   Pulmonary:      Effort: Pulmonary effort is normal. No respiratory distress.      Breath sounds: Normal breath sounds. No wheezing.   Abdominal:      General: Bowel sounds are normal. There is no distension.      Palpations: Abdomen is soft.      Tenderness: There is no abdominal tenderness.   Skin:     Comments: LLE swollen, erythematous. Mildly warm and tender to touch. Area of ulceration by R medial ankle. Appears to have receded from prior marking of margins       Significant Labs: All pertinent labs within the past 24 hours have been  "reviewed.    Significant Imaging: I have reviewed all pertinent imaging results/findings within the past 24 hours.      Assessment/Plan:      * Sepsis  Cellulitis, likely point of bacterial entry medial ankle. WBC uptrending despite appropriate antibiotic therapy. Repeat CT scan with possible early abscess formation on read, but surgery does not feel it is amenable to intervention  - appreciate ID, general surgery involvement  - on cefepime/vancomycin/flagyl  - abnormal arterial US noted   - ABIs ordered  - elevate ankle>knee>hip to facilitate drainage      Cellulitis        Debility  -At home ambulates with a walker  -Notes he "Fell or slid" out of a chair on night prior to admit  -When more stable will consult PT/OT for evaluation.    Chronic cough  -Presumed due to allergic rhinitis  -Continue home anti-histamines  -Add tessalon PRN    Chronic gout    -Continue home allopurinol.    Hypokalemia  -Due to Bartter syndrome and diminished oral intake  -Replace potassium today.  -Will need daily replacement  -Check BMP and Mag in AM.        Hyponatremia  -On admit mild and likely due to diminished oral intake and mild dehydration  -Continue IV fluids  -stable    Normocytic anemia  -Hb 12.1 on admit  -No bleeding  -Check iron, ferritin, b12 and folate - appears more ACD at this time with elevated ferritin  -Repeat cbc in AM    Cellulitis of left lower extremity  -Treatment as above.    Primary open angle glaucoma of both eyes, severe stage  -History noted  -Continue home timolol and lantanoprost drops    Bartter syndrome  -At home takes KCl 10mEq daily and prior labs show his K is usually normal on this  - received IV KCl yesterday and added to fluids  - Start KCl 40 mg PO bid for now, replace Mg  -Treatment as above.    H/O prostate cancer  -history noted  -Continue home oxybutynin    VTE Risk Mitigation (From admission, onward)           Ordered     enoxaparin injection 40 mg  Daily         10/02/22 1416     IP VTE " HIGH RISK PATIENT  Once         10/02/22 1416     Place sequential compression device  Until discontinued         10/02/22 1416                    Discharge Planning   ROS: 10/7/2022     Code Status: Full Code   Is the patient medically ready for discharge?:     Reason for patient still in hospital (select all that apply): Patient trending condition, Laboratory test, Treatment, Consult recommendations and Pending disposition  Discharge Plan A: Home with family   Discharge Delays: None known at this time              David Sadler MD  Department of Hospital Medicine   [facility]

## 2022-10-07 NOTE — ASSESSMENT & PLAN NOTE
"72M with h/o Bartter syndrome, gout admitted 10/2 with erythema, swelling, pain to L leg. Febrile 101. Bcx NGTD x 2 days. On vanc/zosyn/clindmycin and erythema receeding from demarcated margins. CT- no abscess. Arterial studies- Mildly elevated velocity within the left popliteal artery with monophasic waveforms in the calf arteries, concerning for stenosis of the popliteal artery. ID consulted for "antibx recommendations for cellulitis"    Unclear if just cellulitis or he has a vascular component to it as well. Most likely culprit strep vs polymicrobial/GNR infection. Fortunately erythema seems to be improving/receeding from demarcated margins, but pt now day 6 of antibiotic and would have suspected a more substantial improvement by now. Note that ct 10/6 with possible rim enhancement that could indicate developing abscess.     Recommendations:   - would discuss with IR if the fluid collection can be aspirated so that we can get cultures to guide antibiotic therapy  - low threshold for broadening cefepime to meropenem and or adding doxycycline and f/u clinical response to antibiotic changes  - consider vascular evaluation  - anticipated duration of abx 14 days (est end date 10/14)  - wound care as per primary team      "

## 2022-10-07 NOTE — SUBJECTIVE & OBJECTIVE
Interval history: NAEO. Leg still painful, red and swollen. Now day 6 of abx    Ct was repeated  possible rim enhancement that could indicate developing abscess.     Past Surgical History:   Procedure Laterality Date    CHOLECYSTECTOMY      JOINT REPLACEMENT      PROSTATE SURGERY      REFRACTIVE SURGERY Bilateral     ROTATOR CUFF REPAIR Right     sinus polyp         Review of patient's allergies indicates:   Allergen Reactions    Compazine [prochlorperazine edisylate]        No current facility-administered medications on file prior to encounter.     Current Outpatient Medications on File Prior to Encounter   Medication Sig    allopurinol (ZYLOPRIM) 100 MG tablet TAKE 1 TABLET EVERY DAY    clindamycin (CLEOCIN) 300 MG capsule Take 300 mg by mouth every 12 (twelve) hours.    HYDROcodone-acetaminophen (NORCO) 5-325 mg per tablet Take 1 tablet by mouth every 8 (eight) hours as needed.    latanoprost 0.005 % ophthalmic solution Place 1 drop into both eyes every evening.    multivit-minerals/folic acid (MULTIVITAMIN GUMMIES ORAL) Take by mouth.    mupirocin (BACTROBAN) 2 % ointment Apply topically 3 (three) times daily.    oxybutynin (DITROPAN) 5 MG Tab 5 mg once daily.     potassium chloride (MICRO-K) 10 MEQ CpSR TAKE 2 CAPSULES TWICE DAILY    timolol maleate 0.5% (TIMOPTIC) 0.5 % Drop Place 1 drop into both eyes 2 (two) times daily.    aspirin 81 MG Chew Take 81 mg by mouth every other day.     azelastine (ASTELIN) 137 mcg (0.1 %) nasal spray 1 spray (137 mcg total) by Nasal route 2 (two) times daily.    levocetirizine (XYZAL) 5 MG tablet Take 1 tablet (5 mg total) by mouth every evening.     Family History       Problem Relation (Age of Onset)    Blindness Maternal Aunt    Cataracts Father    Glaucoma Maternal Aunt    No Known Problems Mother, Sister, Brother, Sister, Sister, Brother, Maternal Uncle, Paternal Aunt, Paternal Uncle, Maternal Grandmother, Maternal Grandfather, Paternal Grandmother, Paternal Grandfather           Tobacco Use    Smoking status: Never    Smokeless tobacco: Never   Substance and Sexual Activity    Alcohol use: No     Alcohol/week: 0.0 standard drinks    Drug use: No    Sexual activity: Not on file     Review of Systems   Constitutional:  Positive for activity change, appetite change, chills, fatigue and fever.   HENT:  Negative for congestion and dental problem.    Eyes:  Negative for discharge and itching.   Respiratory:  Positive for cough. Negative for apnea, chest tightness and shortness of breath.    Cardiovascular:  Negative for chest pain and leg swelling.   Gastrointestinal:  Negative for abdominal distention and abdominal pain.   Endocrine: Negative for cold intolerance and heat intolerance.   Genitourinary:  Negative for difficulty urinating and dysuria.   Musculoskeletal:  Positive for joint swelling and myalgias. Negative for arthralgias and back pain.   Skin:  Positive for color change and rash.   Allergic/Immunologic: Negative for environmental allergies and food allergies.   Neurological:  Positive for weakness. Negative for dizziness, facial asymmetry and light-headedness.   Hematological:  Negative for adenopathy. Does not bruise/bleed easily.   Psychiatric/Behavioral:  Negative for agitation and behavioral problems.    Objective:     Vital Signs (Most Recent):  Temp: 98.1 °F (36.7 °C) (10/07/22 1112)  Pulse: 75 (10/07/22 1112)  Resp: 16 (10/07/22 1128)  BP: 135/61 (10/07/22 1112)  SpO2: 96 % (10/07/22 1112)   Vital Signs (24h Range):  Temp:  [98.1 °F (36.7 °C)-100 °F (37.8 °C)] 98.1 °F (36.7 °C)  Pulse:  [75-99] 75  Resp:  [16-24] 16  SpO2:  [95 %-97 %] 96 %  BP: (117-135)/(58-65) 135/61     Weight: 61.6 kg (135 lb 12.9 oz)  Body mass index is 21.27 kg/m².    Physical Exam  Vitals and nursing note reviewed.   Constitutional:       General: He is not in acute distress.     Appearance: He is well-developed. He is ill-appearing and toxic-appearing. He is not diaphoretic.   HENT:       "Head: Normocephalic and atraumatic.      Right Ear: External ear normal.      Left Ear: External ear normal.      Nose: Nose normal.      Mouth/Throat:      Mouth: Mucous membranes are moist.   Eyes:      Extraocular Movements: Extraocular movements intact.      Conjunctiva/sclera: Conjunctivae normal.   Cardiovascular:      Rate and Rhythm: Normal rate and regular rhythm.      Heart sounds: Normal heart sounds.   Pulmonary:      Effort: Pulmonary effort is normal. No respiratory distress.      Breath sounds: Normal breath sounds.   Abdominal:      General: Bowel sounds are normal. There is no distension.      Palpations: Abdomen is soft.      Tenderness: There is no abdominal tenderness.   Musculoskeletal:      Cervical back: Normal range of motion. No rigidity.      Comments: Severe swelling, redness and warmth to left leg from just above knee to tips of toes.  There is a 1" bullae near medial malleolus and an area of hyperpigmentation on anterior angle of ankle which looks like there was a bullae there as well which has already drained.  There are a few tiny marks on his shin which may be puncture marks but otherwise no clear insect bite or etiology.  There is no purulent drainage.  I cannot palpate his DP or PT pulses in his left leg.  The leg is quite tender to touch, but otherwise he is comfortable and pain does not appear out of proportion   Skin:     General: Skin is warm and dry.   Neurological:      Mental Status: He is alert and oriented to person, place, and time.      Cranial Nerves: No cranial nerve deficit.      Coordination: Coordination normal.   Psychiatric:         Behavior: Behavior normal.           Erythema receeding from demarcated margins    Significant Labs: All pertinent labs within the past 24 hours have been reviewed.    Significant Imaging: I have reviewed all pertinent imaging results/findings within the past 24 hours.  "

## 2022-10-08 LAB
ALBUMIN SERPL BCP-MCNC: 1.4 G/DL (ref 3.5–5.2)
ALP SERPL-CCNC: 89 U/L (ref 55–135)
ALT SERPL W/O P-5'-P-CCNC: 51 U/L (ref 10–44)
ANION GAP SERPL CALC-SCNC: 7 MMOL/L (ref 8–16)
ANISOCYTOSIS BLD QL SMEAR: SLIGHT
AST SERPL-CCNC: 50 U/L (ref 10–40)
BASOPHILS NFR BLD: 0 % (ref 0–1.9)
BILIRUB SERPL-MCNC: 0.8 MG/DL (ref 0.1–1)
BUN SERPL-MCNC: 16 MG/DL (ref 8–23)
CALCIUM SERPL-MCNC: 8.2 MG/DL (ref 8.7–10.5)
CHLORIDE SERPL-SCNC: 97 MMOL/L (ref 95–110)
CO2 SERPL-SCNC: 30 MMOL/L (ref 23–29)
CREAT SERPL-MCNC: 0.6 MG/DL (ref 0.5–1.4)
DIFFERENTIAL METHOD: ABNORMAL
EOSINOPHIL NFR BLD: 2 % (ref 0–8)
ERYTHROCYTE [DISTWIDTH] IN BLOOD BY AUTOMATED COUNT: 14.2 % (ref 11.5–14.5)
EST. GFR  (NO RACE VARIABLE): >60 ML/MIN/1.73 M^2
GLUCOSE SERPL-MCNC: 115 MG/DL (ref 70–110)
HCT VFR BLD AUTO: 31.5 % (ref 40–54)
HGB BLD-MCNC: 10.5 G/DL (ref 14–18)
IMM GRANULOCYTES # BLD AUTO: ABNORMAL K/UL (ref 0–0.04)
IMM GRANULOCYTES NFR BLD AUTO: ABNORMAL % (ref 0–0.5)
LYMPHOCYTES NFR BLD: 9 % (ref 18–48)
MAGNESIUM SERPL-MCNC: 1.4 MG/DL (ref 1.6–2.6)
MCH RBC QN AUTO: 27.4 PG (ref 27–31)
MCHC RBC AUTO-ENTMCNC: 33.3 G/DL (ref 32–36)
MCV RBC AUTO: 82 FL (ref 82–98)
METAMYELOCYTES NFR BLD MANUAL: 1 %
MONOCYTES NFR BLD: 9 % (ref 4–15)
MYELOCYTES NFR BLD MANUAL: 2 %
NEUTROPHILS NFR BLD: 74 % (ref 38–73)
NEUTS BAND NFR BLD MANUAL: 3 %
NRBC BLD-RTO: 0 /100 WBC
PLATELET # BLD AUTO: 538 K/UL (ref 150–450)
PLATELET BLD QL SMEAR: ABNORMAL
PMV BLD AUTO: 10.3 FL (ref 9.2–12.9)
POIKILOCYTOSIS BLD QL SMEAR: SLIGHT
POLYCHROMASIA BLD QL SMEAR: ABNORMAL
POTASSIUM SERPL-SCNC: 3.8 MMOL/L (ref 3.5–5.1)
PROT SERPL-MCNC: 5.5 G/DL (ref 6–8.4)
RBC # BLD AUTO: 3.83 M/UL (ref 4.6–6.2)
SODIUM SERPL-SCNC: 134 MMOL/L (ref 136–145)
VANCOMYCIN SERPL-MCNC: 15.1 UG/ML
WBC # BLD AUTO: 22.96 K/UL (ref 3.9–12.7)

## 2022-10-08 PROCEDURE — 36415 COLL VENOUS BLD VENIPUNCTURE: CPT | Performed by: STUDENT IN AN ORGANIZED HEALTH CARE EDUCATION/TRAINING PROGRAM

## 2022-10-08 PROCEDURE — 85007 BL SMEAR W/DIFF WBC COUNT: CPT | Performed by: HOSPITALIST

## 2022-10-08 PROCEDURE — 11000001 HC ACUTE MED/SURG PRIVATE ROOM

## 2022-10-08 PROCEDURE — 85027 COMPLETE CBC AUTOMATED: CPT | Performed by: HOSPITALIST

## 2022-10-08 PROCEDURE — 25000003 PHARM REV CODE 250: Performed by: HOSPITALIST

## 2022-10-08 PROCEDURE — 80053 COMPREHEN METABOLIC PANEL: CPT | Performed by: HOSPITALIST

## 2022-10-08 PROCEDURE — 83735 ASSAY OF MAGNESIUM: CPT | Performed by: HOSPITALIST

## 2022-10-08 PROCEDURE — 99233 SBSQ HOSP IP/OBS HIGH 50: CPT | Mod: GC,,, | Performed by: SURGERY

## 2022-10-08 PROCEDURE — 25000003 PHARM REV CODE 250: Performed by: STUDENT IN AN ORGANIZED HEALTH CARE EDUCATION/TRAINING PROGRAM

## 2022-10-08 PROCEDURE — 63600175 PHARM REV CODE 636 W HCPCS: Performed by: HOSPITALIST

## 2022-10-08 PROCEDURE — 99233 PR SUBSEQUENT HOSPITAL CARE,LEVL III: ICD-10-PCS | Mod: GC,,, | Performed by: SURGERY

## 2022-10-08 PROCEDURE — 80202 ASSAY OF VANCOMYCIN: CPT | Performed by: STUDENT IN AN ORGANIZED HEALTH CARE EDUCATION/TRAINING PROGRAM

## 2022-10-08 PROCEDURE — 63600175 PHARM REV CODE 636 W HCPCS: Performed by: STUDENT IN AN ORGANIZED HEALTH CARE EDUCATION/TRAINING PROGRAM

## 2022-10-08 PROCEDURE — 63600175 PHARM REV CODE 636 W HCPCS: Performed by: INTERNAL MEDICINE

## 2022-10-08 PROCEDURE — 25000003 PHARM REV CODE 250: Performed by: INTERNAL MEDICINE

## 2022-10-08 RX ORDER — VANCOMYCIN HCL IN 5 % DEXTROSE 1G/250ML
15 PLASTIC BAG, INJECTION (ML) INTRAVENOUS ONCE
Status: COMPLETED | OUTPATIENT
Start: 2022-10-08 | End: 2022-10-08

## 2022-10-08 RX ADMIN — AZELASTINE 137 MCG: 1 SPRAY, METERED NASAL at 09:10

## 2022-10-08 RX ADMIN — CEFEPIME 2 G: 2 INJECTION, POWDER, FOR SOLUTION INTRAVENOUS at 04:10

## 2022-10-08 RX ADMIN — SENNOSIDES 8.6 MG: 8.6 TABLET, FILM COATED ORAL at 09:10

## 2022-10-08 RX ADMIN — ENOXAPARIN SODIUM 40 MG: 100 INJECTION SUBCUTANEOUS at 05:10

## 2022-10-08 RX ADMIN — Medication 400 MG: at 09:10

## 2022-10-08 RX ADMIN — METRONIDAZOLE 500 MG: 500 TABLET ORAL at 05:10

## 2022-10-08 RX ADMIN — ALLOPURINOL 100 MG: 100 TABLET ORAL at 09:10

## 2022-10-08 RX ADMIN — AZELASTINE 137 MCG: 1 SPRAY, METERED NASAL at 10:10

## 2022-10-08 RX ADMIN — TIMOLOL MALEATE 1 DROP: 5 SOLUTION/ DROPS OPHTHALMIC at 09:10

## 2022-10-08 RX ADMIN — LATANOPROST 1 DROP: 50 SOLUTION OPHTHALMIC at 10:10

## 2022-10-08 RX ADMIN — OXYBUTYNIN CHLORIDE 5 MG: 5 TABLET ORAL at 10:10

## 2022-10-08 RX ADMIN — METRONIDAZOLE 500 MG: 500 TABLET ORAL at 01:10

## 2022-10-08 RX ADMIN — OXYBUTYNIN CHLORIDE 5 MG: 5 TABLET ORAL at 09:10

## 2022-10-08 RX ADMIN — CETIRIZINE HYDROCHLORIDE 5 MG: 5 TABLET ORAL at 10:10

## 2022-10-08 RX ADMIN — SENNOSIDES 8.6 MG: 8.6 TABLET, FILM COATED ORAL at 10:10

## 2022-10-08 RX ADMIN — LACTOBACILLUS ACIDOPHILUS / LACTOBACILLUS BULGARICUS 1 EACH: 100 MILLION CFU STRENGTH GRANULES at 09:10

## 2022-10-08 RX ADMIN — TIMOLOL MALEATE 1 DROP: 5 SOLUTION/ DROPS OPHTHALMIC at 10:10

## 2022-10-08 RX ADMIN — CEFEPIME 2 G: 2 INJECTION, POWDER, FOR SOLUTION INTRAVENOUS at 09:10

## 2022-10-08 RX ADMIN — LACTOBACILLUS ACIDOPHILUS / LACTOBACILLUS BULGARICUS 1 EACH: 100 MILLION CFU STRENGTH GRANULES at 10:10

## 2022-10-08 RX ADMIN — CEFEPIME 2 G: 2 INJECTION, POWDER, FOR SOLUTION INTRAVENOUS at 12:10

## 2022-10-08 RX ADMIN — VANCOMYCIN HYDROCHLORIDE 1000 MG: 1 INJECTION, POWDER, LYOPHILIZED, FOR SOLUTION INTRAVENOUS at 10:10

## 2022-10-08 RX ADMIN — POTASSIUM CHLORIDE 40 MEQ: 1500 TABLET, EXTENDED RELEASE ORAL at 09:10

## 2022-10-08 RX ADMIN — METRONIDAZOLE 500 MG: 500 TABLET ORAL at 10:10

## 2022-10-08 NOTE — PROGRESS NOTES
[facility]  General Surgery  Progress Note    Subjective:     History of Present Illness:  No notes on file    Post-Op Info:  * No surgery found *         Interval History: Patient seen and examined. No acute events. LLE remains stable-- improved erythema about left foot. Full range of motion. Ortho consulted for second opinion.     Medications:  Continuous Infusions:  Scheduled Meds:   allopurinoL  100 mg Oral Daily    aspirin  81 mg Oral Every other day    azelastine  1 spray Nasal BID    ceFEPime (MAXIPIME) IVPB  2 g Intravenous Q8H    cetirizine  5 mg Oral QHS    enoxaparin  40 mg Subcutaneous Daily    lactobacillus acidophilus & bulgar  1 packet Oral BID    latanoprost  1 drop Both Eyes QHS    magnesium oxide  400 mg Oral Daily    metroNIDAZOLE  500 mg Oral Q8H    oxybutynin  5 mg Oral BID    polyethylene glycol  17 g Oral Daily    potassium chloride  40 mEq Oral Daily    senna  8.6 mg Oral BID    timolol maleate 0.5%  1 drop Both Eyes BID     PRN Meds:acetaminophen, benzonatate, dextrose 10%, dextrose 10%, glucagon (human recombinant), glucose, glucose, HYDROcodone-acetaminophen, HYDROcodone-acetaminophen, loperamide, melatonin, ondansetron, simethicone, sodium chloride 0.9%, Pharmacy to dose Vancomycin consult **AND** vancomycin - pharmacy to dose     Review of patient's allergies indicates:   Allergen Reactions    Compazine [prochlorperazine edisylate]      Objective:     Vital Signs (Most Recent):  Temp: 98.7 °F (37.1 °C) (10/08/22 0747)  Pulse: 89 (10/08/22 0747)  Resp: 18 (10/08/22 0747)  BP: 133/62 (10/08/22 0747)  SpO2: 96 % (10/08/22 0747) Vital Signs (24h Range):  Temp:  [98.1 °F (36.7 °C)-99.1 °F (37.3 °C)] 98.7 °F (37.1 °C)  Pulse:  [] 89  Resp:  [16-20] 18  SpO2:  [94 %-97 %] 96 %  BP: (124-137)/(57-62) 133/62     Weight: 61.6 kg (135 lb 12.9 oz)  Body mass index is 21.27 kg/m².    Intake/Output - Last 3 Shifts         10/06 0700  10/07 0659 10/07 0700  10/08 0659  10/08 0700  10/09 0659    P.O. 1440 720     Total Intake(mL/kg) 1440 (23.4) 720 (11.7)     Urine (mL/kg/hr) 4050 (2.7) 2100 (1.4)     Stool  0     Total Output 4050 2100     Net -2610 -1380            Urine Occurrence  2 x     Stool Occurrence  3 x             Physical Exam  Vitals and nursing note reviewed.   Constitutional:       General: He is not in acute distress.     Appearance: Normal appearance.   HENT:      Head: Normocephalic and atraumatic.      Nose: Nose normal.      Mouth/Throat:      Mouth: Mucous membranes are moist.   Cardiovascular:      Rate and Rhythm: Normal rate and regular rhythm.   Pulmonary:      Effort: Pulmonary effort is normal. No respiratory distress.   Abdominal:      General: Abdomen is flat. There is no distension.      Palpations: Abdomen is soft.      Tenderness: There is no abdominal tenderness.   Musculoskeletal:      Comments: Left lower extremity with erythema and swelling below knee down to foot; erythema stable  Tender to medial aspect of LLE  Palpable pulses to DP, PT of LLE  Bullae noted to medial aspect near medial malleolus and anterior aspect of mid-shin; draining serous fluid  Full range of motion of LLE without pain  Cellulitic skin warm to the touch, no appreciable fluctuance   Skin:     General: Skin is warm.   Neurological:      General: No focal deficit present.      Mental Status: He is alert.       Significant Labs:  I have reviewed all pertinent lab results within the past 24 hours.  CBC:   Recent Labs   Lab 10/08/22  0535   WBC 22.96*   RBC 3.83*   HGB 10.5*   HCT 31.5*   *   MCV 82   MCH 27.4   MCHC 33.3     CMP:   Recent Labs   Lab 10/08/22  0535   *   CALCIUM 8.2*   ALBUMIN 1.4*   PROT 5.5*   *   K 3.8   CO2 30*   CL 97   BUN 16   CREATININE 0.6   ALKPHOS 89   ALT 51*   AST 50*   BILITOT 0.8       Significant Diagnostics:  I have reviewed all pertinent imaging results/findings within the past 24 hours.    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     - patient seen and examined  - WBC remains elevated at 22  - ID on board, guiding antibiotic management  - stable neurovascular exam, physical exam to LLE  - Low concern for compartment syndrome-- no paresthesia, poikilothermia, pallor, paralysis, or pulselessness.  - continue with conservative management  - ortho consulted for second opinion  - repeat CT scan does not demonstrate any organized fluid collections; no surgical intervention at this time  - If he deteriorates please call general surgery.         Alexis Worthington MD  General Surgery  [facility] [place] - Med Surg

## 2022-10-08 NOTE — PROGRESS NOTES
Pharmacokinetic Assessment Follow Up: IV Vancomycin    Vancomycin serum concentration assessment(s):    The trough level was drawn correctly and can be used to guide therapy at this time. The measurement is above the desired definitive target range of 10 to 20 mcg/mL.    Vancomycin Regimen Plan:    Discontinue the scheduled vancomycin regimen and re-dose when the random level is less than 20 mcg/mL, next level to be drawn at 0400 on 10/8.    Drug levels (last 3 results):  Recent Labs   Lab Result Units 10/05/22  1357 10/06/22  0403 10/07/22  1834   Vancomycin, Random ug/mL  --  14.6  --    Vancomycin-Trough ug/mL 24.8*  --  24.7*       Pharmacy will continue to follow and monitor vancomycin.    Please contact pharmacy at extension [phone] for questions regarding this assessment.    Thank you for the consult,   Charisma Coyne       Patient brief summary:  Mark Church is a 72 y.o. male initiated on antimicrobial therapy with IV Vancomycin for treatment of skin & soft tissue infection      Drug Allergies:   Review of patient's allergies indicates:   Allergen Reactions    Compazine [prochlorperazine edisylate]        Actual Body Weight:   61.6 kg    Renal Function:   Estimated Creatinine Clearance: 83.1 mL/min (based on SCr of 0.7 mg/dL).,     Dialysis Method (if applicable):  N/A    CBC (last 72 hours):  Recent Labs   Lab Result Units 10/05/22  0454 10/06/22  0403 10/07/22  0528   WBC K/uL 22.50* 24.45* 22.62*   Hemoglobin g/dL 9.7* 9.3* 9.6*   Hematocrit % 28.6* 27.5* 28.9*   Platelets K/uL 300 371 446   Gran % % 77.0* 72.0 76.0*   Lymph % % 9.0* 9.0* 8.0*   Mono % % 3.0* 3.0* 3.0*   Eosinophil % % 2.0 7.0 6.0   Basophil % % 0.0 0.0 0.0   Differential Method  Manual Manual Manual       Metabolic Panel (last 72 hours):  Recent Labs   Lab Result Units 10/05/22  0454 10/06/22  0403 10/07/22  0528   Sodium mmol/L 134* 136 136   Potassium mmol/L 2.6* 3.0* 3.7   Chloride mmol/L 101 101 99   CO2 mmol/L 24 27 29   Glucose mg/dL  136* 115* 146*   BUN mg/dL 18 20 17   Creatinine mg/dL 0.7 0.7 0.7   Albumin g/dL 1.3* 1.3* 1.3*   Total Bilirubin mg/dL 0.8 0.8 0.6   Alkaline Phosphatase U/L 93 105 100   AST U/L 32 51* 74*   ALT U/L 24 35 58*   Magnesium mg/dL 1.6 1.6 1.5*       Vancomycin Administrations:  vancomycin given in the last 96 hours                     vancomycin 750 mg in dextrose 5 % 250 mL IVPB (ready to mix system) (mg) 750 mg New Bag 10/07/22 1830     750 mg New Bag  0642     750 mg New Bag 10/06/22 1845     750 mg New Bag  0634    vancomycin 1.5 g in dextrose 5 % 250 mL IVPB (ready to mix) (mg) 1,500 mg New Bag 10/05/22 0312     1,500 mg New Bag 10/04/22 1500                    Microbiologic Results:  Microbiology Results (last 7 days)       Procedure Component Value Units Date/Time    Blood culture x two cultures. Draw prior to antibiotics. [[id number]] Collected: 10/02/22 1210    Order Status: Completed Specimen: Blood from Peripheral, Foot, Right Updated: 10/06/22 1303     Blood Culture, Routine No Growth after 4 days.    Narrative:      Aerobic and anaerobic    Blood culture x two cultures. Draw prior to antibiotics. [[id number]] Collected: 10/02/22 1216    Order Status: Completed Specimen: Blood from Peripheral, Antecubital, Right Updated: 10/06/22 1303     Blood Culture, Routine No Growth after 4 days.    Narrative:      Aerobic and anaerobic

## 2022-10-08 NOTE — SUBJECTIVE & OBJECTIVE
Interval History: Patient seen and examined. No acute events. LLE remains stable-- improved erythema about left foot. Full range of motion. Ortho consulted for second opinion.     Medications:  Continuous Infusions:  Scheduled Meds:   allopurinoL  100 mg Oral Daily    aspirin  81 mg Oral Every other day    azelastine  1 spray Nasal BID    ceFEPime (MAXIPIME) IVPB  2 g Intravenous Q8H    cetirizine  5 mg Oral QHS    enoxaparin  40 mg Subcutaneous Daily    lactobacillus acidophilus & bulgar  1 packet Oral BID    latanoprost  1 drop Both Eyes QHS    magnesium oxide  400 mg Oral Daily    metroNIDAZOLE  500 mg Oral Q8H    oxybutynin  5 mg Oral BID    polyethylene glycol  17 g Oral Daily    potassium chloride  40 mEq Oral Daily    senna  8.6 mg Oral BID    timolol maleate 0.5%  1 drop Both Eyes BID     PRN Meds:acetaminophen, benzonatate, dextrose 10%, dextrose 10%, glucagon (human recombinant), glucose, glucose, HYDROcodone-acetaminophen, HYDROcodone-acetaminophen, loperamide, melatonin, ondansetron, simethicone, sodium chloride 0.9%, Pharmacy to dose Vancomycin consult **AND** vancomycin - pharmacy to dose     Review of patient's allergies indicates:   Allergen Reactions    Compazine [prochlorperazine edisylate]      Objective:     Vital Signs (Most Recent):  Temp: 98.7 °F (37.1 °C) (10/08/22 0747)  Pulse: 89 (10/08/22 0747)  Resp: 18 (10/08/22 0747)  BP: 133/62 (10/08/22 0747)  SpO2: 96 % (10/08/22 0747) Vital Signs (24h Range):  Temp:  [98.1 °F (36.7 °C)-99.1 °F (37.3 °C)] 98.7 °F (37.1 °C)  Pulse:  [] 89  Resp:  [16-20] 18  SpO2:  [94 %-97 %] 96 %  BP: (124-137)/(57-62) 133/62     Weight: 61.6 kg (135 lb 12.9 oz)  Body mass index is 21.27 kg/m².    Intake/Output - Last 3 Shifts         10/06 0700  10/07 0659 10/07 0700  10/08 0659 10/08 0700  10/09 0659    P.O. 1440 720     Total Intake(mL/kg) 1440 (23.4) 720 (11.7)     Urine (mL/kg/hr) 4050 (2.7) 2100 (1.4)     Stool  0     Total Output 4050 2100     Net -2610  -1380            Urine Occurrence  2 x     Stool Occurrence  3 x             Physical Exam  Vitals and nursing note reviewed.   Constitutional:       General: He is not in acute distress.     Appearance: Normal appearance.   HENT:      Head: Normocephalic and atraumatic.      Nose: Nose normal.      Mouth/Throat:      Mouth: Mucous membranes are moist.   Cardiovascular:      Rate and Rhythm: Normal rate and regular rhythm.   Pulmonary:      Effort: Pulmonary effort is normal. No respiratory distress.   Abdominal:      General: Abdomen is flat. There is no distension.      Palpations: Abdomen is soft.      Tenderness: There is no abdominal tenderness.   Musculoskeletal:      Comments: Left lower extremity with erythema and swelling below knee down to foot; erythema stable  Tender to medial aspect of LLE  Palpable pulses to DP, PT of LLE  Bullae noted to medial aspect near medial malleolus and anterior aspect of mid-shin; draining serous fluid  Full range of motion of LLE without pain  Cellulitic skin warm to the touch, no appreciable fluctuance   Skin:     General: Skin is warm.   Neurological:      General: No focal deficit present.      Mental Status: He is alert.       Significant Labs:  I have reviewed all pertinent lab results within the past 24 hours.  CBC:   Recent Labs   Lab 10/08/22  0535   WBC 22.96*   RBC 3.83*   HGB 10.5*   HCT 31.5*   *   MCV 82   MCH 27.4   MCHC 33.3     CMP:   Recent Labs   Lab 10/08/22  0535   *   CALCIUM 8.2*   ALBUMIN 1.4*   PROT 5.5*   *   K 3.8   CO2 30*   CL 97   BUN 16   CREATININE 0.6   ALKPHOS 89   ALT 51*   AST 50*   BILITOT 0.8       Significant Diagnostics:  I have reviewed all pertinent imaging results/findings within the past 24 hours.

## 2022-10-08 NOTE — CONSULTS
Patient is a 42-year-old male complaining of a 10 day history of increased pain and swelling about his left foot and ankle.  He denies being infected but feels like it may have been bitten by something.  He has been hospitalized on antibiotics.  He has multiple studies vision nausea and a fluid collection he has been getting antibiotics.  His white count is not resolved and his clinical symptoms have not resolved.  Vital signs stable afebrile     Left lower extremity-he has a superficial bulla on both the medial and lateral aspects of the ankle.  There is some discolored skin on the medial side.  There is moderate to severe swelling about the ankle and moderate swelling in the lower leg.  It is questionable whether there is some fluctuance on the medial aspect of the ankle.  There was no gross purulence.  There is some mild serous drainage from both medial and lateral aspects of the ankle.  Range of motion is only slightly limited.  There is some tenderness to palpation at the ankle.  There is induration throughout the lower leg with minimal tenderness and mild erythema with mild warmth    White blood cell count is 22  X-rays of the left lower leg showed no abnormality    Left lower extremity cellulitis with possible abscess  Treatment options were discussed.  If we can not wait to get an MRI or we can consider I&D.  His clinical symptoms have not improved.  There is a chance that he has fasciitis but also likely has an abscess at the medial aspect of his ankle.  I think I and D is warranted at this point as he has not responded appropriately to non operative treatment.  Patient understands that he may have unimpressive findings at the time of surgery but it is warranted.  Plan is being made to go forth with I and D of the left ankle tomorrow

## 2022-10-08 NOTE — PROGRESS NOTES
Pharmacokinetic Assessment Follow Up: IV Vancomycin    Vancomycin serum concentration assessment(s):    The random level was drawn correctly and can be used to guide therapy at this time. The measurement is within the desired definitive target range of 10 to 20 mcg/mL.    Vancomycin Regimen Plan:  Give 1000 mg (15 mg /kg) Vancomycin  today  Re-dose when the random level is less than 20 mcg/mL, next level to be drawn at 04:00 on 10/09    Drug levels (last 3 results):  Recent Labs   Lab Result Units 10/05/22  1357 10/06/22  0403 10/07/22  1834 10/08/22  0535   Vancomycin, Random ug/mL  --  14.6  --  15.1   Vancomycin-Trough ug/mL 24.8*  --  24.7*  --        Pharmacy will continue to follow and monitor vancomycin.    Please contact pharmacy at extension [phone] for questions regarding this assessment.    Thank you for the consult,   Paulette Ro       Patient brief summary:  Mark Church is a 72 y.o. male initiated on antimicrobial therapy with IV Vancomycin for treatment of skin & soft tissue infection      Drug Allergies:   Review of patient's allergies indicates:   Allergen Reactions    Compazine [prochlorperazine edisylate]        Actual Body Weight:   61.6 kg    Renal Function:   Estimated Creatinine Clearance: 97 mL/min (based on SCr of 0.6 mg/dL).,     Dialysis Method (if applicable):  N/A    CBC (last 72 hours):  Recent Labs   Lab Result Units 10/06/22  0403 10/07/22  0528 10/08/22  0535   WBC K/uL 24.45* 22.62* 22.96*   Hemoglobin g/dL 9.3* 9.6* 10.5*   Hematocrit % 27.5* 28.9* 31.5*   Platelets K/uL 371 446 538*   Gran % % 72.0 76.0* 74.0*   Lymph % % 9.0* 8.0* 9.0*   Mono % % 3.0* 3.0* 9.0   Eosinophil % % 7.0 6.0 2.0   Basophil % % 0.0 0.0 0.0   Differential Method  Manual Manual Manual       Metabolic Panel (last 72 hours):  Recent Labs   Lab Result Units 10/06/22  0403 10/07/22  0528 10/08/22  0535   Sodium mmol/L 136 136 134*   Potassium mmol/L 3.0* 3.7 3.8   Chloride mmol/L 101 99 97   CO2 mmol/L 27 29 30*    Glucose mg/dL 115* 146* 115*   BUN mg/dL 20 17 16   Creatinine mg/dL 0.7 0.7 0.6   Albumin g/dL 1.3* 1.3* 1.4*   Total Bilirubin mg/dL 0.8 0.6 0.8   Alkaline Phosphatase U/L 105 100 89   AST U/L 51* 74* 50*   ALT U/L 35 58* 51*   Magnesium mg/dL 1.6 1.5* 1.4*       Vancomycin Administrations:  vancomycin given in the last 96 hours                     vancomycin 750 mg in dextrose 5 % 250 mL IVPB (ready to mix system) (mg) 750 mg New Bag 10/07/22 1830     750 mg New Bag  0642     750 mg New Bag 10/06/22 1845     750 mg New Bag  0634    vancomycin 1.5 g in dextrose 5 % 250 mL IVPB (ready to mix) (mg) 1,500 mg New Bag 10/05/22 0312     1,500 mg New Bag 10/04/22 1500                    Microbiologic Results:  Microbiology Results (last 7 days)       Procedure Component Value Units Date/Time    Blood culture x two cultures. Draw prior to antibiotics. [[id number]] Collected: 10/02/22 1210    Order Status: Completed Specimen: Blood from Peripheral, Foot, Right Updated: 10/06/22 1303     Blood Culture, Routine No Growth after 4 days.    Narrative:      Aerobic and anaerobic    Blood culture x two cultures. Draw prior to antibiotics. [[id number]] Collected: 10/02/22 1216    Order Status: Completed Specimen: Blood from Peripheral, Antecubital, Right Updated: 10/06/22 1303     Blood Culture, Routine No Growth after 4 days.    Narrative:      Aerobic and anaerobic

## 2022-10-08 NOTE — ASSESSMENT & PLAN NOTE
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     - patient seen and examined  - WBC remains elevated at 22  - ID on board, guiding antibiotic management  - stable neurovascular exam, physical exam to LLE  - Low concern for compartment syndrome-- no paresthesia, poikilothermia, pallor, paralysis, or pulselessness.  - continue with conservative management  - ortho consulted for second opinion  - repeat CT scan does not demonstrate any organized fluid collections; no surgical intervention at this time  - If he deteriorates please call general surgery.

## 2022-10-09 ENCOUNTER — ANESTHESIA (OUTPATIENT)
Dept: SURGERY | Facility: HOSPITAL | Age: 72
DRG: 854 | End: 2022-10-09
Payer: MEDICARE

## 2022-10-09 ENCOUNTER — ANESTHESIA EVENT (OUTPATIENT)
Dept: SURGERY | Facility: HOSPITAL | Age: 72
DRG: 854 | End: 2022-10-09
Payer: MEDICARE

## 2022-10-09 LAB
ALBUMIN SERPL BCP-MCNC: 1.5 G/DL (ref 3.5–5.2)
ALP SERPL-CCNC: 82 U/L (ref 55–135)
ALT SERPL W/O P-5'-P-CCNC: 50 U/L (ref 10–44)
ANION GAP SERPL CALC-SCNC: 7 MMOL/L (ref 8–16)
ANISOCYTOSIS BLD QL SMEAR: SLIGHT
AST SERPL-CCNC: 54 U/L (ref 10–40)
BASOPHILS NFR BLD: 0 % (ref 0–1.9)
BILIRUB SERPL-MCNC: 0.6 MG/DL (ref 0.1–1)
BUN SERPL-MCNC: 16 MG/DL (ref 8–23)
CALCIUM SERPL-MCNC: 8.4 MG/DL (ref 8.7–10.5)
CHLORIDE SERPL-SCNC: 97 MMOL/L (ref 95–110)
CO2 SERPL-SCNC: 33 MMOL/L (ref 23–29)
CREAT SERPL-MCNC: 0.7 MG/DL (ref 0.5–1.4)
DIFFERENTIAL METHOD: ABNORMAL
DOHLE BOD BLD QL SMEAR: PRESENT
EOSINOPHIL NFR BLD: 2 % (ref 0–8)
ERYTHROCYTE [DISTWIDTH] IN BLOOD BY AUTOMATED COUNT: 14.2 % (ref 11.5–14.5)
EST. GFR  (NO RACE VARIABLE): >60 ML/MIN/1.73 M^2
GLUCOSE SERPL-MCNC: 116 MG/DL (ref 70–110)
GRAM STN SPEC: NORMAL
GRAM STN SPEC: NORMAL
HCT VFR BLD AUTO: 29.6 % (ref 40–54)
HGB BLD-MCNC: 9.9 G/DL (ref 14–18)
IMM GRANULOCYTES # BLD AUTO: ABNORMAL K/UL (ref 0–0.04)
IMM GRANULOCYTES NFR BLD AUTO: ABNORMAL % (ref 0–0.5)
LYMPHOCYTES NFR BLD: 4 % (ref 18–48)
MAGNESIUM SERPL-MCNC: 1.5 MG/DL (ref 1.6–2.6)
MCH RBC QN AUTO: 28 PG (ref 27–31)
MCHC RBC AUTO-ENTMCNC: 33.4 G/DL (ref 32–36)
MCV RBC AUTO: 84 FL (ref 82–98)
METAMYELOCYTES NFR BLD MANUAL: 1 %
MONOCYTES NFR BLD: 6 % (ref 4–15)
NEUTROPHILS NFR BLD: 87 % (ref 38–73)
NRBC BLD-RTO: 0 /100 WBC
PLATELET # BLD AUTO: 679 K/UL (ref 150–450)
PLATELET BLD QL SMEAR: ABNORMAL
PMV BLD AUTO: 10.1 FL (ref 9.2–12.9)
POIKILOCYTOSIS BLD QL SMEAR: SLIGHT
POLYCHROMASIA BLD QL SMEAR: ABNORMAL
POTASSIUM SERPL-SCNC: 4.3 MMOL/L (ref 3.5–5.1)
PROT SERPL-MCNC: 5.7 G/DL (ref 6–8.4)
RBC # BLD AUTO: 3.54 M/UL (ref 4.6–6.2)
SODIUM SERPL-SCNC: 137 MMOL/L (ref 136–145)
TOXIC GRANULES BLD QL SMEAR: PRESENT
VANCOMYCIN SERPL-MCNC: 11.2 UG/ML
WBC # BLD AUTO: 18.92 K/UL (ref 3.9–12.7)
WBC TOXIC VACUOLES BLD QL SMEAR: PRESENT

## 2022-10-09 PROCEDURE — 36415 COLL VENOUS BLD VENIPUNCTURE: CPT | Performed by: STUDENT IN AN ORGANIZED HEALTH CARE EDUCATION/TRAINING PROGRAM

## 2022-10-09 PROCEDURE — 37000009 HC ANESTHESIA EA ADD 15 MINS: Performed by: ORTHOPAEDIC SURGERY

## 2022-10-09 PROCEDURE — 63600175 PHARM REV CODE 636 W HCPCS: Performed by: ORTHOPAEDIC SURGERY

## 2022-10-09 PROCEDURE — 25000003 PHARM REV CODE 250: Performed by: ORTHOPAEDIC SURGERY

## 2022-10-09 PROCEDURE — 80053 COMPREHEN METABOLIC PANEL: CPT | Performed by: HOSPITALIST

## 2022-10-09 PROCEDURE — D9220A PRA ANESTHESIA: Mod: CRNA,,, | Performed by: STUDENT IN AN ORGANIZED HEALTH CARE EDUCATION/TRAINING PROGRAM

## 2022-10-09 PROCEDURE — 63600175 PHARM REV CODE 636 W HCPCS: Performed by: INTERNAL MEDICINE

## 2022-10-09 PROCEDURE — 87102 FUNGUS ISOLATION CULTURE: CPT | Performed by: ORTHOPAEDIC SURGERY

## 2022-10-09 PROCEDURE — 99233 SBSQ HOSP IP/OBS HIGH 50: CPT | Mod: GC,,, | Performed by: SURGERY

## 2022-10-09 PROCEDURE — 37000008 HC ANESTHESIA 1ST 15 MINUTES: Performed by: ORTHOPAEDIC SURGERY

## 2022-10-09 PROCEDURE — 63600175 PHARM REV CODE 636 W HCPCS: Performed by: ANESTHESIOLOGY

## 2022-10-09 PROCEDURE — 87206 SMEAR FLUORESCENT/ACID STAI: CPT | Performed by: ORTHOPAEDIC SURGERY

## 2022-10-09 PROCEDURE — 71000039 HC RECOVERY, EACH ADD'L HOUR: Performed by: ORTHOPAEDIC SURGERY

## 2022-10-09 PROCEDURE — 11000001 HC ACUTE MED/SURG PRIVATE ROOM

## 2022-10-09 PROCEDURE — 87116 MYCOBACTERIA CULTURE: CPT | Mod: 59 | Performed by: ORTHOPAEDIC SURGERY

## 2022-10-09 PROCEDURE — 87075 CULTR BACTERIA EXCEPT BLOOD: CPT | Performed by: ORTHOPAEDIC SURGERY

## 2022-10-09 PROCEDURE — D9220A PRA ANESTHESIA: ICD-10-PCS | Mod: ANES,,, | Performed by: ANESTHESIOLOGY

## 2022-10-09 PROCEDURE — 36000704 HC OR TIME LEV I 1ST 15 MIN: Performed by: ORTHOPAEDIC SURGERY

## 2022-10-09 PROCEDURE — 27201423 OPTIME MED/SURG SUP & DEVICES STERILE SUPPLY: Performed by: ORTHOPAEDIC SURGERY

## 2022-10-09 PROCEDURE — 63600175 PHARM REV CODE 636 W HCPCS: Performed by: STUDENT IN AN ORGANIZED HEALTH CARE EDUCATION/TRAINING PROGRAM

## 2022-10-09 PROCEDURE — D9220A PRA ANESTHESIA: ICD-10-PCS | Mod: CRNA,,, | Performed by: STUDENT IN AN ORGANIZED HEALTH CARE EDUCATION/TRAINING PROGRAM

## 2022-10-09 PROCEDURE — 85007 BL SMEAR W/DIFF WBC COUNT: CPT | Performed by: HOSPITALIST

## 2022-10-09 PROCEDURE — 25000003 PHARM REV CODE 250: Performed by: INTERNAL MEDICINE

## 2022-10-09 PROCEDURE — 25000003 PHARM REV CODE 250: Performed by: STUDENT IN AN ORGANIZED HEALTH CARE EDUCATION/TRAINING PROGRAM

## 2022-10-09 PROCEDURE — 87205 SMEAR GRAM STAIN: CPT | Performed by: ORTHOPAEDIC SURGERY

## 2022-10-09 PROCEDURE — D9220A PRA ANESTHESIA: Mod: ANES,,, | Performed by: ANESTHESIOLOGY

## 2022-10-09 PROCEDURE — 99233 PR SUBSEQUENT HOSPITAL CARE,LEVL III: ICD-10-PCS | Mod: GC,,, | Performed by: SURGERY

## 2022-10-09 PROCEDURE — 36000705 HC OR TIME LEV I EA ADD 15 MIN: Performed by: ORTHOPAEDIC SURGERY

## 2022-10-09 PROCEDURE — 71000033 HC RECOVERY, INTIAL HOUR: Performed by: ORTHOPAEDIC SURGERY

## 2022-10-09 PROCEDURE — 87147 CULTURE TYPE IMMUNOLOGIC: CPT | Performed by: ORTHOPAEDIC SURGERY

## 2022-10-09 PROCEDURE — 80202 ASSAY OF VANCOMYCIN: CPT | Performed by: STUDENT IN AN ORGANIZED HEALTH CARE EDUCATION/TRAINING PROGRAM

## 2022-10-09 PROCEDURE — 83735 ASSAY OF MAGNESIUM: CPT | Performed by: HOSPITALIST

## 2022-10-09 PROCEDURE — 85027 COMPLETE CBC AUTOMATED: CPT | Performed by: HOSPITALIST

## 2022-10-09 PROCEDURE — 87070 CULTURE OTHR SPECIMN AEROBIC: CPT | Mod: 59 | Performed by: ORTHOPAEDIC SURGERY

## 2022-10-09 RX ORDER — SODIUM CHLORIDE 0.9 % (FLUSH) 0.9 %
10 SYRINGE (ML) INJECTION
Status: DISCONTINUED | OUTPATIENT
Start: 2022-10-09 | End: 2022-10-21 | Stop reason: HOSPADM

## 2022-10-09 RX ORDER — PROPOFOL 10 MG/ML
VIAL (ML) INTRAVENOUS
Status: DISCONTINUED | OUTPATIENT
Start: 2022-10-09 | End: 2022-10-09

## 2022-10-09 RX ORDER — DEXAMETHASONE SODIUM PHOSPHATE 4 MG/ML
INJECTION, SOLUTION INTRA-ARTICULAR; INTRALESIONAL; INTRAMUSCULAR; INTRAVENOUS; SOFT TISSUE
Status: DISCONTINUED | OUTPATIENT
Start: 2022-10-09 | End: 2022-10-09

## 2022-10-09 RX ORDER — ONDANSETRON 2 MG/ML
INJECTION INTRAMUSCULAR; INTRAVENOUS
Status: DISCONTINUED | OUTPATIENT
Start: 2022-10-09 | End: 2022-10-09

## 2022-10-09 RX ORDER — KETOROLAC TROMETHAMINE 30 MG/ML
INJECTION, SOLUTION INTRAMUSCULAR; INTRAVENOUS
Status: DISCONTINUED | OUTPATIENT
Start: 2022-10-09 | End: 2022-10-09

## 2022-10-09 RX ORDER — FENTANYL CITRATE 50 UG/ML
INJECTION, SOLUTION INTRAMUSCULAR; INTRAVENOUS
Status: DISCONTINUED | OUTPATIENT
Start: 2022-10-09 | End: 2022-10-09

## 2022-10-09 RX ORDER — MUPIROCIN 20 MG/G
OINTMENT TOPICAL 2 TIMES DAILY
Status: COMPLETED | OUTPATIENT
Start: 2022-10-09 | End: 2022-10-13

## 2022-10-09 RX ORDER — HYDROMORPHONE HYDROCHLORIDE 2 MG/ML
0.2 INJECTION, SOLUTION INTRAMUSCULAR; INTRAVENOUS; SUBCUTANEOUS EVERY 5 MIN PRN
Status: DISCONTINUED | OUTPATIENT
Start: 2022-10-09 | End: 2022-10-12

## 2022-10-09 RX ORDER — CEFAZOLIN SODIUM 1 G/3ML
INJECTION, POWDER, FOR SOLUTION INTRAMUSCULAR; INTRAVENOUS
Status: DISCONTINUED | OUTPATIENT
Start: 2022-10-09 | End: 2022-10-09

## 2022-10-09 RX ORDER — CEFAZOLIN SODIUM 2 G/50ML
2 SOLUTION INTRAVENOUS
Status: DISCONTINUED | OUTPATIENT
Start: 2022-10-09 | End: 2022-10-09

## 2022-10-09 RX ORDER — LIDOCAINE HYDROCHLORIDE 20 MG/ML
INJECTION INTRAVENOUS
Status: DISCONTINUED | OUTPATIENT
Start: 2022-10-09 | End: 2022-10-09

## 2022-10-09 RX ADMIN — LACTOBACILLUS ACIDOPHILUS / LACTOBACILLUS BULGARICUS 1 EACH: 100 MILLION CFU STRENGTH GRANULES at 11:10

## 2022-10-09 RX ADMIN — PROPOFOL 150 MG: 10 INJECTION, EMULSION INTRAVENOUS at 08:10

## 2022-10-09 RX ADMIN — HYDROMORPHONE HYDROCHLORIDE 0.2 MG: 2 INJECTION INTRAMUSCULAR; INTRAVENOUS; SUBCUTANEOUS at 09:10

## 2022-10-09 RX ADMIN — OXYBUTYNIN CHLORIDE 5 MG: 5 TABLET ORAL at 08:10

## 2022-10-09 RX ADMIN — CEFEPIME 2 G: 2 INJECTION, POWDER, FOR SOLUTION INTRAVENOUS at 05:10

## 2022-10-09 RX ADMIN — MUPIROCIN: 20 OINTMENT TOPICAL at 08:10

## 2022-10-09 RX ADMIN — FENTANYL CITRATE 25 MCG: 50 INJECTION, SOLUTION INTRAMUSCULAR; INTRAVENOUS at 08:10

## 2022-10-09 RX ADMIN — METRONIDAZOLE 500 MG: 500 TABLET ORAL at 02:10

## 2022-10-09 RX ADMIN — ASPIRIN 81 MG CHEWABLE TABLET 81 MG: 81 TABLET CHEWABLE at 11:10

## 2022-10-09 RX ADMIN — TIMOLOL MALEATE 1 DROP: 5 SOLUTION/ DROPS OPHTHALMIC at 08:10

## 2022-10-09 RX ADMIN — AZELASTINE 137 MCG: 1 SPRAY, METERED NASAL at 11:10

## 2022-10-09 RX ADMIN — KETOROLAC TROMETHAMINE 30 MG: 30 INJECTION, SOLUTION INTRAMUSCULAR; INTRAVENOUS at 08:10

## 2022-10-09 RX ADMIN — TIMOLOL MALEATE 1 DROP: 5 SOLUTION/ DROPS OPHTHALMIC at 11:10

## 2022-10-09 RX ADMIN — METRONIDAZOLE 500 MG: 500 TABLET ORAL at 09:10

## 2022-10-09 RX ADMIN — ONDANSETRON 4 MG: 2 INJECTION, SOLUTION INTRAMUSCULAR; INTRAVENOUS at 08:10

## 2022-10-09 RX ADMIN — ALLOPURINOL 100 MG: 100 TABLET ORAL at 11:10

## 2022-10-09 RX ADMIN — HYDROMORPHONE HYDROCHLORIDE 0.2 MG: 2 INJECTION INTRAMUSCULAR; INTRAVENOUS; SUBCUTANEOUS at 10:10

## 2022-10-09 RX ADMIN — CEFEPIME 2 G: 2 INJECTION, POWDER, FOR SOLUTION INTRAVENOUS at 11:10

## 2022-10-09 RX ADMIN — SODIUM CHLORIDE, SODIUM LACTATE, POTASSIUM CHLORIDE, AND CALCIUM CHLORIDE: .6; .31; .03; .02 INJECTION, SOLUTION INTRAVENOUS at 08:10

## 2022-10-09 RX ADMIN — METRONIDAZOLE 500 MG: 500 TABLET ORAL at 05:10

## 2022-10-09 RX ADMIN — CETIRIZINE HYDROCHLORIDE 5 MG: 5 TABLET ORAL at 08:10

## 2022-10-09 RX ADMIN — SENNOSIDES 8.6 MG: 8.6 TABLET, FILM COATED ORAL at 08:10

## 2022-10-09 RX ADMIN — VANCOMYCIN HYDROCHLORIDE 1250 MG: 1.25 INJECTION, POWDER, LYOPHILIZED, FOR SOLUTION INTRAVENOUS at 10:10

## 2022-10-09 RX ADMIN — CEFEPIME 2 G: 2 INJECTION, POWDER, FOR SOLUTION INTRAVENOUS at 12:10

## 2022-10-09 RX ADMIN — LIDOCAINE HYDROCHLORIDE 100 MG: 20 INJECTION, SOLUTION INTRAVENOUS at 08:10

## 2022-10-09 RX ADMIN — ENOXAPARIN SODIUM 40 MG: 100 INJECTION SUBCUTANEOUS at 05:10

## 2022-10-09 RX ADMIN — SENNOSIDES 8.6 MG: 8.6 TABLET, FILM COATED ORAL at 11:10

## 2022-10-09 RX ADMIN — Medication 400 MG: at 11:10

## 2022-10-09 RX ADMIN — CEFAZOLIN SODIUM 2 G: 1 POWDER, FOR SOLUTION INTRAMUSCULAR; INTRAVENOUS at 08:10

## 2022-10-09 RX ADMIN — LATANOPROST 1 DROP: 50 SOLUTION OPHTHALMIC at 08:10

## 2022-10-09 RX ADMIN — DEXAMETHASONE SODIUM PHOSPHATE 4 MG: 4 INJECTION, SOLUTION INTRAMUSCULAR; INTRAVENOUS at 08:10

## 2022-10-09 RX ADMIN — LACTOBACILLUS ACIDOPHILUS / LACTOBACILLUS BULGARICUS 1 EACH: 100 MILLION CFU STRENGTH GRANULES at 08:10

## 2022-10-09 RX ADMIN — POTASSIUM CHLORIDE 40 MEQ: 1500 TABLET, EXTENDED RELEASE ORAL at 11:10

## 2022-10-09 RX ADMIN — CEFEPIME 2 G: 2 INJECTION, POWDER, FOR SOLUTION INTRAVENOUS at 08:10

## 2022-10-09 RX ADMIN — OXYBUTYNIN CHLORIDE 5 MG: 5 TABLET ORAL at 11:10

## 2022-10-09 RX ADMIN — MUPIROCIN: 20 OINTMENT TOPICAL at 02:10

## 2022-10-09 RX ADMIN — HYDROCODONE BITARTRATE AND ACETAMINOPHEN 1 TABLET: 10; 325 TABLET ORAL at 10:10

## 2022-10-09 NOTE — ANESTHESIA PROCEDURE NOTES
Intubation    Date/Time: 10/9/2022 8:25 AM  Performed by: Wilbert Landry CRNA  Authorized by: Lovely Jones MD     Intubation:     Induction:  Intravenous    Intubated:  Postinduction    Mask Ventilation:  Easy mask    Attempts:  1    Attempted By:  CRNA    Difficult Airway Encountered?: No      Complications:  None    Airway Device:  Supraglottic airway/LMA    Airway Device Size:  4.0    Placement Verified By:  Capnometry    Complicating Factors:  None    Findings Post-Intubation:  BS equal bilateral

## 2022-10-09 NOTE — SUBJECTIVE & OBJECTIVE
Interval History: Patient seen and examined this morning. No acute events overnight. Tolerating diet, having bowel function. WBC down to 18. To OR this morning with ortho for I&D.     Medications:  Continuous Infusions:  Scheduled Meds:   allopurinoL  100 mg Oral Daily    aspirin  81 mg Oral Every other day    azelastine  1 spray Nasal BID    ceFEPime (MAXIPIME) IVPB  2 g Intravenous Q8H    cetirizine  5 mg Oral QHS    enoxaparin  40 mg Subcutaneous Daily    lactobacillus acidophilus & bulgar  1 packet Oral BID    latanoprost  1 drop Both Eyes QHS    magnesium oxide  400 mg Oral Daily    metroNIDAZOLE  500 mg Oral Q8H    oxybutynin  5 mg Oral BID    polyethylene glycol  17 g Oral Daily    potassium chloride  40 mEq Oral Daily    senna  8.6 mg Oral BID    timolol maleate 0.5%  1 drop Both Eyes BID    vancomycin (VANCOCIN) IVPB  1,250 mg Intravenous Once     PRN Meds:acetaminophen, benzonatate, dextrose 10%, dextrose 10%, glucagon (human recombinant), glucose, glucose, HYDROcodone-acetaminophen, HYDROcodone-acetaminophen, HYDROmorphone, loperamide, melatonin, ondansetron, simethicone, sodium chloride 0.9%, sodium chloride 0.9%, Pharmacy to dose Vancomycin consult **AND** vancomycin - pharmacy to dose     Review of patient's allergies indicates:   Allergen Reactions    Compazine [prochlorperazine edisylate]      Objective:     Vital Signs (Most Recent):  Temp: 98.4 °F (36.9 °C) (10/09/22 0919)  Pulse: 70 (10/09/22 0919)  Resp: 14 (10/09/22 0919)  BP: (!) 145/62 (10/09/22 0919)  SpO2: 100 % (10/09/22 0919)   Vital Signs (24h Range):  Temp:  [98 °F (36.7 °C)-100 °F (37.8 °C)] 98.4 °F (36.9 °C)  Pulse:  [] 70  Resp:  [14-18] 14  SpO2:  [95 %-100 %] 100 %  BP: ()/(51-62) 145/62     Weight: 61.6 kg (135 lb 12.9 oz)  Body mass index is 21.27 kg/m².    Intake/Output - Last 3 Shifts         10/07 0700  10/08 0659 10/08 0700  10/09 0659 10/09 0700  10/10 0659    P.O. 720 960     IV Piggyback  695.9 450    Total  Intake(mL/kg) 720 (11.7) 1655.9 (26.9) 450 (7.3)    Urine (mL/kg/hr) 2100 (1.4) 2250 (1.5)     Other  0     Stool 0 0     Total Output 2100 2250     Net -1380 -594.1 +450           Urine Occurrence 2 x 1 x     Stool Occurrence 3 x 8 x             Physical Exam  Vitals and nursing note reviewed.   Constitutional:       General: He is not in acute distress.     Appearance: Normal appearance.   HENT:      Head: Normocephalic and atraumatic.      Nose: Nose normal.      Mouth/Throat:      Mouth: Mucous membranes are moist.   Cardiovascular:      Rate and Rhythm: Normal rate and regular rhythm.   Pulmonary:      Effort: Pulmonary effort is normal. No respiratory distress.   Abdominal:      General: Abdomen is flat. There is no distension.      Palpations: Abdomen is soft.      Tenderness: There is no abdominal tenderness.   Musculoskeletal:      Comments: Left lower extremity with erythema and swelling below knee down to foot; erythema stable  Tender to medial aspect of LLE  Palpable pulses to DP of LLE  Bullae noted to medial aspect near medial malleolus and anterior aspect of mid-shin; draining serous fluid with overlying skin necrosis  Full range of motion of LLE without pain  Cellulitic skin warm to the touch, no appreciable fluctuance   Skin:     General: Skin is warm.   Neurological:      General: No focal deficit present.      Mental Status: He is alert.       Significant Labs:  I have reviewed all pertinent lab results within the past 24 hours.  CBC:   Recent Labs   Lab 10/09/22  0444   WBC 18.92*   RBC 3.54*   HGB 9.9*   HCT 29.6*   *   MCV 84   MCH 28.0   MCHC 33.4     CMP:   Recent Labs   Lab 10/09/22  0444   *   CALCIUM 8.4*   ALBUMIN 1.5*   PROT 5.7*      K 4.3   CO2 33*   CL 97   BUN 16   CREATININE 0.7   ALKPHOS 82   ALT 50*   AST 54*   BILITOT 0.6       Significant Diagnostics:  I have reviewed all pertinent imaging results/findings within the past 24 hours.

## 2022-10-09 NOTE — NURSING
Pt resting in bed. No distress. No complaints. Scheduled medications given. IV antibiotics. Condom cath in place. Heel protector boots on. Tele #[id number]. IV saline lock. Pt repositioned with assist wedge in place. Safety measures maintained. Bed alarm set. Call light within reach. Will cont to monitor

## 2022-10-09 NOTE — OP NOTE
OPERATIVE REPORT    DATE OF DICTATION: 10/09/2022    DATE OF OPERATION: 10/09/2022    SURGEON: DELISA SALDAÑA M.D.    ASSISTANT:none    PRE-OPERATIVE DIAGNOSIS:    Left leg abscess  Left leg wound    POST-OPERATIVE DIAGNOSIS:    Same    PROCEDURE PERFORMED:    Incision drainage left leg medial X 2 sites  Lateral ankle    Debridement of muscle skin and fascia medial x2 sites and lateral ankle    ANESTHESIA: General    FINDINGS:  Left leg wound with necrosis    BLOOD LOSS:  35 cc    IMPLANTS:none    COMPLICATIONS: none      HPI: The patient is a 72 y.o. y/o male suffering from left leg wound.  Patient reports he may have been bitten by an insect began to have cellulitis and erythema to left lower extremity.  Patient has elevated leukocytosis as well as fevers.  Taken for an incision and drainage to left leg.      PROCEDURE IN DETAIL: After the appropriate consents were signed discussing possible risks and complications of the surgery including but not limited to wound healing complications such as skin breakdown and infection, as well as injuries to nerves, soft tissue, and vessels in the area of the surgery, as well as non-union or mal-union at the fracture site, as well as new fractures, as well as needing to repeat or perform future operations, limb length discrepancy, limb rotational discrepancy, and other anesthesia related complications such as heart attack, stroke, deep vein thrombosis, pulmonary embolism, and  Death. After these consents were signed and the patient was marked in the holding area, the patient was brought back to the operating room and placed under anesthesia. The patient was transferred onto the operative table and placed in the supine position. All bony prominences and neurovascualr structures were well padded The operative limb was prepped and draped in the normal sterile standard fashion. IV antibiotics had been infused. A time out was called including the scrub  tech, the circulating nurse, the anesthesia staff, and the surgical staff.      After appropriate draping we began our incision overall the medial aspect of the left lower extremity.  There was serosanguineous fluid which was expressed from this location.  We bluntly dissected up more proximally.  There was some planes within the tissues which we followed.  There was no gianna pus with the was murky fluid throughout the entire lower extremity.  We debrided the muscle skin fascia from this location.  We then made incision more proximal over the calf medially and again irrigated this area well there was still murky fluid in this location as well.  We bluntly dissected throughout the tissue planes as well.  We irrigated with pulsatile lavage both these locations copiously.  We then made a separate incision laterally over the left ankle and again performed the same technique dissecting through tissue planes bluntly irrigating well with the pulsatile lavage and debriding muscle skin fascia with sharp debridement to all 3 locations.  We then placed a 2-0 nylon suture in the middle of each incision after packing each incision with wet Kerlix.  This packing is meant for removal which will be removed tomorrow.  Sterile dressing was then applied to the wound.  The patient was awoken from anesthesia without complications.  All counts correct.

## 2022-10-09 NOTE — PROGRESS NOTES
"[facility] Medicine  Progress Note    Patient Name: Mark Church  MRN: [id number]  Patient Class: IP- Inpatient   Admission Date: 10/2/2022  Length of Stay: 7 days  Attending Physician: David Sadler MD  Primary Care Provider: [facility]         Subjective:     Principal Problem:Sepsis        HPI:  Mr. 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  He states this all started about 4 days ago when he was walking in a field. He states he felt some significant itching in his lower leg.  Later that night his leg started to swell and hurt.  He assumed this was caused by an insect bite on his leg, but he never saw a specific bite or insect.  He states that the 2nd night the swelling and pain became severe and he was having fevers and chills which prompted him to visit his primary care provider the next day.  He was prescribed an antibiotic, not sure which one, and despite taking this his leg has continued to get worse.  He notes he has been having fevers and chills and last night he "slid or fell" out of his chair when he was trying to get up because the pain was so bad.  He notes he uses a walker to ambulate at home.  He also notes a chronic cough ascociated wiith allergic rhinitis and had an episode of nausea and vomiting one week ago.  He reports diminished oral intake due to decreased appetite, but no nausea or vomiting since this all started 4 days ago.  He denies chest pain, shortness of breath, headache, palpitations, dysuria and diarrhea.      Overview/Hospital Course:  Admitted with sepsis secondary to left lower extremity cellulitis. Started on IV antibiotics, IVF and potassium replacement (has Bartter syndrome). Surgery consulted for concern for nec fasc/bone involvement. CT did not show any evidence of abscess/gas. Seems to be improving though still significant " swelling and blistering. ID consulted for antibiotic recommendations. Repeat CT ordered for worsening white count which did not show a clear abscess.    Pt continued w/ minimal improvement. Ortho took the patient for I&D      Interval History: Resting comfortably, no new complaints.    Review of Systems   Constitutional:  Negative for chills and fever.   Respiratory:  Negative for cough and shortness of breath.    Cardiovascular:  Negative for chest pain and leg swelling.   Gastrointestinal:  Negative for abdominal distention and abdominal pain.   Skin:  Positive for color change and wound.   Objective:     Vital Signs (Most Recent):  Temp: 98.9 °F (37.2 °C) (10/09/22 0358)  Pulse: 87 (10/09/22 0358)  Resp: 17 (10/09/22 0358)  BP: (!) 103/57 (10/09/22 0358)  SpO2: 96 % (10/09/22 0358) Vital Signs (24h Range):  Temp:  [98 °F (36.7 °C)-100 °F (37.8 °C)] 98.9 °F (37.2 °C)  Pulse:  [] 87  Resp:  [17-18] 17  SpO2:  [96 %-98 %] 96 %  BP: ()/(51-60) 103/57     Weight: 61.6 kg (135 lb 12.9 oz)  Body mass index is 21.27 kg/m².    Intake/Output Summary (Last 24 hours) at 10/9/2022 0749  Last data filed at 10/9/2022 0520  Gross per 24 hour   Intake 1655.9 ml   Output 2250 ml   Net -594.1 ml        Physical Exam  Constitutional:       General: He is not in acute distress.     Appearance: He is ill-appearing. He is not toxic-appearing or diaphoretic.   Cardiovascular:      Rate and Rhythm: Normal rate and regular rhythm.      Heart sounds: No murmur heard.    No gallop.   Pulmonary:      Effort: Pulmonary effort is normal. No respiratory distress.      Breath sounds: Normal breath sounds. No wheezing.   Abdominal:      General: Bowel sounds are normal. There is no distension.      Palpations: Abdomen is soft.      Tenderness: There is no abdominal tenderness.   Skin:     Comments: LLE swollen, erythematous. Mildly warm and tender to touch. Area of ulceration by R medial ankle. Appears to have receded from prior  "marking of margins       Significant Labs: All pertinent labs within the past 24 hours have been reviewed.    Significant Imaging: I have reviewed all pertinent imaging results/findings within the past 24 hours.      Assessment/Plan:      * Sepsis  Cellulitis, likely point of bacterial entry medial ankle. WBC uptrending despite appropriate antibiotic therapy. Repeat CT scan with possible early abscess formation on read, but surgery does not feel it is amenable to intervention. Ortho consulted by [facility] for second opinion, they are planning on taking pt to OR today.  - appreciate ID, general surgery, orthopedics involvement  - on cefepime/vancomycin/flagyl  - abnormal arterial US noted, but WILLIE normal  - elevate ankle>knee>hip to facilitate drainage      Cellulitis        Debility  -At home ambulates with a walker  -Notes he "Fell or slid" out of a chair on night prior to admit  -When more stable will consult PT/OT for evaluation.    Chronic cough  -Presumed due to allergic rhinitis  -Continue home anti-histamines  -Add tessalon PRN    Chronic gout    -Continue home allopurinol.    Hypokalemia  -Due to Bartter syndrome and diminished oral intake  -Replace potassium today.  -Will need daily replacement  -Check BMP and Mag in AM.        Hyponatremia  -On admit mild and likely due to diminished oral intake and mild dehydration  -Continue IV fluids  -stable    Normocytic anemia  -Hb 12.1 on admit  -No bleeding  -Check iron, ferritin, b12 and folate - appears more ACD at this time with elevated ferritin  -Repeat cbc in AM    Cellulitis of left lower extremity  -Treatment as above.    Primary open angle glaucoma of both eyes, severe stage  -History noted  -Continue home timolol and lantanoprost drops    Bartter syndrome  -At home takes KCl 10mEq daily and prior labs show his K is usually normal on this  - received IV KCl yesterday and added to fluids  - Start KCl 40 mg PO bid for now, replace Mg  -Treatment as above.    H/O " prostate cancer  -history noted  -Continue home oxybutynin      VTE Risk Mitigation (From admission, onward)         Ordered     enoxaparin injection 40 mg  Daily         10/02/22 1416     IP VTE HIGH RISK PATIENT  Once         10/02/22 1416     Place sequential compression device  Until discontinued         10/02/22 1416                Discharge Planning   ROS: 10/7/2022     Code Status: Full Code   Is the patient medically ready for discharge?:     Reason for patient still in hospital (select all that apply): Patient trending condition, Laboratory test, Treatment, Consult recommendations and Pending disposition  Discharge Plan A: Home Health   Discharge Delays: None known at this time              David Sadler MD  Department of Hospital Medicine   [facility] Surg

## 2022-10-09 NOTE — ANESTHESIA PREPROCEDURE EVALUATION
10/09/2022    Pre-operative evaluation for Procedure(s) (LRB):  INCISION AND DRAINAGE, ANKLE (Left)    Mark Church is a 72 y.o. male     Patient Active Problem List   Diagnosis    H/O prostate cancer    Bartter syndrome    Glaucoma    Urinary incontinence    Nuclear sclerosis, bilateral    Refractive error    Blind left eye    Chronic pain syndrome    Post-traumatic osteoarthritis of multiple joints    Bilateral leg edema    Primary osteoarthritis of left shoulder    Joint inflammation    Thrombocythemia    Left hip pain    Primary open angle glaucoma of both eyes, severe stage    Blindness of left eye with normal vision in contralateral eye    Sepsis    Cellulitis of left lower extremity    Normocytic anemia    Hyponatremia    Hypokalemia    Chronic gout    Chronic cough    Debility    Cellulitis       Review of patient's allergies indicates:   Allergen Reactions    Compazine [prochlorperazine edisylate]        No current facility-administered medications on file prior to encounter.     Current Outpatient Medications on File Prior to Encounter   Medication Sig Dispense Refill    allopurinol (ZYLOPRIM) 100 MG tablet TAKE 1 TABLET EVERY DAY 90 tablet 0    clindamycin (CLEOCIN) 300 MG capsule Take 300 mg by mouth every 12 (twelve) hours.      HYDROcodone-acetaminophen (NORCO) 5-325 mg per tablet Take 1 tablet by mouth every 8 (eight) hours as needed.      latanoprost 0.005 % ophthalmic solution Place 1 drop into both eyes every evening. 7.5 mL 2    multivit-minerals/folic acid (MULTIVITAMIN GUMMIES ORAL) Take by mouth.      mupirocin (BACTROBAN) 2 % ointment Apply topically 3 (three) times daily.      oxybutynin (DITROPAN) 5 MG Tab 5 mg once daily.       potassium chloride (MICRO-K) 10 MEQ CpSR TAKE 2 CAPSULES TWICE DAILY 360 capsule 3    timolol maleate 0.5% (TIMOPTIC) 0.5 %  Drop Place 1 drop into both eyes 2 (two) times daily. 20 mL 1    aspirin 81 MG Chew Take 81 mg by mouth every other day.       azelastine (ASTELIN) 137 mcg (0.1 %) nasal spray 1 spray (137 mcg total) by Nasal route 2 (two) times daily. 30 mL 1    levocetirizine (XYZAL) 5 MG tablet Take 1 tablet (5 mg total) by mouth every evening. 30 tablet 2       Past Surgical History:   Procedure Laterality Date    CHOLECYSTECTOMY      JOINT REPLACEMENT      PROSTATE SURGERY      REFRACTIVE SURGERY Bilateral     ROTATOR CUFF REPAIR Right     sinus polyp         LABS  CBC:   Recent Labs     10/08/22  0535 10/09/22  0444   WBC 22.96* 18.92*   RBC 3.83* 3.54*   HGB 10.5* 9.9*   HCT 31.5* 29.6*   * 679*   MCV 82 84   MCH 27.4 28.0   MCHC 33.3 33.4       CMP:   Recent Labs     10/08/22  0535 10/09/22  0444   * 137   K 3.8 4.3   CL 97 97   CO2 30* 33*   BUN 16 16   CREATININE 0.6 0.7   * 116*   MG 1.4* 1.5*   CALCIUM 8.2* 8.4*   ALBUMIN 1.4* 1.5*   PROT 5.5* 5.7*   ALKPHOS 89 82   ALT 51* 50*   AST 50* 54*   BILITOT 0.8 0.6         Pre-op Assessment    I have reviewed the Patient Summary Reports.    I have reviewed the NPO Status.   I have reviewed the Medications.     Review of Systems  Anesthesia Hx:  No previous Anesthesia  History of prior surgery of interest to airway management or planning: Denies Family Hx of Anesthesia complications.   Denies Personal Hx of Anesthesia complications.   Social:  Non-Smoker    Hematology/Oncology:  Hematology Normal   Oncology Normal     EENT/Dental:EENT/Dental Normal   Cardiovascular:  Cardiovascular Normal  ECG has been reviewed. ekg with decresed voltage in inf lat leads somewhat comparalble to ekg from 5 years ago   Pulmonary:  Pulmonary Normal    Renal/:   Chronic Renal Disease Renal calculi: barters syndrome.    Hepatic/GI:  Hepatic/GI Normal    Musculoskeletal:   Arthritis     Neurological:  Neurology Normal    Endocrine:  Endocrine Normal Diabetes: barters  syndrome.    Dermatological:  Skin Normal    Psych:  Psychiatric Normal           Physical Exam  General: Well nourished, Cooperative and Alert    Airway:  Mallampati: II   Mouth Opening: Normal  TM Distance: Normal  Tongue: Normal  Neck ROM: Normal ROM    Chest/Lungs:  Normal Respiratory Rate    Heart:  Rate: Normal        Anesthesia Plan  Type of Anesthesia, risks & benefits discussed:    Anesthesia Type: Gen Supraglottic Airway  Intra-op Monitoring Plan: Standard ASA Monitors  Induction:  IV  Informed Consent: Informed consent signed with the Patient and all parties understand the risks and agree with anesthesia plan.  All questions answered.   ASA Score: 2    Ready For Surgery From Anesthesia Perspective.     .

## 2022-10-09 NOTE — PLAN OF CARE
Pt remains free from falls and injury throughout shift. Tolerated I&D well, heel boot in place, pain tolerable after procedure, pt resting well, no distress noted, family at bedside for support.   Problem: Adult Inpatient Plan of Care  Goal: Plan of Care Review  Outcome: Ongoing, Progressing  Goal: Patient-Specific Goal (Individualized)  Outcome: Ongoing, Progressing  Goal: Absence of Hospital-Acquired Illness or Injury  Outcome: Ongoing, Progressing  Goal: Optimal Comfort and Wellbeing  Outcome: Ongoing, Progressing  Goal: Readiness for Transition of Care  Outcome: Ongoing, Progressing     Problem: Adjustment to Illness (Sepsis/Septic Shock)  Goal: Optimal Coping  Outcome: Ongoing, Progressing     Problem: Bleeding (Sepsis/Septic Shock)  Goal: Absence of Bleeding  Outcome: Ongoing, Progressing     Problem: Glycemic Control Impaired (Sepsis/Septic Shock)  Goal: Blood Glucose Level Within Desired Range  Outcome: Ongoing, Progressing     Problem: Infection Progression (Sepsis/Septic Shock)  Goal: Absence of Infection Signs and Symptoms  Outcome: Ongoing, Progressing     Problem: Nutrition Impaired (Sepsis/Septic Shock)  Goal: Optimal Nutrition Intake  Outcome: Ongoing, Progressing     Problem: Skin Injury Risk Increased  Goal: Skin Health and Integrity  Outcome: Ongoing, Progressing     Problem: Impaired Wound Healing  Goal: Optimal Wound Healing  Outcome: Ongoing, Progressing

## 2022-10-09 NOTE — PT/OT/SLP PROGRESS
Physical Therapy      Patient Name:  Mark Church   MRN:  [id number]    Patient not seen today secondary to Patient unwilling to participate (pt reports he is not feeling well s/p his I&D. Pt reports he will participate tomorrow when effects of anesthesia wear off). Will follow-up 10/10/22.

## 2022-10-09 NOTE — PT/OT/SLP PROGRESS
"Occupational Therapy      Patient Name:  Mark Church   MRN:  [id number]    Patient not seen OOB today secondary to Patient unwilling to participate. Patient returned to room following procedure late am. 12:33 Patient encountered in bed dining LLEs elevate. Patient politely refusing OT eval stating "I just got back. I still feel the anthesis. Not today. Can you all come back tomorrow?  I just won't do it today. Please." OT will follow-up 10/10/22 as agreed and per Patient polite request. RN informed.     10/9/2022  "

## 2022-10-09 NOTE — ASSESSMENT & PLAN NOTE
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     - patient seen and examined  - WBC remains elevated at 18  - ID on board, guiding antibiotic management  - stable neurovascular exam, physical exam to LLE  - Low concern for compartment syndrome-- no paresthesia, poikilothermia, pallor, paralysis, or pulselessness.  - to OR with ortho for I&D  - repeat CT scan does not demonstrate any organized fluid collections; no surgical intervention at this time

## 2022-10-09 NOTE — ANESTHESIA POSTPROCEDURE EVALUATION
Anesthesia Post Evaluation    Patient: Mark Church    Procedure(s) Performed: Procedure(s) (LRB):  INCISION AND DRAINAGE, ANKLE (Left)    Final Anesthesia Type: general      Patient location during evaluation: PACU  Patient participation: Yes- Able to Participate  Level of consciousness: awake and alert and oriented  Post-procedure vital signs: reviewed and stable  Pain management: adequate  Airway patency: patent    PONV status at discharge: No PONV  Anesthetic complications: no      Cardiovascular status: blood pressure returned to baseline, hemodynamically stable and stable  Respiratory status: unassisted, spontaneous ventilation and room air  Hydration status: euvolemic  Follow-up not needed.          Vitals Value Taken Time   /61 10/09/22 1114   Temp 36.8 °C (98.2 °F) 10/09/22 1114   Pulse 84 10/09/22 1114   Resp 21 10/09/22 1114   SpO2 97 % 10/09/22 1114         Event Time   Out of Recovery 10/09/2022 11:05:00         Pain/Sumi Score: Pain Rating Prior to Med Admin: 8 (10/9/2022 10:29 AM)  Pain Rating Post Med Admin: 8 (10/9/2022 10:36 AM)  Sumi Score: 9 (10/9/2022 10:36 AM)

## 2022-10-09 NOTE — TRANSFER OF CARE
"Anesthesia Transfer of Care Note    Patient: Mark Church    Procedure(s) Performed: Procedure(s) (LRB):  INCISION AND DRAINAGE, ANKLE (Left)    Patient location: PACU    Anesthesia Type: general    Transport from OR: Transported from OR on room air with adequate spontaneous ventilation    Post pain: adequate analgesia    Post assessment: no apparent anesthetic complications    Post vital signs: stable    Level of consciousness: responds to stimulation and sedated    Nausea/Vomiting: no nausea/vomiting    Complications: none    Transfer of care protocol was followed      Last vitals:   Visit Vitals  BP (!) 145/62 (BP Location: Left arm, Patient Position: Lying)   Pulse 70   Temp 36.9 °C (98.4 °F) (Skin)   Resp 14   Ht 5' 7" (1.702 m)   Wt 61.6 kg (135 lb 12.9 oz)   SpO2 100%   BMI 21.27 kg/m²     "

## 2022-10-09 NOTE — BRIEF OP NOTE
[facility] Surg  Brief Operative Note    SUMMARY     Surgery Date: 10/9/2022     Surgeon(s) and Role:     * Jamison Snyder MD - Primary    Assisting Surgeon: None    Pre-op Diagnosis:  Cellulitis [L03.90]    Post-op Diagnosis:  Post-Op Diagnosis Codes:     * Cellulitis [L03.90]    Procedure(s) (LRB):  INCISION AND DRAINAGE, ANKLE (Left)    Anesthesia: Choice    Operative Findings:  Left ankle and leg fat necrosis with tissue destruction    Estimated Blood Loss: * No values recorded between 10/9/2022  8:36 AM and 10/9/2022  9:14 AM *    30 cc    Estimated Blood Loss has been documented.         Specimens:   Specimen (24h ago, onward)      None            [id number]

## 2022-10-09 NOTE — PROGRESS NOTES
[facility] Surg  General Surgery  Progress Note    Subjective:     History of Present Illness:  No notes on file    Post-Op Info:  Procedure(s) (LRB):  INCISION AND DRAINAGE, ANKLE (Left)   Day of Surgery     Interval History: Patient seen and examined this morning. No acute events overnight. Tolerating diet, having bowel function. WBC down to 18. To OR this morning with ortho for I&D.     Medications:  Continuous Infusions:  Scheduled Meds:   allopurinoL  100 mg Oral Daily    aspirin  81 mg Oral Every other day    azelastine  1 spray Nasal BID    ceFEPime (MAXIPIME) IVPB  2 g Intravenous Q8H    cetirizine  5 mg Oral QHS    enoxaparin  40 mg Subcutaneous Daily    lactobacillus acidophilus & bulgar  1 packet Oral BID    latanoprost  1 drop Both Eyes QHS    magnesium oxide  400 mg Oral Daily    metroNIDAZOLE  500 mg Oral Q8H    oxybutynin  5 mg Oral BID    polyethylene glycol  17 g Oral Daily    potassium chloride  40 mEq Oral Daily    senna  8.6 mg Oral BID    timolol maleate 0.5%  1 drop Both Eyes BID    vancomycin (VANCOCIN) IVPB  1,250 mg Intravenous Once     PRN Meds:acetaminophen, benzonatate, dextrose 10%, dextrose 10%, glucagon (human recombinant), glucose, glucose, HYDROcodone-acetaminophen, HYDROcodone-acetaminophen, HYDROmorphone, loperamide, melatonin, ondansetron, simethicone, sodium chloride 0.9%, sodium chloride 0.9%, Pharmacy to dose Vancomycin consult **AND** vancomycin - pharmacy to dose     Review of patient's allergies indicates:   Allergen Reactions    Compazine [prochlorperazine edisylate]      Objective:     Vital Signs (Most Recent):  Temp: 98.4 °F (36.9 °C) (10/09/22 0919)  Pulse: 70 (10/09/22 0919)  Resp: 14 (10/09/22 0919)  BP: (!) 145/62 (10/09/22 0919)  SpO2: 100 % (10/09/22 0919)   Vital Signs (24h Range):  Temp:  [98 °F (36.7 °C)-100 °F (37.8 °C)] 98.4 °F (36.9 °C)  Pulse:  [] 70  Resp:  [14-18] 14  SpO2:  [95 %-100 %] 100 %  BP: ()/(51-62) 145/62      Weight: 61.6 kg (135 lb 12.9 oz)  Body mass index is 21.27 kg/m².    Intake/Output - Last 3 Shifts         10/07 0700  10/08 0659 10/08 0700  10/09 0659 10/09 0700  10/10 0659    P.O. 720 960     IV Piggyback  695.9 450    Total Intake(mL/kg) 720 (11.7) 1655.9 (26.9) 450 (7.3)    Urine (mL/kg/hr) 2100 (1.4) 2250 (1.5)     Other  0     Stool 0 0     Total Output 2100 2250     Net -1380 -594.1 +450           Urine Occurrence 2 x 1 x     Stool Occurrence 3 x 8 x             Physical Exam  Vitals and nursing note reviewed.   Constitutional:       General: He is not in acute distress.     Appearance: Normal appearance.   HENT:      Head: Normocephalic and atraumatic.      Nose: Nose normal.      Mouth/Throat:      Mouth: Mucous membranes are moist.   Cardiovascular:      Rate and Rhythm: Normal rate and regular rhythm.   Pulmonary:      Effort: Pulmonary effort is normal. No respiratory distress.   Abdominal:      General: Abdomen is flat. There is no distension.      Palpations: Abdomen is soft.      Tenderness: There is no abdominal tenderness.   Musculoskeletal:      Comments: Left lower extremity with erythema and swelling below knee down to foot; erythema stable  Tender to medial aspect of LLE  Palpable pulses to DP of LLE  Bullae noted to medial aspect near medial malleolus and anterior aspect of mid-shin; draining serous fluid with overlying skin necrosis  Full range of motion of LLE without pain  Cellulitic skin warm to the touch, no appreciable fluctuance   Skin:     General: Skin is warm.   Neurological:      General: No focal deficit present.      Mental Status: He is alert.       Significant Labs:  I have reviewed all pertinent lab results within the past 24 hours.  CBC:   Recent Labs   Lab 10/09/22  0444   WBC 18.92*   RBC 3.54*   HGB 9.9*   HCT 29.6*   *   MCV 84   MCH 28.0   MCHC 33.4     CMP:   Recent Labs   Lab 10/09/22  0444   *   CALCIUM 8.4*   ALBUMIN 1.5*   PROT 5.7*      K  4.3   CO2 33*   CL 97   BUN 16   CREATININE 0.7   ALKPHOS 82   ALT 50*   AST 54*   BILITOT 0.6       Significant Diagnostics:  I have reviewed all pertinent imaging results/findings within the past 24 hours.    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     - patient seen and examined  - WBC remains elevated at 18  - ID on board, guiding antibiotic management  - stable neurovascular exam, physical exam to E  - Low concern for compartment syndrome-- no paresthesia, poikilothermia, pallor, paralysis, or pulselessness.  - to OR with ortho for I&D  - repeat CT scan does not demonstrate any organized fluid collections; no surgical intervention at this time        Alexis Worthington MD  General Surgery  [facility] - Med Surg

## 2022-10-09 NOTE — PROGRESS NOTES
Pharmacokinetic Assessment Follow Up: IV Vancomycin    Vancomycin serum concentration assessment(s):    The random level was drawn correctly and can be used to guide therapy at this time. The measurement is within the desired definitive target range of 10 to 20 mcg/mL.    Vancomycin Regimen Plan:  Give 1250 mg Vancomycin today  Re-dose when the random level is less than 20 mcg/mL, next level to be drawn at 04:00 on 10/10    Drug levels (last 3 results):  Recent Labs   Lab Result Units 10/07/22  1834 10/08/22  0535 10/09/22  0444   Vancomycin, Random ug/mL  --  15.1 11.2   Vancomycin-Trough ug/mL 24.7*  --   --        Pharmacy will continue to follow and monitor vancomycin.    Please contact pharmacy at extension [phone] for questions regarding this assessment.    Thank you for the consult,   Paulette Ro       Patient brief summary:  Mark Church is a 72 y.o. male initiated on antimicrobial therapy with IV Vancomycin for treatment of skin & soft tissue infection      Drug Allergies:   Review of patient's allergies indicates:   Allergen Reactions    Compazine [prochlorperazine edisylate]        Actual Body Weight:   61.6 kg    Renal Function:   Estimated Creatinine Clearance: 83.1 mL/min (based on SCr of 0.7 mg/dL).,     Dialysis Method (if applicable):  N/A    CBC (last 72 hours):  Recent Labs   Lab Result Units 10/07/22  0528 10/08/22  0535 10/09/22  0444   WBC K/uL 22.62* 22.96* 18.92*   Hemoglobin g/dL 9.6* 10.5* 9.9*   Hematocrit % 28.9* 31.5* 29.6*   Platelets K/uL 446 538* 679*   Gran % % 76.0* 74.0* 87.0*   Lymph % % 8.0* 9.0* 4.0*   Mono % % 3.0* 9.0 6.0   Eosinophil % % 6.0 2.0 2.0   Basophil % % 0.0 0.0 0.0   Differential Method  Manual Manual Manual       Metabolic Panel (last 72 hours):  Recent Labs   Lab Result Units 10/07/22  0528 10/08/22  0535 10/09/22  0444   Sodium mmol/L 136 134* 137   Potassium mmol/L 3.7 3.8 4.3   Chloride mmol/L 99 97 97   CO2 mmol/L 29 30* 33*   Glucose mg/dL 146* 115* 116*   BUN  mg/dL 17 16 16   Creatinine mg/dL 0.7 0.6 0.7   Albumin g/dL 1.3* 1.4* 1.5*   Total Bilirubin mg/dL 0.6 0.8 0.6   Alkaline Phosphatase U/L 100 89 82   AST U/L 74* 50* 54*   ALT U/L 58* 51* 50*   Magnesium mg/dL 1.5* 1.4* 1.5*       Vancomycin Administrations:  vancomycin given in the last 96 hours                     vancomycin in dextrose 5 % 1 gram/250 mL IVPB 1,000 mg (mg) 1,000 mg New Bag 10/08/22 1025    vancomycin 750 mg in dextrose 5 % 250 mL IVPB (ready to mix system) (mg) 750 mg New Bag 10/07/22 1830     750 mg New Bag  0642     750 mg New Bag 10/06/22 1845     750 mg New Bag  0634                    Microbiologic Results:  Microbiology Results (last 7 days)       Procedure Component Value Units Date/Time    Blood culture x two cultures. Draw prior to antibiotics. [[id number]] Collected: 10/02/22 1210    Order Status: Completed Specimen: Blood from Peripheral, Foot, Right Updated: 10/06/22 1303     Blood Culture, Routine No Growth after 4 days.    Narrative:      Aerobic and anaerobic    Blood culture x two cultures. Draw prior to antibiotics. [[id number]] Collected: 10/02/22 1216    Order Status: Completed Specimen: Blood from Peripheral, Antecubital, Right Updated: 10/06/22 1303     Blood Culture, Routine No Growth after 4 days.    Narrative:      Aerobic and anaerobic

## 2022-10-09 NOTE — SUBJECTIVE & OBJECTIVE
Interval History: Resting comfortably, no new complaints.    Review of Systems   Constitutional:  Negative for chills and fever.   Respiratory:  Negative for cough and shortness of breath.    Cardiovascular:  Negative for chest pain and leg swelling.   Gastrointestinal:  Negative for abdominal distention and abdominal pain.   Skin:  Positive for color change and wound.   Objective:     Vital Signs (Most Recent):  Temp: 98.9 °F (37.2 °C) (10/09/22 0358)  Pulse: 87 (10/09/22 0358)  Resp: 17 (10/09/22 0358)  BP: (!) 103/57 (10/09/22 0358)  SpO2: 96 % (10/09/22 0358) Vital Signs (24h Range):  Temp:  [98 °F (36.7 °C)-100 °F (37.8 °C)] 98.9 °F (37.2 °C)  Pulse:  [] 87  Resp:  [17-18] 17  SpO2:  [96 %-98 %] 96 %  BP: ()/(51-60) 103/57     Weight: 61.6 kg (135 lb 12.9 oz)  Body mass index is 21.27 kg/m².    Intake/Output Summary (Last 24 hours) at 10/9/2022 0749  Last data filed at 10/9/2022 0520  Gross per 24 hour   Intake 1655.9 ml   Output 2250 ml   Net -594.1 ml        Physical Exam  Constitutional:       General: He is not in acute distress.     Appearance: He is ill-appearing. He is not toxic-appearing or diaphoretic.   Cardiovascular:      Rate and Rhythm: Normal rate and regular rhythm.      Heart sounds: No murmur heard.    No gallop.   Pulmonary:      Effort: Pulmonary effort is normal. No respiratory distress.      Breath sounds: Normal breath sounds. No wheezing.   Abdominal:      General: Bowel sounds are normal. There is no distension.      Palpations: Abdomen is soft.      Tenderness: There is no abdominal tenderness.   Skin:     Comments: LLE swollen, erythematous. Mildly warm and tender to touch. Area of ulceration by R medial ankle. Appears to have receded from prior marking of margins       Significant Labs: All pertinent labs within the past 24 hours have been reviewed.    Significant Imaging: I have reviewed all pertinent imaging results/findings within the past 24 hours.

## 2022-10-10 LAB
ALBUMIN SERPL BCP-MCNC: 1.6 G/DL (ref 3.5–5.2)
ALP SERPL-CCNC: 186 U/L (ref 55–135)
ALT SERPL W/O P-5'-P-CCNC: 61 U/L (ref 10–44)
ANION GAP SERPL CALC-SCNC: 7 MMOL/L (ref 8–16)
ANISOCYTOSIS BLD QL SMEAR: SLIGHT
AST SERPL-CCNC: 83 U/L (ref 10–40)
BASOPHILS NFR BLD: 0 % (ref 0–1.9)
BILIRUB SERPL-MCNC: 0.6 MG/DL (ref 0.1–1)
BUN SERPL-MCNC: 19 MG/DL (ref 8–23)
BURR CELLS BLD QL SMEAR: ABNORMAL
CALCIUM SERPL-MCNC: 8.5 MG/DL (ref 8.7–10.5)
CHLORIDE SERPL-SCNC: 98 MMOL/L (ref 95–110)
CO2 SERPL-SCNC: 31 MMOL/L (ref 23–29)
CREAT SERPL-MCNC: 0.8 MG/DL (ref 0.5–1.4)
DIFFERENTIAL METHOD: ABNORMAL
DOHLE BOD BLD QL SMEAR: PRESENT
EOSINOPHIL NFR BLD: 1 % (ref 0–8)
ERYTHROCYTE [DISTWIDTH] IN BLOOD BY AUTOMATED COUNT: 14.3 % (ref 11.5–14.5)
EST. GFR  (NO RACE VARIABLE): >60 ML/MIN/1.73 M^2
GLUCOSE SERPL-MCNC: 101 MG/DL (ref 70–110)
GRAM STN SPEC: NORMAL
GRAM STN SPEC: NORMAL
HCT VFR BLD AUTO: 31.2 % (ref 40–54)
HGB BLD-MCNC: 9.9 G/DL (ref 14–18)
IMM GRANULOCYTES # BLD AUTO: ABNORMAL K/UL (ref 0–0.04)
IMM GRANULOCYTES NFR BLD AUTO: ABNORMAL % (ref 0–0.5)
LYMPHOCYTES NFR BLD: 11 % (ref 18–48)
MAGNESIUM SERPL-MCNC: 1.5 MG/DL (ref 1.6–2.6)
MCH RBC QN AUTO: 26.8 PG (ref 27–31)
MCHC RBC AUTO-ENTMCNC: 31.7 G/DL (ref 32–36)
MCV RBC AUTO: 84 FL (ref 82–98)
METAMYELOCYTES NFR BLD MANUAL: 3 %
MONOCYTES NFR BLD: 4 % (ref 4–15)
MYELOCYTES NFR BLD MANUAL: 1 %
NEUTROPHILS NFR BLD: 79 % (ref 38–73)
NEUTS BAND NFR BLD MANUAL: 1 %
NRBC BLD-RTO: 0 /100 WBC
PLATELET # BLD AUTO: 820 K/UL (ref 150–450)
PLATELET BLD QL SMEAR: ABNORMAL
PMV BLD AUTO: 9.9 FL (ref 9.2–12.9)
POCT GLUCOSE: 132 MG/DL (ref 70–110)
POIKILOCYTOSIS BLD QL SMEAR: SLIGHT
POTASSIUM SERPL-SCNC: 5.1 MMOL/L (ref 3.5–5.1)
PROT SERPL-MCNC: 5.9 G/DL (ref 6–8.4)
RBC # BLD AUTO: 3.7 M/UL (ref 4.6–6.2)
SODIUM SERPL-SCNC: 136 MMOL/L (ref 136–145)
TOXIC GRANULES BLD QL SMEAR: PRESENT
VANCOMYCIN SERPL-MCNC: 13.4 UG/ML
WBC # BLD AUTO: 17.91 K/UL (ref 3.9–12.7)
WBC TOXIC VACUOLES BLD QL SMEAR: PRESENT

## 2022-10-10 PROCEDURE — 99233 SBSQ HOSP IP/OBS HIGH 50: CPT | Mod: ,,, | Performed by: STUDENT IN AN ORGANIZED HEALTH CARE EDUCATION/TRAINING PROGRAM

## 2022-10-10 PROCEDURE — 25000003 PHARM REV CODE 250: Performed by: ORTHOPAEDIC SURGERY

## 2022-10-10 PROCEDURE — 25000003 PHARM REV CODE 250: Performed by: STUDENT IN AN ORGANIZED HEALTH CARE EDUCATION/TRAINING PROGRAM

## 2022-10-10 PROCEDURE — 63600175 PHARM REV CODE 636 W HCPCS: Performed by: ORTHOPAEDIC SURGERY

## 2022-10-10 PROCEDURE — 83735 ASSAY OF MAGNESIUM: CPT | Performed by: ORTHOPAEDIC SURGERY

## 2022-10-10 PROCEDURE — 94761 N-INVAS EAR/PLS OXIMETRY MLT: CPT

## 2022-10-10 PROCEDURE — 63600175 PHARM REV CODE 636 W HCPCS: Performed by: STUDENT IN AN ORGANIZED HEALTH CARE EDUCATION/TRAINING PROGRAM

## 2022-10-10 PROCEDURE — 97530 THERAPEUTIC ACTIVITIES: CPT

## 2022-10-10 PROCEDURE — 80202 ASSAY OF VANCOMYCIN: CPT | Performed by: ORTHOPAEDIC SURGERY

## 2022-10-10 PROCEDURE — 36415 COLL VENOUS BLD VENIPUNCTURE: CPT | Performed by: ORTHOPAEDIC SURGERY

## 2022-10-10 PROCEDURE — 97165 OT EVAL LOW COMPLEX 30 MIN: CPT

## 2022-10-10 PROCEDURE — 11000001 HC ACUTE MED/SURG PRIVATE ROOM

## 2022-10-10 PROCEDURE — 97116 GAIT TRAINING THERAPY: CPT

## 2022-10-10 PROCEDURE — 85027 COMPLETE CBC AUTOMATED: CPT | Performed by: ORTHOPAEDIC SURGERY

## 2022-10-10 PROCEDURE — 80053 COMPREHEN METABOLIC PANEL: CPT | Performed by: ORTHOPAEDIC SURGERY

## 2022-10-10 PROCEDURE — 85007 BL SMEAR W/DIFF WBC COUNT: CPT | Performed by: ORTHOPAEDIC SURGERY

## 2022-10-10 PROCEDURE — 99233 PR SUBSEQUENT HOSPITAL CARE,LEVL III: ICD-10-PCS | Mod: ,,, | Performed by: STUDENT IN AN ORGANIZED HEALTH CARE EDUCATION/TRAINING PROGRAM

## 2022-10-10 PROCEDURE — 97535 SELF CARE MNGMENT TRAINING: CPT

## 2022-10-10 RX ORDER — MORPHINE SULFATE 4 MG/ML
2 INJECTION, SOLUTION INTRAMUSCULAR; INTRAVENOUS ONCE
Status: COMPLETED | OUTPATIENT
Start: 2022-10-10 | End: 2022-10-10

## 2022-10-10 RX ORDER — POTASSIUM CHLORIDE 750 MG/1
10 TABLET, EXTENDED RELEASE ORAL DAILY
Status: DISCONTINUED | OUTPATIENT
Start: 2022-10-10 | End: 2022-10-10

## 2022-10-10 RX ORDER — POTASSIUM CHLORIDE 750 MG/1
10 TABLET, EXTENDED RELEASE ORAL DAILY
Status: DISCONTINUED | OUTPATIENT
Start: 2022-10-11 | End: 2022-10-10

## 2022-10-10 RX ADMIN — MUPIROCIN: 20 OINTMENT TOPICAL at 08:10

## 2022-10-10 RX ADMIN — CEFEPIME 2 G: 2 INJECTION, POWDER, FOR SOLUTION INTRAVENOUS at 08:10

## 2022-10-10 RX ADMIN — ALLOPURINOL 100 MG: 100 TABLET ORAL at 08:10

## 2022-10-10 RX ADMIN — AZELASTINE 137 MCG: 1 SPRAY, METERED NASAL at 08:10

## 2022-10-10 RX ADMIN — MORPHINE SULFATE 2 MG: 4 INJECTION INTRAVENOUS at 01:10

## 2022-10-10 RX ADMIN — OXYBUTYNIN CHLORIDE 5 MG: 5 TABLET ORAL at 09:10

## 2022-10-10 RX ADMIN — SENNOSIDES 8.6 MG: 8.6 TABLET, FILM COATED ORAL at 08:10

## 2022-10-10 RX ADMIN — TIMOLOL MALEATE 1 DROP: 5 SOLUTION/ DROPS OPHTHALMIC at 08:10

## 2022-10-10 RX ADMIN — CEFEPIME 2 G: 2 INJECTION, POWDER, FOR SOLUTION INTRAVENOUS at 11:10

## 2022-10-10 RX ADMIN — Medication 400 MG: at 08:10

## 2022-10-10 RX ADMIN — LACTOBACILLUS ACIDOPHILUS / LACTOBACILLUS BULGARICUS 1 EACH: 100 MILLION CFU STRENGTH GRANULES at 08:10

## 2022-10-10 RX ADMIN — LATANOPROST 1 DROP: 50 SOLUTION OPHTHALMIC at 09:10

## 2022-10-10 RX ADMIN — HYDROCODONE BITARTRATE AND ACETAMINOPHEN 1 TABLET: 10; 325 TABLET ORAL at 10:10

## 2022-10-10 RX ADMIN — CEFEPIME 2 G: 2 INJECTION, POWDER, FOR SOLUTION INTRAVENOUS at 04:10

## 2022-10-10 RX ADMIN — LACTOBACILLUS ACIDOPHILUS / LACTOBACILLUS BULGARICUS 1 EACH: 100 MILLION CFU STRENGTH GRANULES at 09:10

## 2022-10-10 RX ADMIN — METRONIDAZOLE 500 MG: 500 TABLET ORAL at 01:10

## 2022-10-10 RX ADMIN — ENOXAPARIN SODIUM 40 MG: 100 INJECTION SUBCUTANEOUS at 04:10

## 2022-10-10 RX ADMIN — MUPIROCIN: 20 OINTMENT TOPICAL at 09:10

## 2022-10-10 RX ADMIN — METRONIDAZOLE 500 MG: 500 TABLET ORAL at 05:10

## 2022-10-10 RX ADMIN — OXYBUTYNIN CHLORIDE 5 MG: 5 TABLET ORAL at 08:10

## 2022-10-10 RX ADMIN — METRONIDAZOLE 500 MG: 500 TABLET ORAL at 09:10

## 2022-10-10 RX ADMIN — VANCOMYCIN HYDROCHLORIDE 1250 MG: 1.25 INJECTION, POWDER, LYOPHILIZED, FOR SOLUTION INTRAVENOUS at 08:10

## 2022-10-10 RX ADMIN — CETIRIZINE HYDROCHLORIDE 5 MG: 5 TABLET ORAL at 09:10

## 2022-10-10 RX ADMIN — TIMOLOL MALEATE 1 DROP: 5 SOLUTION/ DROPS OPHTHALMIC at 09:10

## 2022-10-10 RX ADMIN — HYDROCODONE BITARTRATE AND ACETAMINOPHEN 1 TABLET: 10; 325 TABLET ORAL at 07:10

## 2022-10-10 NOTE — ASSESSMENT & PLAN NOTE
Cellulitis, likely point of bacterial entry medial ankle. WBC uptrending despite appropriate antibiotic therapy. Repeat CT scan with possible early abscess formation on read, but surgery does not feel it is amenable to intervention. Ortho consulted by GS for second opinion, pt went to OR for I&D but no abscess found  - appreciate ID, general surgery, orthopedics involvement  - on cefepime/vancomycin/flagyl  - abnormal arterial US noted, but WILLIE normal  - elevate ankle>knee>hip to facilitate drainage

## 2022-10-10 NOTE — ASSESSMENT & PLAN NOTE
"-At home ambulates with a walker  -Notes he "Fell or slid" out of a chair on night prior to admit  PT/OT evaluation pending  "

## 2022-10-10 NOTE — PT/OT/SLP EVAL
Occupational Therapy   Evaluation, tx    Name: Mark Church  MRN: [id number]  Admitting Diagnosis:  Sepsis  Recent Surgery: Procedure(s) (LRB):  INCISION AND DRAINAGE, ANKLE (Left) 1 Day Post-Op  The primary encounter diagnosis was Cellulitis. Diagnoses of Tachycardia, Leg swelling, Cellulitis, Hypokalemia, Cellulitis, unspecified cellulitis site, and PAD (peripheral artery disease) were also pertinent to this visit.    Recommendations:     Discharge Recommendations: home health OT  Discharge Equipment Recommendations:  bedside commode, bath bench  Barriers to discharge:  None    Assessment:     Mark Church is a 72 y.o. male with a medical diagnosis of Sepsis.  He presents with Performance deficits affecting function: weakness, impaired endurance, impaired self care skills, impaired functional mobility, gait instability, impaired balance, decreased lower extremity function, decreased coordination, pain, decreased ROM, impaired skin, edema, decreased safety awareness.      OT initial eval completed, tx initiated. Pt. O x 4, performed BSC t/f with Min A with RW, Toileting Min A for clothes management, LE dressing Min A  for socks (L ) shoe cover donned over bulky LE dressing, Feeding Indep, g/hygiene CGA standing at sink with RW. Pain 8/10 left calf and anterior foot. Pt. not back to baseline ADLS.  He was Indep -Mod I PTO. Pt would benefit from HHOT, BSC and TTB for safety at home.  Continue with OT POC.      Rehab Prognosis: Good; patient would benefit from acute skilled OT services to address these deficits and reach maximum level of function.       Plan:     Patient to be seen 5 x/week to address the above listed problems via self-care/home management, therapeutic activities, therapeutic exercises  Plan of Care Expires: 11/10/22  Plan of Care Reviewed with: patient    Subjective     Chief Complaint: L calf  and anterior foot pain.  Patient/Family Comments/goals: My 21 y/o GS and 21 y/o GD are staying with us to help  "out."    Occupational Profile:  Living Environment: pt lives with spouse, 10 y/o GS who they are raising in 2SH with incline entrance; pt's bedroom and full bathroom with t/s combo are on 2nd floor, 14 steps with RHR ascending; pt has a "man cave" on first floor with a sofa sleeper. Pt has 1/2 bathroom down stairs.  Previous level of function: pt was Indep ADLs -Mod I prior to onset; does not drive due to legally blind; wife drives; pt shared meal prep, household chores with spouse. He recently started ambulating with a RW due to increased LLE pain( RW belonged to his [place], pt said RW is not in good condition.)   Roles and Routines: very active, worked out with 5 lbs wt, walked the neighborhood  with trash can picking up trash.  Equipment Used at Home:  grab bar  Assistance upon Discharge: spouse and grandchildren    Pain/Comfort:  Pain Rating 1: 8/10  Location - Side 1: Left  Location - Orientation 1: anterior  Location 1: foot  Pain Addressed 1: Reposition, Distraction  Pain Rating Post-Intervention 1: 8/10  Pain Rating 2: 8/10  Location - Side 2: Left  Location - Orientation 2: posterior  Location 2: calf  Pain Addressed 2: Reposition, Distraction  Pain Rating Post-Intervention 2: 8/10    Patients cultural, spiritual, Christian conflicts given the current situation: no    Objective:     Communicated with: nurse prior to session.  Patient found up in chair with telemetry, peripheral IV upon OT entry to room.    General Precautions: Standard, fall, vision impaired (blind L eye)   Orthopedic Precautions:N/A   Braces: N/A  Respiratory Status: Room air    Occupational Performance:    Functional Mobility/Transfers:  Patient completed Sit <> Stand Transfer with minimum assistance  with  rolling walker   Patient completed Toilet Transfer Step Transfer technique with contact guard assistance with  rolling walker and bedside commode  Functional Mobility: ambulated ~ 6' x 2 to sink and back using RW, mod vc for sequencing " steps and upright posture; mild instability, not over LOB.     Activities of Daily Living:  Feeding:  independence .  Grooming: contact guard assistance washed hand standing inside RW at sink, no LOB, mild unsteadiness.   Lower Body Dressing: minimum assistance and L sock, shoe cover, R socks SBA long sitting in BS chair; pain L knee when flexed due to edema and leg wound, s/p I&D and bulky dressing in placed LLE.   Toileting: minimum assistance for clothes management, pulling under garment/brief up and down, standing with UE unilateral to no support on RW in order to use BUE, Mild unsteadiness, no  LOB. Pt performed seated hygiene courtesy wipe to jonas rectal area, pt did not have BM, only urinated.    Cognitive/Visual Perceptual:  Cognitive/Psychosocial Skills:     -       Oriented to: Person, Place, Time, and Situation   -       Follows Commands/attention:Follows multistep  commands  -       Communication: clear/fluent  -       Memory: No Deficits noted  -       Safety awareness/insight to disability: impaired   -       Mood/Affect/Coping skills/emotional control: Cooperative  Visual/Perceptual:      -Intact with glasses    Physical Exam:  Balance:    -       sitting: good  dynamic: fair plus   standing: fair   dynamic: poor plus  Postural examination/scapula alignment:    -       Rounded shoulders  -       Forward head  Skin integrity: Wound LLE with bulky post op dressing  with ace wrap 2/2 I&D yesterday  Edema:  Moderate LLE -knee to foot  Dominant hand:    -       left  Upper Extremity Range of Motion:     -       Right Upper Extremity: WFL  -       Left Upper Extremity: WFL  Upper Extremity Strength:    -       Right Upper Extremity: WFL  -       Left Upper Extremity: WFL   Strength:    -       Right Upper Extremity: WFL  -       Left Upper Extremity: WFL    AMPAC 6 Click ADL:  AMPAC Total Score: 21    Treatment & Education:  Purpose of OT and POC  Educated in walker safety use for standing ADLS,  transfers, sequencing steps for BSC t/f.  All question concerns addressed within scope.  DME  /DC planning: rec: TTB and BSC for home safety and , HHOT recommended pt will need assist at home for higher level IADLs. .   Call staff for BTB assist, call bell issued.  Encouraged and instructed pt in ankle pumps  and to perform while seated in BS chair.  BLE elevated after tx ended, pillow placed under LLE to minimize calf pain and float heel to prevent pressure/skin breakdown.      Patient left up in chair with all lines intact and call button in reach    GOALS:   Multidisciplinary Problems       Occupational Therapy Goals          Problem: Occupational Therapy    Goal Priority Disciplines Outcome Interventions   Occupational Therapy Goal     OT, PT/OT Ongoing, Progressing    Description: Goals to be met by: 10/31/2022     Patient will increase functional independence with ADLs by performing:    UE Dressing with Modified [place].  LE Dressing with Supervision.  Grooming while standing with Supervision.  Toileting from bedside commode with Supervision for hygiene and clothing management.   Step transfer with Supervision  Toilet transfer to bedside commode with Supervision.  Upper extremity exercise program x10 reps per handout, with independence.                         History:     Past Medical History:   Diagnosis Date    Arthritis     Bartter syndrome     Blind left eye     Cancer     Cataract     Glaucoma     Normocytic anemia 10/2/2022    Prostate cancer     Urinary incontinence          Past Surgical History:   Procedure Laterality Date    ANKLE INCISION AND DRAINAGE Left 10/9/2022    Procedure: INCISION AND DRAINAGE, ANKLE;  Surgeon: Jamison Snyder MD;  Location: [facility];  Service: Orthopedics;  Laterality: Left;    CHOLECYSTECTOMY      JOINT REPLACEMENT      PROSTATE SURGERY      REFRACTIVE SURGERY Bilateral     ROTATOR CUFF REPAIR Right     sinus polyp         Time Tracking:     OT Date of Treatment:  10/10/22  OT Start Time: 1129  OT Stop Time: 1204  OT Total Time (min): 35 min    Billable Minutes:Evaluation 10  Self Care/Home Management 15  Therapeutic Activity 10  Total Time 35    10/10/2022

## 2022-10-10 NOTE — PT/OT/SLP PROGRESS
"Physical Therapy Treatment    Patient Name:  Mark Church   MRN:  [id number]    Recommendations:     Discharge Recommendations:  home health PT   Discharge Equipment Recommendations: none     Assessment:     Mark Church is a 72 y.o. male admitted with a medical diagnosis of Sepsis.  He presents with the following impairments/functional limitations:  impaired functional mobility, gait instability, decreased lower extremity function, pain, decreased ROM, impaired skin .    Rehab Prognosis: Good; patient would benefit from acute skilled PT services to address these deficits and reach maximum level of function.    Recent Surgery: Procedure(s) (LRB):  INCISION AND DRAINAGE, ANKLE (Left) 1 Day Post-Op    Plan:     During this hospitalization, patient to be seen 5 x/week to address the identified rehab impairments via gait training, therapeutic activities, therapeutic exercises and progress toward the following goals:    Plan of Care Expires:  10/17/22    Subjective     Chief Complaint: "The catheter won't stay on..." Pt also reports dizziness once seated EOB.  Patient/Family Comments/goals: Pt agreeable to get OOB today.  Pain/Comfort:  Pain Rating 1: 10/10 ("12")  Location - Side 1: Left  Location 1: foot  Pain Addressed 1: Reposition      Objective:     Communicated with nurseBrie prior to session.  Patient found supine with bed alarm, pressure relief boots, telemetry upon PT entry to room.     General Precautions: Standard, fall   Orthopedic Precautions:N/A   Braces: N/A  Respiratory Status: Room air     Functional Mobility:  Bed Mobility:     Supine to Sit: modified independence  Transfers:     Sit to Stand:  contact guard assistance with rolling walker  Gait: 2' w/RW CGA to B/S chair.      AM-PAC 6 CLICK MOBILITY  Turning over in bed (including adjusting bedclothes, sheets and blankets)?: 4  Sitting down on and standing up from a chair with arms (e.g., wheelchair, bedside commode, etc.): 3  Moving from lying on back " to sitting on the side of the bed?: 4  Moving to and from a bed to a chair (including a wheelchair)?: 3  Need to walk in hospital room?: 3  Climbing 3-5 steps with a railing?: 2  Basic Mobility Total Score: 19       Therapeutic Activities and Exercises:   Pt reports urinating and getting gown wet due to condom catheter coming off.  Pt able to stand at bedside and cleaning self using RW for support.  Pt also able to stand to have brief changed by PT.    Patient left  reclined in chair  with call button in reach, nurse notified, PCT present, and all needs in reach .    GOALS:   Multidisciplinary Problems       Physical Therapy Goals          Problem: Physical Therapy    Goal Priority Disciplines Outcome Goal Variances Interventions   Physical Therapy Goal     PT, PT/OT Ongoing, Progressing     Description: Goals to be met by: 10/17/22     Patient will increase functional independence with mobility by performin. Pt to be mod I with bed mobility.  2. Pt to transfer with mod I.  3. Pt to ambulate 150' w/RW and supervision.  4. Pt to be (I) with written HEP.                         Time Tracking:     PT Received On: 10/10/22  PT Start Time: 1028     PT Stop Time: 1051  PT Total Time (min): 23 min     Billable Minutes: Gait Training 8 and Therapeutic Activity 15    Treatment Type: Treatment  PT/PTA: PT     PTA Visit Number: 2     10/10/2022

## 2022-10-10 NOTE — SUBJECTIVE & OBJECTIVE
Interval History: No fevers documented overnight.   S/p OR yesterday - cx in process, though so far ngtd. Tolerating abx without issues. Reports improved in leg pain.     Review of Systems   Constitutional:  Negative for chills and fever.   All other systems reviewed and are negative.  Objective:     Vital Signs (Most Recent):  Temp: 98.2 °F (36.8 °C) (10/10/22 0734)  Pulse: 84 (10/10/22 0734)  Resp: 16 (10/10/22 0734)  BP: 121/60 (10/10/22 0734)  SpO2: 97 % (10/10/22 0734) Vital Signs (24h Range):  Temp:  [97.6 °F (36.4 °C)-98.7 °F (37.1 °C)] 98.2 °F (36.8 °C)  Pulse:  [69-87] 84  Resp:  [14-23] 16  SpO2:  [94 %-100 %] 97 %  BP: (109-145)/(56-66) 121/60     Weight: 61.6 kg (135 lb 12.9 oz)  Body mass index is 21.27 kg/m².    Estimated Creatinine Clearance: 72.7 mL/min (based on SCr of 0.8 mg/dL).    Physical Exam  Constitutional:       Appearance: He is well-developed.   HENT:      Head: Normocephalic and atraumatic.      Right Ear: External ear normal.      Left Ear: External ear normal.      Nose: Nose normal.      Mouth/Throat:      Mouth: Mucous membranes are moist.      Pharynx: No oropharyngeal exudate.   Eyes:      General: No scleral icterus.        Right eye: No discharge.         Left eye: No discharge.   Neck:      Thyroid: No thyromegaly.   Cardiovascular:      Rate and Rhythm: Normal rate and regular rhythm.   Pulmonary:      Effort: Pulmonary effort is normal. No respiratory distress.      Breath sounds: No wheezing.   Abdominal:      General: Bowel sounds are normal. There is no distension.      Palpations: Abdomen is soft.      Tenderness: There is no abdominal tenderness. There is no guarding.   Musculoskeletal:      Right lower leg: No edema.      Comments: L leg wrapped   Skin:     General: Skin is warm and dry.      Coloration: Skin is not jaundiced.      Findings: No bruising.   Neurological:      Mental Status: He is alert and oriented to person, place, and time. Mental status is at baseline.       Motor: No weakness.   Psychiatric:         Mood and Affect: Mood normal.         Behavior: Behavior normal.       Significant Labs:   Microbiology Results (last 7 days)       Procedure Component Value Units Date/Time    Gram stain [[id number]] Collected: 10/09/22 0853    Order Status: Completed Specimen: Wound from Ankle, Left Updated: 10/10/22 0842     Gram Stain Result Rare WBC's      No organisms seen    Narrative:      LEFT ANKLE TISSUE    Aerobic culture [[id number]] Collected: 10/09/22 0853    Order Status: Completed Specimen: Wound from Ankle, Left Updated: 10/10/22 0759     Aerobic Bacterial Culture No growth    Narrative:      LEFT ANKLE WOUND    Aerobic culture [[id number]] Collected: 10/09/22 0853    Order Status: Sent Specimen: Wound from Ankle, Left Updated: 10/10/22 0751    AFB Culture & Smear [[id number]] Collected: 10/09/22 0853    Order Status: Sent Specimen: Wound from Ankle, Left Updated: 10/10/22 0749    Fungus culture [[id number]] Collected: 10/09/22 0853    Order Status: Sent Specimen: Wound from Ankle, Left Updated: 10/10/22 0748    Culture, Anaerobe [[id number]] Collected: 10/09/22 0853    Order Status: Sent Specimen: Wound from Ankle, Left Updated: 10/10/22 0747    Gram stain [[id number]] Collected: 10/09/22 0853    Order Status: Completed Specimen: Wound from Ankle, Left Updated: 10/09/22 1330     Gram Stain Result Rare WBC's      Few Gram positive cocci in pairs and chains    Narrative:      LEFT ANKLE WOUND    Culture, Anaerobe [[id number]] Collected: 10/09/22 0853    Order Status: Sent Specimen: Wound from Ankle, Left Updated: 10/09/22 1019    Fungus culture [[id number]] Collected: 10/09/22 0853    Order Status: Sent Specimen: Wound from Ankle, Left Updated: 10/09/22 1017    AFB Culture & Smear [[id number]] Collected: 10/09/22 0853    Order Status: Sent Specimen: Wound from Ankle, Left Updated: 10/09/22 1017    Blood culture x two cultures. Draw prior to antibiotics. [[id number]] Collected:  10/02/22 1210    Order Status: Completed Specimen: Blood from Peripheral, Foot, Right Updated: 10/06/22 1303     Blood Culture, Routine No Growth after 4 days.    Narrative:      Aerobic and anaerobic    Blood culture x two cultures. Draw prior to antibiotics. [[id number]] Collected: 10/02/22 1216    Order Status: Completed Specimen: Blood from Peripheral, Antecubital, Right Updated: 10/06/22 1303     Blood Culture, Routine No Growth after 4 days.    Narrative:      Aerobic and anaerobic            Significant Imaging: I have reviewed all pertinent imaging results/findings within the past 24 hours.

## 2022-10-10 NOTE — ASSESSMENT & PLAN NOTE
S/p OR with surgery on 10/9 - OR cx in process, so far ngtd - though patient has been on broad spectrum abx therapy prior to surgical cultures. Op note notable for murky fluid present throughout entire lower extremity. WBC now improving post-surgery.    Recommendations:  -continue vancomycin pharmacy to dose, cefepime, and flagyl while awaiting cx data  -trend WBC

## 2022-10-10 NOTE — PROGRESS NOTES
Ortho Daily Progress Note    Mark Church is a 72 y.o. male admitted on 10/2/2022      Chief Complaint/Reason for admission: Insect Bite (EMS called to 71yo male that was bit by unknown insect on Wednesday. Has warmth, swelling and redness to right lower extremity since the bite but the pain is increasing. Saw dr on Friday and was given oral antibiotics but no improvement. )       Hospital Day: 8  Post Op Day: 1 Day Post-Op     The patient was seen and examined this morning at the bedside. Patient reports no acute issues overnight.  Patient reports that pain is adequately controlled.    _______________    Vitals:    10/10/22 0734 10/10/22 1057 10/10/22 1101 10/10/22 1343   BP: 121/60 135/87     Pulse: 84 63     Resp: 16 16  (!) 21   Temp: 98.2 °F (36.8 °C) 98.7 °F (37.1 °C)     TempSrc: Oral Oral     SpO2: 97% 95% 95%    Weight:       Height:           Vital Signs (Most Recent)  Temp: 98.7 °F (37.1 °C) (10/10/22 1057)  Pulse: 63 (10/10/22 1057)  Resp: (!) 21 (10/10/22 1343)  BP: 135/87 (10/10/22 1057)  SpO2: 95 % (10/10/22 1101)    Vital Signs Range (Last 24H):  Temp:  [97.9 °F (36.6 °C)-98.7 °F (37.1 °C)]   Pulse:  [63-86]   Resp:  [16-21]   BP: (109-135)/(56-87)   SpO2:  [94 %-97 %]       Physical:    AAOx3      Packing was removed from the left leg today at all 3 incision sites in its entirety.    Recent Labs     10/08/22  0535 10/09/22  0444 10/10/22  0409   K 3.8 4.3 5.1   MG 1.4* 1.5* 1.5*   CALCIUM 8.2* 8.4* 8.5*   WBC 22.96* 18.92* 17.91*   HGB 10.5* 9.9* 9.9*   HCT 31.5* 29.6* 31.2*   * 679* 820*       I/O last 3 completed shifts:  In: 2534 [P.O.:2034; IV Piggyback:500]  Out: 2400 [Urine:2400]          Assessment:  A/P status post I&D left lower extremity        Plan:    Continue wound care to left leg.  Will continue to follow up on culture results.  Continue IV antibiotics.      Jamison Snyder MD  [facility]

## 2022-10-10 NOTE — PROGRESS NOTES
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  He states this all started about 4 days ago when he was walking in a field. He states he felt some significant itching in his lower leg.  Later that night his leg started to swell and hurt.  He assumed this was caused by an insect bite on his leg, but he never saw a specific bite or insect.  He states that the 2nd night the swelling and pain became severe and he was having fevers and chills which prompted him to visit his primary care provider the next day.  He was prescribed an antibiotic, not sure which one, and despite taking this his leg has continued to get worse.  He notes he has been having fevers and chills and last night he "slid or fell" out of his chair when he was trying to get up because the pain was so bad.  He notes he uses a walker to ambulate at home.  He also notes a chronic cough ascociated wiith allergic rhinitis and had an episode of nausea and vomiting one week ago.  He reports diminished oral intake due to decreased appetite, but no nausea or vomiting since this all started 4 days ago.  He denies chest pain, shortness of breath, headache, palpitations, dysuria and diarrhea.      Overview/Hospital Course:  Admitted with sepsis secondary to left lower extremity cellulitis. Started on IV antibiotics, IVF and potassium replacement (has Bartter syndrome). Surgery consulted for concern for nec fasc/bone involvement. CT did not show any evidence of abscess/gas. Seems to be improving though still significant " swelling and blistering. ID consulted for antibiotic recommendations. Repeat CT ordered for worsening white count which did not show a clear abscess.    Pt continued w/ minimal improvement. Ortho took the patient for I&D did not reveal abscess.      Interval History: OR yesterday. No complaints this AM.    Review of Systems   Constitutional:  Negative for chills and fever.   Respiratory:  Negative for cough and shortness of breath.    Cardiovascular:  Negative for chest pain and leg swelling.   Gastrointestinal:  Negative for abdominal distention and abdominal pain.   Skin:  Positive for color change and wound.   Objective:     Vital Signs (Most Recent):  Temp: 98.2 °F (36.8 °C) (10/10/22 0734)  Pulse: 84 (10/10/22 0734)  Resp: 16 (10/10/22 0734)  BP: 121/60 (10/10/22 0734)  SpO2: 97 % (10/10/22 0734) Vital Signs (24h Range):  Temp:  [97.6 °F (36.4 °C)-98.7 °F (37.1 °C)] 98.2 °F (36.8 °C)  Pulse:  [69-87] 84  Resp:  [14-23] 16  SpO2:  [94 %-100 %] 97 %  BP: (109-145)/(56-66) 121/60     Weight: 61.6 kg (135 lb 12.9 oz)  Body mass index is 21.27 kg/m².    Intake/Output Summary (Last 24 hours) at 10/10/2022 0826  Last data filed at 10/10/2022 0607  Gross per 24 hour   Intake 2004 ml   Output 1250 ml   Net 754 ml        Physical Exam  Constitutional:       General: He is not in acute distress.     Appearance: He is ill-appearing. He is not toxic-appearing or diaphoretic.   Cardiovascular:      Rate and Rhythm: Normal rate and regular rhythm.      Heart sounds: No murmur heard.    No gallop.   Pulmonary:      Effort: Pulmonary effort is normal. No respiratory distress.      Breath sounds: Normal breath sounds. No wheezing.   Abdominal:      General: Bowel sounds are normal. There is no distension.      Palpations: Abdomen is soft.      Tenderness: There is no abdominal tenderness.   Skin:     Comments: LLE wrapped, dressing not taken down. Distal movement/sensation intact.       Significant Labs: All pertinent labs  "within the past 24 hours have been reviewed.    Significant Imaging: I have reviewed all pertinent imaging results/findings within the past 24 hours.      Assessment/Plan:      * Sepsis  Cellulitis, likely point of bacterial entry medial ankle. WBC uptrending despite appropriate antibiotic therapy. Repeat CT scan with possible early abscess formation on read, but surgery does not feel it is amenable to intervention. Ortho consulted by GS for second opinion, pt went to OR for I&D but no abscess found  - appreciate ID, general surgery, orthopedics involvement  - on cefepime/vancomycin/flagyl  - abnormal arterial US noted, but WILLIE normal  - elevate ankle>knee>hip to facilitate drainage      Cellulitis        Debility  -At home ambulates with a walker  -Notes he "Fell or slid" out of a chair on night prior to admit  PT/OT evaluation pending    Chronic cough  -Presumed due to allergic rhinitis  -Continue home anti-histamines  -Add tessalon PRN    Chronic gout    -Continue home allopurinol.    Hypokalemia  See bartter syndrome      Hyponatremia  -On admit mild and likely due to diminished oral intake and mild dehydration  -Continue IV fluids  -stable    Normocytic anemia  -Hb 12.1 on admit  -No bleeding  -Check iron, ferritin, b12 and folate - appears more ACD at this time with elevated ferritin  -Repeat cbc in AM    Cellulitis of left lower extremity  -Treatment as above.    Primary open angle glaucoma of both eyes, severe stage  -History noted  -Continue home timolol and lantanoprost drops    Bartter syndrome  Hypokalemic on admission, repleted w/ IV and po K, now borderline high.  - hold potassium today  - restart at home dosing of 10meq/day tomorrow        H/O prostate cancer  -history noted  -Continue home oxybutynin      VTE Risk Mitigation (From admission, onward)         Ordered     enoxaparin injection 40 mg  Daily         10/02/22 1416     IP VTE HIGH RISK PATIENT  Once         10/02/22 1416     Place sequential " compression device  Until discontinued         10/02/22 1416                Discharge Planning   ROS: 10/7/2022     Code Status: Full Code   Is the patient medically ready for discharge?:     Reason for patient still in hospital (select all that apply): Patient trending condition, Laboratory test, Treatment, Consult recommendations and Pending disposition  Discharge Plan A: Home Health   Discharge Delays: None known at this time              David Sadler MD  Department of Hospital Medicine   [facility]

## 2022-10-10 NOTE — PLAN OF CARE
Problem: Physical Therapy  Goal: Physical Therapy Goal  Description: Goals to be met by: 10/17/22     Patient will increase functional independence with mobility by performin. Pt to be mod I with bed mobility.  2. Pt to transfer with mod I.  3. Pt to ambulate 150' w/RW and supervision.  4. Pt to be (I) with written HEP.    Outcome: Ongoing, Progressing   Pt agreeable to treat today since sx yesterday.

## 2022-10-10 NOTE — NURSING
Pt resting in bed watching tv. No complaints. Scheduled medications given. IV saline lock. Tele #[id number]. Dressing to left leg cdi. Condom cath in place. Heel protector boots. Safety measures maintained. Will cont to monitor.

## 2022-10-10 NOTE — PROGRESS NOTES
"[facility] Surg  Infectious Disease  Progress Note    Patient Name: Mark Church  MRN: [id number]  Admission Date: 10/2/2022  Length of Stay: 8 days  Attending Physician: David Sadler MD  Primary Care Provider: [facility]     Isolation Status: No active isolations  Assessment/Plan:      Cellulitis of left lower extremity  S/p OR with surgery on 10/9 - OR cx in process, so far ngtd - though patient has been on broad spectrum abx therapy prior to surgical cultures. Op note notable for murky fluid present throughout entire lower extremity. WBC now improving post-surgery.    Recommendations:  -continue vancomycin pharmacy to dose, cefepime, and flagyl while awaiting cx data  -trend WBC              Thank you for your consult. I will follow-up with patient. Please contact us if you have any additional questions. Above d/w primary team.     Time: 35 minutes   50% of time spent on face-to-face counseling and coordination of care. Counseling included review of test results, diagnosis, and treatment plan with patient and/or family.        Estella Valadez MD  Infectious Disease  [facility] Surg    Subjective:     Principal Problem:Sepsis    HPI: 72M with h/o Bartter syndrome, gout admitted 10/2 with swelling and pain in his left leg. Reports about 4 days of progressive swelling and redness to L leg. Suspects he may have had an insect bite, but doesn't recall. Reports swelling and redeness worse and started having fevers so came to hospital. Usually ambulatory with walker. Denies indwelling hardware.     Bcx NGTD x 2 days    CT  1. Diffuse, circumferential subcutaneous soft tissue edema throughout the left tibia and fibula.  No soft tissue gas or rim enhancing fluid collection.  Correlate for cellulitis.  2. Cutaneous collection at the skin surface of the medial ankle compatible with a fluid-filled blister.  ID consulted for "antibx recommendations for cellulitis"    Day 4 " "vanc/cefepime/clindamycin    Tmax 101    Arterial studies- Mildly elevated velocity within the left popliteal artery with monophasic waveforms in the calf arteries, concerning for stenosis of the popliteal artery.    ID consulted for "antibx recommendations for cellulitis"    Interval History: No fevers documented overnight.   S/p OR yesterday - cx in process, though so far ngtd. Tolerating abx without issues. Reports improved in leg pain.     Review of Systems   Constitutional:  Negative for chills and fever.   All other systems reviewed and are negative.  Objective:     Vital Signs (Most Recent):  Temp: 98.2 °F (36.8 °C) (10/10/22 0734)  Pulse: 84 (10/10/22 0734)  Resp: 16 (10/10/22 0734)  BP: 121/60 (10/10/22 0734)  SpO2: 97 % (10/10/22 0734) Vital Signs (24h Range):  Temp:  [97.6 °F (36.4 °C)-98.7 °F (37.1 °C)] 98.2 °F (36.8 °C)  Pulse:  [69-87] 84  Resp:  [14-23] 16  SpO2:  [94 %-100 %] 97 %  BP: (109-145)/(56-66) 121/60     Weight: 61.6 kg (135 lb 12.9 oz)  Body mass index is 21.27 kg/m².    Estimated Creatinine Clearance: 72.7 mL/min (based on SCr of 0.8 mg/dL).    Physical Exam  Constitutional:       Appearance: He is well-developed.   HENT:      Head: Normocephalic and atraumatic.      Right Ear: External ear normal.      Left Ear: External ear normal.      Nose: Nose normal.      Mouth/Throat:      Mouth: Mucous membranes are moist.      Pharynx: No oropharyngeal exudate.   Eyes:      General: No scleral icterus.        Right eye: No discharge.         Left eye: No discharge.   Neck:      Thyroid: No thyromegaly.   Cardiovascular:      Rate and Rhythm: Normal rate and regular rhythm.   Pulmonary:      Effort: Pulmonary effort is normal. No respiratory distress.      Breath sounds: No wheezing.   Abdominal:      General: Bowel sounds are normal. There is no distension.      Palpations: Abdomen is soft.      Tenderness: There is no abdominal tenderness. There is no guarding.   Musculoskeletal:      Right " lower leg: No edema.      Comments: L leg wrapped   Skin:     General: Skin is warm and dry.      Coloration: Skin is not jaundiced.      Findings: No bruising.   Neurological:      Mental Status: He is alert and oriented to person, place, and time. Mental status is at baseline.      Motor: No weakness.   Psychiatric:         Mood and Affect: Mood normal.         Behavior: Behavior normal.       Significant Labs:   Microbiology Results (last 7 days)       Procedure Component Value Units Date/Time    Gram stain [[id number]] Collected: 10/09/22 0853    Order Status: Completed Specimen: Wound from Ankle, Left Updated: 10/10/22 0842     Gram Stain Result Rare WBC's      No organisms seen    Narrative:      LEFT ANKLE TISSUE    Aerobic culture [[id number]] Collected: 10/09/22 0853    Order Status: Completed Specimen: Wound from Ankle, Left Updated: 10/10/22 0759     Aerobic Bacterial Culture No growth    Narrative:      LEFT ANKLE WOUND    Aerobic culture [[id number]] Collected: 10/09/22 0853    Order Status: Sent Specimen: Wound from Ankle, Left Updated: 10/10/22 0751    AFB Culture & Smear [[id number]] Collected: 10/09/22 0853    Order Status: Sent Specimen: Wound from Ankle, Left Updated: 10/10/22 0749    Fungus culture [[id number]] Collected: 10/09/22 0853    Order Status: Sent Specimen: Wound from Ankle, Left Updated: 10/10/22 0748    Culture, Anaerobe [[id number]] Collected: 10/09/22 0853    Order Status: Sent Specimen: Wound from Ankle, Left Updated: 10/10/22 0747    Gram stain [[id number]] Collected: 10/09/22 0853    Order Status: Completed Specimen: Wound from Ankle, Left Updated: 10/09/22 1330     Gram Stain Result Rare WBC's      Few Gram positive cocci in pairs and chains    Narrative:      LEFT ANKLE WOUND    Culture, Anaerobe [[id number]] Collected: 10/09/22 0853    Order Status: Sent Specimen: Wound from Ankle, Left Updated: 10/09/22 1019    Fungus culture [[id number]] Collected: 10/09/22 0853    Order Status: Sent  Specimen: Wound from Ankle, Left Updated: 10/09/22 1017    AFB Culture & Smear [[id number]] Collected: 10/09/22 0853    Order Status: Sent Specimen: Wound from Ankle, Left Updated: 10/09/22 1017    Blood culture x two cultures. Draw prior to antibiotics. [[id number]] Collected: 10/02/22 1210    Order Status: Completed Specimen: Blood from Peripheral, Foot, Right Updated: 10/06/22 1303     Blood Culture, Routine No Growth after 4 days.    Narrative:      Aerobic and anaerobic    Blood culture x two cultures. Draw prior to antibiotics. [[id number]] Collected: 10/02/22 1216    Order Status: Completed Specimen: Blood from Peripheral, Antecubital, Right Updated: 10/06/22 1303     Blood Culture, Routine No Growth after 4 days.    Narrative:      Aerobic and anaerobic            Significant Imaging: I have reviewed all pertinent imaging results/findings within the past 24 hours.

## 2022-10-10 NOTE — PLAN OF CARE
Problem: Occupational Therapy  Goal: Occupational Therapy Goal  Description: Goals to be met by: 10/31/2022     Patient will increase functional independence with ADLs by performing:    UE Dressing with Modified [place].  LE Dressing with Supervision.  Grooming while standing with Supervision.  Toileting from bedside commode with Supervision for hygiene and clothing management.   Step transfer with Supervision  Toilet transfer to bedside commode with Supervision.  Upper extremity exercise program x10 reps per handout, with independence.    Outcome: Ongoing, Progressing   OT initial eval completed, tx initiated. Pt. O x 4, performed BSC t/f with Min A with RW, Toileting Min A for clothes management, LE dressing Min A  for socks (L )shoe cover over bulky dressing on LLE, Feeding Indep, g/hygiene CGA standing at sink with RW. Pain 8/10 left calf and anterior foot. Pt. not back to baseline ADLS.  He was Indep -Mod I PTO. Pt would benefit from HHOT, BSC and TTB for safety at home.  Continue with OT POC.

## 2022-10-10 NOTE — CONSULTS
[facility] Surg  Wound Care    Patient Name:  Mark Church   MRN:  [id number]  Date: 10/10/2022  Diagnosis: Sepsis    History:     Past Medical History:   Diagnosis Date    Arthritis     Bartter syndrome     Blind left eye     Cancer     Cataract     Glaucoma     Normocytic anemia 10/2/2022    Prostate cancer     Urinary incontinence        Social History     Socioeconomic History    Marital status:    Tobacco Use    Smoking status: Never    Smokeless tobacco: Never   Substance and Sexual Activity    Alcohol use: No     Alcohol/week: 0.0 standard drinks    Drug use: No     Social Determinants of Health     Financial Resource Strain: Medium Risk    Difficulty of Paying Living Expenses: Somewhat hard   Food Insecurity: No Food Insecurity    Worried About Running Out of Food in the Last Year: Never true    Ran Out of Food in the Last Year: Never true   Transportation Needs: Unknown    Lack of Transportation (Medical): No   Physical Activity: Sufficiently Active    Days of Exercise per Week: 7 days    Minutes of Exercise per Session: 30 min   Stress: Stress Concern Present    Feeling of Stress : Very much   Social Connections: Unknown    Frequency of Communication with Friends and Family: Twice a week    Frequency of Social Gatherings with Friends and Family: Three times a week    Active Member of Clubs or Organizations: No    Attends Club or Organization Meetings: Never    Marital Status:    Housing Stability: Low Risk     Unable to Pay for Housing in the Last Year: No    Number of Places Lived in the Last Year: 1    Unstable Housing in the Last Year: No       Precautions:     Allergies as of 10/02/2022 - Reviewed 10/02/2022   Allergen Reaction Noted    Compazine [prochlorperazine edisylate]  08/30/2015       WOC Assessment Details/Treatment   Consulted for I&D left leg and foot  S/P Incision and drainage left leg medial x 2 sites and lateral ankle 10/9/22 per Dr. Snyder for left leg wound with  necrosis  A 72 year old male admitted 10/2/22 from home with sepsis; cellulitis of LLE; normocytic anemia; hyponatremia; hypokalemia; Bartter syndrome; debility; chronic cough; chronic gout; glaucoma; history prostate cancer  10/10 WBC 17.91 Hgb 9.9 Hct 31.2 Alb 1.6 Weight 135 lb  On Isoflex mattress; Warren score 18  Assessment:  Nursing reports Dr. Snyder visited today and removed packing from surgical site. Site redressed with gauze saturated with Vashe and wrapped with Kerlix and ACE wrap.   Treatment:  According to nursing, to change dressing daily.  Orders placed.     10/10/2022

## 2022-10-10 NOTE — SUBJECTIVE & OBJECTIVE
Interval History: OR yesterday. No complaints this AM.    Review of Systems   Constitutional:  Negative for chills and fever.   Respiratory:  Negative for cough and shortness of breath.    Cardiovascular:  Negative for chest pain and leg swelling.   Gastrointestinal:  Negative for abdominal distention and abdominal pain.   Skin:  Positive for color change and wound.   Objective:     Vital Signs (Most Recent):  Temp: 98.2 °F (36.8 °C) (10/10/22 0734)  Pulse: 84 (10/10/22 0734)  Resp: 16 (10/10/22 0734)  BP: 121/60 (10/10/22 0734)  SpO2: 97 % (10/10/22 0734) Vital Signs (24h Range):  Temp:  [97.6 °F (36.4 °C)-98.7 °F (37.1 °C)] 98.2 °F (36.8 °C)  Pulse:  [69-87] 84  Resp:  [14-23] 16  SpO2:  [94 %-100 %] 97 %  BP: (109-145)/(56-66) 121/60     Weight: 61.6 kg (135 lb 12.9 oz)  Body mass index is 21.27 kg/m².    Intake/Output Summary (Last 24 hours) at 10/10/2022 0826  Last data filed at 10/10/2022 0607  Gross per 24 hour   Intake 2004 ml   Output 1250 ml   Net 754 ml        Physical Exam  Constitutional:       General: He is not in acute distress.     Appearance: He is ill-appearing. He is not toxic-appearing or diaphoretic.   Cardiovascular:      Rate and Rhythm: Normal rate and regular rhythm.      Heart sounds: No murmur heard.    No gallop.   Pulmonary:      Effort: Pulmonary effort is normal. No respiratory distress.      Breath sounds: Normal breath sounds. No wheezing.   Abdominal:      General: Bowel sounds are normal. There is no distension.      Palpations: Abdomen is soft.      Tenderness: There is no abdominal tenderness.   Skin:     Comments: LLE wrapped, dressing not taken down. Distal movement/sensation intact.       Significant Labs: All pertinent labs within the past 24 hours have been reviewed.    Significant Imaging: I have reviewed all pertinent imaging results/findings within the past 24 hours.

## 2022-10-10 NOTE — PROGRESS NOTES
Pharmacokinetic Assessment Follow Up: IV Vancomycin    Vancomycin serum concentration assessment(s):    The random level was drawn correctly and can be used to guide therapy at this time. The measurement is within the desired definitive target range of 10 to 20 mcg/mL.    Vancomycin Regimen Plan:    Give 1250 mg today.  Re-dose when the random level is less than 20 mcg/mL, next level to be drawn at 0400 on 10/11/2022    Drug levels (last 3 results):  Recent Labs   Lab Result Units 10/07/22  1834 10/08/22  0535 10/09/22  0444 10/10/22  0409   Vancomycin, Random ug/mL  --  15.1 11.2 13.4   Vancomycin-Trough ug/mL 24.7*  --   --   --        Pharmacy will continue to follow and monitor vancomycin.    Please contact pharmacy at extension [phone] for questions regarding this assessment.    Thank you for the consult,   Dirk West Jr       Patient brief summary:  Mark Church is a 72 y.o. male initiated on antimicrobial therapy with IV Vancomycin for treatment of skin & soft tissue infection    Drug Allergies:   Review of patient's allergies indicates:   Allergen Reactions    Compazine [prochlorperazine edisylate]        Actual Body Weight:   61.6 kg    Renal Function:   Estimated Creatinine Clearance: 72.7 mL/min (based on SCr of 0.8 mg/dL).,     Dialysis Method (if applicable):  N/A    CBC (last 72 hours):  Recent Labs   Lab Result Units 10/08/22  0535 10/09/22  0444 10/10/22  0409   WBC K/uL 22.96* 18.92* 17.91*   Hemoglobin g/dL 10.5* 9.9* 9.9*   Hematocrit % 31.5* 29.6* 31.2*   Platelets K/uL 538* 679* 820*   Gran % % 74.0* 87.0* 79.0*   Lymph % % 9.0* 4.0* 11.0*   Mono % % 9.0 6.0 4.0   Eosinophil % % 2.0 2.0 1.0   Basophil % % 0.0 0.0 0.0   Differential Method  Manual Manual Manual       Metabolic Panel (last 72 hours):  Recent Labs   Lab Result Units 10/08/22  0535 10/09/22  0444 10/10/22  0409   Sodium mmol/L 134* 137 136   Potassium mmol/L 3.8 4.3 5.1   Chloride mmol/L 97 97 98   CO2 mmol/L 30* 33* 31*    Glucose mg/dL 115* 116* 101   BUN mg/dL 16 16 19   Creatinine mg/dL 0.6 0.7 0.8   Albumin g/dL 1.4* 1.5* 1.6*   Total Bilirubin mg/dL 0.8 0.6 0.6   Alkaline Phosphatase U/L 89 82 186*   AST U/L 50* 54* 83*   ALT U/L 51* 50* 61*   Magnesium mg/dL 1.4* 1.5* 1.5*       Vancomycin Administrations:  vancomycin given in the last 96 hours                     vancomycin 1.25 g in dextrose 5% 250 mL IVPB (ready to mix) (mg) 1,250 mg New Bag 10/09/22 1018    vancomycin in dextrose 5 % 1 gram/250 mL IVPB 1,000 mg (mg) 1,000 mg New Bag 10/08/22 1025    vancomycin 750 mg in dextrose 5 % 250 mL IVPB (ready to mix system) (mg) 750 mg New Bag 10/07/22 1830     750 mg New Bag  0642     750 mg New Bag 10/06/22 1845                    Microbiologic Results:  Microbiology Results (last 7 days)       Procedure Component Value Units Date/Time    Gram stain [[id number]] Collected: 10/09/22 0853    Order Status: Completed Specimen: Wound from Ankle, Left Updated: 10/09/22 1330     Gram Stain Result Rare WBC's      Few Gram positive cocci in pairs and chains    Narrative:      LEFT ANKLE WOUND    Culture, Anaerobe [[id number]] Collected: 10/09/22 0853    Order Status: Sent Specimen: Wound from Ankle, Left Updated: 10/09/22 1019    Aerobic culture [[id number]] Collected: 10/09/22 0853    Order Status: Sent Specimen: Wound from Ankle, Left Updated: 10/09/22 1018    Fungus culture [[id number]] Collected: 10/09/22 0853    Order Status: Sent Specimen: Wound from Ankle, Left Updated: 10/09/22 1017    AFB Culture & Smear [[id number]] Collected: 10/09/22 0853    Order Status: Sent Specimen: Wound from Ankle, Left Updated: 10/09/22 1017    Fungus culture [[id number]] Collected: 10/09/22 0853    Order Status: Sent Specimen: Wound from Ankle, Left Updated: 10/09/22 0958    Culture, Anaerobe [[id number]] Collected: 10/09/22 0853    Order Status: Sent Specimen: Wound from Ankle, Left Updated: 10/09/22 0958    Aerobic culture [[id number]] Collected: 10/09/22  0853    Order Status: Sent Specimen: Wound from Ankle, Left Updated: 10/09/22 0958    AFB Culture & Smear [[id number]] Collected: 10/09/22 0853    Order Status: Sent Specimen: Wound from Ankle, Left Updated: 10/09/22 0958    Gram stain [[id number]] Collected: 10/09/22 0853    Order Status: Sent Specimen: Wound from Ankle, Left Updated: 10/09/22 0958    Blood culture x two cultures. Draw prior to antibiotics. [[id number]] Collected: 10/02/22 1210    Order Status: Completed Specimen: Blood from Peripheral, Foot, Right Updated: 10/06/22 1303     Blood Culture, Routine No Growth after 4 days.    Narrative:      Aerobic and anaerobic    Blood culture x two cultures. Draw prior to antibiotics. [[id number]] Collected: 10/02/22 1216    Order Status: Completed Specimen: Blood from Peripheral, Antecubital, Right Updated: 10/06/22 1303     Blood Culture, Routine No Growth after 4 days.    Narrative:      Aerobic and anaerobic

## 2022-10-11 LAB
ALBUMIN SERPL BCP-MCNC: 1.6 G/DL (ref 3.5–5.2)
ALP SERPL-CCNC: 131 U/L (ref 55–135)
ALT SERPL W/O P-5'-P-CCNC: 58 U/L (ref 10–44)
ANION GAP SERPL CALC-SCNC: 7 MMOL/L (ref 8–16)
ANISOCYTOSIS BLD QL SMEAR: SLIGHT
AST SERPL-CCNC: 70 U/L (ref 10–40)
BASOPHILS NFR BLD: 0 % (ref 0–1.9)
BILIRUB SERPL-MCNC: 0.6 MG/DL (ref 0.1–1)
BUN SERPL-MCNC: 17 MG/DL (ref 8–23)
BURR CELLS BLD QL SMEAR: ABNORMAL
CALCIUM SERPL-MCNC: 8.2 MG/DL (ref 8.7–10.5)
CHLORIDE SERPL-SCNC: 96 MMOL/L (ref 95–110)
CO2 SERPL-SCNC: 31 MMOL/L (ref 23–29)
CREAT SERPL-MCNC: 0.7 MG/DL (ref 0.5–1.4)
DIFFERENTIAL METHOD: ABNORMAL
EOSINOPHIL NFR BLD: 5 % (ref 0–8)
ERYTHROCYTE [DISTWIDTH] IN BLOOD BY AUTOMATED COUNT: 14.4 % (ref 11.5–14.5)
EST. GFR  (NO RACE VARIABLE): >60 ML/MIN/1.73 M^2
GLUCOSE SERPL-MCNC: 92 MG/DL (ref 70–110)
HCT VFR BLD AUTO: 31.3 % (ref 40–54)
HGB BLD-MCNC: 10.1 G/DL (ref 14–18)
IMM GRANULOCYTES # BLD AUTO: ABNORMAL K/UL (ref 0–0.04)
IMM GRANULOCYTES NFR BLD AUTO: ABNORMAL % (ref 0–0.5)
LYMPHOCYTES NFR BLD: 9 % (ref 18–48)
MAGNESIUM SERPL-MCNC: 1.4 MG/DL (ref 1.6–2.6)
MCH RBC QN AUTO: 27.3 PG (ref 27–31)
MCHC RBC AUTO-ENTMCNC: 32.3 G/DL (ref 32–36)
MCV RBC AUTO: 85 FL (ref 82–98)
METAMYELOCYTES NFR BLD MANUAL: 4 %
MONOCYTES NFR BLD: 7 % (ref 4–15)
NEUTROPHILS NFR BLD: 72 % (ref 38–73)
NEUTS BAND NFR BLD MANUAL: 3 %
NRBC BLD-RTO: 0 /100 WBC
PLATELET # BLD AUTO: 932 K/UL (ref 150–450)
PLATELET BLD QL SMEAR: ABNORMAL
PMV BLD AUTO: 9.6 FL (ref 9.2–12.9)
POIKILOCYTOSIS BLD QL SMEAR: SLIGHT
POTASSIUM SERPL-SCNC: 4.2 MMOL/L (ref 3.5–5.1)
PROT SERPL-MCNC: 5.6 G/DL (ref 6–8.4)
RBC # BLD AUTO: 3.7 M/UL (ref 4.6–6.2)
SODIUM SERPL-SCNC: 134 MMOL/L (ref 136–145)
VANCOMYCIN SERPL-MCNC: 12.1 UG/ML
WBC # BLD AUTO: 15.81 K/UL (ref 3.9–12.7)

## 2022-10-11 PROCEDURE — 25000003 PHARM REV CODE 250: Performed by: ORTHOPAEDIC SURGERY

## 2022-10-11 PROCEDURE — 80053 COMPREHEN METABOLIC PANEL: CPT | Performed by: ORTHOPAEDIC SURGERY

## 2022-10-11 PROCEDURE — 11000001 HC ACUTE MED/SURG PRIVATE ROOM

## 2022-10-11 PROCEDURE — 85027 COMPLETE CBC AUTOMATED: CPT | Performed by: ORTHOPAEDIC SURGERY

## 2022-10-11 PROCEDURE — 99232 PR SUBSEQUENT HOSPITAL CARE,LEVL II: ICD-10-PCS | Mod: ,,, | Performed by: STUDENT IN AN ORGANIZED HEALTH CARE EDUCATION/TRAINING PROGRAM

## 2022-10-11 PROCEDURE — 63600175 PHARM REV CODE 636 W HCPCS: Performed by: ORTHOPAEDIC SURGERY

## 2022-10-11 PROCEDURE — 25000003 PHARM REV CODE 250: Performed by: STUDENT IN AN ORGANIZED HEALTH CARE EDUCATION/TRAINING PROGRAM

## 2022-10-11 PROCEDURE — 80202 ASSAY OF VANCOMYCIN: CPT | Performed by: ORTHOPAEDIC SURGERY

## 2022-10-11 PROCEDURE — 85007 BL SMEAR W/DIFF WBC COUNT: CPT | Performed by: ORTHOPAEDIC SURGERY

## 2022-10-11 PROCEDURE — 97110 THERAPEUTIC EXERCISES: CPT | Mod: CQ

## 2022-10-11 PROCEDURE — 99232 SBSQ HOSP IP/OBS MODERATE 35: CPT | Mod: ,,, | Performed by: STUDENT IN AN ORGANIZED HEALTH CARE EDUCATION/TRAINING PROGRAM

## 2022-10-11 PROCEDURE — 63600175 PHARM REV CODE 636 W HCPCS: Performed by: STUDENT IN AN ORGANIZED HEALTH CARE EDUCATION/TRAINING PROGRAM

## 2022-10-11 PROCEDURE — 36415 COLL VENOUS BLD VENIPUNCTURE: CPT | Performed by: ORTHOPAEDIC SURGERY

## 2022-10-11 PROCEDURE — 83735 ASSAY OF MAGNESIUM: CPT | Performed by: ORTHOPAEDIC SURGERY

## 2022-10-11 RX ADMIN — OXYBUTYNIN CHLORIDE 5 MG: 5 TABLET ORAL at 09:10

## 2022-10-11 RX ADMIN — CETIRIZINE HYDROCHLORIDE 5 MG: 5 TABLET ORAL at 08:10

## 2022-10-11 RX ADMIN — OXYBUTYNIN CHLORIDE 5 MG: 5 TABLET ORAL at 08:10

## 2022-10-11 RX ADMIN — SENNOSIDES 8.6 MG: 8.6 TABLET, FILM COATED ORAL at 08:10

## 2022-10-11 RX ADMIN — VANCOMYCIN HYDROCHLORIDE 1250 MG: 1.25 INJECTION, POWDER, LYOPHILIZED, FOR SOLUTION INTRAVENOUS at 11:10

## 2022-10-11 RX ADMIN — Medication 400 MG: at 09:10

## 2022-10-11 RX ADMIN — AZELASTINE 137 MCG: 1 SPRAY, METERED NASAL at 09:10

## 2022-10-11 RX ADMIN — LACTOBACILLUS ACIDOPHILUS / LACTOBACILLUS BULGARICUS 1 EACH: 100 MILLION CFU STRENGTH GRANULES at 09:10

## 2022-10-11 RX ADMIN — POLYETHYLENE GLYCOL 3350 17 G: 17 POWDER, FOR SOLUTION ORAL at 09:10

## 2022-10-11 RX ADMIN — CEFEPIME 2 G: 2 INJECTION, POWDER, FOR SOLUTION INTRAVENOUS at 05:10

## 2022-10-11 RX ADMIN — TIMOLOL MALEATE 1 DROP: 5 SOLUTION/ DROPS OPHTHALMIC at 09:10

## 2022-10-11 RX ADMIN — ENOXAPARIN SODIUM 40 MG: 100 INJECTION SUBCUTANEOUS at 05:10

## 2022-10-11 RX ADMIN — HYDROCODONE BITARTRATE AND ACETAMINOPHEN 1 TABLET: 10; 325 TABLET ORAL at 06:10

## 2022-10-11 RX ADMIN — METRONIDAZOLE 500 MG: 500 TABLET ORAL at 01:10

## 2022-10-11 RX ADMIN — ALLOPURINOL 100 MG: 100 TABLET ORAL at 09:10

## 2022-10-11 RX ADMIN — TIMOLOL MALEATE 1 DROP: 5 SOLUTION/ DROPS OPHTHALMIC at 08:10

## 2022-10-11 RX ADMIN — METRONIDAZOLE 500 MG: 500 TABLET ORAL at 05:10

## 2022-10-11 RX ADMIN — MUPIROCIN: 20 OINTMENT TOPICAL at 08:10

## 2022-10-11 RX ADMIN — CEFEPIME 2 G: 2 INJECTION, POWDER, FOR SOLUTION INTRAVENOUS at 09:10

## 2022-10-11 RX ADMIN — LACTOBACILLUS ACIDOPHILUS / LACTOBACILLUS BULGARICUS 1 EACH: 100 MILLION CFU STRENGTH GRANULES at 08:10

## 2022-10-11 RX ADMIN — LATANOPROST 1 DROP: 50 SOLUTION OPHTHALMIC at 08:10

## 2022-10-11 RX ADMIN — SENNOSIDES 8.6 MG: 8.6 TABLET, FILM COATED ORAL at 09:10

## 2022-10-11 RX ADMIN — METRONIDAZOLE 500 MG: 500 TABLET ORAL at 10:10

## 2022-10-11 RX ADMIN — ASPIRIN 81 MG CHEWABLE TABLET 81 MG: 81 TABLET CHEWABLE at 09:10

## 2022-10-11 RX ADMIN — HYDROCODONE BITARTRATE AND ACETAMINOPHEN 1 TABLET: 10; 325 TABLET ORAL at 03:10

## 2022-10-11 RX ADMIN — HYDROCODONE BITARTRATE AND ACETAMINOPHEN 1 TABLET: 10; 325 TABLET ORAL at 09:10

## 2022-10-11 RX ADMIN — MUPIROCIN: 20 OINTMENT TOPICAL at 09:10

## 2022-10-11 RX ADMIN — AZELASTINE 137 MCG: 1 SPRAY, METERED NASAL at 08:10

## 2022-10-11 NOTE — PLAN OF CARE
Problem: Physical Therapy  Goal: Physical Therapy Goal  Description: Goals to be met by: 10/17/22     Patient will increase functional independence with mobility by performin. Pt to be mod I with bed mobility.  2. Pt to transfer with mod I.  3. Pt to ambulate 150' w/RW and supervision.  4. Pt to be (I) with written HEP.    Outcome: Ongoing, Progressing

## 2022-10-11 NOTE — PT/OT/SLP PROGRESS
"Physical Therapy Treatment    Patient Name:  Mark Church   MRN:  [id number]    Recommendations:     Discharge Recommendations:  home health PT (with family assistance)   Discharge Equipment Recommendations: none   Barriers to discharge: None    Assessment:     Mark Church is a 72 y.o. male admitted with a medical diagnosis of Sepsis.  He presents with the following impairments/functional limitations:  weakness, impaired endurance, impaired self care skills, gait instability, impaired functional mobility, impaired balance, decreased lower extremity function, decreased ROM, edema, impaired skin, pain .    Rehab Prognosis: Good; patient would benefit from acute skilled PT services to address these deficits and reach maximum level of function.    Recent Surgery: Procedure(s) (LRB):  INCISION AND DRAINAGE, ANKLE (Left) 2 Days Post-Op    Plan:     During this hospitalization, patient to be seen 5 x/week to address the identified rehab impairments via gait training, therapeutic activities, therapeutic exercises and progress toward the following goals:    Plan of Care Expires:  10/17/22    Subjective     Chief Complaint: pain   Patient/Family Comments/goals: pt agreeable to bed level BLE ex's only 2* to pain and tired. " I will walk tomorrow "   Pain/Comfort:  Pain Rating 1: 9/10  Location - Side 1: Left  Location 1: leg  Pain Addressed 1: Pre-medicate for activity, Reposition, Cessation of Activity  Pain Rating Post-Intervention 1: 8/10      Objective:     Communicated with nurse prior to session.  Patient found HOB elevated with BLE elevated  telemetry, peripheral IV, pressure relief boots, Condom Catheter upon PT entry to room.     General Precautions: Standard, fall   Orthopedic Precautions:N/A   Braces: N/A  Respiratory Status: Room air      AM-PAC 6 CLICK MOBILITY  Turning over in bed (including adjusting bedclothes, sheets and blankets)?: 4  Sitting down on and standing up from a chair with arms (e.g., wheelchair, " bedside commode, etc.): 3  Moving from lying on back to sitting on the side of the bed?: 3  Moving to and from a bed to a chair (including a wheelchair)?: 3  Need to walk in hospital room?: 3  Climbing 3-5 steps with a railing?: 2  Basic Mobility Total Score: 18       Therapeutic Activities and Exercises:   Lower Extremity Exercises.   Patient educated on the purpose of therapeutic exercise.    Patient verbalized acceptance/understanding of instructions, expectations, and limitations(for safety).  Patient performed: 10 reps (each) of B LE There Ex: AP, QS, GS , HS, SAQ, Hip abd/add  while in bed( HOB elevated) .       Patient required still requires verbal cues/tactile cues to ensure correct sequence, to maintain proper form, and to allow for self-correction.  Pt reported no complaints or problems with exercise activity.      Patient left HOB elevated with pressure relief boots to BLE, BLE elevated  all lines intact, call button in reach, and nuse notified..    GOALS:   Multidisciplinary Problems       Physical Therapy Goals          Problem: Physical Therapy    Goal Priority Disciplines Outcome Goal Variances Interventions   Physical Therapy Goal     PT, PT/OT Ongoing, Progressing     Description: Goals to be met by: 10/17/22     Patient will increase functional independence with mobility by performin. Pt to be mod I with bed mobility.  2. Pt to transfer with mod I.  3. Pt to ambulate 150' w/RW and supervision.  4. Pt to be (I) with written HEP.                         Time Tracking:     PT Received On: 10/11/22  PT Start Time: 1618     PT Stop Time: 1630  PT Total Time (min): 12 min     Billable Minutes: Therapeutic Exercise 12    Treatment Type: Treatment  PT/PTA: PTA     PTA Visit Number: 1     10/11/2022

## 2022-10-11 NOTE — SUBJECTIVE & OBJECTIVE
Interval History: feeling okay today, pain is still present but still better than admit. Plan to evaluate wound with dressing change this afternoon.    Review of Systems   Constitutional:  Negative for chills and fever.   Respiratory:  Negative for cough and shortness of breath.    Cardiovascular:  Negative for chest pain and leg swelling.   Gastrointestinal:  Negative for abdominal distention and abdominal pain.   Skin:  Positive for color change and wound.   Objective:     Vital Signs (Most Recent):  Temp: 98.9 °F (37.2 °C) (10/11/22 0701)  Pulse: 80 (10/11/22 0701)  Resp: 18 (10/11/22 0931)  BP: (!) 120/58 (10/11/22 0701)  SpO2: 97 % (10/11/22 0701) Vital Signs (24h Range):  Temp:  [97.6 °F (36.4 °C)-99.1 °F (37.3 °C)] 98.9 °F (37.2 °C)  Pulse:  [63-93] 80  Resp:  [16-21] 18  SpO2:  [95 %-100 %] 97 %  BP: (108-135)/(54-87) 120/58     Weight: 61.6 kg (135 lb 12.9 oz)  Body mass index is 21.27 kg/m².    Intake/Output Summary (Last 24 hours) at 10/11/2022 1053  Last data filed at 10/11/2022 0900  Gross per 24 hour   Intake 3307.49 ml   Output 2875 ml   Net 432.49 ml        Physical Exam  Constitutional:       General: He is not in acute distress.     Appearance: He is ill-appearing. He is not toxic-appearing or diaphoretic.   Cardiovascular:      Rate and Rhythm: Normal rate and regular rhythm.      Heart sounds: No murmur heard.    No gallop.   Pulmonary:      Effort: Pulmonary effort is normal. No respiratory distress.      Breath sounds: Normal breath sounds. No wheezing.   Abdominal:      General: Bowel sounds are normal. There is no distension.      Palpations: Abdomen is soft.      Tenderness: There is no abdominal tenderness.   Skin:     Comments: LLE wrapped, dressing not taken down. Distal movement/sensation intact.       Significant Labs: All pertinent labs within the past 24 hours have been reviewed.    Significant Imaging: I have reviewed all pertinent imaging results/findings within the past 24 hours.

## 2022-10-11 NOTE — PLAN OF CARE
Recommendations    1) Continue Regular Diet as tolerated, encourage PO  2) Continue Boost (+) TID  3) Addition of Vel BID to promote wound healing  4) Monitor Nutrition related labs, I/O's, Weights    Goals:   1) Patient to meet > 75% EEN/EPN via PO/ONS intake  2) Monitored labs to trend toward target ranges    Nutrition Goal Status: new  Communication of RD Recs:  (POC)

## 2022-10-11 NOTE — PT/OT/SLP PROGRESS
Occupational Therapy      Patient Name:  Mark Church   MRN:  [id number]    Attempts made 3:17 and 4:30 PM Patient not seen today secondary to Patient fatigue, Pain reported both attempts, had just finished working with PT upon second attempt. Will follow-up as able.    10/11/2022

## 2022-10-11 NOTE — PROGRESS NOTES
Ortho Daily Progress Note    Mark Church is a 72 y.o. male admitted on 10/2/2022      Chief Complaint/Reason for admission: Insect Bite (EMS called to 71yo male that was bit by unknown insect on Wednesday. Has warmth, swelling and redness to right lower extremity since the bite but the pain is increasing. Saw dr on Friday and was given oral antibiotics but no improvement. )       Hospital Day: 9  Post Op Day: 2 Days Post-Op     The patient was seen and examined this morning at the bedside.  Patient is sleeping comfortably in bed it appears leukocytosis is improving continue current care    Vitals:    10/11/22 0543 10/11/22 0701 10/11/22 0931 10/11/22 1100   BP: (!) 113/59 (!) 120/58  (!) 120/58   Pulse: 85 80  103   Resp: 19 18 18 20   Temp: 97.6 °F (36.4 °C) 98.9 °F (37.2 °C)  99.2 °F (37.3 °C)   TempSrc: Oral Oral  Oral   SpO2: 98% 97%  98%   Weight:       Height:           Vital Signs (Most Recent)  Temp: 99.2 °F (37.3 °C) (10/11/22 1100)  Pulse: 103 (10/11/22 1100)  Resp: 20 (10/11/22 1100)  BP: (!) 120/58 (10/11/22 1100)  SpO2: 98 % (10/11/22 1100)    Vital Signs Range (Last 24H):  Temp:  [97.6 °F (36.4 °C)-99.2 °F (37.3 °C)]   Pulse:  []   Resp:  [17-20]   BP: (108-120)/(54-59)   SpO2:  [97 %-100 %]       Physical:    Sleeping comfortably in the bed     Dressing appears appropriate      Recent Labs     10/09/22  0444 10/10/22  0409 10/11/22  0437   K 4.3 5.1 4.2   MG 1.5* 1.5* 1.4*   CALCIUM 8.4* 8.5* 8.2*   WBC 18.92* 17.91* 15.81*   HGB 9.9* 9.9* 10.1*   HCT 29.6* 31.2* 31.3*   * 820* 932*       I/O last 3 completed shifts:  In: 3787.5 [P.O.:3348; IV Piggyback:439.5]  Out: 3375 [Urine:3375]          Assessment:  A/P P status post I&D right leg         Plan:     Continue current care with wound care.  Appears leukocytosis is improving.        Jamison Snyder MD  [facility]

## 2022-10-11 NOTE — SUBJECTIVE & OBJECTIVE
Interval History: No fevers documented overnight. Pt reports some post-op pain. Tolerating abx without issues.     Review of Systems   Constitutional:  Negative for chills and fever.   Musculoskeletal:  Positive for myalgias.   All other systems reviewed and are negative.  Objective:     Vital Signs (Most Recent):  Temp: 98.9 °F (37.2 °C) (10/11/22 0701)  Pulse: 80 (10/11/22 0701)  Resp: 18 (10/11/22 0701)  BP: (!) 120/58 (10/11/22 0701)  SpO2: 97 % (10/11/22 0701) Vital Signs (24h Range):  Temp:  [97.6 °F (36.4 °C)-99.1 °F (37.3 °C)] 98.9 °F (37.2 °C)  Pulse:  [63-93] 80  Resp:  [16-21] 18  SpO2:  [95 %-100 %] 97 %  BP: (108-135)/(54-87) 120/58     Weight: 61.6 kg (135 lb 12.9 oz)  Body mass index is 21.27 kg/m².    Estimated Creatinine Clearance: 83.1 mL/min (based on SCr of 0.7 mg/dL).    Physical Exam  Constitutional:       Appearance: He is well-developed.   HENT:      Head: Normocephalic and atraumatic.      Right Ear: External ear normal.      Left Ear: External ear normal.      Nose: Nose normal.      Mouth/Throat:      Mouth: Mucous membranes are moist.      Pharynx: No oropharyngeal exudate.   Eyes:      General: No scleral icterus.        Right eye: No discharge.         Left eye: No discharge.   Neck:      Thyroid: No thyromegaly.   Cardiovascular:      Rate and Rhythm: Normal rate and regular rhythm.   Pulmonary:      Effort: Pulmonary effort is normal. No respiratory distress.      Breath sounds: No wheezing.   Abdominal:      General: Bowel sounds are normal. There is no distension.      Palpations: Abdomen is soft.      Tenderness: There is no abdominal tenderness. There is no guarding.   Musculoskeletal:      Right lower leg: No edema.      Comments: L leg wrapped   Skin:     General: Skin is warm and dry.      Coloration: Skin is not jaundiced.      Findings: No bruising.   Neurological:      Mental Status: He is alert and oriented to person, place, and time. Mental status is at baseline.       Motor: No weakness.   Psychiatric:         Mood and Affect: Mood normal.         Behavior: Behavior normal.       Significant Labs:   Microbiology Results (last 7 days)       Procedure Component Value Units Date/Time    Aerobic culture [[id number]] Collected: 10/09/22 0853    Order Status: Completed Specimen: Wound from Ankle, Left Updated: 10/11/22 0736     Aerobic Bacterial Culture No growth    Narrative:      LEFT ANKLE TISSUE    Aerobic culture [[id number]] Collected: 10/09/22 0853    Order Status: Completed Specimen: Wound from Ankle, Left Updated: 10/11/22 0629     Aerobic Bacterial Culture No growth    Narrative:      LEFT ANKLE WOUND    AFB Culture & Smear [[id number]] Collected: 10/09/22 0853    Order Status: Sent Specimen: Wound from Ankle, Left Updated: 10/10/22 1552    AFB Culture & Smear [[id number]] Collected: 10/09/22 0853    Order Status: Sent Specimen: Wound from Ankle, Left Updated: 10/10/22 1006    Gram stain [[id number]] Collected: 10/09/22 0853    Order Status: Completed Specimen: Wound from Ankle, Left Updated: 10/10/22 0842     Gram Stain Result Rare WBC's      No organisms seen    Narrative:      LEFT ANKLE TISSUE    Fungus culture [[id number]] Collected: 10/09/22 0853    Order Status: Sent Specimen: Wound from Ankle, Left Updated: 10/10/22 0748    Culture, Anaerobe [[id number]] Collected: 10/09/22 0853    Order Status: Sent Specimen: Wound from Ankle, Left Updated: 10/10/22 0747    Gram stain [[id number]] Collected: 10/09/22 0853    Order Status: Completed Specimen: Wound from Ankle, Left Updated: 10/09/22 1330     Gram Stain Result Rare WBC's      Few Gram positive cocci in pairs and chains    Narrative:      LEFT ANKLE WOUND    Culture, Anaerobe [[id number]] Collected: 10/09/22 0853    Order Status: Sent Specimen: Wound from Ankle, Left Updated: 10/09/22 1019    Fungus culture [[id number]] Collected: 10/09/22 0853    Order Status: Sent Specimen: Wound from Ankle, Left Updated: 10/09/22 1017    Blood  culture x two cultures. Draw prior to antibiotics. [[id number]] Collected: 10/02/22 1210    Order Status: Completed Specimen: Blood from Peripheral, Foot, Right Updated: 10/06/22 1303     Blood Culture, Routine No Growth after 4 days.    Narrative:      Aerobic and anaerobic    Blood culture x two cultures. Draw prior to antibiotics. [[id number]] Collected: 10/02/22 1216    Order Status: Completed Specimen: Blood from Peripheral, Antecubital, Right Updated: 10/06/22 1303     Blood Culture, Routine No Growth after 4 days.    Narrative:      Aerobic and anaerobic            Significant Imaging: I have reviewed all pertinent imaging results/findings within the past 24 hours.

## 2022-10-11 NOTE — ASSESSMENT & PLAN NOTE
Cellulitis, likely point of bacterial entry medial ankle. WBC uptrending despite appropriate antibiotic therapy. Repeat CT scan with possible early abscess formation on read, but surgery does not feel it is amenable to intervention. Ortho consulted by GS for second opinion, pt went to OR for I&D but no abscess found on 10/9  - appreciate ID, general surgery, orthopedics involvement  - on cefepime/vancomycin/flagyl  - abnormal arterial US noted, but WILLIE normal  - elevate ankle>knee>hip to facilitate drainage  - WbC trending down

## 2022-10-11 NOTE — PHYSICIAN QUERY
"PT Name: Mark Church  MR #: [id number]    DOCUMENTATION CLARIFICATION     CDS/: SADIE Marie, RN, CDS               Contact information:[email or web address]   This form is a permanent document in the medical record.    Query Date: October 11, 2022  By submitting this query, we are merely seeking further clarification of documentation. Please utilize your independent clinical judgment when addressing the question(s) below.    The Medical Record contains the following:   Indicator Supporting Clinical Findings Location in Medical Record   X Documentation of "Debridement"  Procedures Performed:   Incision drainage left leg medial X 2 sites  Lateral ankle   Debridement of muscle skin and fascia medial x2 sites and lateral ankle     Findings Left leg wound with necrosis       After appropriate draping we began our incision overall the medial aspect of the left lower extremity.  There was serosanguineous fluid which was expressed from this location.  We bluntly dissected up more proximally.  There was some planes within the tissues which we followed.  There was no ginana pus with the was murky fluid throughout the entire lower extremity.  We debrided the muscle skin fascia from this location.  We then made incision more proximal over the calf medially and again irrigated this area well there was still murky fluid in this location as well.  We bluntly dissected throughout the tissue planes as well.  We irrigated with pulsatile lavage both these locations copiously.  We then made a separate incision laterally over the left ankle and again performed the same technique dissecting through tissue planes bluntly irrigating well with the pulsatile lavage and debriding muscle skin fascia with sharp debridement to all 3 locations.     Op note, Dr. Snyder, 10/9    Documentation of "I&D"      Other       Excisional debridement is the surgical removal or cutting away of such tissue, necrosis, or slough and is classified to " "the root operation "Excision." Use of a sharp instrument does not always indicate that an excisional debridement was performed. Minor removal of loose fragments with scissors or using a sharp instrument to scrape away tissue is not an excisional debridement. Excisional debridement involves the use of a scalpel to remove devitalized tissue.  Nonexcisional debridement is the nonoperative brushing, irrigating, scrubbing, or washing of devitalized tissue, necrosis, slough, or foreign material. Most nonexcisional debridement procedures are classified to the root operation "Extraction" (pulling or stripping out or off all or a portion of a body part by the use of force).     Provider, please Specify the Type of Debridement performed on the Left Lower Extremity:    [  X ] Excisional Debridement         [   ] Non-excisional Debridement      [   ] Other Procedure (please specify): _____________   [  ] Clinically Undetermined     Reference:    ICD-10-CM/PCS Coding Clinic Third Quarter ICD-10, Effective with discharges: October 7, 2015 [facility] [facility] [employer] § Excisional and nonexcisional debridement (2015).    Form No. 41801   "

## 2022-10-11 NOTE — PROGRESS NOTES
"[facility] - Med Surg  Adult Nutrition  Progress Note    SUMMARY       Recommendations    1) Continue Regular Diet as tolerated, encourage PO  2) Continue Boost (+) TID  3) Addition of Vel BID to promote wound healing  4) Monitor Nutrition related labs, I/O's, Weights    Goals:   1) Patient to meet > 75% EEN/EPN via PO/ONS intake  2) Monitored labs to trend toward target ranges    Nutrition Goal Status: new  Communication of RD Recs:  (POC)    Assessment and Plan  Nutrition Problem  Increased Nutrient Energy Needs    Related to (etiology):   Infection    Signs and Symptoms (as evidenced by):   S/P I&D R-Leg     Interventions/Recommendations (treatment strategy):  Commercial Beverage  Modular Beverage  Collaboration of care with other providers    Nutrition Diagnosis Status:   New      Reason for Assessment    Reason For Assessment: length of stay  Diagnosis:  (Sepsis)  Relevant Medical History:   Patient Active Problem List   Diagnosis    H/O prostate cancer    Bartter syndrome    Glaucoma    Urinary incontinence    Nuclear sclerosis, bilateral    Refractive error    Blind left eye    Chronic pain syndrome    Post-traumatic osteoarthritis of multiple joints    Bilateral leg edema    Primary osteoarthritis of left shoulder    Joint inflammation    Thrombocythemia    Left hip pain    Primary open angle glaucoma of both eyes, severe stage    Blindness of left eye with normal vision in contralateral eye    Sepsis    Cellulitis of left lower extremity    Normocytic anemia    Hyponatremia    Hypokalemia    Chronic gout    Chronic cough    Debility    Cellulitis     General Information Comments:   10/11 - Pt presented 2' swelling and pain in right leg after what he thought was an insect bite. Received abx from PCP with no improvements with taking medication. Reported decreased appetite and intake in the several days since initial "bite". PO intake has fluctuated, currently 25 - 50% on average. Has ONS ordered. Addition of " "modular to assist in wound healing as pt is POD#2 from I&D of R-leg, no abscess was found.    Nutrition Discharge Planning: Regular Diet - Low Purine as warranted (Currently taking allopurinol)    Nutrition Risk Screen    Nutrition Risk Screen: large or nonhealing wound, burn or pressure injury    Nutrition/Diet History    Spiritual, Cultural Beliefs, Yazidism Practices, Values that Affect Care: no  Food Allergies: NKFA  Factors Affecting Nutritional Intake: decreased appetite, pain    Anthropometrics    Temp: 98.9 °F (37.2 °C)  Height Method: Stated  Height: 5' 7" (170.2 cm)  Height (inches): 67 in  Weight Method: Bed Scale  Weight: 61.6 kg (135 lb 12.9 oz)  Weight (lb): 135.8 lb  Ideal Body Weight (IBW), Male: 148 lb  % Ideal Body Weight, Male (lb): 87.84 %  % Ideal Body Weight Malnutrition: 80-90% - mild deficit  BMI (Calculated): 21.3  BMI Grade: 18.5-24.9 - normal       Lab/Procedures/Meds    Pertinent Labs Reviewed: reviewed  CBC:  Recent Labs   Lab 10/11/22  0437   WBC 15.81*   HGB 10.1*   HCT 31.3*   *     CMP:  Recent Labs   Lab 10/11/22  0437   CALCIUM 8.2*   ALBUMIN 1.6*   PROT 5.6*   *   K 4.2   CO2 31*   CL 96   BUN 17   CREATININE 0.7   ALKPHOS 131   ALT 58*   AST 70*   BILITOT 0.6     Pertinent Medications Reviewed: reviewed  Scheduled Meds:   allopurinoL  100 mg Oral Daily    aspirin  81 mg Oral Every other day    azelastine  1 spray Nasal BID    ceFEPime (MAXIPIME) IVPB  2 g Intravenous Q8H    cetirizine  5 mg Oral QHS    enoxaparin  40 mg Subcutaneous Daily    lactobacillus acidophilus & bulgar  1 packet Oral BID    latanoprost  1 drop Both Eyes QHS    magnesium oxide  400 mg Oral Daily    metroNIDAZOLE  500 mg Oral Q8H    mupirocin   Nasal BID    oxybutynin  5 mg Oral BID    polyethylene glycol  17 g Oral Daily    senna  8.6 mg Oral BID    timolol maleate 0.5%  1 drop Both Eyes BID    vancomycin (VANCOCIN) IVPB  1,250 mg Intravenous Q24H     Continuous Infusions:  PRN " Meds:.acetaminophen, benzonatate, dextrose 10%, dextrose 10%, glucagon (human recombinant), glucose, glucose, HYDROcodone-acetaminophen, HYDROcodone-acetaminophen, HYDROmorphone, loperamide, melatonin, ondansetron, simethicone, sodium chloride 0.9%, Pharmacy to dose Vancomycin consult **AND** vancomycin - pharmacy to dose    Physical Findings/Assessment  S/P Incision and drainage left leg medial x 2 sites and lateral ankle 10/9/22 per Dr. Snyder for left leg wound with necrosis     Estimated/Assessed Needs    Weight Used For Calorie Calculations: 61.6 kg (135 lb 12.9 oz)  Energy Calorie Requirements (kcal): 2156  Energy Need Method: Kcal/kg (35 per infection, wound)  Protein Requirements: 74 - 111g (1.2 - 1.8g/kg per increased needs of wound healing)  Weight Used For Protein Calculations: 61.6 kg (135 lb 12.9 oz)  Fluid Requirements (mL): 1 mL/kcal  Estimated Fluid Requirement Method:  (or per MD)  RDA Method (mL): 2156  CHO Requirement: 270g    Nutrition Prescription Ordered    Current Diet Order: Regular Diet  Oral Nutrition Supplement: Boost (+)    Evaluation of Received Nutrient/Fluid Intake    I/O: - 3.6 L since admit  Energy Calories Required: not meeting needs  Protein Required: not meeting needs  Fluid Required: not meeting needs  Comments: LBM 10/9  % Intake of Estimated Energy Needs: 25 - 50 %  % Meal Intake: 25 - 50 %    Intake/Output - Last 3 Shifts         10/09 0700  10/10 0659 10/10 0700  10/11 0659 10/11 0700  10/12 0659    P.O. 1554 2868 594    IV Piggyback 450 439.5     Total Intake(mL/kg) 2004 (32.5) 3307.5 (53.7) 594 (9.6)    Urine (mL/kg/hr) 1250 (0.8) 2875 (1.9) 500 (0.8)    Other       Stool 0  0    Total Output 1250 2875 500    Net +754 +432.5 +94           Urine Occurrence 3 x 1 x 1 x    Stool Occurrence 1 x  1 x          Nutrition Risk    Level of Risk/Frequency of Follow-up:  (1-2x/week)     Monitor and Evaluation    Food and Nutrient Intake: energy intake, food and beverage  intake  Food and Nutrient Adminstration: diet order  Knowledge/Beliefs/Attitudes: beliefs and attitudes  Physical Activity and Function: nutrition-related ADLs and IADLs  Anthropometric Measurements: weight, weight change  Biochemical Data, Medical Tests and Procedures: electrolyte and renal panel, gastrointestinal profile, glucose/endocrine profile, inflammatory profile, lipid profile  Nutrition-Focused Physical Findings: overall appearance     Nutrition Follow-Up    RD Follow-up?: Yes  Tricia Etienne, BS, RDN, LDN

## 2022-10-11 NOTE — PROGRESS NOTES
Pharmacokinetic Assessment Follow Up: IV Vancomycin    Vancomycin serum concentration assessment(s):    The trough level was drawn correctly and can be used to guide therapy at this time. The measurement is within the desired definitive target range of 10 to 20 mcg/mL.    Vancomycin Regimen Plan:    Change regimen to Vancomycin 1250 mg IV every 24 hours with next serum trough concentration measured at 1100 prior to 3rd dose on 10/13/2022    Drug levels (last 3 results):  Recent Labs   Lab Result Units 10/09/22  0444 10/10/22  0409 10/11/22  0437   Vancomycin, Random ug/mL 11.2 13.4 12.1       Pharmacy will continue to follow and monitor vancomycin.    Please contact pharmacy at extension [phone] for questions regarding this assessment.    Thank you for the consult,   Dirk West Jr       Patient brief summary:  Mark Church is a 72 y.o. male initiated on antimicrobial therapy with IV Vancomycin for treatment of skin & soft tissue infection    Drug Allergies:   Review of patient's allergies indicates:   Allergen Reactions    Compazine [prochlorperazine edisylate]        Actual Body Weight:   61.6 kg    Renal Function:   Estimated Creatinine Clearance: 83.1 mL/min (based on SCr of 0.7 mg/dL).,     Dialysis Method (if applicable):  N/A    CBC (last 72 hours):  Recent Labs   Lab Result Units 10/09/22  0444 10/10/22  0409 10/11/22  0437   WBC K/uL 18.92* 17.91* 15.81*   Hemoglobin g/dL 9.9* 9.9* 10.1*   Hematocrit % 29.6* 31.2* 31.3*   Platelets K/uL 679* 820* 932*   Gran % % 87.0* 79.0* 72.0   Lymph % % 4.0* 11.0* 9.0*   Mono % % 6.0 4.0 7.0   Eosinophil % % 2.0 1.0 5.0   Basophil % % 0.0 0.0 0.0   Differential Method  Manual Manual Manual       Metabolic Panel (last 72 hours):  Recent Labs   Lab Result Units 10/09/22  0444 10/10/22  0409 10/11/22  0437   Sodium mmol/L 137 136 134*   Potassium mmol/L 4.3 5.1 4.2   Chloride mmol/L 97 98 96   CO2 mmol/L 33* 31* 31*   Glucose mg/dL 116* 101 92   BUN mg/dL 16 19 17    Creatinine mg/dL 0.7 0.8 0.7   Albumin g/dL 1.5* 1.6* 1.6*   Total Bilirubin mg/dL 0.6 0.6 0.6   Alkaline Phosphatase U/L 82 186* 131   AST U/L 54* 83* 70*   ALT U/L 50* 61* 58*   Magnesium mg/dL 1.5* 1.5* 1.4*       Vancomycin Administrations:  vancomycin given in the last 96 hours                     vancomycin 1.25 g in dextrose 5% 250 mL IVPB (ready to mix) (mg) 1,250 mg New Bag 10/10/22 0854    vancomycin 1.25 g in dextrose 5% 250 mL IVPB (ready to mix) (mg) 1,250 mg New Bag 10/09/22 1018    vancomycin in dextrose 5 % 1 gram/250 mL IVPB 1,000 mg (mg) 1,000 mg New Bag 10/08/22 1025    vancomycin 750 mg in dextrose 5 % 250 mL IVPB (ready to mix system) (mg) 750 mg New Bag 10/07/22 1830                    Microbiologic Results:  Microbiology Results (last 7 days)       Procedure Component Value Units Date/Time    Culture, Anaerobe [[id number]] Collected: 10/09/22 0853    Order Status: Completed Specimen: Wound from Ankle, Left Updated: 10/11/22 0914     Anaerobic Culture Culture in progress    Narrative:      LEFT ANKLE WOUND    Aerobic culture [[id number]] Collected: 10/09/22 0853    Order Status: Completed Specimen: Wound from Ankle, Left Updated: 10/11/22 0736     Aerobic Bacterial Culture No growth    Narrative:      LEFT ANKLE TISSUE    Aerobic culture [[id number]] Collected: 10/09/22 0853    Order Status: Completed Specimen: Wound from Ankle, Left Updated: 10/11/22 0629     Aerobic Bacterial Culture No growth    Narrative:      LEFT ANKLE WOUND    AFB Culture & Smear [[id number]] Collected: 10/09/22 0853    Order Status: Sent Specimen: Wound from Ankle, Left Updated: 10/10/22 1552    AFB Culture & Smear [[id number]] Collected: 10/09/22 0853    Order Status: Sent Specimen: Wound from Ankle, Left Updated: 10/10/22 1006    Gram stain [[id number]] Collected: 10/09/22 0853    Order Status: Completed Specimen: Wound from Ankle, Left Updated: 10/10/22 0842     Gram Stain Result Rare WBC's      No organisms seen     Narrative:      LEFT ANKLE TISSUE    Fungus culture [[id number]] Collected: 10/09/22 0853    Order Status: Sent Specimen: Wound from Ankle, Left Updated: 10/10/22 0748    Culture, Anaerobe [[id number]] Collected: 10/09/22 0853    Order Status: Sent Specimen: Wound from Ankle, Left Updated: 10/10/22 0747    Gram stain [[id number]] Collected: 10/09/22 0853    Order Status: Completed Specimen: Wound from Ankle, Left Updated: 10/09/22 1330     Gram Stain Result Rare WBC's      Few Gram positive cocci in pairs and chains    Narrative:      LEFT ANKLE WOUND    Fungus culture [[id number]] Collected: 10/09/22 0853    Order Status: Sent Specimen: Wound from Ankle, Left Updated: 10/09/22 1017    Blood culture x two cultures. Draw prior to antibiotics. [[id number]] Collected: 10/02/22 1210    Order Status: Completed Specimen: Blood from Peripheral, Foot, Right Updated: 10/06/22 1303     Blood Culture, Routine No Growth after 4 days.    Narrative:      Aerobic and anaerobic    Blood culture x two cultures. Draw prior to antibiotics. [[id number]] Collected: 10/02/22 1216    Order Status: Completed Specimen: Blood from Peripheral, Antecubital, Right Updated: 10/06/22 1303     Blood Culture, Routine No Growth after 4 days.    Narrative:      Aerobic and anaerobic

## 2022-10-11 NOTE — ASSESSMENT & PLAN NOTE
S/p OR with surgery on 10/9 - OR cx in process, g/s with GPC in pairs/chains. Cultures so far ngtd- though patient has been on broad spectrum abx therapy prior to surgical cultures. Op note notable for murky fluid present throughout entire lower extremity. WBC improving. Blood cultures ngtd.     Recommendations:  -continue vancomycin pharmacy to dose, cefepime, and flagyl while awaiting cx data, can likely de-escalate further   -trend WBC

## 2022-10-11 NOTE — NURSING
pt had 7runs of vtach; no complaint of chest pain, but complains of left leg pain 7/10, vicodin 10/325 given. pt is aao lying in bed no distress. Notified BRETT Gutierrez NP

## 2022-10-11 NOTE — PROGRESS NOTES
"[facility] Medicine  Progress Note    Patient Name: Mark Church  MRN: [id number]  Patient Class: IP- Inpatient   Admission Date: 10/2/2022  Length of Stay: 9 days  Attending Physician: Christian Esquivel MD  [facility]         Subjective:     Principal Problem:Sepsis        HPI:  Mr. 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  He states this all started about 4 days ago when he was walking in a field. He states he felt some significant itching in his lower leg.  Later that night his leg started to swell and hurt.  He assumed this was caused by an insect bite on his leg, but he never saw a specific bite or insect.  He states that the 2nd night the swelling and pain became severe and he was having fevers and chills which prompted him to visit his primary care provider the next day.  He was prescribed an antibiotic, not sure which one, and despite taking this his leg has continued to get worse.  He notes he has been having fevers and chills and last night he "slid or fell" out of his chair when he was trying to get up because the pain was so bad.  He notes he uses a walker to ambulate at home.  He also notes a chronic cough ascociated wiith allergic rhinitis and had an episode of nausea and vomiting one week ago.  He reports diminished oral intake due to decreased appetite, but no nausea or vomiting since this all started 4 days ago.  He denies chest pain, shortness of breath, headache, palpitations, dysuria and diarrhea.      Overview/Hospital Course:  Admitted with sepsis secondary to left lower extremity cellulitis. Started on IV antibiotics, IVF and potassium replacement (has Bartter syndrome). Surgery consulted for concern for nec fasc/bone involvement. CT did not show any evidence of abscess/gas. Seems to be improving though still significant " swelling and blistering. ID consulted for antibiotic recommendations. Repeat CT ordered for worsening white count which did not show a clear abscess.    Pt continued w/ minimal improvement. Ortho took the patient for I&D did not reveal abscess.      Interval History: feeling okay today, pain is still present but still better than admit. Plan to evaluate wound with dressing change this afternoon.    Review of Systems   Constitutional:  Negative for chills and fever.   Respiratory:  Negative for cough and shortness of breath.    Cardiovascular:  Negative for chest pain and leg swelling.   Gastrointestinal:  Negative for abdominal distention and abdominal pain.   Skin:  Positive for color change and wound.   Objective:     Vital Signs (Most Recent):  Temp: 98.9 °F (37.2 °C) (10/11/22 0701)  Pulse: 80 (10/11/22 0701)  Resp: 18 (10/11/22 0931)  BP: (!) 120/58 (10/11/22 0701)  SpO2: 97 % (10/11/22 0701) Vital Signs (24h Range):  Temp:  [97.6 °F (36.4 °C)-99.1 °F (37.3 °C)] 98.9 °F (37.2 °C)  Pulse:  [63-93] 80  Resp:  [16-21] 18  SpO2:  [95 %-100 %] 97 %  BP: (108-135)/(54-87) 120/58     Weight: 61.6 kg (135 lb 12.9 oz)  Body mass index is 21.27 kg/m².    Intake/Output Summary (Last 24 hours) at 10/11/2022 1053  Last data filed at 10/11/2022 0900  Gross per 24 hour   Intake 3307.49 ml   Output 2875 ml   Net 432.49 ml        Physical Exam  Constitutional:       General: He is not in acute distress.     Appearance: He is ill-appearing. He is not toxic-appearing or diaphoretic.   Cardiovascular:      Rate and Rhythm: Normal rate and regular rhythm.      Heart sounds: No murmur heard.    No gallop.   Pulmonary:      Effort: Pulmonary effort is normal. No respiratory distress.      Breath sounds: Normal breath sounds. No wheezing.   Abdominal:      General: Bowel sounds are normal. There is no distension.      Palpations: Abdomen is soft.      Tenderness: There is no abdominal tenderness.   Skin:     Comments: LLE wrapped,  "dressing not taken down. Distal movement/sensation intact.       Significant Labs: All pertinent labs within the past 24 hours have been reviewed.    Significant Imaging: I have reviewed all pertinent imaging results/findings within the past 24 hours.      Assessment/Plan:      * Sepsis  Cellulitis, likely point of bacterial entry medial ankle. WBC uptrending despite appropriate antibiotic therapy. Repeat CT scan with possible early abscess formation on read, but surgery does not feel it is amenable to intervention. Ortho consulted by GS for second opinion, pt went to OR for I&D but no abscess found on 10/9  - appreciate ID, general surgery, orthopedics involvement  - on cefepime/vancomycin/flagyl  - abnormal arterial US noted, but WILLIE normal  - elevate ankle>knee>hip to facilitate drainage  - WbC trending down        Cellulitis        Debility  -At home ambulates with a walker  -Notes he "Fell or slid" out of a chair on night prior to admit  PT/OT evaluation recommending home health     Chronic cough  -Presumed due to allergic rhinitis  -Continue home anti-histamines  -Add tessalon PRN    Chronic gout    -Continue home allopurinol.    Hypokalemia  See bartter syndrome      Hyponatremia  -On admit mild and likely due to diminished oral intake and mild dehydration  -Continue IV fluids  -stable    Normocytic anemia  -Hb 12.1 on admit  -No bleeding  -Check iron, ferritin, b12 and folate - appears more ACD at this time with elevated ferritin  -Repeat cbc in AM    Cellulitis of left lower extremity  -Treatment as above.    Primary open angle glaucoma of both eyes, severe stage  -History noted  -Continue home timolol and lantanoprost drops    Bartter syndrome  Hypokalemic on admission, repleted w/ IV and po K, now borderline high.  - resume home dose of 10 meq KCl daily        H/O prostate cancer  -history noted  -Continue home oxybutynin      VTE Risk Mitigation (From admission, onward)         Ordered     enoxaparin " injection 40 mg  Daily         10/02/22 1416     IP VTE HIGH RISK PATIENT  Once         10/02/22 1416     Place sequential compression device  Until discontinued         10/02/22 1416                Discharge Planning   ROS: 10/7/2022     Code Status: Full Code   Is the patient medically ready for discharge?:     Reason for patient still in hospital (select all that apply): Treatment  Discharge Plan A: Home Health   Discharge Delays: None known at this time              Christian Esquivel MD  Department of Hospital Medicine   [facility] Surg

## 2022-10-11 NOTE — ASSESSMENT & PLAN NOTE
Hypokalemic on admission, repleted w/ IV and po K, now borderline high.  - resume home dose of 10 meq KCl daily

## 2022-10-11 NOTE — PROGRESS NOTES
[facility] Surg  Infectious Disease  Progress Note    Patient Name: Mark Church  MRN: [id number]  Admission Date: 10/2/2022  Length of Stay: 9 days  Attending Physician: Christian Esquivel MD  Primary Care Provider: [facility] [place]     Isolation Status: No active isolations  Assessment/Plan:      Cellulitis of left lower extremity  S/p OR with surgery on 10/9 - OR cx in process, g/s with GPC in pairs/chains. Cultures so far ngtd- though patient has been on broad spectrum abx therapy prior to surgical cultures. Op note notable for murky fluid present throughout entire lower extremity. WBC improving. Blood cultures ngtd.     Recommendations:  -continue vancomycin pharmacy to dose, cefepime, and flagyl while awaiting cx data, can likely de-escalate further   -trend WBC            Thank you for your consult. I will follow-up with patient. Please contact us if you have any additional questions. Above d/w primary team.     Time: 35 minutes   50% of time spent on face-to-face counseling and coordination of care. Counseling included review of test results, diagnosis, and treatment plan with patient and/or family.        Estella Valadez MD  Infectious Disease  [facility] Surg    Subjective:     Principal Problem:Sepsis    HPI: 72M with h/o Bartter syndrome, gout admitted 10/2 with swelling and pain in his left leg. Reports about 4 days of progressive swelling and redness to L leg. Suspects he may have had an insect bite, but doesn't recall. Reports swelling and redeness worse and started having fevers so came to hospital. Usually ambulatory with walker. Denies indwelling hardware.     Bcx NGTD x 2 days    CT  1. Diffuse, circumferential subcutaneous soft tissue edema throughout the left tibia and fibula.  No soft tissue gas or rim enhancing fluid collection.  Correlate for cellulitis.  2. Cutaneous collection at the skin surface of the medial ankle compatible with a fluid-filled blister.  ID consulted  "for "antibx recommendations for cellulitis"    Day 4 vanc/cefepime/clindamycin    Tmax 101    Arterial studies- Mildly elevated velocity within the left popliteal artery with monophasic waveforms in the calf arteries, concerning for stenosis of the popliteal artery.    ID consulted for "antibx recommendations for cellulitis"    Interval History: No fevers documented overnight. Pt reports some post-op pain. Tolerating abx without issues.     Review of Systems   Constitutional:  Negative for chills and fever.   Musculoskeletal:  Positive for myalgias.   All other systems reviewed and are negative.  Objective:     Vital Signs (Most Recent):  Temp: 98.9 °F (37.2 °C) (10/11/22 0701)  Pulse: 80 (10/11/22 0701)  Resp: 18 (10/11/22 0701)  BP: (!) 120/58 (10/11/22 0701)  SpO2: 97 % (10/11/22 0701) Vital Signs (24h Range):  Temp:  [97.6 °F (36.4 °C)-99.1 °F (37.3 °C)] 98.9 °F (37.2 °C)  Pulse:  [63-93] 80  Resp:  [16-21] 18  SpO2:  [95 %-100 %] 97 %  BP: (108-135)/(54-87) 120/58     Weight: 61.6 kg (135 lb 12.9 oz)  Body mass index is 21.27 kg/m².    Estimated Creatinine Clearance: 83.1 mL/min (based on SCr of 0.7 mg/dL).    Physical Exam  Constitutional:       Appearance: He is well-developed.   HENT:      Head: Normocephalic and atraumatic.      Right Ear: External ear normal.      Left Ear: External ear normal.      Nose: Nose normal.      Mouth/Throat:      Mouth: Mucous membranes are moist.      Pharynx: No oropharyngeal exudate.   Eyes:      General: No scleral icterus.        Right eye: No discharge.         Left eye: No discharge.   Neck:      Thyroid: No thyromegaly.   Cardiovascular:      Rate and Rhythm: Normal rate and regular rhythm.   Pulmonary:      Effort: Pulmonary effort is normal. No respiratory distress.      Breath sounds: No wheezing.   Abdominal:      General: Bowel sounds are normal. There is no distension.      Palpations: Abdomen is soft.      Tenderness: There is no abdominal tenderness. There is no " guarding.   Musculoskeletal:      Right lower leg: No edema.      Comments: L leg wrapped   Skin:     General: Skin is warm and dry.      Coloration: Skin is not jaundiced.      Findings: No bruising.   Neurological:      Mental Status: He is alert and oriented to person, place, and time. Mental status is at baseline.      Motor: No weakness.   Psychiatric:         Mood and Affect: Mood normal.         Behavior: Behavior normal.       Significant Labs:   Microbiology Results (last 7 days)       Procedure Component Value Units Date/Time    Aerobic culture [[id number]] Collected: 10/09/22 0853    Order Status: Completed Specimen: Wound from Ankle, Left Updated: 10/11/22 0736     Aerobic Bacterial Culture No growth    Narrative:      LEFT ANKLE TISSUE    Aerobic culture [[id number]] Collected: 10/09/22 0853    Order Status: Completed Specimen: Wound from Ankle, Left Updated: 10/11/22 0629     Aerobic Bacterial Culture No growth    Narrative:      LEFT ANKLE WOUND    AFB Culture & Smear [[id number]] Collected: 10/09/22 0853    Order Status: Sent Specimen: Wound from Ankle, Left Updated: 10/10/22 1552    AFB Culture & Smear [[id number]] Collected: 10/09/22 0853    Order Status: Sent Specimen: Wound from Ankle, Left Updated: 10/10/22 1006    Gram stain [[id number]] Collected: 10/09/22 0853    Order Status: Completed Specimen: Wound from Ankle, Left Updated: 10/10/22 0842     Gram Stain Result Rare WBC's      No organisms seen    Narrative:      LEFT ANKLE TISSUE    Fungus culture [[id number]] Collected: 10/09/22 0853    Order Status: Sent Specimen: Wound from Ankle, Left Updated: 10/10/22 0748    Culture, Anaerobe [[id number]] Collected: 10/09/22 0853    Order Status: Sent Specimen: Wound from Ankle, Left Updated: 10/10/22 0747    Gram stain [[id number]] Collected: 10/09/22 0853    Order Status: Completed Specimen: Wound from Ankle, Left Updated: 10/09/22 1330     Gram Stain Result Rare WBC's      Few Gram positive cocci in pairs  and chains    Narrative:      LEFT ANKLE WOUND    Culture, Anaerobe [[id number]] Collected: 10/09/22 0853    Order Status: Sent Specimen: Wound from Ankle, Left Updated: 10/09/22 1019    Fungus culture [[id number]] Collected: 10/09/22 0853    Order Status: Sent Specimen: Wound from Ankle, Left Updated: 10/09/22 1017    Blood culture x two cultures. Draw prior to antibiotics. [[id number]] Collected: 10/02/22 1210    Order Status: Completed Specimen: Blood from Peripheral, Foot, Right Updated: 10/06/22 1303     Blood Culture, Routine No Growth after 4 days.    Narrative:      Aerobic and anaerobic    Blood culture x two cultures. Draw prior to antibiotics. [[id number]] Collected: 10/02/22 1216    Order Status: Completed Specimen: Blood from Peripheral, Antecubital, Right Updated: 10/06/22 1303     Blood Culture, Routine No Growth after 4 days.    Narrative:      Aerobic and anaerobic            Significant Imaging: I have reviewed all pertinent imaging results/findings within the past 24 hours.

## 2022-10-11 NOTE — ASSESSMENT & PLAN NOTE
"-At home ambulates with a walker  -Notes he "Fell or slid" out of a chair on night prior to admit  PT/OT evaluation recommending home health   "

## 2022-10-12 LAB
ALBUMIN SERPL BCP-MCNC: 1.7 G/DL (ref 3.5–5.2)
ALP SERPL-CCNC: 112 U/L (ref 55–135)
ALT SERPL W/O P-5'-P-CCNC: 47 U/L (ref 10–44)
ANION GAP SERPL CALC-SCNC: 6 MMOL/L (ref 8–16)
AST SERPL-CCNC: 50 U/L (ref 10–40)
BASOPHILS # BLD AUTO: 0.03 K/UL (ref 0–0.2)
BASOPHILS NFR BLD: 0.2 % (ref 0–1.9)
BILIRUB SERPL-MCNC: 0.4 MG/DL (ref 0.1–1)
BUN SERPL-MCNC: 17 MG/DL (ref 8–23)
CALCIUM SERPL-MCNC: 8.2 MG/DL (ref 8.7–10.5)
CHLORIDE SERPL-SCNC: 99 MMOL/L (ref 95–110)
CK SERPL-CCNC: 11 U/L (ref 20–200)
CO2 SERPL-SCNC: 31 MMOL/L (ref 23–29)
CREAT SERPL-MCNC: 0.7 MG/DL (ref 0.5–1.4)
DIFFERENTIAL METHOD: ABNORMAL
EOSINOPHIL # BLD AUTO: 0.9 K/UL (ref 0–0.5)
EOSINOPHIL NFR BLD: 7.3 % (ref 0–8)
ERYTHROCYTE [DISTWIDTH] IN BLOOD BY AUTOMATED COUNT: 14.6 % (ref 11.5–14.5)
EST. GFR  (NO RACE VARIABLE): >60 ML/MIN/1.73 M^2
GLUCOSE SERPL-MCNC: 158 MG/DL (ref 70–110)
HCT VFR BLD AUTO: 29.4 % (ref 40–54)
HGB BLD-MCNC: 9.3 G/DL (ref 14–18)
IMM GRANULOCYTES # BLD AUTO: 0.49 K/UL (ref 0–0.04)
IMM GRANULOCYTES NFR BLD AUTO: 3.8 % (ref 0–0.5)
LYMPHOCYTES # BLD AUTO: 1.5 K/UL (ref 1–4.8)
LYMPHOCYTES NFR BLD: 11.6 % (ref 18–48)
MAGNESIUM SERPL-MCNC: 1.4 MG/DL (ref 1.6–2.6)
MCH RBC QN AUTO: 27 PG (ref 27–31)
MCHC RBC AUTO-ENTMCNC: 31.6 G/DL (ref 32–36)
MCV RBC AUTO: 86 FL (ref 82–98)
MONOCYTES # BLD AUTO: 1.1 K/UL (ref 0.3–1)
MONOCYTES NFR BLD: 8.4 % (ref 4–15)
NEUTROPHILS # BLD AUTO: 8.9 K/UL (ref 1.8–7.7)
NEUTROPHILS NFR BLD: 68.7 % (ref 38–73)
NRBC BLD-RTO: 0 /100 WBC
PLATELET # BLD AUTO: 939 K/UL (ref 150–450)
PMV BLD AUTO: 9.6 FL (ref 9.2–12.9)
POTASSIUM SERPL-SCNC: 3.5 MMOL/L (ref 3.5–5.1)
PROT SERPL-MCNC: 5.6 G/DL (ref 6–8.4)
RBC # BLD AUTO: 3.44 M/UL (ref 4.6–6.2)
SODIUM SERPL-SCNC: 136 MMOL/L (ref 136–145)
WBC # BLD AUTO: 12.95 K/UL (ref 3.9–12.7)

## 2022-10-12 PROCEDURE — 25000003 PHARM REV CODE 250: Performed by: STUDENT IN AN ORGANIZED HEALTH CARE EDUCATION/TRAINING PROGRAM

## 2022-10-12 PROCEDURE — 25000003 PHARM REV CODE 250: Performed by: ORTHOPAEDIC SURGERY

## 2022-10-12 PROCEDURE — 85025 COMPLETE CBC W/AUTO DIFF WBC: CPT | Performed by: ORTHOPAEDIC SURGERY

## 2022-10-12 PROCEDURE — 97110 THERAPEUTIC EXERCISES: CPT | Mod: CQ

## 2022-10-12 PROCEDURE — 99232 SBSQ HOSP IP/OBS MODERATE 35: CPT | Mod: ,,, | Performed by: STUDENT IN AN ORGANIZED HEALTH CARE EDUCATION/TRAINING PROGRAM

## 2022-10-12 PROCEDURE — 97530 THERAPEUTIC ACTIVITIES: CPT

## 2022-10-12 PROCEDURE — 63600175 PHARM REV CODE 636 W HCPCS: Performed by: STUDENT IN AN ORGANIZED HEALTH CARE EDUCATION/TRAINING PROGRAM

## 2022-10-12 PROCEDURE — 80053 COMPREHEN METABOLIC PANEL: CPT | Performed by: ORTHOPAEDIC SURGERY

## 2022-10-12 PROCEDURE — 36415 COLL VENOUS BLD VENIPUNCTURE: CPT | Performed by: ORTHOPAEDIC SURGERY

## 2022-10-12 PROCEDURE — 83735 ASSAY OF MAGNESIUM: CPT | Performed by: ORTHOPAEDIC SURGERY

## 2022-10-12 PROCEDURE — A4216 STERILE WATER/SALINE, 10 ML: HCPCS | Performed by: ANESTHESIOLOGY

## 2022-10-12 PROCEDURE — 97530 THERAPEUTIC ACTIVITIES: CPT | Mod: CQ

## 2022-10-12 PROCEDURE — 63600175 PHARM REV CODE 636 W HCPCS: Performed by: ORTHOPAEDIC SURGERY

## 2022-10-12 PROCEDURE — 97110 THERAPEUTIC EXERCISES: CPT

## 2022-10-12 PROCEDURE — 99232 PR SUBSEQUENT HOSPITAL CARE,LEVL II: ICD-10-PCS | Mod: ,,, | Performed by: STUDENT IN AN ORGANIZED HEALTH CARE EDUCATION/TRAINING PROGRAM

## 2022-10-12 PROCEDURE — 25000003 PHARM REV CODE 250: Performed by: ANESTHESIOLOGY

## 2022-10-12 PROCEDURE — 82550 ASSAY OF CK (CPK): CPT | Performed by: ORTHOPAEDIC SURGERY

## 2022-10-12 PROCEDURE — 11000001 HC ACUTE MED/SURG PRIVATE ROOM

## 2022-10-12 RX ORDER — HYDROCODONE BITARTRATE AND ACETAMINOPHEN 7.5; 325 MG/1; MG/1
1 TABLET ORAL EVERY 6 HOURS PRN
Status: DISCONTINUED | OUTPATIENT
Start: 2022-10-12 | End: 2022-10-15

## 2022-10-12 RX ORDER — SODIUM CHLORIDE 9 MG/ML
INJECTION, SOLUTION INTRAVENOUS CONTINUOUS
Status: ACTIVE | OUTPATIENT
Start: 2022-10-12 | End: 2022-10-13

## 2022-10-12 RX ADMIN — ALLOPURINOL 100 MG: 100 TABLET ORAL at 08:10

## 2022-10-12 RX ADMIN — Medication 10 ML: at 10:10

## 2022-10-12 RX ADMIN — HYDROCODONE BITARTRATE AND ACETAMINOPHEN 1 TABLET: 7.5; 325 TABLET ORAL at 02:10

## 2022-10-12 RX ADMIN — METRONIDAZOLE 500 MG: 500 TABLET ORAL at 02:10

## 2022-10-12 RX ADMIN — OXYBUTYNIN CHLORIDE 5 MG: 5 TABLET ORAL at 08:10

## 2022-10-12 RX ADMIN — TIMOLOL MALEATE 1 DROP: 5 SOLUTION/ DROPS OPHTHALMIC at 09:10

## 2022-10-12 RX ADMIN — CEFEPIME 2 G: 2 INJECTION, POWDER, FOR SOLUTION INTRAVENOUS at 05:10

## 2022-10-12 RX ADMIN — CEFEPIME 2 G: 2 INJECTION, POWDER, FOR SOLUTION INTRAVENOUS at 12:10

## 2022-10-12 RX ADMIN — AZELASTINE 137 MCG: 1 SPRAY, METERED NASAL at 08:10

## 2022-10-12 RX ADMIN — MUPIROCIN: 20 OINTMENT TOPICAL at 09:10

## 2022-10-12 RX ADMIN — Medication 6 MG: at 10:10

## 2022-10-12 RX ADMIN — VANCOMYCIN HYDROCHLORIDE 1250 MG: 1.25 INJECTION, POWDER, LYOPHILIZED, FOR SOLUTION INTRAVENOUS at 12:10

## 2022-10-12 RX ADMIN — AZELASTINE 137 MCG: 1 SPRAY, METERED NASAL at 09:10

## 2022-10-12 RX ADMIN — LACTOBACILLUS ACIDOPHILUS / LACTOBACILLUS BULGARICUS 1 EACH: 100 MILLION CFU STRENGTH GRANULES at 09:10

## 2022-10-12 RX ADMIN — CETIRIZINE HYDROCHLORIDE 5 MG: 5 TABLET ORAL at 09:10

## 2022-10-12 RX ADMIN — ENOXAPARIN SODIUM 40 MG: 100 INJECTION SUBCUTANEOUS at 05:10

## 2022-10-12 RX ADMIN — LACTOBACILLUS ACIDOPHILUS / LACTOBACILLUS BULGARICUS 1 EACH: 100 MILLION CFU STRENGTH GRANULES at 08:10

## 2022-10-12 RX ADMIN — SODIUM CHLORIDE: 0.9 INJECTION, SOLUTION INTRAVENOUS at 05:10

## 2022-10-12 RX ADMIN — METRONIDAZOLE 500 MG: 500 TABLET ORAL at 09:10

## 2022-10-12 RX ADMIN — TIMOLOL MALEATE 1 DROP: 5 SOLUTION/ DROPS OPHTHALMIC at 08:10

## 2022-10-12 RX ADMIN — METRONIDAZOLE 500 MG: 500 TABLET ORAL at 05:10

## 2022-10-12 RX ADMIN — HYDROCODONE BITARTRATE AND ACETAMINOPHEN 1 TABLET: 10; 325 TABLET ORAL at 08:10

## 2022-10-12 RX ADMIN — HYDROCODONE BITARTRATE AND ACETAMINOPHEN 1 TABLET: 10; 325 TABLET ORAL at 01:10

## 2022-10-12 RX ADMIN — CEFEPIME 2 G: 2 INJECTION, POWDER, FOR SOLUTION INTRAVENOUS at 08:10

## 2022-10-12 RX ADMIN — Medication 400 MG: at 08:10

## 2022-10-12 RX ADMIN — OXYBUTYNIN CHLORIDE 5 MG: 5 TABLET ORAL at 09:10

## 2022-10-12 RX ADMIN — HYDROCODONE BITARTRATE AND ACETAMINOPHEN 1 TABLET: 7.5; 325 TABLET ORAL at 10:10

## 2022-10-12 RX ADMIN — SENNOSIDES 8.6 MG: 8.6 TABLET, FILM COATED ORAL at 09:10

## 2022-10-12 RX ADMIN — SENNOSIDES 8.6 MG: 8.6 TABLET, FILM COATED ORAL at 08:10

## 2022-10-12 RX ADMIN — LATANOPROST 1 DROP: 50 SOLUTION OPHTHALMIC at 09:10

## 2022-10-12 RX ADMIN — MUPIROCIN: 20 OINTMENT TOPICAL at 08:10

## 2022-10-12 NOTE — PROGRESS NOTES
"[facility] Medicine  Progress Note    Patient Name: Mark Church  MRN: [id number]  Patient Class: IP- Inpatient   Admission Date: 10/2/2022  Length of Stay: 10 days  Attending Physician: Christian Esquivel MD  Primary Care Provider: [facility] [place]         Subjective:     Principal Problem:Sepsis        HPI:  Mr. 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  He states this all started about 4 days ago when he was walking in a field. He states he felt some significant itching in his lower leg.  Later that night his leg started to swell and hurt.  He assumed this was caused by an insect bite on his leg, but he never saw a specific bite or insect.  He states that the 2nd night the swelling and pain became severe and he was having fevers and chills which prompted him to visit his primary care provider the next day.  He was prescribed an antibiotic, not sure which one, and despite taking this his leg has continued to get worse.  He notes he has been having fevers and chills and last night he "slid or fell" out of his chair when he was trying to get up because the pain was so bad.  He notes he uses a walker to ambulate at home.  He also notes a chronic cough ascociated wiith allergic rhinitis and had an episode of nausea and vomiting one week ago.  He reports diminished oral intake due to decreased appetite, but no nausea or vomiting since this all started 4 days ago.  He denies chest pain, shortness of breath, headache, palpitations, dysuria and diarrhea.      Overview/Hospital Course:  Admitted with sepsis secondary to left lower extremity cellulitis. Started on IV antibiotics, IVF and potassium replacement (has Bartter syndrome). Surgery consulted for concern for nec fasc/bone involvement. CT did not show any evidence of abscess/gas. Seems to be improving though still significant " swelling and blistering. ID consulted for antibiotic recommendations. Repeat CT ordered for worsening white count which did not show a clear abscess.    Pt continued w/ minimal improvement. Ortho took the patient for I&D did not reveal abscess.      Interval History: doing well today, eating breakfast. Pain is tolerable.    Review of Systems   Constitutional:  Negative for chills and fever.   Respiratory:  Negative for cough and shortness of breath.    Cardiovascular:  Negative for chest pain and leg swelling.   Gastrointestinal:  Negative for abdominal distention and abdominal pain.   Skin:  Positive for color change and wound.   Objective:     Vital Signs (Most Recent):  Temp: 98.6 °F (37 °C) (10/12/22 0701)  Pulse: (!) 116 (10/12/22 0701)  Resp: 19 (10/12/22 0840)  BP: (!) 114/59 (10/12/22 0701)  SpO2: 98 % (10/12/22 0701) Vital Signs (24h Range):  Temp:  [98 °F (36.7 °C)-99.2 °F (37.3 °C)] 98.6 °F (37 °C)  Pulse:  [101-116] 116  Resp:  [17-20] 19  SpO2:  [97 %-98 %] 98 %  BP: (105-122)/(55-59) 114/59     Weight: 61.6 kg (135 lb 12.9 oz)  Body mass index is 21.27 kg/m².    Intake/Output Summary (Last 24 hours) at 10/12/2022 0931  Last data filed at 10/12/2022 0644  Gross per 24 hour   Intake 664.73 ml   Output 1325 ml   Net -660.27 ml        Physical Exam  Constitutional:       General: He is not in acute distress.     Appearance: He is ill-appearing. He is not toxic-appearing or diaphoretic.   Cardiovascular:      Rate and Rhythm: Normal rate and regular rhythm.      Heart sounds: No murmur heard.    No gallop.   Pulmonary:      Effort: Pulmonary effort is normal. No respiratory distress.      Breath sounds: Normal breath sounds. No wheezing.   Abdominal:      General: Bowel sounds are normal. There is no distension.      Palpations: Abdomen is soft.      Tenderness: There is no abdominal tenderness.   Skin:     Comments: LLE wrapped, dressing not taken down. Distal movement/sensation intact.       Significant  "Labs: All pertinent labs within the past 24 hours have been reviewed.    Significant Imaging: I have reviewed all pertinent imaging results/findings within the past 24 hours.      Assessment/Plan:      * Sepsis  Cellulitis, likely point of bacterial entry medial ankle. WBC uptrending despite appropriate antibiotic therapy. Repeat CT scan with possible early abscess formation on read, but surgery does not feel it is amenable to intervention. Ortho consulted by GS for second opinion, pt went to OR for I&D but no abscess found on 10/9  - appreciate ID, general surgery, orthopedics involvement  - on cefepime/vancomycin/flagyl  - abnormal arterial US noted, but WILLIE normal  - elevate ankle>knee>hip to facilitate drainage  - WbC trending down, cultures with no growth to date        Cellulitis        Debility  -At home ambulates with a walker  -Notes he "Fell or slid" out of a chair on night prior to admit  PT/OT evaluation recommending home health     Chronic cough  -Presumed due to allergic rhinitis  -Continue home anti-histamines  -Add tessalon PRN    Chronic gout    -Continue home allopurinol.    Hypokalemia  See bartter syndrome      Hyponatremia  -On admit mild and likely due to diminished oral intake and mild dehydration  -Continue IV fluids  -stable    Normocytic anemia  -Hb 12.1 on admit  -No bleeding  -Check iron, ferritin, b12 and folate - appears more ACD at this time with elevated ferritin  -Repeat cbc in AM    Cellulitis of left lower extremity  -Treatment as above.    Primary open angle glaucoma of both eyes, severe stage  -History noted  -Continue home timolol and lantanoprost drops    Bartter syndrome  Hypokalemic on admission, repleted w/ IV and po K, now borderline high.  - resume home dose of 10 meq KCl daily        H/O prostate cancer  -history noted  -Continue home oxybutynin      VTE Risk Mitigation (From admission, onward)         Ordered     enoxaparin injection 40 mg  Daily         10/02/22 1416    "  IP VTE HIGH RISK PATIENT  Once         10/02/22 1416     Place sequential compression device  Until discontinued         10/02/22 1416                Discharge Planning   ROS: 10/7/2022     Code Status: Full Code   Is the patient medically ready for discharge?:     Reason for patient still in hospital (select all that apply): Treatment  Discharge Plan A: Home Health   Discharge Delays: None known at this time              Christian Esquivel MD  Department of Hospital Medicine   [facility]

## 2022-10-12 NOTE — NURSING
Dr Esquivel aware of orthostatic BP as reported to her by therapy, confirmed cardiac rhythm is sinus, IVF ordered for labs reflecting volume depletion

## 2022-10-12 NOTE — PROGRESS NOTES
Vancomycin consult follow-up:    Patient reviewed, renal function stable, no new levels, continue current therapy; Next levels due: trough due 10/13/2022 at 1100

## 2022-10-12 NOTE — ASSESSMENT & PLAN NOTE
Cellulitis, likely point of bacterial entry medial ankle. WBC uptrending despite appropriate antibiotic therapy. Repeat CT scan with possible early abscess formation on read, but surgery does not feel it is amenable to intervention. Ortho consulted by GS for second opinion, pt went to OR for I&D but no abscess found on 10/9  - appreciate ID, general surgery, orthopedics involvement  - on cefepime/vancomycin/flagyl  - abnormal arterial US noted, but WILLIE normal  - elevate ankle>knee>hip to facilitate drainage  - WbC trending down, cultures with no growth to date

## 2022-10-12 NOTE — PT/OT/SLP PROGRESS
Physical Therapy Treatment    Patient Name:  Mark Church   MRN:  [id number]    Recommendations:     Discharge Recommendations:  home health PT (with family assistance )   Discharge Equipment Recommendations: none   Barriers to discharge:  orthostatic hypotension     Assessment:     Mark Church is a 72 y.o. male admitted with a medical diagnosis of Sepsis.  He presents with the following impairments/functional limitations:  weakness, impaired endurance, impaired self care skills, gait instability, impaired balance, decreased upper extremity function, decreased lower extremity function, decreased ROM, impaired functional mobility, edema, pain, impaired skin, impaired cardiopulmonary response to activity .   Limited with gait training 2* to orthostatic hypotension   BP in sitting EOB (post BLE ex's)  : 116/63 ,  , MAP 84  BP in standing with 10 reps x 2 marching in placed and 6 steps to chair : error   BP in sitting in chair : 71/50 ,  , MAP 56  Pt placed in reclined chair with BLE elevated : BP : 122/70, HR : 107 , MAP 90   SpO2: 98% on RA . Patient is symptomatic, nurse Brie notified.   Rehab Prognosis: Good; patient would benefit from acute skilled PT services to address these deficits and reach maximum level of function.    Recent Surgery: Procedure(s) (LRB):  INCISION AND DRAINAGE, ANKLE (Left) 3 Days Post-Op    Plan:     During this hospitalization, patient to be seen 5 x/week to address the identified rehab impairments via gait training, therapeutic activities, therapeutic exercises and progress toward the following goals:    Plan of Care Expires:  10/17/22    Subjective     Chief Complaint: light HA and weakness  Patient/Family Comments/goals: pt is pleasant and agreeable to therapy   Pain/Comfort:  Pain Rating 1: 7/10  Location - Side 1: Left  Location 1: leg  Pain Addressed 1: Pre-medicate for activity, Reposition, Cessation of Activity, Nurse notified  Pain Rating Post-Intervention 1:  6/10      Objective:     Communicated with nurse prior to session.  Patient found HOB elevated with telemetry, peripheral IV, pressure relief boots, Condom Catheter malfunction diaper is soiled with urine upon PT entry to room.     General Precautions: Standard, fall   Orthopedic Precautions:N/A   Braces: N/A  Respiratory Status: Room air     Functional Mobility:  Bed Mobility:   Scooting: stand by assistance   Supine to Sit: stand by assistance  Transfers:     Sit to Stand: X 3 trials from bed  contact guard assistance with rolling walker. Noted with increased body shakiness 2* to weakness.   Bed to Chair: contact guard assistance with  rolling walker  using  Step Transfer  Gait:  pt ambulated 10 reps x 2 trials marching in placed and 6 steps from bed to chair with RW, CGA. Limited with further gait training 2* to orthostatic hypotension (pt fatigue/weakness and c/o light DAHL). Nurse Brie notified.   Balance: good in sitting, fair in standing.     AM-PAC 6 CLICK MOBILITY  Turning over in bed (including adjusting bedclothes, sheets and blankets)?: 4  Sitting down on and standing up from a chair with arms (e.g., wheelchair, bedside commode, etc.): 3  Moving from lying on back to sitting on the side of the bed?: 3  Moving to and from a bed to a chair (including a wheelchair)?: 3  Need to walk in hospital room?: 3  Climbing 3-5 steps with a railing?: 2  Basic Mobility Total Score: 18       Therapeutic Activities and Exercises:   Pt tolerated static standing for diaper changed with RW, CGA.  Lower Extremity Exercises.   Patient educated on the purpose of therapeutic exercise.    Patient verbalized acceptance/understanding of instructions, expectations, and limitations(for safety).  Patient performed:  10 reps (each) of B LE There Ex: AP, LAQ, Hip abd/add, Hip flexion while sitting up on EOB.       Patient required  verbal cues/tactile cues to ensure correct sequence, to maintain proper form, and to allow for  self-correction.   Post transfer, pt's condom cath came out , pt 's socks and dressing in L leg are soiled with urine, nurse Brie notified to room for dressing changed .   R sock changed for pt .     Patient left up in reclined chair on green air cushion , BLE elevated on pillows with all lines intact, call button in reach, and nurse Brie notified and  present..    GOALS:   Multidisciplinary Problems       Physical Therapy Goals          Problem: Physical Therapy    Goal Priority Disciplines Outcome Goal Variances Interventions   Physical Therapy Goal     PT, PT/OT Ongoing, Progressing     Description: Goals to be met by: 10/17/22     Patient will increase functional independence with mobility by performin. Pt to be mod I with bed mobility.  2. Pt to transfer with mod I.  3. Pt to ambulate 150' w/RW and supervision.  4. Pt to be (I) with written HEP.                         Time Tracking:     PT Received On: 10/12/22  PT Start Time: 1017     PT Stop Time: 1047  PT Total Time (min): 30 min     Billable Minutes: Therapeutic Activity 15 and Therapeutic Exercise 15    Treatment Type: Treatment  PT/PTA: PTA     PTA Visit Number: 2     10/12/2022

## 2022-10-12 NOTE — PROGRESS NOTES
[facility] Surg  Infectious Disease  Progress Note    Patient Name: Mark Church  MRN: [id number]  Admission Date: 10/2/2022  Length of Stay: 10 days  Attending Physician: Christian Esquivel MD  Primary Care Provider: [facility]     Isolation Status: No active isolations  Assessment/Plan:      Cellulitis of left lower extremity  S/p OR with surgery on 10/9 - OR cx in process, g/s with GPC in pairs/chains. Cultures so far ngtd- though patient has been on broad spectrum abx therapy prior to surgical cultures. Op note notable for murky fluid present throughout entire lower extremity. WBC improving. Blood cultures ngtd.     Recommendations:  -continue vancomycin pharmacy to dose, cefepime, and flagyl while awaiting cx data - cx so far ngtd; g/s with GPC in pairs/chains (suspect strep)   -given improvement in WBC, can likely transition to PO abx when closer to discharge (doxy/augmentin) to complete 14d course from washout  -trend WBC        Thank you for your consult. I will follow-up with patient. Please contact us if you have any additional questions. Above d/w primary team.     Time: 35 minutes   50% of time spent on face-to-face counseling and coordination of care. Counseling included review of test results, diagnosis, and treatment plan with patient and/or family.        Estella Valadez MD  Infectious Disease  [facility] S[facility]    Subjective:     Principal Problem:Sepsis    HPI: 72M with h/o Bartter syndrome, gout admitted 10/2 with swelling and pain in his left leg. Reports about 4 days of progressive swelling and redness to L leg. Suspects he may have had an insect bite, but doesn't recall. Reports swelling and redeness worse and started having fevers so came to hospital. Usually ambulatory with walker. Denies indwelling hardware.     Bcx NGTD x 2 days    CT  1. Diffuse, circumferential subcutaneous soft tissue edema throughout the left tibia and fibula.  No soft tissue gas or rim enhancing  "fluid collection.  Correlate for cellulitis.  2. Cutaneous collection at the skin surface of the medial ankle compatible with a fluid-filled blister.  ID consulted for "antibx recommendations for cellulitis"    Day 4 vanc/cefepime/clindamycin    Tmax 101    Arterial studies- Mildly elevated velocity within the left popliteal artery with monophasic waveforms in the calf arteries, concerning for stenosis of the popliteal artery.    ID consulted for "antibx recommendations for cellulitis"    Interval History: No fevers documented overnight. Reports minimal post-op pain, however, overall improved since admission. Pt reports tolerating abx without issues.     Review of Systems   Constitutional:  Negative for chills and fever.   Musculoskeletal:  Positive for myalgias.   All other systems reviewed and are negative.  Objective:     Vital Signs (Most Recent):  Temp: 98.6 °F (37 °C) (10/12/22 0655)  Pulse: (!) 116 (10/12/22 0655)  Resp: 18 (10/12/22 0655)  BP: (!) 114/59 (10/12/22 0655)  SpO2: 98 % (10/12/22 0655) Vital Signs (24h Range):  Temp:  [98 °F (36.7 °C)-99.2 °F (37.3 °C)] 98.6 °F (37 °C)  Pulse:  [101-116] 116  Resp:  [17-20] 18  SpO2:  [97 %-98 %] 98 %  BP: (105-122)/(55-59) 114/59     Weight: 61.6 kg (135 lb 12.9 oz)  Body mass index is 21.27 kg/m².    Estimated Creatinine Clearance: 83.1 mL/min (based on SCr of 0.7 mg/dL).    Physical Exam  Constitutional:       Appearance: He is well-developed.   HENT:      Head: Normocephalic and atraumatic.      Right Ear: External ear normal.      Left Ear: External ear normal.      Nose: Nose normal.      Mouth/Throat:      Mouth: Mucous membranes are moist.      Pharynx: No oropharyngeal exudate.   Eyes:      General: No scleral icterus.        Right eye: No discharge.         Left eye: No discharge.   Neck:      Thyroid: No thyromegaly.   Cardiovascular:      Rate and Rhythm: Normal rate and regular rhythm.   Pulmonary:      Effort: Pulmonary effort is normal. No " respiratory distress.      Breath sounds: No wheezing.   Abdominal:      General: Bowel sounds are normal. There is no distension.      Palpations: Abdomen is soft.      Tenderness: There is no abdominal tenderness. There is no guarding.   Musculoskeletal:      Right lower leg: No edema.      Comments: L leg wrapped   Skin:     General: Skin is warm and dry.      Coloration: Skin is not jaundiced.      Findings: No bruising.   Neurological:      Mental Status: He is alert and oriented to person, place, and time. Mental status is at baseline.      Motor: No weakness.   Psychiatric:         Mood and Affect: Mood normal.         Behavior: Behavior normal.       Significant Labs:   Microbiology Results (last 7 days)       Procedure Component Value Units Date/Time    AFB Culture & Smear [[id number]] Collected: 10/09/22 0853    Order Status: Completed Specimen: Wound from Ankle, Left Updated: 10/11/22 2127     AFB Culture & Smear Culture in progress     AFB CULTURE STAIN No acid fast bacilli seen.    Narrative:      LEFT ANKLE WOUND    AFB Culture & Smear [[id number]] Collected: 10/09/22 0853    Order Status: Completed Specimen: Wound from Ankle, Left Updated: 10/11/22 2127     AFB Culture & Smear Culture in progress     AFB CULTURE STAIN No acid fast bacilli seen.    Narrative:      LEFT ANKLE TISSUE    Culture, Anaerobe [[id number]] Collected: 10/09/22 0853    Order Status: Completed Specimen: Wound from Ankle, Left Updated: 10/11/22 0914     Anaerobic Culture Culture in progress    Narrative:      LEFT ANKLE WOUND    Aerobic culture [[id number]] Collected: 10/09/22 0853    Order Status: Completed Specimen: Wound from Ankle, Left Updated: 10/11/22 0736     Aerobic Bacterial Culture No growth    Narrative:      LEFT ANKLE TISSUE    Aerobic culture [[id number]] Collected: 10/09/22 0853    Order Status: Completed Specimen: Wound from Ankle, Left Updated: 10/11/22 0629     Aerobic Bacterial Culture No growth    Narrative:       LEFT ANKLE WOUND    Gram stain [[id number]] Collected: 10/09/22 0853    Order Status: Completed Specimen: Wound from Ankle, Left Updated: 10/10/22 0842     Gram Stain Result Rare WBC's      No organisms seen    Narrative:      LEFT ANKLE TISSUE    Fungus culture [[id number]] Collected: 10/09/22 0853    Order Status: Sent Specimen: Wound from Ankle, Left Updated: 10/10/22 0748    Culture, Anaerobe [[id number]] Collected: 10/09/22 0853    Order Status: Sent Specimen: Wound from Ankle, Left Updated: 10/10/22 0747    Gram stain [[id number]] Collected: 10/09/22 0853    Order Status: Completed Specimen: Wound from Ankle, Left Updated: 10/09/22 1330     Gram Stain Result Rare WBC's      Few Gram positive cocci in pairs and chains    Narrative:      LEFT ANKLE WOUND    Fungus culture [[id number]] Collected: 10/09/22 0853    Order Status: Sent Specimen: Wound from Ankle, Left Updated: 10/09/22 1017    Blood culture x two cultures. Draw prior to antibiotics. [[id number]] Collected: 10/02/22 1210    Order Status: Completed Specimen: Blood from Peripheral, Foot, Right Updated: 10/06/22 1303     Blood Culture, Routine No Growth after 4 days.    Narrative:      Aerobic and anaerobic    Blood culture x two cultures. Draw prior to antibiotics. [[id number]] Collected: 10/02/22 1216    Order Status: Completed Specimen: Blood from Peripheral, Antecubital, Right Updated: 10/06/22 1303     Blood Culture, Routine No Growth after 4 days.    Narrative:      Aerobic and anaerobic            Significant Imaging: I have reviewed all pertinent imaging results/findings within the past 24 hours.

## 2022-10-12 NOTE — PLAN OF CARE
Pt remains free from falls and injury throughout shift. Tolerated wound care well, LLE looks much better, incision borders intact with suture, heel boot in place, pain tolerable with current regimen, pt resting well, no distress noted Problem: Adult Inpatient Plan of Care  Goal: Plan of Care Review  Outcome: Ongoing, Progressing  Goal: Patient-Specific Goal (Individualized)  Outcome: Ongoing, Progressing  Goal: Absence of Hospital-Acquired Illness or Injury  Outcome: Ongoing, Progressing  Goal: Optimal Comfort and Wellbeing  Outcome: Ongoing, Progressing  Goal: Readiness for Transition of Care  Outcome: Ongoing, Progressing     Problem: Adjustment to Illness (Sepsis/Septic Shock)  Goal: Optimal Coping  Outcome: Ongoing, Progressing     Problem: Bleeding (Sepsis/Septic Shock)  Goal: Absence of Bleeding  Outcome: Ongoing, Progressing     Problem: Glycemic Control Impaired (Sepsis/Septic Shock)  Goal: Blood Glucose Level Within Desired Range  Outcome: Ongoing, Progressing     Problem: Infection Progression (Sepsis/Septic Shock)  Goal: Absence of Infection Signs and Symptoms  Outcome: Ongoing, Progressing     Problem: Nutrition Impaired (Sepsis/Septic Shock)  Goal: Optimal Nutrition Intake  Outcome: Ongoing, Progressing     Problem: Skin Injury Risk Increased  Goal: Skin Health and Integrity  Outcome: Ongoing, Progressing     Problem: Impaired Wound Healing  Goal: Optimal Wound Healing  Outcome: Ongoing, Progressing     Problem: Pain Acute  Goal: Acceptable Pain Control and Functional Ability  Outcome: Ongoing, Progressing

## 2022-10-12 NOTE — PLAN OF CARE
Problem: Physical Therapy  Goal: Physical Therapy Goal  Description: Goals to be met by: 10/17/22     Patient will increase functional independence with mobility by performin. Pt to be mod I with bed mobility.  2. Pt to transfer with mod I.  3. Pt to ambulate 150' w/RW and supervision.  4. Pt to be (I) with written HEP.    Outcome: Ongoing, Progressing   Limited with gait training 2* to orthostatic hypotension   BP in sitting EOB (post BLE ex's)  : 116/63 ,  , MAP 84  BP in standing with 10 reps x 2 marching in placed and 6 steps to chair : error   BP in sitting in chair : 71/50 ,  , MAP 56  Pt placed in reclined chair with BLE elevated : BP : 122/70, HR : 107 , MAP 90   SpO2: 98% on RA . Patient is symptomatic, nurse Brie notified.

## 2022-10-12 NOTE — PT/OT/SLP PROGRESS
Occupational Therapy   Treatment    Name: Mark Church  MRN: [id number]  Admitting Diagnosis: Sepsis  3 Days Post-Op    Recommendations:     Discharge Recommendations: home health OT  Discharge Equipment Recommendations: bedside commode, bath bench  Barriers to Discharge: Other (Comment) (Patient has not yet regained his PLOF.)    Assessment:     Mark Church is a 72 y.o. male with a medical diagnosis of sepsis.  He presents with good participation for UE exercise.  Performance deficits affecting function are weakness, impaired endurance, impaired self-care skills, gait instability, impaired balance, decreased lower extremity function, impaired functional mobility, edema, pain, impaired skin, and impaired cardiopulmonary response to activity.     Rehab Prognosis:  Good; patient would benefit from acute skilled OT services to address these deficits and reach maximum level of function.       Plan:     Patient to be seen 5 x/week to address the above listed problems via self-care/home management, therapeutic activities, and therapeutic exercises.  Plan of Care Expires: 11/10/22  Plan of Care Reviewed with: patient    Subjective     Pain/Comfort:  Pain Rating 1: 6/10  Location - Side 1: Bilateral  Location - Orientation 1: Distal  Location 1: (Soles of feet)  Pain Addressed 1: Pre-medicate for activity, Reposition, Cessation of activity  Pain Rating Post-Intervention 1: 6/10    Objective:     Communicated with: Nurse, Brie NOYOLA, prior to session.  Patient found in bed with Condom Catheter, bed alarm, telemetry, peripheral IV, pressure-relief boots, wedge cushion to R side/hip,  sock to R foot, LLE wrapped, and pillows supporting BLE's upon OT entry to room.    General Precautions: Standard, fall, and skin precautions apply.    Orthopedic Precautions: N/A   Braces: N/A  Respiratory Status: Room air     Occupational Performance:     Bed Mobility:    Min A was required to adjust LLE/replace pillow under LLE   SBA for  rolling to bilateral sides for placement of wedge/pillow(s) as needed      Functional Mobility/Transfers:  Transfers: Patient declined transferring from bed as per below.   Functional Mobility: Patient declined performing functional mob's as per below.     Activities of Daily Living:  N/A for today's session       [facility] 6 Click ADL: 21    Treatment & Education:  Patient declined getting OOB today; patient stated that he stayed in recliner for several hours (verified by nursing staff) and had just gotten back to bed prior to being treated by OT.  Patient with low BP issues earlier today when standing.    Provided patient with blue Theraband and coordinating HEP today.  Demonstrated all five exercises to patient and had him demo them back to me to ensure proper understanding.  Patient required mod verbal/visual cues with some hand-over-hand assistance for proper form.    Patient was pleasant and participated well throughout the session.     Patient left in bed with all lines intact as previously stated, bed alarm active, pressure-relief boots in place with  sock on R foot and wrap on L leg/foot, wedge cushion replaced to R hip, and a pillow supporting LLE.  Nurse (Aida NOYOLA) was informed of patient's status.     GOALS:   Multidisciplinary Problems       Occupational Therapy Goals          Problem: Occupational Therapy    Goal Priority Disciplines Outcome Interventions   Occupational Therapy Goal     OT, PT/OT Ongoing, Progressing    Description: Goals to be met by: 10/31/2022     Patient will increase functional independence with ADL's by performing:    UE Dressing with Modified [place].  LE Dressing with Supervision.  Grooming while standing with Supervision.  Toileting from bedside commode with Supervision for hygiene and clothing management.   Step transfer with Supervision.  Toilet transfer to bedside commode with Supervision.  Upper extremity exercise program x 10 reps per handout, with [place].                          Time Tracking:     OT Date of Treatment: 10/12/22  OT Start Time: 1602  OT Stop Time: 1632  OT Total Time (min): 30 min    Billable Minutes:Therapeutic Activity 10 mins  Therapeutic Exercise 20 mins    OT/JOSELO: OT          10/12/2022

## 2022-10-12 NOTE — SUBJECTIVE & OBJECTIVE
Interval History: doing well today, eating breakfast. Pain is tolerable.    Review of Systems   Constitutional:  Negative for chills and fever.   Respiratory:  Negative for cough and shortness of breath.    Cardiovascular:  Negative for chest pain and leg swelling.   Gastrointestinal:  Negative for abdominal distention and abdominal pain.   Skin:  Positive for color change and wound.   Objective:     Vital Signs (Most Recent):  Temp: 98.6 °F (37 °C) (10/12/22 0701)  Pulse: (!) 116 (10/12/22 0701)  Resp: 19 (10/12/22 0840)  BP: (!) 114/59 (10/12/22 0701)  SpO2: 98 % (10/12/22 0701) Vital Signs (24h Range):  Temp:  [98 °F (36.7 °C)-99.2 °F (37.3 °C)] 98.6 °F (37 °C)  Pulse:  [101-116] 116  Resp:  [17-20] 19  SpO2:  [97 %-98 %] 98 %  BP: (105-122)/(55-59) 114/59     Weight: 61.6 kg (135 lb 12.9 oz)  Body mass index is 21.27 kg/m².    Intake/Output Summary (Last 24 hours) at 10/12/2022 0931  Last data filed at 10/12/2022 0644  Gross per 24 hour   Intake 664.73 ml   Output 1325 ml   Net -660.27 ml        Physical Exam  Constitutional:       General: He is not in acute distress.     Appearance: He is ill-appearing. He is not toxic-appearing or diaphoretic.   Cardiovascular:      Rate and Rhythm: Normal rate and regular rhythm.      Heart sounds: No murmur heard.    No gallop.   Pulmonary:      Effort: Pulmonary effort is normal. No respiratory distress.      Breath sounds: Normal breath sounds. No wheezing.   Abdominal:      General: Bowel sounds are normal. There is no distension.      Palpations: Abdomen is soft.      Tenderness: There is no abdominal tenderness.   Skin:     Comments: LLE wrapped, dressing not taken down. Distal movement/sensation intact.       Significant Labs: All pertinent labs within the past 24 hours have been reviewed.    Significant Imaging: I have reviewed all pertinent imaging results/findings within the past 24 hours.

## 2022-10-12 NOTE — SUBJECTIVE & OBJECTIVE
Interval History: No fevers documented overnight. Reports minimal post-op pain, however, overall improved since admission. Pt reports tolerating abx without issues.     Review of Systems   Constitutional:  Negative for chills and fever.   Musculoskeletal:  Positive for myalgias.   All other systems reviewed and are negative.  Objective:     Vital Signs (Most Recent):  Temp: 98.6 °F (37 °C) (10/12/22 0655)  Pulse: (!) 116 (10/12/22 0655)  Resp: 18 (10/12/22 0655)  BP: (!) 114/59 (10/12/22 0655)  SpO2: 98 % (10/12/22 0655) Vital Signs (24h Range):  Temp:  [98 °F (36.7 °C)-99.2 °F (37.3 °C)] 98.6 °F (37 °C)  Pulse:  [101-116] 116  Resp:  [17-20] 18  SpO2:  [97 %-98 %] 98 %  BP: (105-122)/(55-59) 114/59     Weight: 61.6 kg (135 lb 12.9 oz)  Body mass index is 21.27 kg/m².    Estimated Creatinine Clearance: 83.1 mL/min (based on SCr of 0.7 mg/dL).    Physical Exam  Constitutional:       Appearance: He is well-developed.   HENT:      Head: Normocephalic and atraumatic.      Right Ear: External ear normal.      Left Ear: External ear normal.      Nose: Nose normal.      Mouth/Throat:      Mouth: Mucous membranes are moist.      Pharynx: No oropharyngeal exudate.   Eyes:      General: No scleral icterus.        Right eye: No discharge.         Left eye: No discharge.   Neck:      Thyroid: No thyromegaly.   Cardiovascular:      Rate and Rhythm: Normal rate and regular rhythm.   Pulmonary:      Effort: Pulmonary effort is normal. No respiratory distress.      Breath sounds: No wheezing.   Abdominal:      General: Bowel sounds are normal. There is no distension.      Palpations: Abdomen is soft.      Tenderness: There is no abdominal tenderness. There is no guarding.   Musculoskeletal:      Right lower leg: No edema.      Comments: L leg wrapped   Skin:     General: Skin is warm and dry.      Coloration: Skin is not jaundiced.      Findings: No bruising.   Neurological:      Mental Status: He is alert and oriented to person,  place, and time. Mental status is at baseline.      Motor: No weakness.   Psychiatric:         Mood and Affect: Mood normal.         Behavior: Behavior normal.       Significant Labs:   Microbiology Results (last 7 days)       Procedure Component Value Units Date/Time    AFB Culture & Smear [[id number]] Collected: 10/09/22 0853    Order Status: Completed Specimen: Wound from Ankle, Left Updated: 10/11/22 2127     AFB Culture & Smear Culture in progress     AFB CULTURE STAIN No acid fast bacilli seen.    Narrative:      LEFT ANKLE WOUND    AFB Culture & Smear [[id number]] Collected: 10/09/22 0853    Order Status: Completed Specimen: Wound from Ankle, Left Updated: 10/11/22 2127     AFB Culture & Smear Culture in progress     AFB CULTURE STAIN No acid fast bacilli seen.    Narrative:      LEFT ANKLE TISSUE    Culture, Anaerobe [[id number]] Collected: 10/09/22 0853    Order Status: Completed Specimen: Wound from Ankle, Left Updated: 10/11/22 0914     Anaerobic Culture Culture in progress    Narrative:      LEFT ANKLE WOUND    Aerobic culture [[id number]] Collected: 10/09/22 0853    Order Status: Completed Specimen: Wound from Ankle, Left Updated: 10/11/22 0736     Aerobic Bacterial Culture No growth    Narrative:      LEFT ANKLE TISSUE    Aerobic culture [[id number]] Collected: 10/09/22 0853    Order Status: Completed Specimen: Wound from Ankle, Left Updated: 10/11/22 0629     Aerobic Bacterial Culture No growth    Narrative:      LEFT ANKLE WOUND    Gram stain [[id number]] Collected: 10/09/22 0853    Order Status: Completed Specimen: Wound from Ankle, Left Updated: 10/10/22 0842     Gram Stain Result Rare WBC's      No organisms seen    Narrative:      LEFT ANKLE TISSUE    Fungus culture [[id number]] Collected: 10/09/22 0853    Order Status: Sent Specimen: Wound from Ankle, Left Updated: 10/10/22 0748    Culture, Anaerobe [[id number]] Collected: 10/09/22 0853    Order Status: Sent Specimen: Wound from Ankle, Left Updated:  10/10/22 0747    Gram stain [[id number]] Collected: 10/09/22 0853    Order Status: Completed Specimen: Wound from Ankle, Left Updated: 10/09/22 1330     Gram Stain Result Rare WBC's      Few Gram positive cocci in pairs and chains    Narrative:      LEFT ANKLE WOUND    Fungus culture [[id number]] Collected: 10/09/22 0853    Order Status: Sent Specimen: Wound from Ankle, Left Updated: 10/09/22 1017    Blood culture x two cultures. Draw prior to antibiotics. [[id number]] Collected: 10/02/22 1210    Order Status: Completed Specimen: Blood from Peripheral, Foot, Right Updated: 10/06/22 1303     Blood Culture, Routine No Growth after 4 days.    Narrative:      Aerobic and anaerobic    Blood culture x two cultures. Draw prior to antibiotics. [[id number]] Collected: 10/02/22 1216    Order Status: Completed Specimen: Blood from Peripheral, Antecubital, Right Updated: 10/06/22 1303     Blood Culture, Routine No Growth after 4 days.    Narrative:      Aerobic and anaerobic            Significant Imaging: I have reviewed all pertinent imaging results/findings within the past 24 hours.

## 2022-10-12 NOTE — ASSESSMENT & PLAN NOTE
S/p OR with surgery on 10/9 - OR cx in process, g/s with GPC in pairs/chains. Cultures so far ngtd- though patient has been on broad spectrum abx therapy prior to surgical cultures. Op note notable for murky fluid present throughout entire lower extremity. WBC improving. Blood cultures ngtd.     Recommendations:  -continue vancomycin pharmacy to dose, cefepime, and flagyl while awaiting cx data - so far ngtd; g/s with GPC in pairs/chains (suspect strep)   -given improvement in WBC, can likely transition to PO abx when closer to discharge (doxy/augmentin) to complete 14d course from washout  -trend WBC

## 2022-10-12 NOTE — PLAN OF CARE
Problem: Adult Inpatient Plan of Care  Goal: Plan of Care Review  10/12/2022 0200 by Vanesa Mendez RN  Outcome: Ongoing, Progressing  10/12/2022 0200 by Vanesa Mendez RN  Outcome: Ongoing, Progressing     Problem: Impaired Wound Healing  Goal: Optimal Wound Healing  Outcome: Ongoing, Progressing  Intervention: Promote Wound Healing  Flowsheets (Taken 10/12/2022 0200)  Activity Management:   Rolling - L1   Arm raise - L1  Pain Management Interventions:   awakened for pain meds per patient request   care clustered   diversional activity provided   pain management plan reviewed with patient/caregiver   pillow support provided   position adjusted   quiet environment facilitated     Problem: Pain Acute  Goal: Acceptable Pain Control and Functional Ability  Outcome: Ongoing, Progressing  Intervention: Develop Pain Management Plan  Flowsheets (Taken 10/12/2022 0200)  Pain Management Interventions:   awakened for pain meds per patient request   care clustered   diversional activity provided   pain management plan reviewed with patient/caregiver   pillow support provided   position adjusted   quiet environment facilitated

## 2022-10-12 NOTE — NURSING
Report given to oncoming nurse OBED Baird at bedside. NAD noted. Safety precautions maintained. Call bell in reach.   Chart check completed.

## 2022-10-12 NOTE — PLAN OF CARE
Problem: Occupational Therapy  Goal: Occupational Therapy Goal  Description: Goals to be met by: 10/31/2022     Patient will increase functional independence with ADL's by performing:    UE Dressing with Modified [place].  LE Dressing with Supervision.  Grooming while standing with Supervision.  Toileting from bedside commode with Supervision for hygiene and clothing management.   Step transfer with Supervision.  Toilet transfer to bedside commode with Supervision.  Upper extremity exercise program x 10 reps per handout, with [place].    Outcome: Ongoing, Progressing     Patient declined getting OOB today; patient stated that he stayed in recliner for several hours (verified by nursing staff) and had just gotten back to bed prior to being treated by OT.  Patient with low BP issues earlier today when standing.    Provided patient with blue Theraband and coordinating HEP today.  Demonstrated all five exercises to patient and had him demo them back to me to ensure proper understanding.  Patient required mod verbal/visual cues with some hand-over-hand assistance for proper form.    Patient was pleasant and participated well throughout the session.

## 2022-10-13 LAB
ALBUMIN SERPL BCP-MCNC: 1.7 G/DL (ref 3.5–5.2)
ALP SERPL-CCNC: 88 U/L (ref 55–135)
ALT SERPL W/O P-5'-P-CCNC: 39 U/L (ref 10–44)
ANION GAP SERPL CALC-SCNC: 7 MMOL/L (ref 8–16)
AST SERPL-CCNC: 49 U/L (ref 10–40)
BACTERIA SPEC AEROBE CULT: NO GROWTH
BACTERIA SPEC ANAEROBE CULT: NORMAL
BASOPHILS # BLD AUTO: 0.04 K/UL (ref 0–0.2)
BASOPHILS NFR BLD: 0.4 % (ref 0–1.9)
BILIRUB SERPL-MCNC: 0.6 MG/DL (ref 0.1–1)
BUN SERPL-MCNC: 18 MG/DL (ref 8–23)
CALCIUM SERPL-MCNC: 8.2 MG/DL (ref 8.7–10.5)
CHLORIDE SERPL-SCNC: 100 MMOL/L (ref 95–110)
CO2 SERPL-SCNC: 27 MMOL/L (ref 23–29)
CREAT SERPL-MCNC: 0.7 MG/DL (ref 0.5–1.4)
DIFFERENTIAL METHOD: ABNORMAL
EOSINOPHIL # BLD AUTO: 0.8 K/UL (ref 0–0.5)
EOSINOPHIL NFR BLD: 7.4 % (ref 0–8)
ERYTHROCYTE [DISTWIDTH] IN BLOOD BY AUTOMATED COUNT: 14.6 % (ref 11.5–14.5)
EST. GFR  (NO RACE VARIABLE): >60 ML/MIN/1.73 M^2
GLUCOSE SERPL-MCNC: 103 MG/DL (ref 70–110)
HCT VFR BLD AUTO: 28.7 % (ref 40–54)
HGB BLD-MCNC: 9.1 G/DL (ref 14–18)
IMM GRANULOCYTES # BLD AUTO: 0.34 K/UL (ref 0–0.04)
IMM GRANULOCYTES NFR BLD AUTO: 3 % (ref 0–0.5)
LYMPHOCYTES # BLD AUTO: 1.4 K/UL (ref 1–4.8)
LYMPHOCYTES NFR BLD: 12.9 % (ref 18–48)
MAGNESIUM SERPL-MCNC: 1.3 MG/DL (ref 1.6–2.6)
MCH RBC QN AUTO: 26.8 PG (ref 27–31)
MCHC RBC AUTO-ENTMCNC: 31.7 G/DL (ref 32–36)
MCV RBC AUTO: 85 FL (ref 82–98)
MONOCYTES # BLD AUTO: 1.1 K/UL (ref 0.3–1)
MONOCYTES NFR BLD: 9.5 % (ref 4–15)
NEUTROPHILS # BLD AUTO: 7.4 K/UL (ref 1.8–7.7)
NEUTROPHILS NFR BLD: 66.8 % (ref 38–73)
NRBC BLD-RTO: 0 /100 WBC
PLATELET # BLD AUTO: 916 K/UL (ref 150–450)
PMV BLD AUTO: 9.6 FL (ref 9.2–12.9)
POCT GLUCOSE: 123 MG/DL (ref 70–110)
POTASSIUM SERPL-SCNC: 3.5 MMOL/L (ref 3.5–5.1)
PROT SERPL-MCNC: 5.5 G/DL (ref 6–8.4)
RBC # BLD AUTO: 3.39 M/UL (ref 4.6–6.2)
SODIUM SERPL-SCNC: 134 MMOL/L (ref 136–145)
WBC # BLD AUTO: 11.15 K/UL (ref 3.9–12.7)

## 2022-10-13 PROCEDURE — 25000003 PHARM REV CODE 250: Performed by: ORTHOPAEDIC SURGERY

## 2022-10-13 PROCEDURE — 80053 COMPREHEN METABOLIC PANEL: CPT | Performed by: ORTHOPAEDIC SURGERY

## 2022-10-13 PROCEDURE — 36415 COLL VENOUS BLD VENIPUNCTURE: CPT | Performed by: ORTHOPAEDIC SURGERY

## 2022-10-13 PROCEDURE — 63600175 PHARM REV CODE 636 W HCPCS: Performed by: ORTHOPAEDIC SURGERY

## 2022-10-13 PROCEDURE — 85025 COMPLETE CBC W/AUTO DIFF WBC: CPT | Performed by: ORTHOPAEDIC SURGERY

## 2022-10-13 PROCEDURE — 99232 PR SUBSEQUENT HOSPITAL CARE,LEVL II: ICD-10-PCS | Mod: ,,, | Performed by: STUDENT IN AN ORGANIZED HEALTH CARE EDUCATION/TRAINING PROGRAM

## 2022-10-13 PROCEDURE — 25000003 PHARM REV CODE 250: Performed by: STUDENT IN AN ORGANIZED HEALTH CARE EDUCATION/TRAINING PROGRAM

## 2022-10-13 PROCEDURE — 83735 ASSAY OF MAGNESIUM: CPT | Performed by: ORTHOPAEDIC SURGERY

## 2022-10-13 PROCEDURE — 97110 THERAPEUTIC EXERCISES: CPT | Mod: CQ

## 2022-10-13 PROCEDURE — 97530 THERAPEUTIC ACTIVITIES: CPT | Mod: CQ

## 2022-10-13 PROCEDURE — 99232 SBSQ HOSP IP/OBS MODERATE 35: CPT | Mod: ,,, | Performed by: STUDENT IN AN ORGANIZED HEALTH CARE EDUCATION/TRAINING PROGRAM

## 2022-10-13 PROCEDURE — 97116 GAIT TRAINING THERAPY: CPT | Mod: CQ

## 2022-10-13 PROCEDURE — 11000001 HC ACUTE MED/SURG PRIVATE ROOM

## 2022-10-13 RX ORDER — LANOLIN ALCOHOL/MO/W.PET/CERES
400 CREAM (GRAM) TOPICAL 2 TIMES DAILY
Status: DISCONTINUED | OUTPATIENT
Start: 2022-10-13 | End: 2022-10-21 | Stop reason: HOSPADM

## 2022-10-13 RX ORDER — AMOXICILLIN AND CLAVULANATE POTASSIUM 875; 125 MG/1; MG/1
1 TABLET, FILM COATED ORAL EVERY 12 HOURS
Status: DISCONTINUED | OUTPATIENT
Start: 2022-10-13 | End: 2022-10-21 | Stop reason: HOSPADM

## 2022-10-13 RX ADMIN — CEFEPIME 2 G: 2 INJECTION, POWDER, FOR SOLUTION INTRAVENOUS at 09:10

## 2022-10-13 RX ADMIN — OXYBUTYNIN CHLORIDE 5 MG: 5 TABLET ORAL at 08:10

## 2022-10-13 RX ADMIN — HYDROCODONE BITARTRATE AND ACETAMINOPHEN 1 TABLET: 7.5; 325 TABLET ORAL at 09:10

## 2022-10-13 RX ADMIN — LACTOBACILLUS ACIDOPHILUS / LACTOBACILLUS BULGARICUS 1 EACH: 100 MILLION CFU STRENGTH GRANULES at 09:10

## 2022-10-13 RX ADMIN — SENNOSIDES 8.6 MG: 8.6 TABLET, FILM COATED ORAL at 09:10

## 2022-10-13 RX ADMIN — AMOXICILLIN AND CLAVULANATE POTASSIUM 1 TABLET: 875; 125 TABLET, FILM COATED ORAL at 09:10

## 2022-10-13 RX ADMIN — MUPIROCIN: 20 OINTMENT TOPICAL at 08:10

## 2022-10-13 RX ADMIN — LATANOPROST 1 DROP: 50 SOLUTION OPHTHALMIC at 09:10

## 2022-10-13 RX ADMIN — AZELASTINE 137 MCG: 1 SPRAY, METERED NASAL at 09:10

## 2022-10-13 RX ADMIN — ASPIRIN 81 MG CHEWABLE TABLET 81 MG: 81 TABLET CHEWABLE at 08:10

## 2022-10-13 RX ADMIN — ENOXAPARIN SODIUM 40 MG: 100 INJECTION SUBCUTANEOUS at 05:10

## 2022-10-13 RX ADMIN — TIMOLOL MALEATE 1 DROP: 5 SOLUTION/ DROPS OPHTHALMIC at 09:10

## 2022-10-13 RX ADMIN — ALLOPURINOL 100 MG: 100 TABLET ORAL at 08:10

## 2022-10-13 RX ADMIN — TIMOLOL MALEATE 1 DROP: 5 SOLUTION/ DROPS OPHTHALMIC at 08:10

## 2022-10-13 RX ADMIN — LACTOBACILLUS ACIDOPHILUS / LACTOBACILLUS BULGARICUS 1 EACH: 100 MILLION CFU STRENGTH GRANULES at 08:10

## 2022-10-13 RX ADMIN — CEFEPIME 2 G: 2 INJECTION, POWDER, FOR SOLUTION INTRAVENOUS at 12:10

## 2022-10-13 RX ADMIN — Medication 400 MG: at 08:10

## 2022-10-13 RX ADMIN — METRONIDAZOLE 500 MG: 500 TABLET ORAL at 06:10

## 2022-10-13 RX ADMIN — AMOXICILLIN AND CLAVULANATE POTASSIUM 1 TABLET: 875; 125 TABLET, FILM COATED ORAL at 11:10

## 2022-10-13 RX ADMIN — POLYETHYLENE GLYCOL 3350 17 G: 17 POWDER, FOR SOLUTION ORAL at 08:10

## 2022-10-13 RX ADMIN — CETIRIZINE HYDROCHLORIDE 5 MG: 5 TABLET ORAL at 09:10

## 2022-10-13 RX ADMIN — OXYBUTYNIN CHLORIDE 5 MG: 5 TABLET ORAL at 09:10

## 2022-10-13 RX ADMIN — MUPIROCIN: 20 OINTMENT TOPICAL at 09:10

## 2022-10-13 RX ADMIN — SENNOSIDES 8.6 MG: 8.6 TABLET, FILM COATED ORAL at 08:10

## 2022-10-13 RX ADMIN — Medication 400 MG: at 09:10

## 2022-10-13 RX ADMIN — AZELASTINE 137 MCG: 1 SPRAY, METERED NASAL at 08:10

## 2022-10-13 NOTE — PLAN OF CARE
10/13/22 1622   Medicare Message   Important Message from Medicare regarding Discharge Appeal Rights Given to patient/caregiver;Explained to patient/caregiver;Signed/date by patient/caregiver   Date IMM was signed 10/13/22   Time IMM was signed 2444

## 2022-10-13 NOTE — PROGRESS NOTES
"[facility] Surg  Infectious Disease  Progress Note    Patient Name: Marc Church  MRN: [id number]  Admission Date: 10/2/2022  Length of Stay: 11 days  Attending Physician: Christian Esquivel MD  Primary Care Provider: [facility] [place]     Isolation Status: No active isolations  Assessment/Plan:      Cellulitis of left lower extremity  S/p OR with surgery on 10/9 - OR cx with Gp A strep (rare). Anaerobic cx in process Op note notable for murky fluid present throughout entire lower extremity. Leukocytosis resolved. Blood cultures ngtd.     Recommendations:  -stop vanc, no MRSA isolated  -stop cefepime/flagyl   -transition to augmentin PO (cover GAS and potential anaerobes) x 14d from washout, marc 10/23  -wound care                  Thank you for your consult. I will sign off. Please contact us if you have any additional questions. Above d/w primary team.     Time: 35 minutes   50% of time spent on face-to-face counseling and coordination of care. Counseling included review of test results, diagnosis, and treatment plan with patient and/or family.        Estella Valadez MD  Infectious Disease  [facility] S[facility]    Subjective:     Principal Problem:Sepsis    HPI: 72M with h/o Bartter syndrome, gout admitted 10/2 with swelling and pain in his left leg. Reports about 4 days of progressive swelling and redness to L leg. Suspects he may have had an insect bite, but doesn't recall. Reports swelling and redeness worse and started having fevers so came to hospital. Usually ambulatory with walker. Denies indwelling hardware.     Bcx NGTD x 2 days    CT  1. Diffuse, circumferential subcutaneous soft tissue edema throughout the left tibia and fibula.  No soft tissue gas or rim enhancing fluid collection.  Correlate for cellulitis.  2. Cutaneous collection at the skin surface of the medial ankle compatible with a fluid-filled blister.  ID consulted for "antibx recommendations for cellulitis"    Day 4 " "vanc/cefepime/clindamycin    Tmax 101    Arterial studies- Mildly elevated velocity within the left popliteal artery with monophasic waveforms in the calf arteries, concerning for stenosis of the popliteal artery.    ID consulted for "antibx recommendations for cellulitis"    Interval History: No issues overnight. WBC normalized. Reports improved post-op pain. Tolerating abx without issues.     Review of Systems   Constitutional:  Negative for chills and fever.   Gastrointestinal:  Negative for abdominal pain, diarrhea and nausea.   All other systems reviewed and are negative.  Objective:     Vital Signs (Most Recent):  Temp: 97.2 °F (36.2 °C) (10/13/22 0507)  Pulse: 86 (10/13/22 0507)  Resp: 20 (10/13/22 0507)  BP: (!) 109/53 (10/13/22 0507)  SpO2: 95 % (10/13/22 0507) Vital Signs (24h Range):  Temp:  [97.2 °F (36.2 °C)-98.8 °F (37.1 °C)] 97.2 °F (36.2 °C)  Pulse:  [] 86  Resp:  [16-20] 20  SpO2:  [95 %-99 %] 95 %  BP: (105-122)/(53-67) 109/53     Weight: 61.6 kg (135 lb 12.9 oz)  Body mass index is 21.27 kg/m².    Estimated Creatinine Clearance: 83.1 mL/min (based on SCr of 0.7 mg/dL).    Physical Exam  Constitutional:       Appearance: He is well-developed.   HENT:      Head: Normocephalic and atraumatic.      Right Ear: External ear normal.      Left Ear: External ear normal.      Nose: Nose normal.      Mouth/Throat:      Mouth: Mucous membranes are moist.      Pharynx: No oropharyngeal exudate.   Eyes:      General: No scleral icterus.        Right eye: No discharge.         Left eye: No discharge.   Neck:      Thyroid: No thyromegaly.   Cardiovascular:      Rate and Rhythm: Normal rate and regular rhythm.   Pulmonary:      Effort: Pulmonary effort is normal. No respiratory distress.      Breath sounds: No wheezing.   Abdominal:      General: Bowel sounds are normal. There is no distension.      Palpations: Abdomen is soft.      Tenderness: There is no abdominal tenderness. There is no guarding. "   Musculoskeletal:      Right lower leg: No edema.      Comments: L leg wrapped   Skin:     General: Skin is warm and dry.      Coloration: Skin is not jaundiced.      Findings: No bruising.   Neurological:      Mental Status: He is alert and oriented to person, place, and time. Mental status is at baseline.      Motor: No weakness.   Psychiatric:         Mood and Affect: Mood normal.         Behavior: Behavior normal.       Significant Labs:   Microbiology Results (last 7 days)       Procedure Component Value Units Date/Time    Aerobic culture [[id number]] Collected: 10/09/22 0853    Order Status: Completed Specimen: Wound from Ankle, Left Updated: 10/13/22 0603     Aerobic Bacterial Culture No growth    Narrative:      LEFT ANKLE WOUND    Aerobic culture [[id number]] Collected: 10/09/22 0853    Order Status: Completed Specimen: Wound from Ankle, Left Updated: 10/12/22 0747     Aerobic Bacterial Culture No growth    Narrative:      LEFT ANKLE TISSUE    Culture, Anaerobe [[id number]] Collected: 10/09/22 0853    Order Status: Completed Specimen: Wound from Ankle, Left Updated: 10/12/22 0724     Anaerobic Culture Culture in progress    Narrative:      LEFT ANKLE TISSUE    AFB Culture & Smear [[id number]] Collected: 10/09/22 0853    Order Status: Completed Specimen: Wound from Ankle, Left Updated: 10/11/22 2127     AFB Culture & Smear Culture in progress     AFB CULTURE STAIN No acid fast bacilli seen.    Narrative:      LEFT ANKLE WOUND    AFB Culture & Smear [[id number]] Collected: 10/09/22 0853    Order Status: Completed Specimen: Wound from Ankle, Left Updated: 10/11/22 2127     AFB Culture & Smear Culture in progress     AFB CULTURE STAIN No acid fast bacilli seen.    Narrative:      LEFT ANKLE TISSUE    Culture, Anaerobe [[id number]] Collected: 10/09/22 0853    Order Status: Completed Specimen: Wound from Ankle, Left Updated: 10/11/22 0914     Anaerobic Culture Culture in progress    Narrative:      LEFT ANKLE WOUND     Gram stain [[id number]] Collected: 10/09/22 0853    Order Status: Completed Specimen: Wound from Ankle, Left Updated: 10/10/22 0842     Gram Stain Result Rare WBC's      No organisms seen    Narrative:      LEFT ANKLE TISSUE    Fungus culture [[id number]] Collected: 10/09/22 0853    Order Status: Sent Specimen: Wound from Ankle, Left Updated: 10/10/22 0748    Gram stain [[id number]] Collected: 10/09/22 0853    Order Status: Completed Specimen: Wound from Ankle, Left Updated: 10/09/22 1330     Gram Stain Result Rare WBC's      Few Gram positive cocci in pairs and chains    Narrative:      LEFT ANKLE WOUND    Fungus culture [[id number]] Collected: 10/09/22 0853    Order Status: Sent Specimen: Wound from Ankle, Left Updated: 10/09/22 1017    Blood culture x two cultures. Draw prior to antibiotics. [[id number]] Collected: 10/02/22 1210    Order Status: Completed Specimen: Blood from Peripheral, Foot, Right Updated: 10/06/22 1303     Blood Culture, Routine No Growth after 4 days.    Narrative:      Aerobic and anaerobic    Blood culture x two cultures. Draw prior to antibiotics. [[id number]] Collected: 10/02/22 1216    Order Status: Completed Specimen: Blood from Peripheral, Antecubital, Right Updated: 10/06/22 1303     Blood Culture, Routine No Growth after 4 days.    Narrative:      Aerobic and anaerobic            Significant Imaging: I have reviewed all pertinent imaging results/findings within the past 24 hours.

## 2022-10-13 NOTE — ASSESSMENT & PLAN NOTE
Cellulitis, likely point of bacterial entry medial ankle. WBC uptrending despite appropriate antibiotic therapy. Repeat CT scan with possible early abscess formation on read, but surgery does not feel it is amenable to intervention. Ortho consulted by GS for second opinion, pt went to OR for I&D but no abscess found on 10/9  - appreciate ID, general surgery, orthopedics involvement  - on cefepime/vancomycin/flagyl  - abnormal arterial US noted, but WILLIE normal  - elevate ankle>knee>hip to facilitate drainage  - WbC trending down, cultures growing Strep Pyogenes - transitioned to PO Augmentin x2 weeks from washout (tentative end date 10/23)  - daily wound care - patient to discuss with family about helping when home health is not able to  - Discussing with Orthopedic follow up

## 2022-10-13 NOTE — CONSULTS
"POD # 4 I&D left leg and foot  Wounds on medial and lateral ankle saturating dressing in 24 hours. Small amount of drainage from medial proximal incision left leg.   Leg much improved since I&D with less erythema and pain.  Photodocumentation (from  10/12)    Left medial lower leg incisions- Proximal incision with suture. Lower incision with opening and scant slough along wound edge. Red drainage from opening. Periwound skin dry and peeling  Medial ankle incision- Draining a large amount serosanguineous fluid without odor. White ischemic tissue in center of wound with dead space/cavity under ischemic roof. Clean partial thickness pink wound surrounding opening with outer edges of wound dry peeling skin. Much less pain when wound palpated.   Strong palpable DP pulse.     Left lateral ankle wound- Draining a large amount serosanguineous fluid, but no dead space identified when wound explored. Light erythema and small amount edema.   Elevating leg when sitting up in chair.   Treatment:  Cleansed wound with Vashe. Applied 3M Cavilon No Sting Film Barrier to periwound skin. Filled draining areas with dead space with susan of Aquacel Ag hydrofiber. Covered wounds with Mepilex 4x4 without a border. Wrapped from toes to knee with Kerlix roll then covered with cast wrap. Applied 4" Coban for light compression.   Recommend changing dressing every other day. Patient interested in follow up at [facility] outpatient wound care weekly in addition to HH.   Patient reports poor appetite, but will drink supplements. Encouraged po intake for improved healing.   Discussed continued elevation of leg when sitting.   "

## 2022-10-13 NOTE — PROGRESS NOTES
Therapy with vancomycin complete and/or consult discontinued by provider.  Pharmacy will sign off, please re-consult as needed.    Thank you for the consult,   Mar Song  10/13/2022

## 2022-10-13 NOTE — PLAN OF CARE
[facility] - Med Surg  Discharge Reassessment    Primary Care Provider: [facility]     Expected Discharge Date: 10/7/2022    Reassessment (most recent)       Discharge Reassessment - 10/13/22 1622          Discharge Reassessment    Assessment Type Discharge Planning Reassessment     Discharge Plan discussed with: Patient;Spouse/sig other     Name(s) and Number(s) Winifred Church (Spouse)   [phone]     Communicated ROS with patient/caregiver Yes     Discharge Plan A Home Health;Home with family     Discharge Plan B Home Health;Home with family     DME Needed Upon Discharge  bath bench;bedside commode     Discharge Barriers Identified None     Why the patient remains in the hospital Requires continued medical care        Post-Acute Status    Coverage [employer]     Discharge Delays None known at this time                   SW met with patient at bedside to discuss HH choice. Patient stated [facility], he does not have any preference if [facility]  does not accept him except that it's not [facility].

## 2022-10-13 NOTE — PLAN OF CARE
Problem: Adult Inpatient Plan of Care  Goal: Absence of Hospital-Acquired Illness or Injury  Outcome: Ongoing, Progressing  Intervention: Identify and Manage Fall Risk  Flowsheets (Taken 10/13/2022 1224)  Safety Promotion/Fall Prevention:   assistive device/personal item within reach   bed alarm set   commode/urinal/bedpan at bedside   Fall Risk reviewed with patient/family   Fall Risk signage in place   in recliner, wheels locked   lighting adjusted   medications reviewed   nonskid shoes/socks when out of bed   room near unit station   side rails raised x 2   Supervised toileting - stay within arms reach   toileting scheduled   instructed to call staff for mobility  Intervention: Prevent Skin Injury  Flowsheets (Taken 10/13/2022 1224)  Body Position:   foot of bed elevated   heels elevated   supine   weight shifting  Skin Protection:   adhesive use limited   incontinence pads utilized   protective footwear used   tubing/devices free from skin contact  Intervention: Prevent and Manage VTE (Venous Thromboembolism) Risk  Flowsheets (Taken 10/13/2022 1224)  Activity Management: Walk with assistive devise and /or staff member - L3  VTE Prevention/Management:   remove, assess skin, and reapply sequential compression device   ROM (active) performed   bleeding precations maintained  Range of Motion:   ROM (range of motion) performed   active ROM (range of motion) encouraged  Intervention: Prevent Infection  Flowsheets (Taken 10/13/2022 1224)  Infection Prevention:   equipment surfaces disinfected   hand hygiene promoted   single patient room provided     Problem: Adjustment to Illness (Sepsis/Septic Shock)  Goal: Optimal Coping  Outcome: Ongoing, Progressing     Problem: Infection Progression (Sepsis/Septic Shock)  Goal: Absence of Infection Signs and Symptoms  Outcome: Ongoing, Progressing  Intervention: Initiate Sepsis Management  Flowsheets (Taken 10/13/2022 1224)  Infection Prevention:   equipment surfaces  disinfected   hand hygiene promoted   single patient room provided  Infection Management: aseptic technique maintained  Stabilization Measures: legs elevated  Isolation Precautions: protective  Intervention: Promote Stabilization  Flowsheets (Taken 10/13/2022 1224)  Fever Reduction/Comfort Measures: lightweight bedding  Intervention: Promote Recovery  Flowsheets (Taken 10/13/2022 1224)  Sleep/Rest Enhancement: regular sleep/rest pattern promoted  Airway/Ventilation Support: comfort measures provided  Activity Management: Walk with assistive devise and /or staff member - L3     Problem: Skin Injury Risk Increased  Goal: Skin Health and Integrity  Outcome: Ongoing, Progressing  Intervention: Optimize Skin Protection  Flowsheets (Taken 10/13/2022 1224)  Pressure Reduction Techniques:   frequent weight shift encouraged   heels elevated off bed   pressure points protected   weight shift assistance provided  Pressure Reduction Devices:   chair cushion utilized   positioning supports utilized  Skin Protection:   adhesive use limited   incontinence pads utilized   protective footwear used   tubing/devices free from skin contact  Head of Bed (HOB) Positioning: HOB at 30-45 degrees  Intervention: Promote and Optimize Oral Intake  Flowsheets (Taken 10/13/2022 1224)  Oral Nutrition Promotion:   calorie-dense foods provided   medicated     Problem: Impaired Wound Healing  Goal: Optimal Wound Healing  Outcome: Ongoing, Progressing  Intervention: Promote Wound Healing  Flowsheets (Taken 10/13/2022 1224)  Oral Nutrition Promotion:   calorie-dense foods provided   medicated  Sleep/Rest Enhancement: regular sleep/rest pattern promoted  Activity Management: Walk with assistive devise and /or staff member - L3  Pain Management Interventions:   medication offered   pillow support provided   pain management plan reviewed with patient/caregiver   position adjusted   premedicated for activity     Problem: Pain Acute  Goal: Acceptable Pain  Control and Functional Ability  Outcome: Ongoing, Progressing  Intervention: Develop Pain Management Plan  Flowsheets (Taken 10/13/2022 1224)  Pain Management Interventions:   medication offered   pillow support provided   pain management plan reviewed with patient/caregiver   position adjusted   premedicated for activity  Intervention: Prevent or Manage Pain  Flowsheets (Taken 10/13/2022 1224)  Sleep/Rest Enhancement: regular sleep/rest pattern promoted  Sensory Stimulation Regulation: television on  Bowel Elimination Promotion:   adequate fluid intake promoted   commode/bedpan at bedside   privacy promoted  Medication Review/Management: medications reviewed  Intervention: Optimize Psychosocial Wellbeing  Flowsheets (Taken 10/13/2022 1224)  Supportive Measures:   active listening utilized   decision-making supported   goal-setting facilitated   positive reinforcement provided   self-care encouraged   verbalization of feelings encouraged  Diversional Activities: television

## 2022-10-13 NOTE — SUBJECTIVE & OBJECTIVE
"Interval History: attempting to work with therapy, feeling "woozy" when sitting on side of bed.     Review of Systems   Constitutional:  Negative for chills and fever.   Respiratory:  Negative for cough and shortness of breath.    Cardiovascular:  Negative for chest pain and leg swelling.   Gastrointestinal:  Negative for abdominal distention and abdominal pain.   Skin:  Positive for color change and wound.   Objective:     Vital Signs (Most Recent):  Temp: 98.1 °F (36.7 °C) (10/13/22 0752)  Pulse: 110 (10/13/22 0752)  Resp: 19 (10/13/22 0925)  BP: 114/63 (10/13/22 0752)  SpO2: 95 % (10/13/22 0752) Vital Signs (24h Range):  Temp:  [97.2 °F (36.2 °C)-98.8 °F (37.1 °C)] 98.1 °F (36.7 °C)  Pulse:  [] 110  Resp:  [16-20] 19  SpO2:  [95 %-99 %] 95 %  BP: (105-116)/(53-63) 114/63     Weight: 61.6 kg (135 lb 12.9 oz)  Body mass index is 21.27 kg/m².    Intake/Output Summary (Last 24 hours) at 10/13/2022 1151  Last data filed at 10/13/2022 0905  Gross per 24 hour   Intake 2868.21 ml   Output 1500 ml   Net 1368.21 ml        Physical Exam  Constitutional:       General: He is not in acute distress.     Appearance: He is ill-appearing. He is not toxic-appearing or diaphoretic.   Cardiovascular:      Rate and Rhythm: Normal rate and regular rhythm.      Heart sounds: No murmur heard.    No gallop.   Pulmonary:      Effort: Pulmonary effort is normal. No respiratory distress.      Breath sounds: Normal breath sounds. No wheezing.   Abdominal:      General: Bowel sounds are normal. There is no distension.      Palpations: Abdomen is soft.      Tenderness: There is no abdominal tenderness.   Skin:     Comments: LLE wrapped, dressing not taken down. Distal movement/sensation intact.       Significant Labs: All pertinent labs within the past 24 hours have been reviewed.    Significant Imaging: I have reviewed all pertinent imaging results/findings within the past 24 hours.  "

## 2022-10-13 NOTE — ASSESSMENT & PLAN NOTE
S/p OR with surgery on 10/9 - OR cx with Gp A strep (rare). Anaerobic cx in process Op note notable for murky fluid present throughout entire lower extremity. Leukocytosis resolved. Blood cultures ngtd.     Recommendations:  -stop vanc, no MRSA isolated  -transition to augmentin (cover GAS and potential anaerobes) x 14d from washout, marc 10/23

## 2022-10-13 NOTE — PROGRESS NOTES
"[facility] Medicine  Progress Note    Patient Name: Mark Church  MRN: [id number]  Patient Class: IP- Inpatient   Admission Date: 10/2/2022  Length of Stay: 11 days  Attending Physician: Christian Esquivel MD  Primary Care Provider: [facility] [place]         Subjective:     Principal Problem:Sepsis        HPI:  Mr. 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  He states this all started about 4 days ago when he was walking in a field. He states he felt some significant itching in his lower leg.  Later that night his leg started to swell and hurt.  He assumed this was caused by an insect bite on his leg, but he never saw a specific bite or insect.  He states that the 2nd night the swelling and pain became severe and he was having fevers and chills which prompted him to visit his primary care provider the next day.  He was prescribed an antibiotic, not sure which one, and despite taking this his leg has continued to get worse.  He notes he has been having fevers and chills and last night he "slid or fell" out of his chair when he was trying to get up because the pain was so bad.  He notes he uses a walker to ambulate at home.  He also notes a chronic cough ascociated wiith allergic rhinitis and had an episode of nausea and vomiting one week ago.  He reports diminished oral intake due to decreased appetite, but no nausea or vomiting since this all started 4 days ago.  He denies chest pain, shortness of breath, headache, palpitations, dysuria and diarrhea.      Overview/Hospital Course:  Admitted with sepsis secondary to left lower extremity cellulitis. Started on IV antibiotics, IVF and potassium replacement (has Bartter syndrome). Surgery consulted for concern for nec fasc/bone involvement. CT did not show any evidence of abscess/gas. Seems to be improving though still significant " "swelling and blistering. ID consulted for antibiotic recommendations. Repeat CT ordered for worsening white count which did not show a clear abscess.    Pt continued w/ minimal improvement. Ortho took the patient for I&D did not reveal abscess.      Interval History: attempting to work with therapy, feeling "woozy" when sitting on side of bed.     Review of Systems   Constitutional:  Negative for chills and fever.   Respiratory:  Negative for cough and shortness of breath.    Cardiovascular:  Negative for chest pain and leg swelling.   Gastrointestinal:  Negative for abdominal distention and abdominal pain.   Skin:  Positive for color change and wound.   Objective:     Vital Signs (Most Recent):  Temp: 98.1 °F (36.7 °C) (10/13/22 0752)  Pulse: 110 (10/13/22 0752)  Resp: 19 (10/13/22 0925)  BP: 114/63 (10/13/22 0752)  SpO2: 95 % (10/13/22 0752) Vital Signs (24h Range):  Temp:  [97.2 °F (36.2 °C)-98.8 °F (37.1 °C)] 98.1 °F (36.7 °C)  Pulse:  [] 110  Resp:  [16-20] 19  SpO2:  [95 %-99 %] 95 %  BP: (105-116)/(53-63) 114/63     Weight: 61.6 kg (135 lb 12.9 oz)  Body mass index is 21.27 kg/m².    Intake/Output Summary (Last 24 hours) at 10/13/2022 1151  Last data filed at 10/13/2022 0905  Gross per 24 hour   Intake 2868.21 ml   Output 1500 ml   Net 1368.21 ml        Physical Exam  Constitutional:       General: He is not in acute distress.     Appearance: He is ill-appearing. He is not toxic-appearing or diaphoretic.   Cardiovascular:      Rate and Rhythm: Normal rate and regular rhythm.      Heart sounds: No murmur heard.    No gallop.   Pulmonary:      Effort: Pulmonary effort is normal. No respiratory distress.      Breath sounds: Normal breath sounds. No wheezing.   Abdominal:      General: Bowel sounds are normal. There is no distension.      Palpations: Abdomen is soft.      Tenderness: There is no abdominal tenderness.   Skin:     Comments: LLE wrapped, dressing not taken down. Distal movement/sensation " "intact.       Significant Labs: All pertinent labs within the past 24 hours have been reviewed.    Significant Imaging: I have reviewed all pertinent imaging results/findings within the past 24 hours.      Assessment/Plan:      * Sepsis  Cellulitis, likely point of bacterial entry medial ankle. WBC uptrending despite appropriate antibiotic therapy. Repeat CT scan with possible early abscess formation on read, but surgery does not feel it is amenable to intervention. Ortho consulted by [facility] for second opinion, pt went to OR for I&D but no abscess found on 10/9  - appreciate ID, general surgery, orthopedics involvement  - on cefepime/vancomycin/flagyl  - abnormal arterial US noted, but WILLIE normal  - elevate ankle>knee>hip to facilitate drainage  - WbC trending down, cultures growing Strep Pyogenes - transitioned to PO Augmentin x2 weeks from washout (tentative end date 10/23)  - daily wound care - patient to discuss with family about helping when home health is not able to  - Discussing with Orthopedic follow up         Cellulitis  See sepsis      Debility  -At home ambulates with a walker  -Notes he "Fell or slid" out of a chair on night prior to admit  PT/OT evaluation recommending home health     Chronic cough  -Presumed due to allergic rhinitis  -Continue home anti-histamines  -Add tessalon PRN    Chronic gout    -Continue home allopurinol.    Hypokalemia  See bartter syndrome      Hyponatremia  -On admit mild and likely due to diminished oral intake and mild dehydration  -Continue IV fluids  -stable    Normocytic anemia  -Hb 12.1 on admit  -No bleeding  -Check iron, ferritin, b12 and folate - appears more ACD at this time with elevated ferritin  -Repeat cbc in AM    Cellulitis of left lower extremity  -Treatment as above.    Primary open angle glaucoma of both eyes, severe stage  -History noted  -Continue home timolol and lantanoprost drops    Bartter syndrome  Hypokalemic on admission, repleted w/ IV and po K, now " borderline high.  - resume home dose of 10 meq KCl daily        H/O prostate cancer  -history noted  -Continue home oxybutynin      VTE Risk Mitigation (From admission, onward)         Ordered     enoxaparin injection 40 mg  Daily         10/02/22 1416     IP VTE HIGH RISK PATIENT  Once         10/02/22 1416     Place sequential compression device  Until discontinued         10/02/22 1416                Discharge Planning   ROS: 10/7/2022     Code Status: Full Code   Is the patient medically ready for discharge?:     Reason for patient still in hospital (select all that apply): Treatment  Discharge Plan A: Home Health   Discharge Delays: None known at this time              Christian Esquivel MD  Department of Hospital Medicine   [facility]

## 2022-10-13 NOTE — PLAN OF CARE
Patient is awake alert and oriented. IV fluids and antibiotics as ordered. LLE surgical dressing CDI with offloading boots in place. Medicated once for pain. Bed alarm on , call light and urinal in reach. Free of falls. Continue with plan of care as ordered. No distress noted  Problem: Adult Inpatient Plan of Care  Goal: Plan of Care Review  Outcome: Ongoing, Progressing  Goal: Optimal Comfort and Wellbeing  Outcome: Ongoing, Progressing  Goal: Readiness for Transition of Care  Outcome: Ongoing, Progressing     Problem: Infection Progression (Sepsis/Septic Shock)  Goal: Absence of Infection Signs and Symptoms  Outcome: Ongoing, Progressing     Problem: Skin Injury Risk Increased  Goal: Skin Health and Integrity  Outcome: Ongoing, Progressing     Problem: Pain Acute  Goal: Acceptable Pain Control and Functional Ability  Outcome: Ongoing, Progressing

## 2022-10-13 NOTE — PT/OT/SLP PROGRESS
Occupational Therapy      Patient Name:  Mark Church   MRN:  [id number]      Upon attempt to treat patient after lunch, patient stated that he was fatigued and wanted to rest.      Made a second attempt to treat patient at 4:18 PM: Patient not seen at this time today secondary to being unarousable.  Will follow-up tomorrow.     10/13/2022

## 2022-10-13 NOTE — PT/OT/SLP PROGRESS
"Physical Therapy Treatment    Patient Name:  Mark Church   MRN:  [id number]    Recommendations:     Discharge Recommendations:  home health PT   Discharge Equipment Recommendations: none   Barriers to discharge: Inaccessible home    Assessment:     Mark Church is a 72 y.o. male admitted with a medical diagnosis of Sepsis.  He presents with the following impairments/functional limitations:  weakness, impaired endurance, gait instability, impaired balance, decreased lower extremity function, pain, impaired skin, edema, decreased safety awareness, impaired cardiopulmonary response to activity, impaired functional mobility . BP much better today , pt able to ambulate in the hallway w/o difficulty. Pt c/o being " woozy" upon sitting EOB however subside with further therapy treatment.   BP in bed (HOB elevated) : 127/60 , HR : 92 , MAP 86  BP in sitting EOB (post BLE ex's)  : 107/55 ,  , MAP 76  BP in standing (post ambulating 20 ft ) 118/61,  , MAP 83  BP in standing post ambulating 60 ft : 99/56, HR: 118 , MAP 70   Pt sat down in chair  : BP : 122/70, HR : 110/ 56  ,  MAP 77  SpO2: 98% on RA . Patient is symptomatic, nurse Brie notified.   Rehab Prognosis: Good; patient would benefit from acute skilled PT services to address these deficits and reach maximum level of function.    Recent Surgery: Procedure(s) (LRB):  INCISION AND DRAINAGE, ANKLE (Left) 4 Days Post-Op    Plan:     During this hospitalization, patient to be seen 5 x/week to address the identified rehab impairments via gait training, therapeutic activities, therapeutic exercises and progress toward the following goals:    Plan of Care Expires:  10/17/22    Subjective     Chief Complaint: LLE pain   Patient/Family Comments/goals: pt is pleasant and agreeable to therapy   Pain/Comfort:  Pain Rating 1: 8/10  Location - Side 1: Left  Location 1: leg  Pain Addressed 1: Pre-medicate for activity, Nurse notified, Reposition, Cessation of " Activity  Pain Rating Post-Intervention 1: 8/10      Objective:     Communicated with nurse Swetha/Brie  prior to session.  Patient found HOB elevated with telemetry, bed alarm, peripheral IV, pressure relief boots, Condom Catheter upon PT entry to room.     General Precautions: Standard, fall   Orthopedic Precautions:N/A   Braces: N/A  Respiratory Status: Room air   SpO2 : 96% - 99% on RA   Functional Mobility:  Bed Mobility:   Scooting: Supervision   Supine to Sit: Supervision   Transfers:     Sit to Stand: 2 trials from bed  contact guard assistance with rolling walker. Noted with increased body shakiness 2* to weakness.   Bed to Chair: contact guard assistance with  rolling walker  using  Step Transfer  Gait:  pt ambulated 20 ft and 60 ft  with RW, CGA/SBA (RLE non skid sock and shoe-cover on LLE 2* to  edema unable to place sock). Noted with decreased zeynep, decreased steps length, decreased weight shifting and weight bearing to LLE 2* to pain, no LOB. Pt required VC's for safety , proper technique, walker management and to improve neck/trunk extension.  Balance: good in sitting, fair+ in standing.     AM-PAC 6 CLICK MOBILITY  Turning over in bed (including adjusting bedclothes, sheets and blankets)?: 4  Sitting down on and standing up from a chair with arms (e.g., wheelchair, bedside commode, etc.): 4  Moving from lying on back to sitting on the side of the bed?: 4  Moving to and from a bed to a chair (including a wheelchair)?: 3  Need to walk in hospital room?: 3  Climbing 3-5 steps with a railing?: 3  Basic Mobility Total Score: 21       Therapeutic Activities and Exercises:   Lower Extremity Exercises.   Patient educated on the purpose of therapeutic exercise.    Patient verbalized acceptance/understanding of instructions, expectations, and limitations(for safety).  Patient performed: 2 sets of 10 reps (each) of B LE There Ex: AP, LAQ, Hip abd/add, Hip flexion and BUE x 15 reps :  elbows/shoulders flexion/extension while sitting up on EOB.       Patient required  verbal cues/tactile cues to ensure correct sequence, to maintain proper form, and to allow for self-correction.  Pt reported no complaints or problems with exercise activity.  Encouraged pt to perform BLE ex's per handout throughout the day. Pt verbalized understanding.     Patient left up in reclined chair on green air cushion, BLE elevated on pillows with all lines intact, call button in reach, and nurse notified..    GOALS:   Multidisciplinary Problems       Physical Therapy Goals          Problem: Physical Therapy    Goal Priority Disciplines Outcome Goal Variances Interventions   Physical Therapy Goal     PT, PT/OT Ongoing, Progressing     Description: Goals to be met by: 10/17/22     Patient will increase functional independence with mobility by performin. Pt to be mod I with bed mobility.  2. Pt to transfer with mod I.  3. Pt to ambulate 150' w/RW and supervision.  4. Pt to be (I) with written HEP.                         Time Tracking:     PT Received On: 10/13/22  PT Start Time: 1035     PT Stop Time: 1115  PT Total Time (min): 40 min     Billable Minutes: Gait Training 15, Therapeutic Activity 14, and Therapeutic Exercise 11    Treatment Type: Treatment  PT/PTA: PTA     PTA Visit Number: 3     10/13/2022

## 2022-10-13 NOTE — SUBJECTIVE & OBJECTIVE
Interval History: No issues overnight. WBC normalized. Reports improved post-op pain. Tolerating abx without issues.     Review of Systems   Constitutional:  Negative for chills and fever.   Gastrointestinal:  Negative for abdominal pain, diarrhea and nausea.   All other systems reviewed and are negative.  Objective:     Vital Signs (Most Recent):  Temp: 97.2 °F (36.2 °C) (10/13/22 0507)  Pulse: 86 (10/13/22 0507)  Resp: 20 (10/13/22 0507)  BP: (!) 109/53 (10/13/22 0507)  SpO2: 95 % (10/13/22 0507) Vital Signs (24h Range):  Temp:  [97.2 °F (36.2 °C)-98.8 °F (37.1 °C)] 97.2 °F (36.2 °C)  Pulse:  [] 86  Resp:  [16-20] 20  SpO2:  [95 %-99 %] 95 %  BP: (105-122)/(53-67) 109/53     Weight: 61.6 kg (135 lb 12.9 oz)  Body mass index is 21.27 kg/m².    Estimated Creatinine Clearance: 83.1 mL/min (based on SCr of 0.7 mg/dL).    Physical Exam  Constitutional:       Appearance: He is well-developed.   HENT:      Head: Normocephalic and atraumatic.      Right Ear: External ear normal.      Left Ear: External ear normal.      Nose: Nose normal.      Mouth/Throat:      Mouth: Mucous membranes are moist.      Pharynx: No oropharyngeal exudate.   Eyes:      General: No scleral icterus.        Right eye: No discharge.         Left eye: No discharge.   Neck:      Thyroid: No thyromegaly.   Cardiovascular:      Rate and Rhythm: Normal rate and regular rhythm.   Pulmonary:      Effort: Pulmonary effort is normal. No respiratory distress.      Breath sounds: No wheezing.   Abdominal:      General: Bowel sounds are normal. There is no distension.      Palpations: Abdomen is soft.      Tenderness: There is no abdominal tenderness. There is no guarding.   Musculoskeletal:      Right lower leg: No edema.      Comments: L leg wrapped   Skin:     General: Skin is warm and dry.      Coloration: Skin is not jaundiced.      Findings: No bruising.   Neurological:      Mental Status: He is alert and oriented to person, place, and time.  Mental status is at baseline.      Motor: No weakness.   Psychiatric:         Mood and Affect: Mood normal.         Behavior: Behavior normal.       Significant Labs:   Microbiology Results (last 7 days)       Procedure Component Value Units Date/Time    Aerobic culture [[id number]] Collected: 10/09/22 0853    Order Status: Completed Specimen: Wound from Ankle, Left Updated: 10/13/22 0603     Aerobic Bacterial Culture No growth    Narrative:      LEFT ANKLE WOUND    Aerobic culture [[id number]] Collected: 10/09/22 0853    Order Status: Completed Specimen: Wound from Ankle, Left Updated: 10/12/22 0747     Aerobic Bacterial Culture No growth    Narrative:      LEFT ANKLE TISSUE    Culture, Anaerobe [[id number]] Collected: 10/09/22 0853    Order Status: Completed Specimen: Wound from Ankle, Left Updated: 10/12/22 0724     Anaerobic Culture Culture in progress    Narrative:      LEFT ANKLE TISSUE    AFB Culture & Smear [[id number]] Collected: 10/09/22 0853    Order Status: Completed Specimen: Wound from Ankle, Left Updated: 10/11/22 2127     AFB Culture & Smear Culture in progress     AFB CULTURE STAIN No acid fast bacilli seen.    Narrative:      LEFT ANKLE WOUND    AFB Culture & Smear [[id number]] Collected: 10/09/22 0853    Order Status: Completed Specimen: Wound from Ankle, Left Updated: 10/11/22 2127     AFB Culture & Smear Culture in progress     AFB CULTURE STAIN No acid fast bacilli seen.    Narrative:      LEFT ANKLE TISSUE    Culture, Anaerobe [[id number]] Collected: 10/09/22 0853    Order Status: Completed Specimen: Wound from Ankle, Left Updated: 10/11/22 0914     Anaerobic Culture Culture in progress    Narrative:      LEFT ANKLE WOUND    Gram stain [[id number]] Collected: 10/09/22 0853    Order Status: Completed Specimen: Wound from Ankle, Left Updated: 10/10/22 0842     Gram Stain Result Rare WBC's      No organisms seen    Narrative:      LEFT ANKLE TISSUE    Fungus culture [[id number]] Collected: 10/09/22 0853     Order Status: Sent Specimen: Wound from Ankle, Left Updated: 10/10/22 0748    Gram stain [[id number]] Collected: 10/09/22 0853    Order Status: Completed Specimen: Wound from Ankle, Left Updated: 10/09/22 1330     Gram Stain Result Rare WBC's      Few Gram positive cocci in pairs and chains    Narrative:      LEFT ANKLE WOUND    Fungus culture [[id number]] Collected: 10/09/22 0853    Order Status: Sent Specimen: Wound from Ankle, Left Updated: 10/09/22 1017    Blood culture x two cultures. Draw prior to antibiotics. [[id number]] Collected: 10/02/22 1210    Order Status: Completed Specimen: Blood from Peripheral, Foot, Right Updated: 10/06/22 1303     Blood Culture, Routine No Growth after 4 days.    Narrative:      Aerobic and anaerobic    Blood culture x two cultures. Draw prior to antibiotics. [[id number]] Collected: 10/02/22 1216    Order Status: Completed Specimen: Blood from Peripheral, Antecubital, Right Updated: 10/06/22 1303     Blood Culture, Routine No Growth after 4 days.    Narrative:      Aerobic and anaerobic            Significant Imaging: I have reviewed all pertinent imaging results/findings within the past 24 hours.

## 2022-10-14 LAB
ALBUMIN SERPL BCP-MCNC: 1.9 G/DL (ref 3.5–5.2)
ALP SERPL-CCNC: 79 U/L (ref 55–135)
ALT SERPL W/O P-5'-P-CCNC: 33 U/L (ref 10–44)
ANION GAP SERPL CALC-SCNC: 6 MMOL/L (ref 8–16)
AST SERPL-CCNC: 38 U/L (ref 10–40)
BACTERIA SPEC AEROBE CULT: ABNORMAL
BACTERIA SPEC ANAEROBE CULT: NORMAL
BASOPHILS # BLD AUTO: 0.03 K/UL (ref 0–0.2)
BASOPHILS NFR BLD: 0.3 % (ref 0–1.9)
BILIRUB SERPL-MCNC: 0.5 MG/DL (ref 0.1–1)
BUN SERPL-MCNC: 18 MG/DL (ref 8–23)
CALCIUM SERPL-MCNC: 8.4 MG/DL (ref 8.7–10.5)
CHLORIDE SERPL-SCNC: 99 MMOL/L (ref 95–110)
CO2 SERPL-SCNC: 31 MMOL/L (ref 23–29)
CREAT SERPL-MCNC: 0.7 MG/DL (ref 0.5–1.4)
DIFFERENTIAL METHOD: ABNORMAL
EOSINOPHIL # BLD AUTO: 0.6 K/UL (ref 0–0.5)
EOSINOPHIL NFR BLD: 6.2 % (ref 0–8)
ERYTHROCYTE [DISTWIDTH] IN BLOOD BY AUTOMATED COUNT: 14.8 % (ref 11.5–14.5)
EST. GFR  (NO RACE VARIABLE): >60 ML/MIN/1.73 M^2
GLUCOSE SERPL-MCNC: 112 MG/DL (ref 70–110)
HCT VFR BLD AUTO: 28.8 % (ref 40–54)
HGB BLD-MCNC: 9.5 G/DL (ref 14–18)
IMM GRANULOCYTES # BLD AUTO: 0.22 K/UL (ref 0–0.04)
IMM GRANULOCYTES NFR BLD AUTO: 2.2 % (ref 0–0.5)
LYMPHOCYTES # BLD AUTO: 1.7 K/UL (ref 1–4.8)
LYMPHOCYTES NFR BLD: 16.5 % (ref 18–48)
MAGNESIUM SERPL-MCNC: 1.4 MG/DL (ref 1.6–2.6)
MCH RBC QN AUTO: 27.8 PG (ref 27–31)
MCHC RBC AUTO-ENTMCNC: 33 G/DL (ref 32–36)
MCV RBC AUTO: 84 FL (ref 82–98)
MONOCYTES # BLD AUTO: 1.1 K/UL (ref 0.3–1)
MONOCYTES NFR BLD: 10.8 % (ref 4–15)
NEUTROPHILS # BLD AUTO: 6.4 K/UL (ref 1.8–7.7)
NEUTROPHILS NFR BLD: 64 % (ref 38–73)
NRBC BLD-RTO: 0 /100 WBC
PLATELET # BLD AUTO: 964 K/UL (ref 150–450)
PMV BLD AUTO: 9.4 FL (ref 9.2–12.9)
POTASSIUM SERPL-SCNC: 3.1 MMOL/L (ref 3.5–5.1)
PROT SERPL-MCNC: 5.9 G/DL (ref 6–8.4)
RBC # BLD AUTO: 3.42 M/UL (ref 4.6–6.2)
SODIUM SERPL-SCNC: 136 MMOL/L (ref 136–145)
WBC # BLD AUTO: 9.97 K/UL (ref 3.9–12.7)

## 2022-10-14 PROCEDURE — 25000003 PHARM REV CODE 250: Performed by: STUDENT IN AN ORGANIZED HEALTH CARE EDUCATION/TRAINING PROGRAM

## 2022-10-14 PROCEDURE — 97110 THERAPEUTIC EXERCISES: CPT | Mod: CQ

## 2022-10-14 PROCEDURE — 97116 GAIT TRAINING THERAPY: CPT | Mod: CQ

## 2022-10-14 PROCEDURE — 97530 THERAPEUTIC ACTIVITIES: CPT

## 2022-10-14 PROCEDURE — 83735 ASSAY OF MAGNESIUM: CPT | Performed by: ORTHOPAEDIC SURGERY

## 2022-10-14 PROCEDURE — 36415 COLL VENOUS BLD VENIPUNCTURE: CPT | Performed by: ORTHOPAEDIC SURGERY

## 2022-10-14 PROCEDURE — 97535 SELF CARE MNGMENT TRAINING: CPT

## 2022-10-14 PROCEDURE — 25000003 PHARM REV CODE 250: Performed by: ORTHOPAEDIC SURGERY

## 2022-10-14 PROCEDURE — 85025 COMPLETE CBC W/AUTO DIFF WBC: CPT | Performed by: ORTHOPAEDIC SURGERY

## 2022-10-14 PROCEDURE — 11000001 HC ACUTE MED/SURG PRIVATE ROOM

## 2022-10-14 PROCEDURE — 63600175 PHARM REV CODE 636 W HCPCS: Performed by: ORTHOPAEDIC SURGERY

## 2022-10-14 PROCEDURE — 80053 COMPREHEN METABOLIC PANEL: CPT | Performed by: ORTHOPAEDIC SURGERY

## 2022-10-14 PROCEDURE — 97530 THERAPEUTIC ACTIVITIES: CPT | Mod: CQ

## 2022-10-14 RX ORDER — POTASSIUM CHLORIDE 750 MG/1
10 TABLET, EXTENDED RELEASE ORAL DAILY
Status: DISCONTINUED | OUTPATIENT
Start: 2022-10-14 | End: 2022-10-21 | Stop reason: HOSPADM

## 2022-10-14 RX ORDER — SODIUM CHLORIDE 9 MG/ML
INJECTION, SOLUTION INTRAVENOUS CONTINUOUS
Status: ACTIVE | OUTPATIENT
Start: 2022-10-14 | End: 2022-10-15

## 2022-10-14 RX ORDER — MIDODRINE HYDROCHLORIDE 2.5 MG/1
2.5 TABLET ORAL 3 TIMES DAILY
Status: DISCONTINUED | OUTPATIENT
Start: 2022-10-14 | End: 2022-10-18

## 2022-10-14 RX ADMIN — ENOXAPARIN SODIUM 40 MG: 100 INJECTION SUBCUTANEOUS at 04:10

## 2022-10-14 RX ADMIN — CETIRIZINE HYDROCHLORIDE 5 MG: 5 TABLET ORAL at 09:10

## 2022-10-14 RX ADMIN — LACTOBACILLUS ACIDOPHILUS / LACTOBACILLUS BULGARICUS 1 EACH: 100 MILLION CFU STRENGTH GRANULES at 09:10

## 2022-10-14 RX ADMIN — Medication 400 MG: at 09:10

## 2022-10-14 RX ADMIN — AZELASTINE 137 MCG: 1 SPRAY, METERED NASAL at 09:10

## 2022-10-14 RX ADMIN — AZELASTINE 137 MCG: 1 SPRAY, METERED NASAL at 10:10

## 2022-10-14 RX ADMIN — SENNOSIDES 8.6 MG: 8.6 TABLET, FILM COATED ORAL at 10:10

## 2022-10-14 RX ADMIN — TIMOLOL MALEATE 1 DROP: 5 SOLUTION/ DROPS OPHTHALMIC at 09:10

## 2022-10-14 RX ADMIN — MIDODRINE HYDROCHLORIDE 2.5 MG: 2.5 TABLET ORAL at 04:10

## 2022-10-14 RX ADMIN — OXYBUTYNIN CHLORIDE 5 MG: 5 TABLET ORAL at 10:10

## 2022-10-14 RX ADMIN — LATANOPROST 1 DROP: 50 SOLUTION OPHTHALMIC at 09:10

## 2022-10-14 RX ADMIN — TIMOLOL MALEATE 1 DROP: 5 SOLUTION/ DROPS OPHTHALMIC at 10:10

## 2022-10-14 RX ADMIN — MIDODRINE HYDROCHLORIDE 2.5 MG: 2.5 TABLET ORAL at 09:10

## 2022-10-14 RX ADMIN — Medication 400 MG: at 10:10

## 2022-10-14 RX ADMIN — AMOXICILLIN AND CLAVULANATE POTASSIUM 1 TABLET: 875; 125 TABLET, FILM COATED ORAL at 10:10

## 2022-10-14 RX ADMIN — LACTOBACILLUS ACIDOPHILUS / LACTOBACILLUS BULGARICUS 1 EACH: 100 MILLION CFU STRENGTH GRANULES at 10:10

## 2022-10-14 RX ADMIN — SODIUM CHLORIDE: 0.9 INJECTION, SOLUTION INTRAVENOUS at 06:10

## 2022-10-14 RX ADMIN — AMOXICILLIN AND CLAVULANATE POTASSIUM 1 TABLET: 875; 125 TABLET, FILM COATED ORAL at 09:10

## 2022-10-14 RX ADMIN — POLYETHYLENE GLYCOL 3350 17 G: 17 POWDER, FOR SOLUTION ORAL at 10:10

## 2022-10-14 RX ADMIN — ALLOPURINOL 100 MG: 100 TABLET ORAL at 10:10

## 2022-10-14 RX ADMIN — POTASSIUM CHLORIDE 10 MEQ: 750 TABLET, EXTENDED RELEASE ORAL at 04:10

## 2022-10-14 RX ADMIN — OXYBUTYNIN CHLORIDE 5 MG: 5 TABLET ORAL at 09:10

## 2022-10-14 NOTE — PLAN OF CARE
Problem: Adult Inpatient Plan of Care  Goal: Plan of Care Review  Outcome: Ongoing, Progressing  Flowsheets (Taken 10/14/2022 1257)  Plan of Care Reviewed With: patient  Goal: Optimal Comfort and Wellbeing  Outcome: Ongoing, Progressing  Intervention: Monitor Pain and Promote Comfort  Flowsheets (Taken 10/14/2022 1257)  Pain Management Interventions:   medication offered but refused   pain management plan reviewed with patient/caregiver   pillow support provided   position adjusted  Intervention: Provide Person-Centered Care  Flowsheets (Taken 10/14/2022 1257)  Trust Relationship/Rapport:   care explained   choices provided   emotional support provided   empathic listening provided   questions answered   questions encouraged   reassurance provided   thoughts/feelings acknowledged     Problem: Impaired Wound Healing  Goal: Optimal Wound Healing  Outcome: Ongoing, Progressing  Intervention: Promote Wound Healing  Flowsheets (Taken 10/14/2022 1257)  Oral Nutrition Promotion:   calorie-dense foods provided   nutritional therapy counseling provided  Sleep/Rest Enhancement: regular sleep/rest pattern promoted  Activity Management:   Walk with assistive devise and /or staff member - L3   Up to bedside commode - L3  Pain Management Interventions:   medication offered but refused   pain management plan reviewed with patient/caregiver   pillow support provided   position adjusted

## 2022-10-14 NOTE — PROGRESS NOTES
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  He states this all started about 4 days ago when he was walking in a field. He states he felt some significant itching in his lower leg.  Later that night his leg started to swell and hurt.  He assumed this was caused by an insect bite on his leg, but he never saw a specific bite or insect.  He states that the 2nd night the swelling and pain became severe and he was having fevers and chills which prompted him to visit his primary care provider the next day.  He was prescribed an antibiotic, not sure which one, and despite taking this his leg has continued to get worse.  He notes he has been having fevers and chills and last night he "slid or fell" out of his chair when he was trying to get up because the pain was so bad.  He notes he uses a walker to ambulate at home.  He also notes a chronic cough ascociated wiith allergic rhinitis and had an episode of nausea and vomiting one week ago.  He reports diminished oral intake due to decreased appetite, but no nausea or vomiting since this all started 4 days ago.  He denies chest pain, shortness of breath, headache, palpitations, dysuria and diarrhea.      Overview/Hospital Course:  Admitted with sepsis secondary to left lower extremity cellulitis. Started on IV antibiotics, IVF and potassium replacement (has Bartter syndrome). Surgery consulted for concern for nec fasc/bone involvement. CT did not show any evidence of abscess/gas. Seems to be improving though still significant " swelling and blistering. ID consulted for antibiotic recommendations. Repeat CT ordered for worsening white count which did not show a clear abscess.    Pt continued w/ minimal improvement. Ortho took the patient for I&D did not reveal abscess.      Interval History: patient feeling very down and depressed today. Poor motivation, feels like none of his family wants to help take care of him. He did have significant orthostatic hypotension today with therapy, dropped to 70s systolic while ambulating.     Review of Systems   Constitutional:  Negative for chills and fever.   Respiratory:  Negative for cough and shortness of breath.    Cardiovascular:  Negative for chest pain and leg swelling.   Gastrointestinal:  Negative for abdominal distention and abdominal pain.   Skin:  Positive for color change and wound.   Objective:     Vital Signs (Most Recent):  Temp: 98.1 °F (36.7 °C) (10/14/22 1153)  Pulse: 83 (10/14/22 1153)  Resp: 15 (10/14/22 1153)  BP: (!) 101/55 (10/14/22 1153)  SpO2: 95 % (10/14/22 1153) Vital Signs (24h Range):  Temp:  [98.1 °F (36.7 °C)-99.7 °F (37.6 °C)] 98.1 °F (36.7 °C)  Pulse:  [] 83  Resp:  [15-20] 15  SpO2:  [93 %-96 %] 95 %  BP: ()/(53-58) 101/55     Weight: 61.6 kg (135 lb 12.9 oz)  Body mass index is 21.27 kg/m².    Intake/Output Summary (Last 24 hours) at 10/14/2022 1455  Last data filed at 10/14/2022 1239  Gross per 24 hour   Intake 840 ml   Output 3750 ml   Net -2910 ml        Physical Exam  Constitutional:       General: He is not in acute distress.     Appearance: He is ill-appearing. He is not toxic-appearing or diaphoretic.   Cardiovascular:      Rate and Rhythm: Normal rate and regular rhythm.      Heart sounds: No murmur heard.    No gallop.   Pulmonary:      Effort: Pulmonary effort is normal. No respiratory distress.      Breath sounds: Normal breath sounds. No wheezing.   Abdominal:      General: Bowel sounds are normal. There is no distension.      Palpations:  "Abdomen is soft.      Tenderness: There is no abdominal tenderness.   Skin:     Comments: LLE wrapped, dressing not taken down. Distal movement/sensation intact.   Psychiatric:         Mood and Affect: Mood is depressed.         Behavior: Behavior is withdrawn.       Significant Labs: All pertinent labs within the past 24 hours have been reviewed.    Significant Imaging: I have reviewed all pertinent imaging results/findings within the past 24 hours.      Assessment/Plan:      * Sepsis  Cellulitis, likely point of bacterial entry medial ankle. WBC uptrending despite appropriate antibiotic therapy. Repeat CT scan with possible early abscess formation on read, but surgery does not feel it is amenable to intervention. Ortho consulted by GS for second opinion, pt went to OR for I&D but no abscess found on 10/9  - appreciate ID, general surgery, orthopedics involvement  - on cefepime/vancomycin/flagyl  - abnormal arterial US noted, but WILLIE normal  - elevate ankle>knee>hip to facilitate drainage  - WbC trending down, cultures growing Strep Pyogenes - transitioned to PO Augmentin x2 weeks from washout (tentative end date 10/23)  - daily wound care - patient to discuss with family about helping when home health is not able to  - Discussing with Orthopedic follow up         Cellulitis  See sepsis      Debility  -At home ambulates with a walker  -Notes he "Fell or slid" out of a chair on night prior to admit  PT/OT evaluation recommending home health     Chronic cough  -Presumed due to allergic rhinitis  -Continue home anti-histamines  -Add tessalon PRN    Chronic gout    -Continue home allopurinol.    Hypokalemia  See bartter syndrome      Hyponatremia  -On admit mild and likely due to diminished oral intake and mild dehydration  -Continue IV fluids  -stable    Normocytic anemia  -Hb 12.1 on admit  -No bleeding  -Check iron, ferritin, b12 and folate - appears more ACD at this time with elevated ferritin  -Repeat cbc in " AM    Cellulitis of left lower extremity  -Treatment as above.    Primary open angle glaucoma of both eyes, severe stage  -History noted  -Continue home timolol and lantanoprost drops    Bartter syndrome  Hypokalemic on admission, repleted w/ IV and po K, now borderline high.  - resume home dose of 10 meq KCl daily        H/O prostate cancer  -history noted  -Continue home oxybutynin      VTE Risk Mitigation (From admission, onward)         Ordered     enoxaparin injection 40 mg  Daily         10/02/22 1416     IP VTE HIGH RISK PATIENT  Once         10/02/22 1416     Place sequential compression device  Until discontinued         10/02/22 1416                Discharge Planning   ROS: 10/7/2022     Code Status: Full Code   Is the patient medically ready for discharge?:     Reason for patient still in hospital (select all that apply): Treatment  Discharge Plan A: Home Health, Home with family   Discharge Delays: None known at this time              Christian Esquivel MD  Department of Hospital Medicine   [facility] - Med Surg

## 2022-10-14 NOTE — PLAN OF CARE
Problem: Adult Inpatient Plan of Care  Goal: Plan of Care Review  Outcome: Ongoing, Progressing  Goal: Optimal Comfort and Wellbeing  Outcome: Ongoing, Progressing  Goal: Readiness for Transition of Care  Outcome: Ongoing, Progressing     Problem: Adjustment to Illness (Sepsis/Septic Shock)  Goal: Optimal Coping  Outcome: Ongoing, Progressing     Problem: Impaired Wound Healing  Goal: Optimal Wound Healing  Outcome: Ongoing, Progressing     Problem: Pain Acute  Goal: Acceptable Pain Control and Functional Ability  Outcome: Ongoing, Progressing

## 2022-10-14 NOTE — PLAN OF CARE
Problem: Occupational Therapy  Goal: Occupational Therapy Goal  Description: Goals to be met by: 10/31/2022     Patient will increase functional independence with ADL's by performing:    UE Dressing with Modified [place].  LE Dressing with Supervision.  Grooming while standing with Supervision.  Toileting from bedside commode with Supervision for hygiene and clothing management.   Step transfer with Supervision.  Toilet transfer to bedside commode with Supervision.  Upper extremity exercise program x 10 reps per handout, with [place].    Outcome: Ongoing, Progressing     Patient was co-treated by PTA and OT today.  Patient reported feeling woozy and required verbal encouragement from MD, nurses, and therapists to participate.  Patient's BP was taken multiple times today.    While sitting at EOB, BP was 109/55, MAP was 75, and SpO2 was 95% on room air.  HR was 92 bpm.      After exercise, BP was 97/53 and SpO2 was 98%.    After ambulation, BP was 75/44; patient reported feeling warm.  Patient was returned to bed asap.     Once back in bed/reclined, BP was 121/63, HR was 74 bpm, and MAP was 86.      Patient was SBA for supine to sitting at EOB.  He performed hygiene/grooming tasks at EOB with SBA/CGA.  He required CGA for sit to stand.  He ambulated approximately 20 ft with CGA.  He required Mod A of 2 persons to transfer from W/C to bed.

## 2022-10-14 NOTE — PLAN OF CARE
"  Problem: Physical Therapy  Goal: Physical Therapy Goal  Description: Goals to be met by: 10/17/22     Patient will increase functional independence with mobility by performin. Pt to be mod I with bed mobility.  2. Pt to transfer with mod I.  3. Pt to ambulate 150' w/RW and supervision.  4. Pt to be (I) with written HEP.  5. Pt to ascend/descend ~6 MINDY with R HR and SBA.    Outcome: Ongoing, Progressing   Pt c/o feeling "Woozy" prior treatment and still feel the same throughout treatment today. Pt moved slow and required extra time to complete tasks. SpO2 level stay high, BP went low after 1st ambulation. Therapists transferred pt back to bed with Mod A of 2 persons from WC to Bed using Squat Pivot post treatment. Will try stair training with pt tomorrow if able or when his vitals are more stable  BP in bed (HOB elevated) : 109/55 , HR: 92 , MAP 75 SpO2 95%  BP in sitting EOB (post ADL's)  : 93/55 ,  , MAP 69 SpO2 98%  BP in sitting EOB (post BLE ex's): 97/53,  , MAP 72  SpO2 98%  BP in sitting in WC (post ambulating 20 ft ) 75/44,  , MAP 83  BP in bed (HOB elevated): 121/63, HR: 74 , MAP 86 SpO2 98%  Nurse notified.  "

## 2022-10-14 NOTE — PT/OT/SLP PROGRESS
"Physical Therapy Treatment    Patient Name:  Mark Church   MRN:  [id number]    Recommendations:     Discharge Recommendations:  home health PT   Discharge Equipment Recommendations: none  Barriers to discharge: Inaccessible home    Assessment:     Mark Church is a 72 y.o. male admitted with a medical diagnosis of Sepsis.  He presents with the following impairments/functional limitations:  weakness, impaired endurance, gait instability, impaired balance, decreased upper extremity function, decreased lower extremity function, impaired functional mobility.    Pt agreeable to work with Therapists. Pt c/o feeling "Woozy" prior treatment and still feel the same throughout treatment today. Pt moved slow and required extra time to complete tasks. SpO2 level stay high, BP went low after 1st ambulation. Therapists transferred pt back to bed with Mod A of 2 persons from WC to Bed using Squat Pivot post treatment. Will try stair training with pt tomorrow if able or when his vitals are more stable  BP in bed (HOB elevated) : 109/55 , HR: 92 , MAP 75 SpO2 95%  BP in sitting EOB (post ADL's)  : 93/55 ,  , MAP 69 SpO2 98%  BP in sitting EOB (post BLE ex's): 97/53,  , MAP 72  SpO2 98%  BP in sitting in WC (post ambulating 20 ft ) 75/44,  , MAP 83  BP in bed (HOB elevated): 121/63, HR: 74 , MAP 86 SpO2 98%  Nurse notified  Rehab Prognosis: Good; patient would benefit from acute skilled PT services to address these deficits and reach maximum level of function.    Recent Surgery: Procedure(s) (LRB):  INCISION AND DRAINAGE, ANKLE (Left) 5 Days Post-Op    Plan:     During this hospitalization, patient to be seen 5 x/week to address the identified rehab impairments via gait training, therapeutic activities, therapeutic exercises and progress toward the following goals:    Plan of Care Expires:  10/17/22    Subjective     Chief Complaint: feeling "woozy"  Patient/Family Comments/goals: Pt agreeable to work with " Therapists.  Pain/Comfort:  Pain Rating 1: 0/10  Pain Rating Post-Intervention 1: 0/10      Objective:     Communicated with nurse Brie DE OLIVEIRA prior to session.  Patient found HOB elevated with bed alarm, pressure relief boots, hutton catheter (wedge cushion to R hip, disconnected IV at RUE, two pillows under BLE's) upon PT entry to room.     General Precautions: Standard, fall (Orthostatic hypotension)   Orthopedic Precautions:N/A   Braces: N/A  Respiratory Status: Room air (SpO2 95-98%)     Functional Mobility:  Bed Mobility:     Scooting: anterior scoot to EOB with stand by assistance  Supine to Sit: stand by assistance with HOB elevated  Sit to Supine: moderate assistance and of 2 persons  Transfers:   non skid socks and gait belt on prior OOB activity  Sit to Stand: from EOB with contact guard assistance with rolling walker  Gait: Patient ambulated ~20   feet on level tile with Rolling Walker with Contact Guard Assistance. Pt with demonstrating a reciprocal gait with decreased zeynep and decreased step length. Impairments contributing to gait deviations include impaired balance and decreased strength; v/c for upright posture, AD management, increase step length, pt was able to perform with proper hand placement.  WC to Bed: Mod A of 2 persons using Squat pivot   Balance: Fair+ Dynamic Sitting at EOB; Fair Static Standing using RW      AM-PAC 6 CLICK MOBILITY  Turning over in bed (including adjusting bedclothes, sheets and blankets)?: 4  Sitting down on and standing up from a chair with arms (e.g., wheelchair, bedside commode, etc.): 4  Moving from lying on back to sitting on the side of the bed?: 4  Moving to and from a bed to a chair (including a wheelchair)?: 3  Need to walk in hospital room?: 3  Climbing 3-5 steps with a railing?: 2  Basic Mobility Total Score: 20       Therapeutic Activities and Exercises:   Educated pt on performing BUE/BLE ex throughout the day to improve muscle strength/endurance, blood  circulation, pt verbalized understanding    Patient left HOB elevated with all lines intact, call button in reach, bed alarm on, and nurse Brie DE OLIVEIRA notified..    GOALS:   Multidisciplinary Problems       Physical Therapy Goals          Problem: Physical Therapy    Goal Priority Disciplines Outcome Goal Variances Interventions   Physical Therapy Goal     PT, PT/OT Ongoing, Progressing     Description: Goals to be met by: 10/17/22     Patient will increase functional independence with mobility by performin. Pt to be mod I with bed mobility.  2. Pt to transfer with mod I.  3. Pt to ambulate 150' w/RW and supervision.  4. Pt to be (I) with written HEP.  5. Pt to ascend/descend ~6 MINDY with R HR and SBA.                         Time Tracking:     PT Received On: 10/14/22  PT Start Time: 1015     PT Stop Time: 1100  PT Total Time (min): 45 min     Billable Minutes: Gait Training 10 min, Therapeutic Activity 20 min, and Therapeutic Exercise 15 min (Co-treat with OT)    Treatment Type: Treatment  PT/PTA: PTA     PTA Visit Number: 4     10/14/2022

## 2022-10-14 NOTE — SUBJECTIVE & OBJECTIVE
Interval History: patient feeling very down and depressed today. Poor motivation, feels like none of his family wants to help take care of him. He did have significant orthostatic hypotension today with therapy, dropped to 70s systolic while ambulating.     Review of Systems   Constitutional:  Negative for chills and fever.   Respiratory:  Negative for cough and shortness of breath.    Cardiovascular:  Negative for chest pain and leg swelling.   Gastrointestinal:  Negative for abdominal distention and abdominal pain.   Skin:  Positive for color change and wound.   Objective:     Vital Signs (Most Recent):  Temp: 98.1 °F (36.7 °C) (10/14/22 1153)  Pulse: 83 (10/14/22 1153)  Resp: 15 (10/14/22 1153)  BP: (!) 101/55 (10/14/22 1153)  SpO2: 95 % (10/14/22 1153) Vital Signs (24h Range):  Temp:  [98.1 °F (36.7 °C)-99.7 °F (37.6 °C)] 98.1 °F (36.7 °C)  Pulse:  [] 83  Resp:  [15-20] 15  SpO2:  [93 %-96 %] 95 %  BP: ()/(53-58) 101/55     Weight: 61.6 kg (135 lb 12.9 oz)  Body mass index is 21.27 kg/m².    Intake/Output Summary (Last 24 hours) at 10/14/2022 1455  Last data filed at 10/14/2022 1239  Gross per 24 hour   Intake 840 ml   Output 3750 ml   Net -2910 ml        Physical Exam  Constitutional:       General: He is not in acute distress.     Appearance: He is ill-appearing. He is not toxic-appearing or diaphoretic.   Cardiovascular:      Rate and Rhythm: Normal rate and regular rhythm.      Heart sounds: No murmur heard.    No gallop.   Pulmonary:      Effort: Pulmonary effort is normal. No respiratory distress.      Breath sounds: Normal breath sounds. No wheezing.   Abdominal:      General: Bowel sounds are normal. There is no distension.      Palpations: Abdomen is soft.      Tenderness: There is no abdominal tenderness.   Skin:     Comments: LLE wrapped, dressing not taken down. Distal movement/sensation intact.   Psychiatric:         Mood and Affect: Mood is depressed.         Behavior: Behavior is  withdrawn.       Significant Labs: All pertinent labs within the past 24 hours have been reviewed.    Significant Imaging: I have reviewed all pertinent imaging results/findings within the past 24 hours.

## 2022-10-14 NOTE — PT/OT/SLP PROGRESS
Occupational Therapy   Treatment    Name: Mark Church  MRN: [id number]  Admitting Diagnosis:  Sepsis  5 Days Post-Op    Recommendations:     Discharge Recommendations: home health OT  Discharge Equipment Recommendations: bedside commode, bath bench  Barriers to Discharge: Other (Comment) (Low BP when upright!)    Assessment:     Mark Church is a 72 y.o. male with a medical diagnosis of sepsis.  He presents with low BP following ambulation and wooziness at onset of session.  Performance deficits affecting function are weakness, impaired endurance, gait instability, impaired balance, decreased lower extremity function, impaired cardiopulmonary response to activity, and impaired functional mobility.     Rehab Prognosis:  Fair; patient would benefit from acute skilled OT services to address these deficits and reach maximum level of function.       Plan:     Patient to be seen 5 x/week to address the above listed problems via self-care/home management, therapeutic activities, and therapeutic exercises.  Plan of Care Expires: 11/10/22  Plan of Care Reviewed with: patient    Subjective     Pain/Comfort:  Pain Rating 1: 0/10 (Patient reported just feeling a little woozy.)  Pain Rating Post-Intervention 1: 0/10    Objective:     Communicated with: Nurses, Swetha and Brie SPARKS, prior to session.  Patient found in bed in supine with HOB slightly elevated with bed alarm, pressure-relief boots, hutton catheter, wedge cushion to R hip, disconnected IV at RUE, and two pillows under BLE's upon OT entry to room.    General Precautions: Standard, fall, other (see comments) (Orthostatic hypotension)   Orthopedic Precautions: N/A   Braces: N/A  Respiratory Status: Room air     Occupational Performance:     Bed Mobility:    SBA for supine to sitting at EOB     Functional Mobility/Transfers:  Transfers: CGA to stand from EOB with use of RW and Mod A of 2 persons to transfer from W/C back to bed    Functional Mobility: CGA to ambulate  approximately 20 ft with use of RW     Activities of Daily Living:  Hygiene/Grooming: SBA to CGA while seated at EOB to wash face, comb hair, and brush teeth      AMPAC 6 Click ADL: 19    Treatment & Education:  Patient was co-treated by PTA and OT today.  Patient reported feeling woozy and required verbal encouragement from MD, nurses, and therapists to participate.  Patient's BP was taken multiple times today.     While sitting at EOB, BP was 109/55, MAP was 75, and SpO2 was 95% on room air.  HR was 92 bpm.       After exercise, BP was 97/53 and SpO2 was 98%.     After ambulation, BP was 75/44; patient reported feeling warm.  Patient was returned to bed asap.      Once back in bed/reclined, BP was 121/63, HR was 74 bpm, and MAP was 86.       Patient was SBA for supine to sitting at EOB.  He performed hygiene/grooming tasks at EOB with SBA/CGA.  He required CGA for sit to stand.  He ambulated approximately 20 ft with CGA.  He required Mod A of 2 persons to transfer from W/C to bed.          Patient left in bed in supine with HOB slightly elevated, call button within his reach, nurse informed, and lines and cushion/pillows in place.     GOALS:   Multidisciplinary Problems       Occupational Therapy Goals          Problem: Occupational Therapy    Goal Priority Disciplines Outcome Interventions   Occupational Therapy Goal     OT, PT/OT Ongoing, Progressing    Description: Goals to be met by: 10/31/2022     Patient will increase functional independence with ADL's by performing:    UE Dressing with Modified [place].  LE Dressing with Supervision.  Grooming while standing with Supervision.  Toileting from bedside commode with Supervision for hygiene and clothing management.   Step transfer with Supervision.  Toilet transfer to bedside commode with Supervision.  Upper extremity exercise program x 10 reps per handout, with [place].                         Time Tracking:     OT Date of Treatment: 10/14/22  OT  Start Time: 1010  OT Stop Time: 1100  OT Total Time (min): 50 min    Billable Minutes:Self Care/Home Management 30 mins  Therapeutic Activity 20 mins    OT/JOSELO: OT          10/14/2022

## 2022-10-14 NOTE — PHYSICIAN QUERY
PT Name: Marc Church  MR #: [id number]     DOCUMENTATION CLARIFICATION      CDS/: SADIE Marie, RN, CDS               Contact information:[email or web address]   This form is a permanent document in the medical record.    Query Date: October 14, 2022    By submitting this query, we are merely seeking further clarification of documentation to reflect the severity of illness of your patient. Please utilize your independent clinical judgment when addressing the question(s) below.     The Medical Record contains the following:   Indicators   Supporting Clinical Findings Location in Medical Record   X Documentation of Sepsis, Septic Shock  Sepsis secondary to left lower extremity cellulitis    , Dr. Esquivel, 10/13   X Blood Culture  Blood culture NGTD    ID, Dr. Valadez, 10/13    Respiratory Culture      Urine Culture     X Other Culture  Left ankle wound culture: Streptococcus Pyogenes (Group A)     Cultures growing Strep Pyogenes      S/p OR with surgery on 10/9 - OR cx with Gp A strep (rare). Anaerobic cx in process Op note notable for murky fluid present throughout entire lower extremity.    Lab 10/9       MONALISA, Dr. Esquivel, 10/13     ID, Dr. Valadez, 10/13   X Acute/Chronic Illness  Left lower extremity cellulitis, Bartter syndrome, gout, glaucoma and urinary incontinence     MONALISA, Dr. Esquivel, 10/13   X Medication/Treatment  IV antibiotics   Transitioned to PO Augmentin x2 weeks from washout (tentative end date 10/23)     -transition to augmentin PO (cover GAS and potential anaerobes) x 14d from washout, marc 10/23    Dr. Alvin SHELDON, 10/13         ID, Dr. Valadez, 10/13    Other        Provider, please further specify the Sepsis diagnosis:     [ x  ] Due to Strep Pyogenes     [   ] Due to (please specify): __________________________________     [   ] Other specification (please specify): ____________________     [   ] Clinically Undetermined     Please document in your progress notes daily for the duration of  treatment until resolved, and include in your discharge summary.  Form No. [id number]

## 2022-10-14 NOTE — PLAN OF CARE
SW met with patient at bedside. Patient explained he is ready to discharge. SW explained to patient that [facility] accepted patient. SW explained MD has not confirm discharge date at this time and if patient discharges today it would be this afternoon. Patient voiced understanding.

## 2022-10-15 LAB
ALBUMIN SERPL BCP-MCNC: 2 G/DL (ref 3.5–5.2)
ALP SERPL-CCNC: 71 U/L (ref 55–135)
ALT SERPL W/O P-5'-P-CCNC: 29 U/L (ref 10–44)
ANION GAP SERPL CALC-SCNC: 8 MMOL/L (ref 8–16)
AST SERPL-CCNC: 31 U/L (ref 10–40)
BASOPHILS # BLD AUTO: 0.03 K/UL (ref 0–0.2)
BASOPHILS NFR BLD: 0.3 % (ref 0–1.9)
BILIRUB SERPL-MCNC: 0.4 MG/DL (ref 0.1–1)
BUN SERPL-MCNC: 25 MG/DL (ref 8–23)
CALCIUM SERPL-MCNC: 8.5 MG/DL (ref 8.7–10.5)
CHLORIDE SERPL-SCNC: 100 MMOL/L (ref 95–110)
CO2 SERPL-SCNC: 28 MMOL/L (ref 23–29)
CREAT SERPL-MCNC: 0.7 MG/DL (ref 0.5–1.4)
DIFFERENTIAL METHOD: ABNORMAL
EOSINOPHIL # BLD AUTO: 0.5 K/UL (ref 0–0.5)
EOSINOPHIL NFR BLD: 4.6 % (ref 0–8)
ERYTHROCYTE [DISTWIDTH] IN BLOOD BY AUTOMATED COUNT: 15 % (ref 11.5–14.5)
EST. GFR  (NO RACE VARIABLE): >60 ML/MIN/1.73 M^2
GLUCOSE SERPL-MCNC: 110 MG/DL (ref 70–110)
HCT VFR BLD AUTO: 30.4 % (ref 40–54)
HGB BLD-MCNC: 10 G/DL (ref 14–18)
IMM GRANULOCYTES # BLD AUTO: 0.18 K/UL (ref 0–0.04)
IMM GRANULOCYTES NFR BLD AUTO: 1.7 % (ref 0–0.5)
LYMPHOCYTES # BLD AUTO: 2 K/UL (ref 1–4.8)
LYMPHOCYTES NFR BLD: 19 % (ref 18–48)
MAGNESIUM SERPL-MCNC: 1.4 MG/DL (ref 1.6–2.6)
MCH RBC QN AUTO: 27.7 PG (ref 27–31)
MCHC RBC AUTO-ENTMCNC: 32.9 G/DL (ref 32–36)
MCV RBC AUTO: 84 FL (ref 82–98)
MONOCYTES # BLD AUTO: 1.4 K/UL (ref 0.3–1)
MONOCYTES NFR BLD: 13 % (ref 4–15)
NEUTROPHILS # BLD AUTO: 6.5 K/UL (ref 1.8–7.7)
NEUTROPHILS NFR BLD: 61.4 % (ref 38–73)
NRBC BLD-RTO: 0 /100 WBC
PLATELET # BLD AUTO: 859 K/UL (ref 150–450)
PMV BLD AUTO: 9.1 FL (ref 9.2–12.9)
POTASSIUM SERPL-SCNC: 3.8 MMOL/L (ref 3.5–5.1)
PROT SERPL-MCNC: 6 G/DL (ref 6–8.4)
RBC # BLD AUTO: 3.61 M/UL (ref 4.6–6.2)
SODIUM SERPL-SCNC: 136 MMOL/L (ref 136–145)
WBC # BLD AUTO: 10.56 K/UL (ref 3.9–12.7)

## 2022-10-15 PROCEDURE — 11000001 HC ACUTE MED/SURG PRIVATE ROOM

## 2022-10-15 PROCEDURE — 97116 GAIT TRAINING THERAPY: CPT | Mod: CQ

## 2022-10-15 PROCEDURE — 85025 COMPLETE CBC W/AUTO DIFF WBC: CPT | Performed by: ORTHOPAEDIC SURGERY

## 2022-10-15 PROCEDURE — 63600175 PHARM REV CODE 636 W HCPCS: Performed by: ORTHOPAEDIC SURGERY

## 2022-10-15 PROCEDURE — 36415 COLL VENOUS BLD VENIPUNCTURE: CPT | Performed by: ORTHOPAEDIC SURGERY

## 2022-10-15 PROCEDURE — 97530 THERAPEUTIC ACTIVITIES: CPT | Mod: CQ

## 2022-10-15 PROCEDURE — 80053 COMPREHEN METABOLIC PANEL: CPT | Performed by: ORTHOPAEDIC SURGERY

## 2022-10-15 PROCEDURE — 25000003 PHARM REV CODE 250: Performed by: STUDENT IN AN ORGANIZED HEALTH CARE EDUCATION/TRAINING PROGRAM

## 2022-10-15 PROCEDURE — 25000003 PHARM REV CODE 250: Performed by: ORTHOPAEDIC SURGERY

## 2022-10-15 PROCEDURE — 83735 ASSAY OF MAGNESIUM: CPT | Performed by: ORTHOPAEDIC SURGERY

## 2022-10-15 RX ORDER — ACETAMINOPHEN 325 MG/1
650 TABLET ORAL EVERY 6 HOURS PRN
Status: DISCONTINUED | OUTPATIENT
Start: 2022-10-15 | End: 2022-10-21 | Stop reason: HOSPADM

## 2022-10-15 RX ADMIN — MIDODRINE HYDROCHLORIDE 2.5 MG: 2.5 TABLET ORAL at 02:10

## 2022-10-15 RX ADMIN — LATANOPROST 1 DROP: 50 SOLUTION OPHTHALMIC at 08:10

## 2022-10-15 RX ADMIN — TIMOLOL MALEATE 1 DROP: 5 SOLUTION/ DROPS OPHTHALMIC at 08:10

## 2022-10-15 RX ADMIN — OXYBUTYNIN CHLORIDE 5 MG: 5 TABLET ORAL at 08:10

## 2022-10-15 RX ADMIN — AMOXICILLIN AND CLAVULANATE POTASSIUM 1 TABLET: 875; 125 TABLET, FILM COATED ORAL at 08:10

## 2022-10-15 RX ADMIN — LACTOBACILLUS ACIDOPHILUS / LACTOBACILLUS BULGARICUS 1 EACH: 100 MILLION CFU STRENGTH GRANULES at 08:10

## 2022-10-15 RX ADMIN — MIDODRINE HYDROCHLORIDE 2.5 MG: 2.5 TABLET ORAL at 08:10

## 2022-10-15 RX ADMIN — SENNOSIDES 8.6 MG: 8.6 TABLET, FILM COATED ORAL at 08:10

## 2022-10-15 RX ADMIN — Medication 400 MG: at 08:10

## 2022-10-15 RX ADMIN — ASPIRIN 81 MG CHEWABLE TABLET 81 MG: 81 TABLET CHEWABLE at 08:10

## 2022-10-15 RX ADMIN — ENOXAPARIN SODIUM 40 MG: 100 INJECTION SUBCUTANEOUS at 04:10

## 2022-10-15 RX ADMIN — CETIRIZINE HYDROCHLORIDE 5 MG: 5 TABLET ORAL at 08:10

## 2022-10-15 RX ADMIN — ALLOPURINOL 100 MG: 100 TABLET ORAL at 08:10

## 2022-10-15 RX ADMIN — ACETAMINOPHEN 650 MG: 325 TABLET ORAL at 08:10

## 2022-10-15 RX ADMIN — AZELASTINE 137 MCG: 1 SPRAY, METERED NASAL at 08:10

## 2022-10-15 RX ADMIN — POTASSIUM CHLORIDE 10 MEQ: 750 TABLET, EXTENDED RELEASE ORAL at 08:10

## 2022-10-15 RX ADMIN — HYDROCODONE BITARTRATE AND ACETAMINOPHEN 1 TABLET: 5; 325 TABLET ORAL at 03:10

## 2022-10-15 NOTE — ASSESSMENT & PLAN NOTE
Cellulitis, likely point of bacterial entry medial ankle. WBC uptrending despite appropriate antibiotic therapy. Repeat CT scan with possible early abscess formation on read, but surgery does not feel it is amenable to intervention. Ortho consulted by [facility] for second opinion, pt went to OR for I&D but no abscess found on 10/9  - appreciate ID, general surgery, orthopedics involvement  - on cefepime/vancomycin/flagyl  - abnormal arterial US noted, but WILLIE normal  - elevate ankle>knee>hip to facilitate drainage  - WbC trending down, cultures growing Strep Pyogenes - transitioned to PO Augmentin x2 weeks from washout (tentative end date 10/23)  - Overall improving from this standpoint but safety and weakness are issues and barriers to discharge

## 2022-10-15 NOTE — PT/OT/SLP PROGRESS
Physical Therapy Treatment    Patient Name:  Mark Church   MRN:  [id number]    Recommendations:     Discharge Recommendations:  home health PT   Discharge Equipment Recommendations: bedside commode, bath bench   Barriers to discharge: None    Assessment:     Mark Church is a 72 y.o. male admitted with a medical diagnosis of Sepsis.  He presents with the following impairments/functional limitations:  weakness, impaired endurance, impaired self care skills, impaired functional mobility, gait instability, impaired balance, decreased lower extremity function, pain, impaired skin.  Pt's progress is limited due to orthstatic hypotension. supine  bp 127/59, sit 118/62, standing 110/60. After amb 102/55. Pt c/o feeling woozy with amb.    Rehab Pbprognosis: Good; patient would benefit from acute skilled PT services to address these deficits and reach maximum level of function.    Recent Surgery: Procedure(s) (LRB):  INCISION AND DRAINAGE, ANKLE (Left) 6 Days Post-Op    Plan:     During this hospitalization, patient to be seen 5 x/week to address the identified rehab impairments via gait training, therapeutic activities, therapeutic exercises and progress toward the following goals:    Plan of Care Expires:  10/17/22    Subjective     Chief Complaint: pain and wooziness  Patient/Family Comments/goals: pt agreed to therapy  Pain/Comfort:  Pain Rating 1: 8/10  Location - Side 1: Left  Location - Orientation 1: lower  Location 1: leg  Pain Addressed 1: Pre-medicate for activity, Reposition, Distraction, Nurse notified, Cessation of Activity  Pain Rating Post-Intervention 1: 10/10      Objective:     Communicated with nurse Sheldon prior to session.  Patient found HOB elevated with peripheral IV, Condom Catheter upon PT entry to room.     General Precautions: Standard, fall (Orthostatic hypotension)   Orthopedic Precautions:LLE weight bearing as tolerated   Braces:    Respiratory Status: Room air     Functional Mobility:  Bed Mobility:      Rolling Left:  supervision  Scooting: stand by assistance  Supine to Sit: stand by assistance  Transfers:     Sit to Stand:  contact guard assistance with rolling walker  Gait: 20 ft with rw with SBA with decreased step length, decreased zeynep, increased time in dble stance  Balance: F+ dynamic standing      AM-PAC 6 CLICK MOBILITY  Turning over in bed (including adjusting bedclothes, sheets and blankets)?: 4  Sitting down on and standing up from a chair with arms (e.g., wheelchair, bedside commode, etc.): 3  Moving from lying on back to sitting on the side of the bed?: 4  Moving to and from a bed to a chair (including a wheelchair)?: 3  Need to walk in hospital room?: 3  Climbing 3-5 steps with a railing?: 3  Basic Mobility Total Score: 20       Therapeutic Activities and Exercises:   Sit to stand from EOB x2  Sit to stand from chair x 2 with rw  Static standing x 2 with rw     Patient left up in chair with all lines intact, call button in reach, and nurse notified..    GOALS:   Multidisciplinary Problems       Physical Therapy Goals          Problem: Physical Therapy    Goal Priority Disciplines Outcome Goal Variances Interventions   Physical Therapy Goal     PT, PT/OT Ongoing, Progressing     Description: Goals to be met by: 10/17/22     Patient will increase functional independence with mobility by performin. Pt to be mod I with bed mobility.  2. Pt to transfer with mod I.  3. Pt to ambulate 150' w/RW and supervision.  4. Pt to be (I) with written HEP.  5. Pt to ascend/descend ~6 MINDY with R HR and SBA.                         Time Tracking:     PT Received On: 10/15/22  PT Start Time: 1500     PT Stop Time: 1530  PT Total Time (min): 30 min     Billable Minutes: Gait Training 10 and Therapeutic Activity 20    Treatment Type: Treatment  PT/PTA: PTA     PTA Visit Number: 1     10/15/2022

## 2022-10-15 NOTE — SUBJECTIVE & OBJECTIVE
Interval History: in better spirits today but still woozy and weak when working with therapy. Not able to work on stairs today    Review of Systems   Constitutional:  Negative for chills and fever.   Respiratory:  Negative for cough and shortness of breath.    Cardiovascular:  Negative for chest pain and leg swelling.   Gastrointestinal:  Negative for abdominal distention and abdominal pain.   Skin:  Positive for color change and wound.   Objective:     Vital Signs (Most Recent):  Temp: 98.2 °F (36.8 °C) (10/15/22 1611)  Pulse: 77 (10/15/22 1611)  Resp: 17 (10/15/22 1611)  BP: 132/67 (10/15/22 1611)  SpO2: 100 % (10/15/22 1611) Vital Signs (24h Range):  Temp:  [97.9 °F (36.6 °C)-99 °F (37.2 °C)] 98.2 °F (36.8 °C)  Pulse:  [] 77  Resp:  [16-18] 17  SpO2:  [96 %-100 %] 100 %  BP: (107-132)/(52-67) 132/67     Weight: 61.6 kg (135 lb 12.9 oz)  Body mass index is 21.27 kg/m².    Intake/Output Summary (Last 24 hours) at 10/15/2022 1643  Last data filed at 10/15/2022 1215  Gross per 24 hour   Intake 600 ml   Output 604 ml   Net -4 ml        Physical Exam  Constitutional:       General: He is not in acute distress.     Appearance: He is ill-appearing. He is not toxic-appearing or diaphoretic.   Cardiovascular:      Rate and Rhythm: Normal rate and regular rhythm.      Heart sounds: No murmur heard.    No gallop.   Pulmonary:      Effort: Pulmonary effort is normal. No respiratory distress.      Breath sounds: Normal breath sounds. No wheezing.   Abdominal:      General: Bowel sounds are normal. There is no distension.      Palpations: Abdomen is soft.      Tenderness: There is no abdominal tenderness.   Skin:     Comments: LLE wrapped, dressing not taken down. Distal movement/sensation intact.   Psychiatric:         Mood and Affect: Mood normal.         Behavior: Behavior normal. Behavior is cooperative.       Significant Labs: All pertinent labs within the past 24 hours have been reviewed.    Significant Imaging: I  have reviewed all pertinent imaging results/findings within the past 24 hours.

## 2022-10-15 NOTE — PROGRESS NOTES
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  He states this all started about 4 days ago when he was walking in a field. He states he felt some significant itching in his lower leg.  Later that night his leg started to swell and hurt.  He assumed this was caused by an insect bite on his leg, but he never saw a specific bite or insect.  He states that the 2nd night the swelling and pain became severe and he was having fevers and chills which prompted him to visit his primary care provider the next day.  He was prescribed an antibiotic, not sure which one, and despite taking this his leg has continued to get worse.  He notes he has been having fevers and chills and last night he "slid or fell" out of his chair when he was trying to get up because the pain was so bad.  He notes he uses a walker to ambulate at home.  He also notes a chronic cough ascociated wiith allergic rhinitis and had an episode of nausea and vomiting one week ago.  He reports diminished oral intake due to decreased appetite, but no nausea or vomiting since this all started 4 days ago.  He denies chest pain, shortness of breath, headache, palpitations, dysuria and diarrhea.      Overview/Hospital Course:  Admitted with sepsis secondary to left lower extremity cellulitis. Started on IV antibiotics, IVF and potassium replacement (has Bartter syndrome). Surgery consulted for concern for nec fasc/bone involvement. CT did not show any evidence of abscess/gas. Seems to be improving though still significant " swelling and blistering. ID consulted for antibiotic recommendations. Repeat CT ordered for worsening white count which did not show a clear abscess.    Pt continued w/ minimal improvement. Ortho took the patient for I&D did not reveal abscess.      Interval History: in better spirits today but still woozy and weak when working with therapy. Not able to work on stairs today    Review of Systems   Constitutional:  Negative for chills and fever.   Respiratory:  Negative for cough and shortness of breath.    Cardiovascular:  Negative for chest pain and leg swelling.   Gastrointestinal:  Negative for abdominal distention and abdominal pain.   Skin:  Positive for color change and wound.   Objective:     Vital Signs (Most Recent):  Temp: 98.2 °F (36.8 °C) (10/15/22 1611)  Pulse: 77 (10/15/22 1611)  Resp: 17 (10/15/22 1611)  BP: 132/67 (10/15/22 1611)  SpO2: 100 % (10/15/22 1611) Vital Signs (24h Range):  Temp:  [97.9 °F (36.6 °C)-99 °F (37.2 °C)] 98.2 °F (36.8 °C)  Pulse:  [] 77  Resp:  [16-18] 17  SpO2:  [96 %-100 %] 100 %  BP: (107-132)/(52-67) 132/67     Weight: 61.6 kg (135 lb 12.9 oz)  Body mass index is 21.27 kg/m².    Intake/Output Summary (Last 24 hours) at 10/15/2022 1643  Last data filed at 10/15/2022 1215  Gross per 24 hour   Intake 600 ml   Output 604 ml   Net -4 ml        Physical Exam  Constitutional:       General: He is not in acute distress.     Appearance: He is ill-appearing. He is not toxic-appearing or diaphoretic.   Cardiovascular:      Rate and Rhythm: Normal rate and regular rhythm.      Heart sounds: No murmur heard.    No gallop.   Pulmonary:      Effort: Pulmonary effort is normal. No respiratory distress.      Breath sounds: Normal breath sounds. No wheezing.   Abdominal:      General: Bowel sounds are normal. There is no distension.      Palpations: Abdomen is soft.      Tenderness: There is no abdominal tenderness.   Skin:     Comments: LLE wrapped, dressing not taken down. Distal  "movement/sensation intact.   Psychiatric:         Mood and Affect: Mood normal.         Behavior: Behavior normal. Behavior is cooperative.       Significant Labs: All pertinent labs within the past 24 hours have been reviewed.    Significant Imaging: I have reviewed all pertinent imaging results/findings within the past 24 hours.      Assessment/Plan:      * Sepsis  Cellulitis, likely point of bacterial entry medial ankle. WBC uptrending despite appropriate antibiotic therapy. Repeat CT scan with possible early abscess formation on read, but surgery does not feel it is amenable to intervention. Ortho consulted by GS for second opinion, pt went to OR for I&D but no abscess found on 10/9  - appreciate ID, general surgery, orthopedics involvement  - on cefepime/vancomycin/flagyl  - abnormal arterial US noted, but WILLIE normal  - elevate ankle>knee>hip to facilitate drainage  - WbC trending down, cultures growing Strep Pyogenes - transitioned to PO Augmentin x2 weeks from washout (tentative end date 10/23)  - Overall improving from this standpoint but safety and weakness are issues and barriers to discharge        Cellulitis  See sepsis      Debility  -At home ambulates with a walker  -Notes he "Fell or slid" out of a chair on night prior to admit  PT/OT evaluation recommending home health     Chronic cough  -Presumed due to allergic rhinitis  -Continue home anti-histamines  -Add tessalon PRN    Chronic gout    -Continue home allopurinol.    Hypokalemia  See bartter syndrome      Hyponatremia  -On admit mild and likely due to diminished oral intake and mild dehydration  -Continue IV fluids  -stable    Normocytic anemia  -Hb 12.1 on admit  -No bleeding  -Check iron, ferritin, b12 and folate - appears more ACD at this time with elevated ferritin  -Repeat cbc in AM    Cellulitis of left lower extremity  -Treatment as above.    Primary open angle glaucoma of both eyes, severe stage  -History noted  -Continue home timolol and " lantanoprost drops    Bartter syndrome  Hypokalemic on admission, repleted w/ IV and po K, now borderline high.  - resume home dose of 10 meq KCl daily        H/O prostate cancer  -history noted  -Continue home oxybutynin      VTE Risk Mitigation (From admission, onward)         Ordered     enoxaparin injection 40 mg  Daily         10/02/22 1416     IP VTE HIGH RISK PATIENT  Once         10/02/22 1416     Place sequential compression device  Until discontinued         10/02/22 1416                Discharge Planning   ROS: 10/7/2022     Code Status: Full Code   Is the patient medically ready for discharge?:     Reason for patient still in hospital (select all that apply): Treatment  Discharge Plan A: Home Health, Home with family   Discharge Delays: None known at this time              Christian Esquivel MD  Department of Hospital Medicine   [facility] Surg

## 2022-10-15 NOTE — PLAN OF CARE
Problem: Physical Therapy  Goal: Physical Therapy Goal  Description: Goals to be met by: 10/17/22     Patient will increase functional independence with mobility by performin. Pt to be mod I with bed mobility.  2. Pt to transfer with mod I.  3. Pt to ambulate 150' w/RW and supervision.  4. Pt to be (I) with written HEP.  5. Pt to ascend/descend ~6 MINDY with R HR and SBA.    Outcome: Ongoing, Progressing   Pt with Orthostatic hypotension with sup to sit, sit to stand .  Sup to sit with with S, sit to stand with CGA with rw.  GT training with rw x 20 ft with CGA.

## 2022-10-15 NOTE — PLAN OF CARE
Problem: Adult Inpatient Plan of Care  Goal: Plan of Care Review  Outcome: Ongoing, Progressing  Flowsheets (Taken 10/15/2022 0815)  Plan of Care Reviewed With: patient  Goal: Patient-Specific Goal (Individualized)  Outcome: Ongoing, Progressing  Goal: Optimal Comfort and Wellbeing  Outcome: Ongoing, Progressing  Intervention: Monitor Pain and Promote Comfort  Flowsheets (Taken 10/15/2022 0815)  Pain Management Interventions:   care clustered   medication offered but refused   pillow support provided   position adjusted   quiet environment facilitated     Problem: Adult Inpatient Plan of Care  Goal: Plan of Care Review  Outcome: Ongoing, Progressing  Flowsheets (Taken 10/15/2022 0815)  Plan of Care Reviewed With: patient     Problem: Adult Inpatient Plan of Care  Goal: Plan of Care Review  Outcome: Ongoing, Progressing  Flowsheets (Taken 10/15/2022 0815)  Plan of Care Reviewed With: patient     Problem: Adult Inpatient Plan of Care  Goal: Patient-Specific Goal (Individualized)  Outcome: Ongoing, Progressing     Problem: Adult Inpatient Plan of Care  Goal: Patient-Specific Goal (Individualized)  Outcome: Ongoing, Progressing     Problem: Adult Inpatient Plan of Care  Goal: Optimal Comfort and Wellbeing  Outcome: Ongoing, Progressing  Intervention: Monitor Pain and Promote Comfort  Flowsheets (Taken 10/15/2022 0815)  Pain Management Interventions:   care clustered   medication offered but refused   pillow support provided   position adjusted   quiet environment facilitated     Problem: Adult Inpatient Plan of Care  Goal: Optimal Comfort and Wellbeing  Outcome: Ongoing, Progressing     Problem: Adult Inpatient Plan of Care  Goal: Optimal Comfort and Wellbeing  Intervention: Monitor Pain and Promote Comfort  Flowsheets (Taken 10/15/2022 0815)  Pain Management Interventions:   care clustered   medication offered but refused   pillow support provided   position adjusted   quiet environment facilitated     Problem: Adult  Inpatient Plan of Care  Goal: Optimal Comfort and Wellbeing  Intervention: Monitor Pain and Promote Comfort  Flowsheets (Taken 10/15/2022 0815)  Pain Management Interventions:   care clustered   medication offered but refused   pillow support provided   position adjusted   quiet environment facilitated

## 2022-10-16 LAB
POCT GLUCOSE: 174 MG/DL (ref 70–110)
POCT GLUCOSE: 90 MG/DL (ref 70–110)

## 2022-10-16 PROCEDURE — 25000003 PHARM REV CODE 250: Performed by: ORTHOPAEDIC SURGERY

## 2022-10-16 PROCEDURE — 63600175 PHARM REV CODE 636 W HCPCS: Performed by: ORTHOPAEDIC SURGERY

## 2022-10-16 PROCEDURE — 25000003 PHARM REV CODE 250: Performed by: STUDENT IN AN ORGANIZED HEALTH CARE EDUCATION/TRAINING PROGRAM

## 2022-10-16 PROCEDURE — 11000001 HC ACUTE MED/SURG PRIVATE ROOM

## 2022-10-16 RX ADMIN — OXYBUTYNIN CHLORIDE 5 MG: 5 TABLET ORAL at 08:10

## 2022-10-16 RX ADMIN — AMOXICILLIN AND CLAVULANATE POTASSIUM 1 TABLET: 875; 125 TABLET, FILM COATED ORAL at 08:10

## 2022-10-16 RX ADMIN — AZELASTINE 137 MCG: 1 SPRAY, METERED NASAL at 08:10

## 2022-10-16 RX ADMIN — MIDODRINE HYDROCHLORIDE 2.5 MG: 2.5 TABLET ORAL at 08:10

## 2022-10-16 RX ADMIN — LATANOPROST 1 DROP: 50 SOLUTION OPHTHALMIC at 08:10

## 2022-10-16 RX ADMIN — LACTOBACILLUS ACIDOPHILUS / LACTOBACILLUS BULGARICUS 1 EACH: 100 MILLION CFU STRENGTH GRANULES at 08:10

## 2022-10-16 RX ADMIN — CETIRIZINE HYDROCHLORIDE 5 MG: 5 TABLET ORAL at 08:10

## 2022-10-16 RX ADMIN — ENOXAPARIN SODIUM 40 MG: 100 INJECTION SUBCUTANEOUS at 04:10

## 2022-10-16 RX ADMIN — TIMOLOL MALEATE 1 DROP: 5 SOLUTION/ DROPS OPHTHALMIC at 08:10

## 2022-10-16 RX ADMIN — ALLOPURINOL 100 MG: 100 TABLET ORAL at 08:10

## 2022-10-16 RX ADMIN — POTASSIUM CHLORIDE 10 MEQ: 750 TABLET, EXTENDED RELEASE ORAL at 08:10

## 2022-10-16 RX ADMIN — MIDODRINE HYDROCHLORIDE 2.5 MG: 2.5 TABLET ORAL at 02:10

## 2022-10-16 RX ADMIN — Medication 400 MG: at 08:10

## 2022-10-16 RX ADMIN — SENNOSIDES 8.6 MG: 8.6 TABLET, FILM COATED ORAL at 08:10

## 2022-10-16 NOTE — PLAN OF CARE
Problem: Adult Inpatient Plan of Care  Goal: Plan of Care Review  Outcome: Ongoing, Progressing  Flowsheets (Taken 10/16/2022 0815)  Plan of Care Reviewed With: patient  Goal: Patient-Specific Goal (Individualized)  Outcome: Ongoing, Progressing     Problem: Skin Injury Risk Increased  Goal: Skin Health and Integrity  Outcome: Ongoing, Progressing  Intervention: Optimize Skin Protection  Flowsheets (Taken 10/16/2022 0815)  Pressure Reduction Techniques:   frequent weight shift encouraged   heels elevated off bed  Pressure Reduction Devices:   heel offloading device utilized   pressure-redistributing mattress utilized  Skin Protection: incontinence pads utilized     Problem: Adult Inpatient Plan of Care  Goal: Plan of Care Review  Outcome: Ongoing, Progressing  Flowsheets (Taken 10/16/2022 0815)  Plan of Care Reviewed With: patient     Problem: Adult Inpatient Plan of Care  Goal: Plan of Care Review  Outcome: Ongoing, Progressing  Flowsheets (Taken 10/16/2022 0815)  Plan of Care Reviewed With: patient     Problem: Adult Inpatient Plan of Care  Goal: Patient-Specific Goal (Individualized)  Outcome: Ongoing, Progressing     Problem: Adult Inpatient Plan of Care  Goal: Patient-Specific Goal (Individualized)  Outcome: Ongoing, Progressing     Problem: Skin Injury Risk Increased  Goal: Skin Health and Integrity  Outcome: Ongoing, Progressing  Intervention: Optimize Skin Protection  Flowsheets (Taken 10/16/2022 0815)  Pressure Reduction Techniques:   frequent weight shift encouraged   heels elevated off bed  Pressure Reduction Devices:   heel offloading device utilized   pressure-redistributing mattress utilized  Skin Protection: incontinence pads utilized     Problem: Skin Injury Risk Increased  Goal: Skin Health and Integrity  Outcome: Ongoing, Progressing     Problem: Skin Injury Risk Increased  Goal: Skin Health and Integrity  Intervention: Optimize Skin Protection  Flowsheets (Taken 10/16/2022 0815)  Pressure  Reduction Techniques:   frequent weight shift encouraged   heels elevated off bed  Pressure Reduction Devices:   heel offloading device utilized   pressure-redistributing mattress utilized  Skin Protection: incontinence pads utilized     Problem: Skin Injury Risk Increased  Goal: Skin Health and Integrity  Intervention: Optimize Skin Protection  Flowsheets (Taken 10/16/2022 0815)  Pressure Reduction Techniques:   frequent weight shift encouraged   heels elevated off bed  Pressure Reduction Devices:   heel offloading device utilized   pressure-redistributing mattress utilized  Skin Protection: incontinence pads utilized

## 2022-10-16 NOTE — PROGRESS NOTES
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  He states this all started about 4 days ago when he was walking in a field. He states he felt some significant itching in his lower leg.  Later that night his leg started to swell and hurt.  He assumed this was caused by an insect bite on his leg, but he never saw a specific bite or insect.  He states that the 2nd night the swelling and pain became severe and he was having fevers and chills which prompted him to visit his primary care provider the next day.  He was prescribed an antibiotic, not sure which one, and despite taking this his leg has continued to get worse.  He notes he has been having fevers and chills and last night he "slid or fell" out of his chair when he was trying to get up because the pain was so bad.  He notes he uses a walker to ambulate at home.  He also notes a chronic cough ascociated wiith allergic rhinitis and had an episode of nausea and vomiting one week ago.  He reports diminished oral intake due to decreased appetite, but no nausea or vomiting since this all started 4 days ago.  He denies chest pain, shortness of breath, headache, palpitations, dysuria and diarrhea.      Overview/Hospital Course:  Admitted with sepsis secondary to left lower extremity cellulitis. Started on IV antibiotics, IVF and potassium replacement (has Bartter syndrome). Surgery consulted for concern for nec fasc/bone involvement. CT did not show any evidence of abscess/gas. Seems to be improving though still significant " "swelling and blistering. ID consulted for antibiotic recommendations. Repeat CT ordered for worsening white count which did not show a clear abscess. Patient still with poor improvement, he underwent I&D with Orthopedic surgery and however no abscess was found but he did have "murky fluid" in tissue planes which were irrigated. He has been doing well since, WBC resolved and pain improving. PT/OT has been consulted and patient has been having difficulty participating due to orthostatic hypotension. Fluid ws given and low dose midodrine added which seems to have improved some but still limiting patient.        Interval History: sitting in chair, trying to work with stairs today. Was not able to yesterday. Attempted to call wife but no answer.    Review of Systems   Constitutional:  Negative for chills and fever.   Respiratory:  Negative for cough and shortness of breath.    Cardiovascular:  Negative for chest pain and leg swelling.   Gastrointestinal:  Negative for abdominal distention and abdominal pain.   Skin:  Positive for color change and wound.   Objective:     Vital Signs (Most Recent):  Temp: 99 °F (37.2 °C) (10/16/22 1127)  Pulse: 101 (10/16/22 1127)  Resp: 17 (10/16/22 1127)  BP: (!) 108/57 (10/16/22 1127)  SpO2: 98 % (10/16/22 1127) Vital Signs (24h Range):  Temp:  [98.1 °F (36.7 °C)-99 °F (37.2 °C)] 99 °F (37.2 °C)  Pulse:  [] 101  Resp:  [17-18] 17  SpO2:  [96 %-100 %] 98 %  BP: (108-132)/(57-67) 108/57     Weight: 61.6 kg (135 lb 12.9 oz)  Body mass index is 21.27 kg/m².    Intake/Output Summary (Last 24 hours) at 10/16/2022 1243  Last data filed at 10/16/2022 1218  Gross per 24 hour   Intake 720 ml   Output 2150 ml   Net -1430 ml        Physical Exam  Constitutional:       General: He is not in acute distress.     Appearance: He is ill-appearing. He is not toxic-appearing or diaphoretic.   Cardiovascular:      Rate and Rhythm: Normal rate and regular rhythm.      Heart sounds: No murmur heard.    " "No gallop.   Pulmonary:      Effort: Pulmonary effort is normal. No respiratory distress.      Breath sounds: Normal breath sounds. No wheezing.   Abdominal:      General: Bowel sounds are normal. There is no distension.      Palpations: Abdomen is soft.      Tenderness: There is no abdominal tenderness.   Skin:     Comments: LLE wrapped, dressing not taken down. Distal movement/sensation intact.   Psychiatric:         Mood and Affect: Mood normal.         Behavior: Behavior normal. Behavior is cooperative.       Significant Labs: All pertinent labs within the past 24 hours have been reviewed.    Significant Imaging: I have reviewed all pertinent imaging results/findings within the past 24 hours.      Assessment/Plan:      * Sepsis  Cellulitis, likely point of bacterial entry medial ankle. WBC uptrending despite appropriate antibiotic therapy. Repeat CT scan with possible early abscess formation on read, but surgery does not feel it is amenable to intervention. Ortho consulted by GS for second opinion, pt went to OR for I&D but no abscess found on 10/9  - appreciate ID, general surgery, orthopedics involvement  - on cefepime/vancomycin/flagyl  - abnormal arterial US noted, but WILLIE normal  - elevate ankle>knee>hip to facilitate drainage  - WbC trending down, cultures growing Strep Pyogenes - transitioned to PO Augmentin x2 weeks from washout (tentative end date 10/23)  - Overall improving from this standpoint but safety and weakness are issues and barriers to discharge        Cellulitis  See sepsis      Debility  -At home ambulates with a walker  -Notes he "Fell or slid" out of a chair on night prior to admit  PT/OT evaluation recommending home health     Chronic cough  -Presumed due to allergic rhinitis  -Continue home anti-histamines  -Add tessalon PRN    Chronic gout    -Continue home allopurinol.    Hypokalemia  See bartter syndrome      Hyponatremia  -On admit mild and likely due to diminished oral intake and " mild dehydration  -Continue IV fluids  -stable    Normocytic anemia  -Hb 12.1 on admit  -No bleeding  -Check iron, ferritin, b12 and folate - appears more ACD at this time with elevated ferritin  -Repeat cbc in AM    Cellulitis of left lower extremity  -Treatment as above.    Primary open angle glaucoma of both eyes, severe stage  -History noted  -Continue home timolol and lantanoprost drops    Bartter syndrome  Hypokalemic on admission, repleted w/ IV and po K, now borderline high.  - resume home dose of 10 meq KCl daily        H/O prostate cancer  -history noted  -Continue home oxybutynin      VTE Risk Mitigation (From admission, onward)         Ordered     enoxaparin injection 40 mg  Daily         10/02/22 1416     IP VTE HIGH RISK PATIENT  Once         10/02/22 1416     Place sequential compression device  Until discontinued         10/02/22 1416                Discharge Planning   ROS: 10/7/2022     Code Status: Full Code   Is the patient medically ready for discharge?:     Reason for patient still in hospital (select all that apply): Patient unstable and Pending disposition  Discharge Plan A: Home Health, Home with family   Discharge Delays: None known at this time        Patient's orthostatic hypotension is barrier at this time.      Christian Esquivel MD  Department of Hospital Medicine   [facility]

## 2022-10-16 NOTE — SUBJECTIVE & OBJECTIVE
Interval History: sitting in chair, trying to work with stairs today. Was not able to yesterday. Attempted to call wife but no answer.    Review of Systems   Constitutional:  Negative for chills and fever.   Respiratory:  Negative for cough and shortness of breath.    Cardiovascular:  Negative for chest pain and leg swelling.   Gastrointestinal:  Negative for abdominal distention and abdominal pain.   Skin:  Positive for color change and wound.   Objective:     Vital Signs (Most Recent):  Temp: 99 °F (37.2 °C) (10/16/22 1127)  Pulse: 101 (10/16/22 1127)  Resp: 17 (10/16/22 1127)  BP: (!) 108/57 (10/16/22 1127)  SpO2: 98 % (10/16/22 1127) Vital Signs (24h Range):  Temp:  [98.1 °F (36.7 °C)-99 °F (37.2 °C)] 99 °F (37.2 °C)  Pulse:  [] 101  Resp:  [17-18] 17  SpO2:  [96 %-100 %] 98 %  BP: (108-132)/(57-67) 108/57     Weight: 61.6 kg (135 lb 12.9 oz)  Body mass index is 21.27 kg/m².    Intake/Output Summary (Last 24 hours) at 10/16/2022 1243  Last data filed at 10/16/2022 1218  Gross per 24 hour   Intake 720 ml   Output 2150 ml   Net -1430 ml        Physical Exam  Constitutional:       General: He is not in acute distress.     Appearance: He is ill-appearing. He is not toxic-appearing or diaphoretic.   Cardiovascular:      Rate and Rhythm: Normal rate and regular rhythm.      Heart sounds: No murmur heard.    No gallop.   Pulmonary:      Effort: Pulmonary effort is normal. No respiratory distress.      Breath sounds: Normal breath sounds. No wheezing.   Abdominal:      General: Bowel sounds are normal. There is no distension.      Palpations: Abdomen is soft.      Tenderness: There is no abdominal tenderness.   Skin:     Comments: LLE wrapped, dressing not taken down. Distal movement/sensation intact.   Psychiatric:         Mood and Affect: Mood normal.         Behavior: Behavior normal. Behavior is cooperative.       Significant Labs: All pertinent labs within the past 24 hours have been reviewed.    Significant  Imaging: I have reviewed all pertinent imaging results/findings within the past 24 hours.

## 2022-10-16 NOTE — NURSING
[facility]  Nurses Note -- 4 Eyes      10/16/2022       Skin assessed on: Saturday      [] No Pressure Injuries Present    []Prevention Measures Documented    [x] Yes LDA  for Pressure Injury Previously documented     [] Yes New Pressure Injury Discovered   [] LDA for New Pressure Injury Added      Attending RN:  Monalisa Bose RN     Second RN:  DIONY Venegas

## 2022-10-17 PROCEDURE — 11000001 HC ACUTE MED/SURG PRIVATE ROOM

## 2022-10-17 PROCEDURE — 99900035 HC TECH TIME PER 15 MIN (STAT)

## 2022-10-17 PROCEDURE — 25000003 PHARM REV CODE 250: Performed by: STUDENT IN AN ORGANIZED HEALTH CARE EDUCATION/TRAINING PROGRAM

## 2022-10-17 PROCEDURE — 25000003 PHARM REV CODE 250: Performed by: ORTHOPAEDIC SURGERY

## 2022-10-17 PROCEDURE — 94761 N-INVAS EAR/PLS OXIMETRY MLT: CPT

## 2022-10-17 PROCEDURE — 97116 GAIT TRAINING THERAPY: CPT

## 2022-10-17 PROCEDURE — 97110 THERAPEUTIC EXERCISES: CPT

## 2022-10-17 PROCEDURE — 63600175 PHARM REV CODE 636 W HCPCS: Performed by: ORTHOPAEDIC SURGERY

## 2022-10-17 RX ORDER — AMOXICILLIN AND CLAVULANATE POTASSIUM 875; 125 MG/1; MG/1
1 TABLET, FILM COATED ORAL EVERY 12 HOURS
Qty: 12 TABLET | Refills: 0 | Status: SHIPPED | OUTPATIENT
Start: 2022-10-17 | End: 2022-10-23

## 2022-10-17 RX ORDER — MIDODRINE HYDROCHLORIDE 2.5 MG/1
2.5 TABLET ORAL 3 TIMES DAILY
Qty: 90 TABLET | Refills: 11 | Status: SHIPPED | OUTPATIENT
Start: 2022-10-17 | End: 2022-10-21 | Stop reason: HOSPADM

## 2022-10-17 RX ADMIN — TIMOLOL MALEATE 1 DROP: 5 SOLUTION/ DROPS OPHTHALMIC at 09:10

## 2022-10-17 RX ADMIN — AZELASTINE 137 MCG: 1 SPRAY, METERED NASAL at 08:10

## 2022-10-17 RX ADMIN — ALLOPURINOL 100 MG: 100 TABLET ORAL at 09:10

## 2022-10-17 RX ADMIN — ASPIRIN 81 MG CHEWABLE TABLET 81 MG: 81 TABLET CHEWABLE at 09:10

## 2022-10-17 RX ADMIN — LACTOBACILLUS ACIDOPHILUS / LACTOBACILLUS BULGARICUS 1 EACH: 100 MILLION CFU STRENGTH GRANULES at 09:10

## 2022-10-17 RX ADMIN — MIDODRINE HYDROCHLORIDE 2.5 MG: 2.5 TABLET ORAL at 08:10

## 2022-10-17 RX ADMIN — SENNOSIDES 8.6 MG: 8.6 TABLET, FILM COATED ORAL at 08:10

## 2022-10-17 RX ADMIN — LATANOPROST 1 DROP: 50 SOLUTION OPHTHALMIC at 08:10

## 2022-10-17 RX ADMIN — AZELASTINE 137 MCG: 1 SPRAY, METERED NASAL at 09:10

## 2022-10-17 RX ADMIN — POLYETHYLENE GLYCOL 3350 17 G: 17 POWDER, FOR SOLUTION ORAL at 09:10

## 2022-10-17 RX ADMIN — ENOXAPARIN SODIUM 40 MG: 100 INJECTION SUBCUTANEOUS at 04:10

## 2022-10-17 RX ADMIN — OXYBUTYNIN CHLORIDE 5 MG: 5 TABLET ORAL at 08:10

## 2022-10-17 RX ADMIN — SENNOSIDES 8.6 MG: 8.6 TABLET, FILM COATED ORAL at 09:10

## 2022-10-17 RX ADMIN — Medication 400 MG: at 09:10

## 2022-10-17 RX ADMIN — TIMOLOL MALEATE 1 DROP: 5 SOLUTION/ DROPS OPHTHALMIC at 08:10

## 2022-10-17 RX ADMIN — MIDODRINE HYDROCHLORIDE 2.5 MG: 2.5 TABLET ORAL at 04:10

## 2022-10-17 RX ADMIN — AMOXICILLIN AND CLAVULANATE POTASSIUM 1 TABLET: 875; 125 TABLET, FILM COATED ORAL at 09:10

## 2022-10-17 RX ADMIN — POTASSIUM CHLORIDE 10 MEQ: 750 TABLET, EXTENDED RELEASE ORAL at 09:10

## 2022-10-17 RX ADMIN — MIDODRINE HYDROCHLORIDE 2.5 MG: 2.5 TABLET ORAL at 09:10

## 2022-10-17 RX ADMIN — CETIRIZINE HYDROCHLORIDE 5 MG: 5 TABLET ORAL at 08:10

## 2022-10-17 RX ADMIN — Medication 400 MG: at 08:10

## 2022-10-17 RX ADMIN — AMOXICILLIN AND CLAVULANATE POTASSIUM 1 TABLET: 875; 125 TABLET, FILM COATED ORAL at 08:10

## 2022-10-17 RX ADMIN — OXYBUTYNIN CHLORIDE 5 MG: 5 TABLET ORAL at 09:10

## 2022-10-17 RX ADMIN — LACTOBACILLUS ACIDOPHILUS / LACTOBACILLUS BULGARICUS 1 EACH: 100 MILLION CFU STRENGTH GRANULES at 08:10

## 2022-10-17 NOTE — PLAN OF CARE
10/17/22 1625   Medicare Message   Important Message from Medicare regarding Discharge Appeal Rights Given to patient/caregiver;Explained to patient/caregiver;Other (comments)  (TN spoke with patient's spouse, Winifred Church via phone [phone] verbalized understanding. Copy mailed to address in chart [phone])   Date IMM was signed 10/17/22   Time IMM was signed [id number]

## 2022-10-17 NOTE — PT/OT/SLP PROGRESS
Physical Therapy Treatment    Patient Name:  Mark Church   MRN:  [id number]    Recommendations:     Discharge Recommendations:  home health PT with family assistance  Discharge Equipment Recommendations: bedside commode, bath bench   Barriers to discharge: Inaccessible home    Assessment:     Mark Church is a 72 y.o. male admitted with a medical diagnosis of Sepsis.  He presents with the following impairments/functional limitations:  weakness, impaired endurance, impaired self care skills, impaired functional mobility, gait instability, impaired balance, decreased lower extremity function, pain, decreased ROM, impaired skin, edema.    Rehab Prognosis: Good; patient would benefit from acute skilled PT services to address these deficits and reach maximum level of function.    Recent Surgery: Procedure(s) (LRB):  INCISION AND DRAINAGE, ANKLE (Left) 8 Days Post-Op    Plan:     During this hospitalization, patient to be seen 3 x/week to address the identified rehab impairments via gait training, therapeutic activities, therapeutic exercises and progress toward the following goals:    Plan of Care Expires:  10/31/22    Subjective     Chief Complaint: Pt reported mild dizziness during ambulation.  Patient/Family Comments/goals: Pt agreeable to therapy.    Pain/Comfort:  Pain Rating 1:  (Pt c/o some pain to LLE.)  Pain Addressed 1: Reposition, Distraction, Cessation of Activity      Objective:     Patient found up in chair reclined with peripheral IV upon PT entry to room.     General Precautions: Standard, fall (Orthostatic hypotension)   Orthopedic Precautions:LLE weight bearing as tolerated   Braces: N/A  Respiratory Status: Room air     Functional Mobility: Pt found up in recliner upon PT arrival, reported that he's been up in the chair since 7:30 am with nursing assistance.  /71 and  bpm after stair training.  /70 and  bpm after gait training.  spO2 on RA 98%.  Dr. Esquivel notified.    Transfers:      Sit to Stand:  stand by assistance and contact guard assistance with rolling walker x2 trials   Bed to Chair: stand by assistance and contact guard assistance with  rolling walker  using  Step Transfer  Gait: Pt ambulated ~120 ft with CGA-SBA using RW and chair to follow.  Pt with decreased step length and zeynep.  Stairs: Pt ascended/descended ~10 steps using step stool with min A-CGA with BUE support.    Balance: Pt with fair dynamic standing balance.       AM-PAC 6 CLICK MOBILITY  Turning over in bed (including adjusting bedclothes, sheets and blankets)?: 4  Sitting down on and standing up from a chair with arms (e.g., wheelchair, bedside commode, etc.): 3  Moving from lying on back to sitting on the side of the bed?: 4  Moving to and from a bed to a chair (including a wheelchair)?: 3  Need to walk in hospital room?: 3  Climbing 3-5 steps with a railing?: 3  Basic Mobility Total Score: 20       Therapeutic Activities and Exercises:  BLE seated therex x15 reps: AP, LAQ, and hip flex  BLE supine therex x15 reps: hip abd/add, AP, QS, and GS    Pt issued/educated on HEP for seated/supine LE therex, encouraged to perform LE therex 2 sets x15 reps.  Pt verbalized good understanding.     Patient left up in chair reclined and LLE elevated on pillow with all lines intact and call button in reach.  Tray table close by.     GOALS:   Multidisciplinary Problems       Physical Therapy Goals          Problem: Physical Therapy    Goal Priority Disciplines Outcome Goal Variances Interventions   Physical Therapy Goal     PT, PT/OT Ongoing, Progressing     Description: Goals to be met by: 10/31/22     Patient will increase functional independence with mobility by performin. Pt to be mod I with bed mobility.  2. Pt to transfer with mod I.  3. Pt to ambulate >150' w/RW and mod I.  4. Pt to be (I) with written HEP.  5. Pt to ascend/descend ~6 MINDY with R HR and mod I.                         Time Tracking:     PT Received On:  10/17/22  PT Start Time: 1106     PT Stop Time: 1131  PT Total Time (min): 25 min     Billable Minutes: Gait Training 13 min and Therapeutic Exercise 12 min    Treatment Type: 6th Visit  PT/PTA: PT     PTA Visit Number: 0     10/17/2022

## 2022-10-17 NOTE — PLAN OF CARE
Problem: Occupational Therapy  Goal: Occupational Therapy Goal  Description: Goals to be met by: 10/31/2022     Patient will increase functional independence with ADL's by performing:    UE Dressing with Modified [place].  LE Dressing with Supervision.  Grooming while standing with Supervision.  Toileting from bedside commode with Supervision for hygiene and clothing management.   Step transfer with Supervision.  Toilet transfer to bedside commode with Supervision.  Upper extremity exercise program x 10 reps per handout, with [place].    Outcome: Ongoing, Progressing     Reviewed UE HEP with patient.     Patient's Tx frequency has been adjusted to 3 x's a week.  Continue per POC otherwise.

## 2022-10-17 NOTE — PROGRESS NOTES
"[facility] Medicine  Progress Note    Patient Name: Mark Church  MRN: [id number]  Patient Class: IP- Inpatient   Admission Date: 10/2/2022  Length of Stay: 15 days  Attending Physician: Christian Esquivel MD  Primary Care Provider: [facility]         Subjective:     Principal Problem:Sepsis        HPI:  Mr. 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  He states this all started about 4 days ago when he was walking in a field. He states he felt some significant itching in his lower leg.  Later that night his leg started to swell and hurt.  He assumed this was caused by an insect bite on his leg, but he never saw a specific bite or insect.  He states that the 2nd night the swelling and pain became severe and he was having fevers and chills which prompted him to visit his primary care provider the next day.  He was prescribed an antibiotic, not sure which one, and despite taking this his leg has continued to get worse.  He notes he has been having fevers and chills and last night he "slid or fell" out of his chair when he was trying to get up because the pain was so bad.  He notes he uses a walker to ambulate at home.  He also notes a chronic cough ascociated wiith allergic rhinitis and had an episode of nausea and vomiting one week ago.  He reports diminished oral intake due to decreased appetite, but no nausea or vomiting since this all started 4 days ago.  He denies chest pain, shortness of breath, headache, palpitations, dysuria and diarrhea.      Overview/Hospital Course:  Admitted with sepsis secondary to left lower extremity cellulitis. Started on IV antibiotics, IVF and potassium replacement (has Bartter syndrome). Surgery consulted for concern for nec fasc/bone involvement. CT did not show any evidence of abscess/gas. Seems to be improving though still significant " "swelling and blistering. ID consulted for antibiotic recommendations. Repeat CT ordered for worsening white count which did not show a clear abscess. Patient still with poor improvement, he underwent I&D with Orthopedic surgery and however no abscess was found but he did have "murky fluid" in tissue planes which were irrigated. He has been doing well since, WBC resolved and pain improving. PT/OT has been consulted and patient has been having difficulty participating due to orthostatic hypotension. Fluid ws given and low dose midodrine added which seems to have improved some but still limiting patient.        Interval History: no acute issues, he is in good spirits today and motivated.    Review of Systems   Constitutional:  Negative for chills and fever.   Respiratory:  Negative for cough and shortness of breath.    Cardiovascular:  Negative for chest pain and leg swelling.   Gastrointestinal:  Negative for abdominal distention and abdominal pain.   Skin:  Positive for color change and wound.   Objective:     Vital Signs (Most Recent):  Temp: 98.1 °F (36.7 °C) (10/17/22 1135)  Pulse: (!) 123 (10/17/22 1135)  Resp: 19 (10/17/22 1135)  BP: (!) 115/58 (10/17/22 1135)  SpO2: 99 % (10/17/22 1135) Vital Signs (24h Range):  Temp:  [98.1 °F (36.7 °C)-99.1 °F (37.3 °C)] 98.1 °F (36.7 °C)  Pulse:  [] 123  Resp:  [17-25] 19  SpO2:  [95 %-99 %] 99 %  BP: (109-120)/(57-61) 115/58     Weight: 61.6 kg (135 lb 12.9 oz)  Body mass index is 21.27 kg/m².    Intake/Output Summary (Last 24 hours) at 10/17/2022 1510  Last data filed at 10/17/2022 1506  Gross per 24 hour   Intake 1384 ml   Output 1500 ml   Net -116 ml        Physical Exam  Constitutional:       General: He is not in acute distress.     Appearance: He is ill-appearing. He is not toxic-appearing or diaphoretic.   Cardiovascular:      Rate and Rhythm: Normal rate and regular rhythm.      Heart sounds: No murmur heard.    No gallop.   Pulmonary:      Effort: Pulmonary " "effort is normal. No respiratory distress.      Breath sounds: Normal breath sounds. No wheezing.   Abdominal:      General: Bowel sounds are normal. There is no distension.      Palpations: Abdomen is soft.      Tenderness: There is no abdominal tenderness.   Skin:     Comments: LLE wrapped, dressing not taken down. Distal movement/sensation intact.   Psychiatric:         Mood and Affect: Mood normal.         Behavior: Behavior normal. Behavior is cooperative.       Significant Labs: All pertinent labs within the past 24 hours have been reviewed.    Significant Imaging: I have reviewed all pertinent imaging results/findings within the past 24 hours.      Assessment/Plan:      * Sepsis  Cellulitis, likely point of bacterial entry medial ankle. WBC uptrending despite appropriate antibiotic therapy. Repeat CT scan with possible early abscess formation on read, but surgery does not feel it is amenable to intervention. Ortho consulted by GS for second opinion, pt went to OR for I&D but no abscess found on 10/9  - appreciate ID, general surgery, orthopedics involvement  - on cefepime/vancomycin/flagyl  - abnormal arterial US noted, but WILLIE normal  - elevate ankle>knee>hip to facilitate drainage  - WbC trending down, cultures growing Strep Pyogenes - transitioned to PO Augmentin x2 weeks from washout (tentative end date 10/23)  - Overall improving from this standpoint but safety and weakness are issues and barriers to discharge        Cellulitis  See sepsis      Debility  -At home ambulates with a walker  -Notes he "Fell or slid" out of a chair on night prior to admit  PT/OT evaluation recommending home health     Chronic cough  -Presumed due to allergic rhinitis  -Continue home anti-histamines  -Add tessalon PRN    Chronic gout    -Continue home allopurinol.    Hypokalemia  See bartter syndrome      Hyponatremia  -On admit mild and likely due to diminished oral intake and mild dehydration  -Continue IV " fluids  -stable    Normocytic anemia  -Hb 12.1 on admit  -No bleeding  -Check iron, ferritin, b12 and folate - appears more ACD at this time with elevated ferritin  -Repeat cbc in AM    Cellulitis of left lower extremity  -Treatment as above.    Primary open angle glaucoma of both eyes, severe stage  -History noted  -Continue home timolol and lantanoprost drops    Bartter syndrome  Hypokalemic on admission, repleted w/ IV and po K, now borderline high.  - resume home dose of 10 meq KCl daily        H/O prostate cancer  -history noted  -Continue home oxybutynin      VTE Risk Mitigation (From admission, onward)         Ordered     enoxaparin injection 40 mg  Daily         10/02/22 1416     IP VTE HIGH RISK PATIENT  Once         10/02/22 1416     Place sequential compression device  Until discontinued         10/02/22 1416                Discharge Planning   ROS: 10/7/2022     Code Status: Full Code   Is the patient medically ready for discharge?:     Reason for patient still in hospital (select all that apply): Pending disposition  Discharge Plan A: Home Health, Home with family   Discharge Delays: None known at this time      Plan for d/c tomorrow. Family updated and preparing.        Christian Esquivel MD  Department of Hospital Medicine   [facility]

## 2022-10-17 NOTE — PLAN OF CARE
Plan for discharge with [facility] once medically clear. Per therapy, recommendations for bsc and bath bench. Order for hospital bed placed per physician. TN to continue to follow for discharge needs.     3:00pm Spoke with patient's spouse, regarding discharge planning with home health. Pt's spouse accepting to [facility]. Informed patient that pt did not qualify for insurance to cover hospital bed. Explained bed could be rented at a monthly fee, pt's spouse stated she and her spouse are on a fixed income and unable to afford the monthly payment. Pt will need bsc and anticipate discharge for tomorrow. Pt will need transportation assistance home.    10/17/22 1059   Discharge Reassessment   Assessment Type Discharge Planning Reassessment   Did the patient's condition or plan change since previous assessment? Yes   Name(s) and Number(s) Winifred Church (Spouse)   [phone]   Communicated ROS with patient/caregiver Date not available/Unable to determine   Discharge Plan A Home Health;Home with family   Discharge Plan B Home   DME Needed Upon Discharge  bath bench;bedside commode   Discharge Barriers Identified None   Why the patient remains in the hospital Requires continued medical care   Post-Acute Status   Post-Acute Authorization Home Health   Home Health Status Pending medical clearance/testing   Coverage [employer]   Discharge Delays None known at this time

## 2022-10-17 NOTE — PLAN OF CARE
[facility]      HOME HEALTH ORDERS  FACE TO FACE ENCOUNTER    Patient Name: Mark Church  YOB: 1950    PCP: [facility]    PCP Address: [address] [place] [address] CRISTA MASON  PCP Phone Number: [phone]  PCP Fax: [phone]    Encounter Date: 10/2/22    Admit to Home Health    Diagnoses:  Active Hospital Problems    Diagnosis  POA    *Sepsis [A41.9]  Yes    Cellulitis [L03.90]  Yes    Cellulitis of left lower extremity [L03.116]  Yes    Normocytic anemia [D64.9]  Yes    Hyponatremia [E87.1]  Yes    Hypokalemia [E87.6]  Yes    Chronic gout [M1A.9XX0]  Yes    Chronic cough [R05.3]  Yes    Debility [R53.81]  Yes    Primary open angle glaucoma of both eyes, severe stage [H40.1133]  Yes    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       Follow Up Appointments:  Future Appointments   Date Time Provider Department Center   12/5/2022  8:30 AM Rikki Tyson MD [facility]       Allergies:  Review of patient's allergies indicates:   Allergen Reactions    Compazine [prochlorperazine edisylate]        Medications: Review discharge medications with patient and family and provide education.      I have seen and examined this patient within the last 30 days. My clinical findings that support the need for the home health skilled services and home bound status are the following:no   Weakness/numbness causing balance and gait disturbance due to Infection and Weakness/Debility making it taxing to leave home.  Requiring assistive device to leave home due to unsteady gait caused by  Infection and Weakness/Debility.     Diet:   regular diet    Labs: per PCP      Referrals/ Consults  Physical Therapy to evaluate and treat. Evaluate for home safety and equipment needs; Establish/upgrade home exercise program. Perform / instruct on therapeutic exercises, gait  "training, transfer training, and Range of Motion.  Occupational Therapy to evaluate and treat. Evaluate home environment for safety and equipment needs. Perform/Instruct on transfers, ADL training, ROM, and therapeutic exercises.   to evaluate for community resources/long-range planning.  Aide to provide assistance with personal care, ADLs, and vital signs.    Activities:   activity as tolerated    Nursing:   Agency to admit patient within 24 hours of hospital discharge unless specified on physician order or at patient request    SN to complete comprehensive assessment including routine vital signs. Instruct on disease process and s/s of complications to report to MD. Review/verify medication list sent home with the patient at time of discharge  and instruct patient/caregiver as needed. Frequency may be adjusted depending on start of care date.     Skilled nurse to perform up to 3 visits PRN for symptoms related to diagnosis    Notify MD if SBP > 160 or < 90; DBP > 90 or < 50; HR > 120 or < 50; Temp > 101; O2 < 88%; Other:       Ok to schedule additional visits based on staff availability and patient request on consecutive days within the home health episode.    Miscellaneous       Home Health Aide:  Nursing , Physical Therapy , Occupational Therapy , Medical Social Work , and Home Health Aide     Wound Care Orders  Local wound care to left lower leg I&D sites every 2 days- Cleanse with Vashe. Apply 3M Cavilon No Sting Film Barrier to periwound skin. Fill openings between sutures with susan of Aquacel Ag hydrofiber. Cover 2 wounds on medial left leg and lateral ankle with Mepilex 4x4 foam without border. Wrap from foot to knee with Kerlix roll. Cover Kerlix roll with cast wrap and 4" Coban- avoid wrapping too tight and wrap from toes to knee, do not wrap from knee to toes.    I certify that this patient is confined to his home and needs intermittent skilled nursing care, physical therapy, and " occupational therapy.

## 2022-10-17 NOTE — SUBJECTIVE & OBJECTIVE
Interval History: no acute issues, he is in good spirits today and motivated.    Review of Systems   Constitutional:  Negative for chills and fever.   Respiratory:  Negative for cough and shortness of breath.    Cardiovascular:  Negative for chest pain and leg swelling.   Gastrointestinal:  Negative for abdominal distention and abdominal pain.   Skin:  Positive for color change and wound.   Objective:     Vital Signs (Most Recent):  Temp: 98.1 °F (36.7 °C) (10/17/22 1135)  Pulse: (!) 123 (10/17/22 1135)  Resp: 19 (10/17/22 1135)  BP: (!) 115/58 (10/17/22 1135)  SpO2: 99 % (10/17/22 1135) Vital Signs (24h Range):  Temp:  [98.1 °F (36.7 °C)-99.1 °F (37.3 °C)] 98.1 °F (36.7 °C)  Pulse:  [] 123  Resp:  [17-25] 19  SpO2:  [95 %-99 %] 99 %  BP: (109-120)/(57-61) 115/58     Weight: 61.6 kg (135 lb 12.9 oz)  Body mass index is 21.27 kg/m².    Intake/Output Summary (Last 24 hours) at 10/17/2022 1510  Last data filed at 10/17/2022 1506  Gross per 24 hour   Intake 1384 ml   Output 1500 ml   Net -116 ml        Physical Exam  Constitutional:       General: He is not in acute distress.     Appearance: He is ill-appearing. He is not toxic-appearing or diaphoretic.   Cardiovascular:      Rate and Rhythm: Normal rate and regular rhythm.      Heart sounds: No murmur heard.    No gallop.   Pulmonary:      Effort: Pulmonary effort is normal. No respiratory distress.      Breath sounds: Normal breath sounds. No wheezing.   Abdominal:      General: Bowel sounds are normal. There is no distension.      Palpations: Abdomen is soft.      Tenderness: There is no abdominal tenderness.   Skin:     Comments: LLE wrapped, dressing not taken down. Distal movement/sensation intact.   Psychiatric:         Mood and Affect: Mood normal.         Behavior: Behavior normal. Behavior is cooperative.       Significant Labs: All pertinent labs within the past 24 hours have been reviewed.    Significant Imaging: I have reviewed all pertinent imaging  results/findings within the past 24 hours.

## 2022-10-17 NOTE — PLAN OF CARE
Problem: Skin Injury Risk Increased  Goal: Skin Health and Integrity  Outcome: Ongoing, Progressing     Problem: Impaired Wound Healing  Goal: Optimal Wound Healing  Outcome: Ongoing, Progressing     Problem: Pain Acute  Goal: Acceptable Pain Control and Functional Ability  Outcome: Ongoing, Progressing     Problem: Fall Injury Risk  Goal: Absence of Fall and Fall-Related Injury  Outcome: Ongoing, Progressing     Problem: Mobility Impairment  Goal: Optimal Mobility  Outcome: Ongoing, Progressing

## 2022-10-17 NOTE — PT/OT/SLP PROGRESS
Occupational Therapy   Treatment    Name: Mark Church  MRN: [id number]  Admitting Diagnosis:  Sepsis  8 Days Post-Op    Recommendations:     Discharge Recommendations: home health OT  Discharge Equipment Recommendations: bedside commode, bath bench  Barriers to Discharge: Other (Comment) (Low BP at times)    Assessment:     Mark Church is a 72 y.o. male with a medical diagnosis of sepsis.  He presents with decreased willingness to get OOB again after staying OOB most of the morning/for increased time today.  Performance deficits affecting function are weakness, impaired endurance, impaired functional mobility, gait instability, impaired balance, decreased lower extremity function, decreased ROM, impaired self-care skills, impaired skin, and edema.     Rehab Prognosis:  Good; patient would benefit from acute skilled OT services to address these deficits and reach maximum level of function.       Plan:     Patient to be seen 3 x/week to address the above listed problems via self-care/home management, therapeutic activities, and therapeutic exercises.  Plan of Care Expires: 11/10/22  Plan of Care Reviewed with: patient    Subjective     Pain/Comfort:  Pain Rating 1: 0/10  Pain Rating Post-Intervention 1: 0/10    Objective:     Communicated with: Nursing and the assigned PT prior to session.  Patient found in bed in supine with HOB elevated with IV to LUE, not connected, and pillow under LLE with LLE wrapped in Coban upon OT entry to room.    General Precautions: Standard and fall precautions apply.  (Patient with orthostatic hypotension.)   Orthopedic Precautions: LLE weight bearing as tolerated   Braces: N/A  Respiratory Status: Room air     Occupational Performance:     Bed Mobility:    Supervision to Mod I to push self upright in bed (toward HOB)      Functional Mobility/Transfers:  Transfers: N/A for today's session   Functional Mobility: N/A for today's session     Activities of Daily Living:  N/A for today's session        [facility] 6 Click ADL: 19    Treatment & Education:  Reviewed UE HEP (Theraband program) with patient.  Patient demonstrated improved knowledge and technique of all exercises other than the bicep curl; patient could not remember/perform this exercise without verbal and visual prompts.  Patient spent many hours OOB today and declined further therapy or getting OOB again.  Reviewed D/C planning with patient.     Patient left in bed in supine with HOB elevated with call button within his reach, line(s) intact as previously stated, and pillow supporting wrapped LLE.     GOALS:   Multidisciplinary Problems       Occupational Therapy Goals          Problem: Occupational Therapy    Goal Priority Disciplines Outcome Interventions   Occupational Therapy Goal     OT, PT/OT Ongoing, Progressing    Description: Goals to be met by: 10/31/2022     Patient will increase functional independence with ADL's by performing:    UE Dressing with Modified [place].  LE Dressing with Supervision.  Grooming while standing with Supervision.  Toileting from bedside commode with Supervision for hygiene and clothing management.   Step transfer with Supervision.  Toilet transfer to bedside commode with Supervision.  Upper extremity exercise program x 10 reps per handout, with Claiborne.                         Time Tracking:     OT Date of Treatment: 10/17/22  OT Start Time: 1603  OT Stop Time: 1612  OT Total Time (min): 9 min    Billable Minutes:Therapeutic Exercise 9 mins    OT/JOSELO: OT          10/17/2022

## 2022-10-17 NOTE — PLAN OF CARE
Problem: Physical Therapy  Goal: Physical Therapy Goal  Description: Goals to be met by: 10/31/22     Patient will increase functional independence with mobility by performin. Pt to be mod I with bed mobility.  2. Pt to transfer with mod I.  3. Pt to ambulate >150' w/RW and mod I.  4. Pt to be (I) with written HEP.  5. Pt to ascend/descend ~6 MINDY with R HR and mod I.    Outcome: Ongoing, Progressing     Pt ambulated ~120 ft with CGA using RW.

## 2022-10-18 PROBLEM — I95.9 HYPOTENSION: Status: ACTIVE | Noted: 2022-10-18

## 2022-10-18 LAB
ANION GAP SERPL CALC-SCNC: 10 MMOL/L (ref 8–16)
BSA FOR ECHO PROCEDURE: 1.67 M2
BUN SERPL-MCNC: 14 MG/DL (ref 8–23)
CALCIUM SERPL-MCNC: 9.3 MG/DL (ref 8.7–10.5)
CHLORIDE SERPL-SCNC: 98 MMOL/L (ref 95–110)
CO2 SERPL-SCNC: 28 MMOL/L (ref 23–29)
CREAT SERPL-MCNC: 0.6 MG/DL (ref 0.5–1.4)
CV ECHO LV RWT: 0.46 CM
DOP CALC LVOT AREA: 3 CM2
DOP CALC LVOT DIAMETER: 1.97 CM
ECHO LV POSTERIOR WALL: 1 CM (ref 0.6–1.1)
EJECTION FRACTION: 55 %
EST. GFR  (NO RACE VARIABLE): >60 ML/MIN/1.73 M^2
FRACTIONAL SHORTENING: 23 % (ref 28–44)
GLUCOSE SERPL-MCNC: 110 MG/DL (ref 70–110)
INTERVENTRICULAR SEPTUM: 0.96 CM (ref 0.6–1.1)
IVC DIAMETER: 1.18 CM
LA MAJOR: 4.13 CM
LEFT ATRIUM SIZE: 3.56 CM
LEFT INTERNAL DIMENSION IN SYSTOLE: 3.35 CM (ref 2.1–4)
LEFT VENTRICLE DIASTOLIC VOLUME INDEX: 48.96 ML/M2
LEFT VENTRICLE DIASTOLIC VOLUME: 84.21 ML
LEFT VENTRICLE MASS INDEX: 81 G/M2
LEFT VENTRICLE SYSTOLIC VOLUME INDEX: 26.7 ML/M2
LEFT VENTRICLE SYSTOLIC VOLUME: 45.91 ML
LEFT VENTRICULAR INTERNAL DIMENSION IN DIASTOLE: 4.33 CM (ref 3.5–6)
LEFT VENTRICULAR MASS: 140.11 G
MAGNESIUM SERPL-MCNC: 1.5 MG/DL (ref 1.6–2.6)
PISA TR MAX VEL: 2.1 M/S
POTASSIUM SERPL-SCNC: 3.4 MMOL/L (ref 3.5–5.1)
PV PEAK VELOCITY: 0.88 CM/S
RA MAJOR: 4.08 CM
RA PRESSURE: 3 MMHG
RA WIDTH: 3 CM
RIGHT VENTRICULAR END-DIASTOLIC DIMENSION: 3.38 CM
SODIUM SERPL-SCNC: 136 MMOL/L (ref 136–145)
TDI LATERAL: 0.1 M/S
TDI SEPTAL: 0.05 M/S
TDI: 0.08 M/S
TR MAX PG: 18 MMHG
TRICUSPID ANNULAR PLANE SYSTOLIC EXCURSION: 1.8 CM
TV REST PULMONARY ARTERY PRESSURE: 21 MMHG

## 2022-10-18 PROCEDURE — 80048 BASIC METABOLIC PNL TOTAL CA: CPT | Performed by: STUDENT IN AN ORGANIZED HEALTH CARE EDUCATION/TRAINING PROGRAM

## 2022-10-18 PROCEDURE — 63600175 PHARM REV CODE 636 W HCPCS: Performed by: STUDENT IN AN ORGANIZED HEALTH CARE EDUCATION/TRAINING PROGRAM

## 2022-10-18 PROCEDURE — 25000003 PHARM REV CODE 250: Performed by: STUDENT IN AN ORGANIZED HEALTH CARE EDUCATION/TRAINING PROGRAM

## 2022-10-18 PROCEDURE — 25000003 PHARM REV CODE 250: Performed by: ORTHOPAEDIC SURGERY

## 2022-10-18 PROCEDURE — 11000001 HC ACUTE MED/SURG PRIVATE ROOM

## 2022-10-18 PROCEDURE — 36415 COLL VENOUS BLD VENIPUNCTURE: CPT | Performed by: STUDENT IN AN ORGANIZED HEALTH CARE EDUCATION/TRAINING PROGRAM

## 2022-10-18 PROCEDURE — 83735 ASSAY OF MAGNESIUM: CPT | Performed by: STUDENT IN AN ORGANIZED HEALTH CARE EDUCATION/TRAINING PROGRAM

## 2022-10-18 PROCEDURE — 63600175 PHARM REV CODE 636 W HCPCS: Performed by: ORTHOPAEDIC SURGERY

## 2022-10-18 RX ORDER — MAGNESIUM SULFATE HEPTAHYDRATE 40 MG/ML
2 INJECTION, SOLUTION INTRAVENOUS ONCE
Status: COMPLETED | OUTPATIENT
Start: 2022-10-18 | End: 2022-10-18

## 2022-10-18 RX ORDER — POTASSIUM CHLORIDE 20 MEQ/1
40 TABLET, EXTENDED RELEASE ORAL ONCE
Status: COMPLETED | OUTPATIENT
Start: 2022-10-18 | End: 2022-10-18

## 2022-10-18 RX ADMIN — LACTOBACILLUS ACIDOPHILUS / LACTOBACILLUS BULGARICUS 1 EACH: 100 MILLION CFU STRENGTH GRANULES at 10:10

## 2022-10-18 RX ADMIN — AMOXICILLIN AND CLAVULANATE POTASSIUM 1 TABLET: 875; 125 TABLET, FILM COATED ORAL at 10:10

## 2022-10-18 RX ADMIN — OXYBUTYNIN CHLORIDE 5 MG: 5 TABLET ORAL at 08:10

## 2022-10-18 RX ADMIN — MAGNESIUM SULFATE HEPTAHYDRATE 2 G: 40 INJECTION, SOLUTION INTRAVENOUS at 11:10

## 2022-10-18 RX ADMIN — POLYETHYLENE GLYCOL 3350 17 G: 17 POWDER, FOR SOLUTION ORAL at 10:10

## 2022-10-18 RX ADMIN — CETIRIZINE HYDROCHLORIDE 5 MG: 5 TABLET ORAL at 08:10

## 2022-10-18 RX ADMIN — SODIUM CHLORIDE 500 ML: 0.9 INJECTION, SOLUTION INTRAVENOUS at 11:10

## 2022-10-18 RX ADMIN — Medication 400 MG: at 08:10

## 2022-10-18 RX ADMIN — SENNOSIDES 8.6 MG: 8.6 TABLET, FILM COATED ORAL at 08:10

## 2022-10-18 RX ADMIN — AZELASTINE 137 MCG: 1 SPRAY, METERED NASAL at 10:10

## 2022-10-18 RX ADMIN — ALLOPURINOL 100 MG: 100 TABLET ORAL at 10:10

## 2022-10-18 RX ADMIN — AZELASTINE 137 MCG: 1 SPRAY, METERED NASAL at 08:10

## 2022-10-18 RX ADMIN — OXYBUTYNIN CHLORIDE 5 MG: 5 TABLET ORAL at 10:10

## 2022-10-18 RX ADMIN — ENOXAPARIN SODIUM 40 MG: 100 INJECTION SUBCUTANEOUS at 04:10

## 2022-10-18 RX ADMIN — POTASSIUM CHLORIDE 40 MEQ: 1500 TABLET, EXTENDED RELEASE ORAL at 10:10

## 2022-10-18 RX ADMIN — Medication 400 MG: at 10:10

## 2022-10-18 RX ADMIN — POTASSIUM CHLORIDE 10 MEQ: 750 TABLET, EXTENDED RELEASE ORAL at 10:10

## 2022-10-18 RX ADMIN — TIMOLOL MALEATE 1 DROP: 5 SOLUTION/ DROPS OPHTHALMIC at 10:10

## 2022-10-18 RX ADMIN — TIMOLOL MALEATE 1 DROP: 5 SOLUTION/ DROPS OPHTHALMIC at 08:10

## 2022-10-18 RX ADMIN — LACTOBACILLUS ACIDOPHILUS / LACTOBACILLUS BULGARICUS 1 EACH: 100 MILLION CFU STRENGTH GRANULES at 08:10

## 2022-10-18 RX ADMIN — AMOXICILLIN AND CLAVULANATE POTASSIUM 1 TABLET: 875; 125 TABLET, FILM COATED ORAL at 08:10

## 2022-10-18 RX ADMIN — SENNOSIDES 8.6 MG: 8.6 TABLET, FILM COATED ORAL at 10:10

## 2022-10-18 RX ADMIN — LATANOPROST 1 DROP: 50 SOLUTION OPHTHALMIC at 08:10

## 2022-10-18 NOTE — PLAN OF CARE
Chart check complete, pt AAOx4, able to verbalize needs.  IV flushed and saline locked.  Pt able to transfer to the commode with assistance, x1.  Pt denies pain.  Diet tolerated well, pt denies N/V.  BP managed with medications.  No acute distress noted, pt free from falls or injury this shift.  Bed in low position, wheels locked, bed alarm on, call light in reach for assistance, will continue to monitor.       Problem: Adult Inpatient Plan of Care  Goal: Plan of Care Review  Outcome: Ongoing, Progressing     Problem: Adult Inpatient Plan of Care  Goal: Patient-Specific Goal (Individualized)  Outcome: Ongoing, Progressing     Problem: Adult Inpatient Plan of Care  Goal: Absence of Hospital-Acquired Illness or Injury  Outcome: Ongoing, Progressing     Problem: Adult Inpatient Plan of Care  Goal: Optimal Comfort and Wellbeing  Outcome: Ongoing, Progressing     Problem: Adjustment to Illness (Sepsis/Septic Shock)  Goal: Optimal Coping  Outcome: Ongoing, Progressing     Problem: Glycemic Control Impaired (Sepsis/Septic Shock)  Goal: Blood Glucose Level Within Desired Range  Outcome: Ongoing, Progressing     Problem: Impaired Wound Healing  Goal: Optimal Wound Healing  Outcome: Ongoing, Progressing     Problem: Pain Acute  Goal: Acceptable Pain Control and Functional Ability  Outcome: Ongoing, Progressing     Problem: Mobility Impairment  Goal: Optimal Mobility  Outcome: Ongoing, Progressing

## 2022-10-18 NOTE — ASSESSMENT & PLAN NOTE
Hypokalemic on admission, repleted w/ IV and po K  K and Mg low on recheck this am    - resume home dose of 10 meq KCl daily  -replete with iv mg and kcl po today

## 2022-10-18 NOTE — PROGRESS NOTES
"[facility]  Progress Note    Patient Name: Mark Church  MRN: [id number]  Patient Class: IP- Inpatient   Admission Date: 10/2/2022  Length of Stay: 16 days  Attending Physician: Mauricio Torres III, MD  Primary Care Provider: [facility]         Subjective:     Principal Problem:Sepsis        HPI:  Mr. 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  He states this all started about 4 days ago when he was walking in a field. He states he felt some significant itching in his lower leg.  Later that night his leg started to swell and hurt.  He assumed this was caused by an insect bite on his leg, but he never saw a specific bite or insect.  He states that the 2nd night the swelling and pain became severe and he was having fevers and chills which prompted him to visit his primary care provider the next day.  He was prescribed an antibiotic, not sure which one, and despite taking this his leg has continued to get worse.  He notes he has been having fevers and chills and last night he "slid or fell" out of his chair when he was trying to get up because the pain was so bad.  He notes he uses a walker to ambulate at home.  He also notes a chronic cough ascociated wiith allergic rhinitis and had an episode of nausea and vomiting one week ago.  He reports diminished oral intake due to decreased appetite, but no nausea or vomiting since this all started 4 days ago.  He denies chest pain, shortness of breath, headache, palpitations, dysuria and diarrhea.      Overview/Hospital Course:  Admitted with sepsis secondary to left lower extremity cellulitis. Started on IV antibiotics, IVF and potassium replacement (has Bartter syndrome). Surgery consulted for concern for nec fasc/bone involvement. CT did not show any evidence of abscess/gas. Seems to be improving though still " "significant swelling and blistering. ID consulted for antibiotic recommendations. Repeat CT ordered for worsening white count which did not show a clear abscess. Patient still with poor improvement, he underwent I&D with Orthopedic surgery and however no abscess was found but he did have "murky fluid" in tissue planes which were irrigated. He has been doing well since, WBC resolved and pain improving. PT/OT has been consulted and patient has been having difficulty participating due to orthostatic hypotension. Fluid ws given and low dose midodrine added which seems to have improved some but still limiting patient.        Interval History: Pt states he is feeling good and was able to get up to the bathroom by himself overnight. Pt noted to be tachycardic this morining and with low mag and k on labs. Pt denies cp, sob, fever, chills, n/v. Pt does not palpitations especially with standing.        Review of Systems  Objective:     Vital Signs (Most Recent):  Temp: 99.8 °F (37.7 °C) (10/18/22 0717)  Pulse: (!) 121 (10/18/22 0717)  Resp: 19 (10/18/22 0717)  BP: 136/70 (10/18/22 0717)  SpO2: 99 % (10/18/22 0717)   Vital Signs (24h Range):  Temp:  [97.5 °F (36.4 °C)-99.8 °F (37.7 °C)] 99.8 °F (37.7 °C)  Pulse:  [] 121  Resp:  [18-19] 19  SpO2:  [96 %-99 %] 99 %  BP: (115-136)/(58-70) 136/70     Weight: 61.6 kg (135 lb 12.9 oz)  Body mass index is 21.27 kg/m².    Intake/Output Summary (Last 24 hours) at 10/18/2022 0907  Last data filed at 10/18/2022 0830  Gross per 24 hour   Intake 1754 ml   Output --   Net 1754 ml      Physical Exam  Vitals reviewed.   Constitutional:       Appearance: He is not ill-appearing.   HENT:      Head: Normocephalic and atraumatic.      Nose: No congestion or rhinorrhea.      Mouth/Throat:      Mouth: Mucous membranes are moist.      Pharynx: Oropharynx is clear.   Eyes:      General: No scleral icterus.     Extraocular Movements: Extraocular movements intact.   Cardiovascular:      Rate and " "Rhythm: Regular rhythm. Tachycardia present.   Pulmonary:      Effort: Pulmonary effort is normal. No respiratory distress.      Breath sounds: Normal breath sounds.   Abdominal:      Palpations: Abdomen is soft.      Tenderness: There is no abdominal tenderness.   Musculoskeletal:      Cervical back: Neck supple.   Skin:     General: Skin is warm and dry.   Neurological:      General: No focal deficit present.      Mental Status: He is alert and oriented to person, place, and time.   Psychiatric:         Mood and Affect: Mood normal.         Behavior: Behavior normal.       Significant Labs: All pertinent labs within the past 24 hours have been reviewed.    Significant Imaging: I have reviewed all pertinent imaging results/findings within the past 24 hours.      Assessment/Plan:      * Sepsis  Cellulitis, likely point of bacterial entry medial ankle. WBC uptrending despite appropriate antibiotic therapy. Repeat CT scan with possible early abscess formation on read, but surgery does not feel it is amenable to intervention. Ortho consulted by GS for second opinion, pt went to OR for I&D but no abscess found on 10/9  - appreciate ID, general surgery, orthopedics involvement  - on cefepime/vancomycin/flagyl  - abnormal arterial US noted, but WILLIE normal  - elevate ankle>knee>hip to facilitate drainage  - WbC trending down, cultures growing Strep Pyogenes - transitioned to PO Augmentin x2 weeks from washout (tentative end date 10/23)  - Overall improving from this standpoint but safety and weakness are issues and barriers to discharge        Hypotension  Pt having issues with bp over the past couple of days despite ivf hydration. Was started on midodrine at low dose. Pt with some tachycardia this am. We will hold midodrine and monitor bp today as well as getting orthostatic vs and echo.       Debility  -At home ambulates with a walker  -Notes he "Fell or slid" out of a chair on night prior to admit  PT/OT evaluation " recommending home health, but this will hold up d/c as pt lives up 3 flights of steps and is unable to walk them currently.    Hypokalemia  See bartter syndrome      Cellulitis  See sepsis      Cellulitis of left lower extremity  -Treatment as above.    Chronic cough  -Presumed due to allergic rhinitis  -Continue home anti-histamines  -Add tessalon PRN    Chronic gout    -Continue home allopurinol.    Hyponatremia  -On admit mild and likely due to diminished oral intake and mild dehydration  -stable    Normocytic anemia  -Hb 12.1 on admit  -No bleeding  -Check iron, ferritin, b12 and folate - appears more ACD at this time with elevated ferritin      Primary open angle glaucoma of both eyes, severe stage  -History noted  -Continue home timolol and lantanoprost drops    Bartter syndrome  Hypokalemic on admission, repleted w/ IV and po K  K and Mg low on recheck this am    - resume home dose of 10 meq KCl daily  -replete with iv mg and kcl po today      H/O prostate cancer  -history noted  -Continue home oxybutynin      VTE Risk Mitigation (From admission, onward)           Ordered     enoxaparin injection 40 mg  Daily         10/02/22 1416     IP VTE HIGH RISK PATIENT  Once         10/02/22 1416     Place sequential compression device  Until discontinued         10/02/22 1416                    Discharge Planning   ROS: 10/7/2022     Code Status: Full Code   Is the patient medically ready for discharge?:     Reason for patient still in hospital (select all that apply): Treatment  Discharge Plan A: Home Health, Home with family   Discharge Delays: None known at this time              Mauricio Torres III, MD  Department of Hospital Medicine   [facility]

## 2022-10-18 NOTE — ASSESSMENT & PLAN NOTE
-Hb 12.1 on admit  -No bleeding  -Check iron, ferritin, b12 and folate - appears more ACD at this time with elevated ferritin

## 2022-10-18 NOTE — SUBJECTIVE & OBJECTIVE
Interval History: Pt states he is feeling good and was able to get up to the bathroom by himself overnight. Pt noted to be tachycardic this morining and with low mag and k on labs. Pt denies cp, sob, fever, chills, n/v. Pt does not palpitations especially with standing.        Review of Systems  Objective:     Vital Signs (Most Recent):  Temp: 99.8 °F (37.7 °C) (10/18/22 0717)  Pulse: (!) 121 (10/18/22 0717)  Resp: 19 (10/18/22 0717)  BP: 136/70 (10/18/22 0717)  SpO2: 99 % (10/18/22 0717)   Vital Signs (24h Range):  Temp:  [97.5 °F (36.4 °C)-99.8 °F (37.7 °C)] 99.8 °F (37.7 °C)  Pulse:  [] 121  Resp:  [18-19] 19  SpO2:  [96 %-99 %] 99 %  BP: (115-136)/(58-70) 136/70     Weight: 61.6 kg (135 lb 12.9 oz)  Body mass index is 21.27 kg/m².    Intake/Output Summary (Last 24 hours) at 10/18/2022 0907  Last data filed at 10/18/2022 0830  Gross per 24 hour   Intake 1754 ml   Output --   Net 1754 ml      Physical Exam  Vitals reviewed.   Constitutional:       Appearance: He is not ill-appearing.   HENT:      Head: Normocephalic and atraumatic.      Nose: No congestion or rhinorrhea.      Mouth/Throat:      Mouth: Mucous membranes are moist.      Pharynx: Oropharynx is clear.   Eyes:      General: No scleral icterus.     Extraocular Movements: Extraocular movements intact.   Cardiovascular:      Rate and Rhythm: Regular rhythm. Tachycardia present.   Pulmonary:      Effort: Pulmonary effort is normal. No respiratory distress.      Breath sounds: Normal breath sounds.   Abdominal:      Palpations: Abdomen is soft.      Tenderness: There is no abdominal tenderness.   Musculoskeletal:      Cervical back: Neck supple.   Skin:     General: Skin is warm and dry.   Neurological:      General: No focal deficit present.      Mental Status: He is alert and oriented to person, place, and time.   Psychiatric:         Mood and Affect: Mood normal.         Behavior: Behavior normal.       Significant Labs: All pertinent labs within  the past 24 hours have been reviewed.    Significant Imaging: I have reviewed all pertinent imaging results/findings within the past 24 hours.

## 2022-10-18 NOTE — NURSING
Pt's wife expressed concerns about pt making bizarre and paranoid statements. This writer has personally NOT witnessed these, but apparently they happen more at night when he calls her. Hasn't been reported to this writer.  She has a lot of concerns regarding his discharge, Contacted  Rama to come talk to her as well. She said no hx of dementia, previous time in hospital pt became altered mentally due to steroids. MD and CM made aware. Pt has been aaox4 and appropriate during the day, does not appear paranoid and hasn't verbalized any delusional statements.

## 2022-10-18 NOTE — NURSING
"Pt's sister left pt's room and reported pt was not acting like himself, he appeared confused and did not recognize her. Neuro assessment done, equal boubacar strength x4, no facial drooping, able to perform finger to nose exam, no neuro changes observed. Pt able to answer most questions appropriately. He does tend to ruminate on situation, and at times, does appear mildly confused and paranoid. He stated "I told my sister that she cant go to work. She's gonna wake me up in the middle of the night and slam that door. I told her to stay home because people out there are bad". MD was alerted earlier of family reporting pt was making bizarre statements.   "

## 2022-10-18 NOTE — ASSESSMENT & PLAN NOTE
Pt having issues with bp over the past couple of days despite ivf hydration. Was started on midodrine at low dose. Pt with some tachycardia this am. We will hold midodrine and monitor bp today as well as getting orthostatic vs. Possible d/c in am if tachycardia resolves.

## 2022-10-18 NOTE — PLAN OF CARE
Received message from patient's nurse, Nolan regarding pt and spouse discharge concerns. Spoke with pt and spouse at bedside.     Pt's spouse expressed concerns about patient not having a hospital bed, stated pt's bedroom is upstairs. TN informed pt's spouse pt does not have qualifying dx for bed. Further pt's spouse stated she is unable to be pt's help at home due to a shoulder injury of her ow. TN offered additional private paid care giver resources, pt's wife declined resources and stated unable to afford out of pocket expense.     TN offered NH placement as an option if she felt unable to care for pt at home, both pt and spouse declined NH placement at this time.      Reviewed with pt and spouse for dc plan for home health, verbalized understanding and acceptance.

## 2022-10-18 NOTE — PLAN OF CARE
Problem: Infection Progression (Sepsis/Septic Shock)  Goal: Absence of Infection Signs and Symptoms  Outcome: Ongoing, Progressing     Problem: Nutrition Impaired (Sepsis/Septic Shock)  Goal: Optimal Nutrition Intake  Outcome: Ongoing, Progressing     Problem: Impaired Wound Healing  Goal: Optimal Wound Healing  Outcome: Ongoing, Progressing     Problem: Fall Injury Risk  Goal: Absence of Fall and Fall-Related Injury  Outcome: Ongoing, Progressing     Problem: Mobility Impairment  Goal: Optimal Mobility  Outcome: Ongoing, Progressing

## 2022-10-18 NOTE — PROGRESS NOTES
MEWS Monitoring: Chart check completed, abnormal VS noted, bedside RNNolan contacted, no concerns verbalized at this time, instructed to call [phone] for further concerns or assistance..    Rec tele monitor

## 2022-10-19 PROBLEM — G93.40 ACUTE ENCEPHALOPATHY: Status: ACTIVE | Noted: 2022-10-19

## 2022-10-19 LAB
ANION GAP SERPL CALC-SCNC: 8 MMOL/L (ref 8–16)
BUN SERPL-MCNC: 16 MG/DL (ref 8–23)
CALCIUM SERPL-MCNC: 9.2 MG/DL (ref 8.7–10.5)
CHLORIDE SERPL-SCNC: 100 MMOL/L (ref 95–110)
CO2 SERPL-SCNC: 28 MMOL/L (ref 23–29)
CREAT SERPL-MCNC: 0.7 MG/DL (ref 0.5–1.4)
EST. GFR  (NO RACE VARIABLE): >60 ML/MIN/1.73 M^2
GLUCOSE SERPL-MCNC: 140 MG/DL (ref 70–110)
MAGNESIUM SERPL-MCNC: 1.6 MG/DL (ref 1.6–2.6)
POCT GLUCOSE: 124 MG/DL (ref 70–110)
POTASSIUM SERPL-SCNC: 3.7 MMOL/L (ref 3.5–5.1)
SODIUM SERPL-SCNC: 136 MMOL/L (ref 136–145)

## 2022-10-19 PROCEDURE — 97110 THERAPEUTIC EXERCISES: CPT | Mod: CQ

## 2022-10-19 PROCEDURE — 11000001 HC ACUTE MED/SURG PRIVATE ROOM

## 2022-10-19 PROCEDURE — 36415 COLL VENOUS BLD VENIPUNCTURE: CPT | Performed by: STUDENT IN AN ORGANIZED HEALTH CARE EDUCATION/TRAINING PROGRAM

## 2022-10-19 PROCEDURE — 63600175 PHARM REV CODE 636 W HCPCS: Performed by: STUDENT IN AN ORGANIZED HEALTH CARE EDUCATION/TRAINING PROGRAM

## 2022-10-19 PROCEDURE — 83735 ASSAY OF MAGNESIUM: CPT | Performed by: STUDENT IN AN ORGANIZED HEALTH CARE EDUCATION/TRAINING PROGRAM

## 2022-10-19 PROCEDURE — 97116 GAIT TRAINING THERAPY: CPT | Mod: CQ

## 2022-10-19 PROCEDURE — 25000003 PHARM REV CODE 250: Performed by: ORTHOPAEDIC SURGERY

## 2022-10-19 PROCEDURE — 80048 BASIC METABOLIC PNL TOTAL CA: CPT | Performed by: STUDENT IN AN ORGANIZED HEALTH CARE EDUCATION/TRAINING PROGRAM

## 2022-10-19 PROCEDURE — 63600175 PHARM REV CODE 636 W HCPCS: Performed by: ORTHOPAEDIC SURGERY

## 2022-10-19 PROCEDURE — 25000003 PHARM REV CODE 250: Performed by: STUDENT IN AN ORGANIZED HEALTH CARE EDUCATION/TRAINING PROGRAM

## 2022-10-19 RX ORDER — OLANZAPINE 5 MG/1
10 TABLET, ORALLY DISINTEGRATING ORAL DAILY PRN
Status: DISCONTINUED | OUTPATIENT
Start: 2022-10-19 | End: 2022-10-21 | Stop reason: HOSPADM

## 2022-10-19 RX ORDER — MAGNESIUM SULFATE HEPTAHYDRATE 40 MG/ML
2 INJECTION, SOLUTION INTRAVENOUS ONCE
Status: COMPLETED | OUTPATIENT
Start: 2022-10-19 | End: 2022-10-19

## 2022-10-19 RX ADMIN — POTASSIUM CHLORIDE 10 MEQ: 750 TABLET, EXTENDED RELEASE ORAL at 08:10

## 2022-10-19 RX ADMIN — ALLOPURINOL 100 MG: 100 TABLET ORAL at 08:10

## 2022-10-19 RX ADMIN — AMOXICILLIN AND CLAVULANATE POTASSIUM 1 TABLET: 875; 125 TABLET, FILM COATED ORAL at 09:10

## 2022-10-19 RX ADMIN — Medication 400 MG: at 08:10

## 2022-10-19 RX ADMIN — ASPIRIN 81 MG CHEWABLE TABLET 81 MG: 81 TABLET CHEWABLE at 08:10

## 2022-10-19 RX ADMIN — ENOXAPARIN SODIUM 40 MG: 100 INJECTION SUBCUTANEOUS at 04:10

## 2022-10-19 RX ADMIN — LACTOBACILLUS ACIDOPHILUS / LACTOBACILLUS BULGARICUS 1 EACH: 100 MILLION CFU STRENGTH GRANULES at 08:10

## 2022-10-19 RX ADMIN — TIMOLOL MALEATE 1 DROP: 5 SOLUTION/ DROPS OPHTHALMIC at 08:10

## 2022-10-19 RX ADMIN — AZELASTINE 137 MCG: 1 SPRAY, METERED NASAL at 08:10

## 2022-10-19 RX ADMIN — SENNOSIDES 8.6 MG: 8.6 TABLET, FILM COATED ORAL at 08:10

## 2022-10-19 RX ADMIN — MAGNESIUM SULFATE HEPTAHYDRATE 2 G: 40 INJECTION, SOLUTION INTRAVENOUS at 10:10

## 2022-10-19 RX ADMIN — OXYBUTYNIN CHLORIDE 5 MG: 5 TABLET ORAL at 08:10

## 2022-10-19 NOTE — PLAN OF CARE
Chart check complete, pt resting comfortably.  Pt acting bizarre at times, able to be reoriented as needed.  Pt often shouts out thinking that he is home.  IV flushed and saline locked,.  Dressing to foot intact.  Pt able to ambulate to the restroom and void with assist x1.  No acute distress noted, pt free from falls or injury this shift.  Bed in low position, wheels locked, bed alarm on, call light in reach for assistance, will continue to monitor.      Problem: Adult Inpatient Plan of Care  Goal: Plan of Care Review  Outcome: Ongoing, Progressing     Problem: Adult Inpatient Plan of Care  Goal: Patient-Specific Goal (Individualized)  Outcome: Ongoing, Progressing     Problem: Adult Inpatient Plan of Care  Goal: Absence of Hospital-Acquired Illness or Injury  Outcome: Ongoing, Progressing     Problem: Adult Inpatient Plan of Care  Goal: Optimal Comfort and Wellbeing  Outcome: Ongoing, Progressing     Problem: Bleeding (Sepsis/Septic Shock)  Goal: Absence of Bleeding  Outcome: Ongoing, Progressing     Problem: Nutrition Impaired (Sepsis/Septic Shock)  Goal: Optimal Nutrition Intake  Outcome: Ongoing, Progressing     Problem: Skin Injury Risk Increased  Goal: Skin Health and Integrity  Outcome: Ongoing, Progressing     Problem: Impaired Wound Healing  Goal: Optimal Wound Healing  Outcome: Ongoing, Progressing

## 2022-10-19 NOTE — PLAN OF CARE
Per physician, patient confused and plan for therapy re-eval, for home health vs snf if appropriate. TN to continue to follow for dc needs.    10/19/22 4991   Discharge Reassessment   Assessment Type Discharge Planning Reassessment   Did the patient's condition or plan change since previous assessment? Yes   Communicated ROS with patient/caregiver Date not available/Unable to determine   Discharge Plan A Home Health;Home with family   Discharge Plan B Other  (SNF if appropriate)   DME Needed Upon Discharge  bedside commode;bath bench   Discharge Barriers Identified None   Why the patient remains in the hospital Requires continued medical care   Post-Acute Status   Home Health Status Pending medical clearance/testing   Discharge Delays None known at this time

## 2022-10-19 NOTE — PROGRESS NOTES
[facility] - Med Surg  Adult Nutrition  Progress Note    SUMMARY       Recommendations    1) Continue Regular Diet as tolerated, encourage PO  2) Continue Boost (+) TID, Vel BID  3) Monitor Nutrition related labs, I/O's, Weights   - Please obtain updated weight - last taken 10/2/2022    Goals:   1) Patient to meet >/= 75% EEN/EPN via PO/ONS intake  2) Monitored labs to trend toward target ranges    Nutrition Goal Status: progressing towards goal  Communication of RD Recs:  (POC)    Assessment and Plan  Nutrition Problem  Increased Nutrient Energy Needs    Related to (etiology):   Infection    Signs and Symptoms (as evidenced by):   S/P I&D R-Leg     Interventions/Recommendations (treatment strategy):  Commercial Beverage  Modular Beverage  Collaboration of care with other providers    Nutrition Diagnosis Status:   Continues      Reason for Assessment    Reason For Assessment: RD-Follow up  Diagnosis:  (Sepsis)  Relevant Medical History:   Patient Active Problem List   Diagnosis    H/O prostate cancer    Bartter syndrome    Glaucoma    Urinary incontinence    Nuclear sclerosis, bilateral    Refractive error    Blind left eye    Chronic pain syndrome    Post-traumatic osteoarthritis of multiple joints    Bilateral leg edema    Primary osteoarthritis of left shoulder    Joint inflammation    Thrombocythemia    Left hip pain    Primary open angle glaucoma of both eyes, severe stage    Blindness of left eye with normal vision in contralateral eye    Sepsis    Cellulitis of left lower extremity    Normocytic anemia    Hyponatremia    Hypokalemia    Chronic gout    Chronic cough    Debility    Cellulitis    Hypotension     General Information Comments:     10/19 - Visited pt on 10/18, was TON at time of visit but spouse was in room. Spoke with her at that time about pt PO intake and appetite. She reports that his appetite has not been good - has not been eating food himself. Pt apparently had been ordering meal trays and  "waiting for her to come and providing them to her to eat. Stating that he did not want to eat the food because it was bad / thinks that "they are trying to take me out" with the bad food. Concerned about this change in altered mental status and behavior as well as not having appropriate assistance for care at home. Notified pt nurse of reports spouse had relayed to myself. Nurse stated that the two days he had pt - pt was drinking boost, was unsure of pt passing tray off to spouse. Nurse documented relayed information - Later same day pt sister reported that pt was exhibiting bizarre behavior as well - neuro assessment done. Will continue to monitor.    10/11 - Pt presented 2' swelling and pain in right leg after what he thought was an insect bite. Received abx from PCP with no improvements with taking medication. Reported decreased appetite and intake in the several days since initial "bite". PO intake has fluctuated, currently 25 - 50% on average. Has ONS ordered. Addition of modular to assist in wound healing as pt is POD#2 from I&D of R-leg, no abscess was found.    Nutrition Discharge Planning: Regular Diet - Low Purine as warranted (Currently taking allopurinol)    Nutrition Risk Screen    Nutrition Risk Screen: large or nonhealing wound, burn or pressure injury    Nutrition/Diet History    Spiritual, Cultural Beliefs, Anabaptist Practices, Values that Affect Care: yes  Food Allergies: NKFA  Factors Affecting Nutritional Intake: impaired cognitive status/motor control (Per spouse pt stating food is "no good" and he thinks that we (facility staff) are trying to take him out with it.)    Anthropometrics    Temp: 98.3 °F (36.8 °C)  Height Method: Stated  Height: 5' 7" (170.2 cm)  Height (inches): 67 in  Weight Method: Bed Scale  Weight: 61.6 kg (135 lb 12.9 oz)  Weight (lb): 135.8 lb  Ideal Body Weight (IBW), Male: 148 lb  % Ideal Body Weight, Male (lb): 87.84 %  % Ideal Body Weight Malnutrition: 80-90% - mild " deficit  BMI (Calculated): 21.3  BMI Grade: 18.5-24.9 - normal       Lab/Procedures/Meds    Pertinent Labs Reviewed: reviewed  CBC:  No results for input(s): WBC, HGB, HCT, PLT in the last 24 hours.    CMP:  Recent Labs   Lab 10/19/22  0942   CALCIUM 9.2      K 3.7   CO2 28      BUN 16   CREATININE 0.7     Pertinent Medications Reviewed: reviewed  Scheduled Meds:   allopurinoL  100 mg Oral Daily    amoxicillin-clavulanate 875-125mg  1 tablet Oral Q12H    aspirin  81 mg Oral Every other day    azelastine  1 spray Nasal BID    cetirizine  5 mg Oral QHS    enoxaparin  40 mg Subcutaneous Daily    lactobacillus acidophilus & bulgar  1 packet Oral BID    latanoprost  1 drop Both Eyes QHS    magnesium oxide  400 mg Oral BID    magnesium sulfate IVPB  2 g Intravenous Once    oxybutynin  5 mg Oral BID    polyethylene glycol  17 g Oral Daily    potassium chloride  10 mEq Oral Daily    senna  8.6 mg Oral BID    timolol maleate 0.5%  1 drop Both Eyes BID     Continuous Infusions:  PRN Meds:.acetaminophen, benzonatate, dextrose 10%, dextrose 10%, glucagon (human recombinant), glucose, glucose, loperamide, melatonin, ondansetron, simethicone, sodium chloride 0.9%    Physical Findings/Assessment  S/P Incision and drainage left leg medial x 2 sites and lateral ankle 10/9/22 per Dr. Snyder for left leg wound with necrosis     Estimated/Assessed Needs    Weight Used For Calorie Calculations: 61.6 kg (135 lb 12.9 oz)  Energy Calorie Requirements (kcal): 2156  Energy Need Method: Kcal/kg (35 per infection, wound)  Protein Requirements: 74 - 111g (1.2 - 1.8g/kg per increased needs of wound healing)  Weight Used For Protein Calculations: 61.6 kg (135 lb 12.9 oz)  Fluid Requirements (mL): 1 mL/kcal  Estimated Fluid Requirement Method:  (or per MD)  RDA Method (mL): 2156  CHO Requirement: 270g    Nutrition Prescription Ordered    Current Diet Order: Regular Diet  Oral Nutrition Supplement: Vel, Boost (+)    Evaluation of  Received Nutrient/Fluid Intake    I/O: - 2.2 L since admit  Energy Calories Required: not meeting needs  Protein Required: not meeting needs  Fluid Required: not meeting needs  Comments: LBM 10/17  % Intake of Estimated Energy Needs: 25 - 50 %  % Meal Intake: 25 - 50 %    Intake/Output - Last 3 Shifts         10/17 0700  10/18 0659 10/18 0700  10/19 0659 10/19 0700  10/20 0659    P.O. 1634 717 240    Total Intake(mL/kg) 1634 (26.5) 717 (11.6) 240 (3.9)    Urine (mL/kg/hr) 150 (0.1)      Stool 0      Total Output 150      Net +1484 +717 +240           Urine Occurrence 2 x 2 x     Stool Occurrence 1 x 1 x 0 x          Nutrition Risk    Level of Risk/Frequency of Follow-up:  (1-2x/week)     Monitor and Evaluation    Food and Nutrient Intake: energy intake, food and beverage intake  Food and Nutrient Adminstration: diet order  Knowledge/Beliefs/Attitudes: beliefs and attitudes  Physical Activity and Function: nutrition-related ADLs and IADLs  Anthropometric Measurements: weight, weight change  Biochemical Data, Medical Tests and Procedures: electrolyte and renal panel, gastrointestinal profile, glucose/endocrine profile, inflammatory profile, lipid profile  Nutrition-Focused Physical Findings: overall appearance     Nutrition Follow-Up    RD Follow-up?: Yes  Tricia Etienne, BS, RDN, LDN

## 2022-10-19 NOTE — ASSESSMENT & PLAN NOTE
Pt having issues with bp over the past couple of days despite ivf hydration. Was started on midodrine at low dose. Pt with some tachycardia this am. We will hold midodrine and monitor bp today as well as getting orthostatic vs.   -orthostatic vs positive and pt given ivf. Midodrine held with adequate bp currently

## 2022-10-19 NOTE — NURSING
"Patient became very aggressive (physically and verbally.He started throwing things out of the room. I went to see what was happening. He was getting up. I tried to help him back in bed. He started kicking me.He kicked my left arm and my right wrist. I called out for help as pt became more aggressive and throwing cardiac monitor and IV into the hallway. CNA and two RNs assisted placing pt in restraints and back into bed. Security called. Pt states "I'm going to come back here and slit y'alls throats." Pt also states, "Don't be scared of me, I just want to preserve y'all's bodies for the cookout in [place]."  Dr. Torres and charge nurse notified. Restraints order obtained.   "

## 2022-10-19 NOTE — SUBJECTIVE & OBJECTIVE
"Interval History: Pt slightly more confused and paranoid this morning stating he is "worried about his wife". Pt oriented to person and place, but does not appear to understand the situation surrounding why he is in the hospital. I discussed pts change in mentation with his wife who states he has gotten confused in the past when he was hospitalized. Pt denies cp, sob, fever, chills, n/v at this time    Review of Systems  Objective:     Vital Signs (Most Recent):  Temp: 98.3 °F (36.8 °C) (10/19/22 1048)  Pulse: 102 (10/19/22 1048)  Resp: 18 (10/19/22 1048)  BP: 126/77 (10/19/22 1048)  SpO2: 96 % (10/19/22 1048) Vital Signs (24h Range):  Temp:  [97.9 °F (36.6 °C)-99.2 °F (37.3 °C)] 98.3 °F (36.8 °C)  Pulse:  [100-121] 102  Resp:  [18-24] 18  SpO2:  [94 %-100 %] 96 %  BP: (126-140)/(62-77) 126/77     Weight: 61.6 kg (135 lb 12.9 oz)  Body mass index is 21.27 kg/m².    Intake/Output Summary (Last 24 hours) at 10/19/2022 1416  Last data filed at 10/19/2022 1215  Gross per 24 hour   Intake 600 ml   Output --   Net 600 ml      Physical Exam    Significant Labs: All pertinent labs within the past 24 hours have been reviewed.    Significant Imaging: I have reviewed all pertinent imaging results/findings within the past 24 hours.  "

## 2022-10-19 NOTE — PLAN OF CARE
Problem: Physical Therapy  Goal: Physical Therapy Goal  Description: Goals to be met by: 10/31/22     Patient will increase functional independence with mobility by performin. Pt to be mod I with bed mobility.  2. Pt to transfer with mod I.  3. Pt to ambulate >150' w/RW and mod I.  4. Pt to be (I) with written HEP.  5. Pt to ascend/descend ~6 MINDY with R HR and mod I.    Outcome: Ongoing, Progressing   Pt is progressing well towards goals but is very confused mentally.

## 2022-10-19 NOTE — ASSESSMENT & PLAN NOTE
"-At home ambulates with a walker  -Notes he "Fell or slid" out of a chair on night prior to admit  PT/OT evaluation recommending home health, Requesting re-evaluation as pt appears to weak to be able to ambulate well at home  "

## 2022-10-19 NOTE — ASSESSMENT & PLAN NOTE
Hypokalemic on admission, repleted w/ IV and po K  K and Mg low     - resume home dose of 10 meq KCl daily  -replete with iv mg and kcl po today

## 2022-10-19 NOTE — NURSING
Upon departure from floor to get a CT scan: cardiac monitor in progress, patient awake, and alert. No apparent distress noted at this time.

## 2022-10-19 NOTE — PROGRESS NOTES
"[facility] Medicine  Progress Note    Patient Name: Mark Church  MRN: [id number]  Patient Class: IP- Inpatient   Admission Date: 10/2/2022  Length of Stay: 17 days  Attending Physician: Mauricio Torres III, MD  Primary Care Provider: [facility]         Subjective:     Principal Problem:Sepsis        HPI:  Mr. 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  He states this all started about 4 days ago when he was walking in a field. He states he felt some significant itching in his lower leg.  Later that night his leg started to swell and hurt.  He assumed this was caused by an insect bite on his leg, but he never saw a specific bite or insect.  He states that the 2nd night the swelling and pain became severe and he was having fevers and chills which prompted him to visit his primary care provider the next day.  He was prescribed an antibiotic, not sure which one, and despite taking this his leg has continued to get worse.  He notes he has been having fevers and chills and last night he "slid or fell" out of his chair when he was trying to get up because the pain was so bad.  He notes he uses a walker to ambulate at home.  He also notes a chronic cough ascociated wiith allergic rhinitis and had an episode of nausea and vomiting one week ago.  He reports diminished oral intake due to decreased appetite, but no nausea or vomiting since this all started 4 days ago.  He denies chest pain, shortness of breath, headache, palpitations, dysuria and diarrhea.      Overview/Hospital Course:  Admitted with sepsis secondary to left lower extremity cellulitis. Started on IV antibiotics, IVF and potassium replacement (has Bartter syndrome). Surgery consulted for concern for nec fasc/bone involvement. CT did not show any evidence of abscess/gas. Seems to be improving though still " "significant swelling and blistering. ID consulted for antibiotic recommendations. Repeat CT ordered for worsening white count which did not show a clear abscess. Patient still with poor improvement, he underwent I&D with Orthopedic surgery and however no abscess was found but he did have "murky fluid" in tissue planes which were irrigated. He has been doing well since, WBC resolved and pain improving. PT/OT has been consulted and patient has been having difficulty participating due to orthostatic hypotension. Fluid ws given and low dose midodrine added which seems to have improved some but still limiting patient.        Interval History: Pt slightly more confused and paranoid this morning stating he is "worried about his wife". Pt oriented to person and place, but does not appear to understand the situation surrounding why he is in the hospital. I discussed pts change in mentation with his wife who states he has gotten confused in the past when he was hospitalized. Pt denies cp, sob, fever, chills, n/v at this time    Review of Systems  Objective:     Vital Signs (Most Recent):  Temp: 98.3 °F (36.8 °C) (10/19/22 1048)  Pulse: 102 (10/19/22 1048)  Resp: 18 (10/19/22 1048)  BP: 126/77 (10/19/22 1048)  SpO2: 96 % (10/19/22 1048) Vital Signs (24h Range):  Temp:  [97.9 °F (36.6 °C)-99.2 °F (37.3 °C)] 98.3 °F (36.8 °C)  Pulse:  [100-121] 102  Resp:  [18-24] 18  SpO2:  [94 %-100 %] 96 %  BP: (126-140)/(62-77) 126/77     Weight: 61.6 kg (135 lb 12.9 oz)  Body mass index is 21.27 kg/m².    Intake/Output Summary (Last 24 hours) at 10/19/2022 1416  Last data filed at 10/19/2022 1215  Gross per 24 hour   Intake 600 ml   Output --   Net 600 ml      Physical Exam    Significant Labs: All pertinent labs within the past 24 hours have been reviewed.    Significant Imaging: I have reviewed all pertinent imaging results/findings within the past 24 hours.      Assessment/Plan:      * Sepsis  Cellulitis, likely point of bacterial entry " "medial ankle. WBC uptrending despite appropriate antibiotic therapy. Repeat CT scan with possible early abscess formation on read, but surgery does not feel it is amenable to intervention. Ortho consulted by GS for second opinion, pt went to OR for I&D but no abscess found on 10/9  - appreciate ID, general surgery, orthopedics involvement  - on cefepime/vancomycin/flagyl  - abnormal arterial US noted, but WILLIE normal  - elevate ankle>knee>hip to facilitate drainage  - WbC trending down, cultures growing Strep Pyogenes - transitioned to PO Augmentin x2 weeks from washout (tentative end date 10/23)  - Overall improving from this standpoint but safety and weakness are issues and barriers to discharge        Acute encephalopathy  Pt with some confusion noted by nursing overnight with paranoid thoughts and tangential speech. On my interview continues to have issues as stated above in subjective. Pt without focal findings. Lab work up without causation. Likely related to hospital acquired delirium as he has had a similar reaction in hospitalization in the past. Discussed with pt wife.    -continue re-orientation  -encouraged family visitation  -ct head pending  -avoid sedating agents as possible.    Hypotension  Pt having issues with bp over the past couple of days despite ivf hydration. Was started on midodrine at low dose. Pt with some tachycardia this am. We will hold midodrine and monitor bp today as well as getting orthostatic vs.   -orthostatic vs positive and pt given ivf. Midodrine held with adequate bp currently      Debility  -At home ambulates with a walker  -Notes he "Fell or slid" out of a chair on night prior to admit  PT/OT evaluation recommending home health, Requesting re-evaluation as pt appears to weak to be able to ambulate well at home    Hypokalemia  See bartter syndrome      Cellulitis  See sepsis      Cellulitis of left lower extremity  -Treatment as above.    Chronic cough  -Presumed due to " allergic rhinitis  -Continue home anti-histamines  -Add tessalon PRN    Chronic gout    -Continue home allopurinol.    Hyponatremia  -On admit mild and likely due to diminished oral intake and mild dehydration  -stable    Normocytic anemia  -Hb 12.1 on admit  -No bleeding  -Check iron, ferritin, b12 and folate - appears more ACD at this time with elevated ferritin      Primary open angle glaucoma of both eyes, severe stage  -History noted  -Continue home timolol and lantanoprost drops    Bartter syndrome  Hypokalemic on admission, repleted w/ IV and po K  K and Mg low     - resume home dose of 10 meq KCl daily  -replete with iv mg and kcl po today      H/O prostate cancer  -history noted  -Continue home oxybutynin      VTE Risk Mitigation (From admission, onward)         Ordered     enoxaparin injection 40 mg  Daily         10/02/22 1416     IP VTE HIGH RISK PATIENT  Once         10/02/22 1416     Place sequential compression device  Until discontinued         10/02/22 1416                Discharge Planning   ROS: 10/7/2022     Code Status: Full Code   Is the patient medically ready for discharge?:     Reason for patient still in hospital (select all that apply): Treatment  Discharge Plan A: Home Health, Home with family   Discharge Delays: None known at this time              Mauricio Torres III, MD  Department of Hospital Medicine   [facility] Surg

## 2022-10-19 NOTE — PLAN OF CARE
Recommendations    1) Continue Regular Diet as tolerated, encourage PO  2) Continue Boost (+) TID, Vel BID  3) Monitor Nutrition related labs, I/O's, Weights   - Please obtain updated weight - last taken 10/2/2022    Goals:   1) Patient to meet >/= 75% EEN/EPN via PO/ONS intake  2) Monitored labs to trend toward target ranges    Nutrition Goal Status: progressing towards goal  Communication of RD Recs:  (POC)

## 2022-10-19 NOTE — NURSING
Upon arrival to floor from imaging: cardiac monitoring in progress, patient oriented to room, call bell in reach and bed in lowest position. Denies pain or discomfort at this time. No apparent distress noted.

## 2022-10-19 NOTE — PT/OT/SLP PROGRESS
"Physical Therapy Treatment    Patient Name:  Mark Church   MRN:  [id number]    Recommendations:     Discharge Recommendations:  home health PT   Discharge Equipment Recommendations: bedside commode, bath bench   Barriers to discharge:  pt cognitively impaired which makes him unsafe to transfer or amb indepently.    Assessment:     Mark Church is a 72 y.o. male admitted with a medical diagnosis of Sepsis.  He presents with the following impairments/functional limitations:  weakness, impaired endurance, impaired self care skills, gait instability, impaired cognition, decreased lower extremity function, impaired cardiopulmonary response to activity.  Pt able to follow commands but but with hallucinations.  Pt was talking about "flying to [employer] tomorrow with therapist on his side."  Rehab Prognosis: Good; patient would benefit from acute skilled PT services to address these deficits and reach maximum level of function.    Recent Surgery: Procedure(s) (LRB):  INCISION AND DRAINAGE, ANKLE (Left) 10 Days Post-Op    Plan:     During this hospitalization, patient to be seen 3 x/week to address the identified rehab impairments via gait training, therapeutic activities, therapeutic exercises and progress toward the following goals:    Plan of Care Expires:  10/31/22    Subjective     Chief Complaint: "I need you to help me get out of here."  Patient/Family Comments/goals: "you know they"re trying to keep me here."  Pain/Comfort:  Pain Rating 1: 0/10  Pain Rating Post-Intervention 1: 0/10      Objective:     Communicated with nurse prior to session.  Patient found HOB elevated with telemetry, peripheral IV, bed alarm upon PT entry to room.     General Precautions: Standard, fall (Orthostatic hypotension)   Orthopedic Precautions:LLE weight bearing as tolerated   Braces:    Respiratory Status: Room air     Functional Mobility:  Bed Mobility:     Rolling Left:  modified independence  Scooting: modified independence  Supine " to Sit: modified independence  Sit to Supine: modified independence  Transfers:     Sit to Stand:  supervision with rolling walker  Gait: 300 ft with rw with S   Balance: F+ dynamic standing      AM-PAC 6 CLICK MOBILITY  Turning over in bed (including adjusting bedclothes, sheets and blankets)?: 4  Sitting down on and standing up from a chair with arms (e.g., wheelchair, bedside commode, etc.): 4  Moving from lying on back to sitting on the side of the bed?: 4  Moving to and from a bed to a chair (including a wheelchair)?: 4  Need to walk in hospital room?: 4       Treatment & Education:  Lower Extremity Exercises.   Patient educated on the purpose of therapeutic exercise.    Patient verbalized acceptance/understanding of instructions, expectations, and limitations(for safety).  Patient performed: 2 sets of 10 reps (each) of B LE There Ex: AP, LAQ, Hip abd/add, Hip flexion while sitting up on EOB.       Patient required still requires verbal cues/tactile cues to ensure correct sequence, to maintain proper form, and to allow for self-correction.  Pt reported no complaints or problems with exercise activity.      Patient left HOB elevated with all lines intact, call button in reach, bed alarm on, and nurse notified..    GOALS:   Multidisciplinary Problems       Physical Therapy Goals          Problem: Physical Therapy    Goal Priority Disciplines Outcome Goal Variances Interventions   Physical Therapy Goal     PT, PT/OT Ongoing, Progressing     Description: Goals to be met by: 10/31/22     Patient will increase functional independence with mobility by performin. Pt to be mod I with bed mobility.  2. Pt to transfer with mod I.  3. Pt to ambulate >150' w/RW and mod I.  4. Pt to be (I) with written HEP.  5. Pt to ascend/descend ~6 MINDY with R HR and mod I.                         Time Tracking:     PT Received On: 10/19/22  PT Start Time: 1446     PT Stop Time: 1509  PT Total Time (min): 23 min     Billable  Minutes: Gait Training 8 and Therapeutic Exercise 15    Treatment Type: Treatment  PT/PTA: PTA     PTA Visit Number: 1     10/19/2022

## 2022-10-19 NOTE — CONSULTS
Thank you for your consult to [facility]. We have reviewed the patient chart. This patient does not meet criteria for [facility] service at this time due to Patient has a condition likely to benefit from bedside evaluation such as confusion and hypotension requiring midodrine.. Will not assume care of the patient at this time.

## 2022-10-19 NOTE — PROGRESS NOTES
Nursing recently changed dressing to left lower extremity wounds. Reports scant drainage.   Patient confused and talking about politics. Not answering questions. Rambling on about people in [facility].   Plan: Assess wound tomorrow.   Informed TN of concerns about patient's confusion and change in mental status since last week. TN, nursing and Hospitaliist aware of change in mental status.

## 2022-10-19 NOTE — PLAN OF CARE
Problem: Adult Inpatient Plan of Care  Goal: Plan of Care Review  Outcome: Ongoing, Progressing  Flowsheets (Taken 10/19/2022 1255)  Plan of Care Reviewed With:   patient   spouse  Goal: Patient-Specific Goal (Individualized)  Outcome: Ongoing, Progressing     Problem: Impaired Wound Healing  Goal: Optimal Wound Healing  Outcome: Ongoing, Progressing  Intervention: Promote Wound Healing  Flowsheets (Taken 10/19/2022 0815)  Oral Nutrition Promotion: physical activity promoted  Pain Management Interventions:   medication offered but refused   care clustered   quiet environment facilitated     Problem: Adult Inpatient Plan of Care  Goal: Plan of Care Review  Outcome: Ongoing, Progressing  Flowsheets (Taken 10/19/2022 1255)  Plan of Care Reviewed With:   patient   spouse     Problem: Adult Inpatient Plan of Care  Goal: Plan of Care Review  Outcome: Ongoing, Progressing  Flowsheets (Taken 10/19/2022 1255)  Plan of Care Reviewed With:   patient   spouse     Problem: Adult Inpatient Plan of Care  Goal: Patient-Specific Goal (Individualized)  Outcome: Ongoing, Progressing     Problem: Adult Inpatient Plan of Care  Goal: Patient-Specific Goal (Individualized)  Outcome: Ongoing, Progressing     Problem: Impaired Wound Healing  Goal: Optimal Wound Healing  Outcome: Ongoing, Progressing  Intervention: Promote Wound Healing  Flowsheets (Taken 10/19/2022 0815)  Oral Nutrition Promotion: physical activity promoted  Pain Management Interventions:   medication offered but refused   care clustered   quiet environment facilitated     Problem: Impaired Wound Healing  Goal: Optimal Wound Healing  Outcome: Ongoing, Progressing     Problem: Impaired Wound Healing  Goal: Optimal Wound Healing  Intervention: Promote Wound Healing  Flowsheets (Taken 10/19/2022 0815)  Oral Nutrition Promotion: physical activity promoted  Pain Management Interventions:   medication offered but refused   care clustered   quiet environment facilitated      Problem: Impaired Wound Healing  Goal: Optimal Wound Healing  Intervention: Promote Wound Healing  Flowsheets (Taken 10/19/2022 0415)  Oral Nutrition Promotion: physical activity promoted  Pain Management Interventions:   medication offered but refused   care clustered   quiet environment facilitated

## 2022-10-19 NOTE — ASSESSMENT & PLAN NOTE
Pt with some confusion noted by nursing overnight with paranoid thoughts and tangential speech. On my interview continues to have issues as stated above in subjective. Pt without focal findings. Lab work up without causation. Likely related to hospital acquired delirium as he has had a similar reaction in hospitalization in the past. Discussed with pt wife.    -continue re-orientation  -encouraged family visitation  -ct head pending  -avoid sedating agents as possible.

## 2022-10-19 NOTE — PT/OT/SLP PROGRESS
Occupational Therapy      Patient Name:  Mark Church   MRN:  [id number]    Attempted to treat patient at 10:30 AM.  Discussed patient's recent onset of AMS with MD/Rehab Director/nursing/care team.  Patient started to exhibit s/s of confusion last night.  Patient not seen today secondary to continuing to have AMS/confusion/varying levels of participation.  Patient initially stated that he was not Mr. Church, that that person was his brother.  Patient stated that his brother was a patient in the hospital but that he himself was not in any need of help.  Patient then apologized to the treating OT and said that he was confused and would work with therapy.  Immediately upon attempt to mobilize to EOB, patient c/o chest pain.  Patient said that his heart hurt and that he needed to rest in bed.  Patient's nurse, Monalisa, was notified of all of this.  Will follow-up tomorrow or as appropriate.     10/19/2022

## 2022-10-20 LAB
ALBUMIN SERPL BCP-MCNC: 2.6 G/DL (ref 3.5–5.2)
ALP SERPL-CCNC: 74 U/L (ref 55–135)
ALT SERPL W/O P-5'-P-CCNC: 30 U/L (ref 10–44)
AMMONIA PLAS-SCNC: 35 UMOL/L (ref 10–50)
ANION GAP SERPL CALC-SCNC: 10 MMOL/L (ref 8–16)
AST SERPL-CCNC: 29 U/L (ref 10–40)
BASOPHILS # BLD AUTO: 0.03 K/UL (ref 0–0.2)
BASOPHILS NFR BLD: 0.2 % (ref 0–1.9)
BILIRUB SERPL-MCNC: 0.7 MG/DL (ref 0.1–1)
BILIRUB UR QL STRIP: NEGATIVE
BUN SERPL-MCNC: 18 MG/DL (ref 8–23)
CALCIUM SERPL-MCNC: 9.3 MG/DL (ref 8.7–10.5)
CHLORIDE SERPL-SCNC: 100 MMOL/L (ref 95–110)
CLARITY UR: CLEAR
CO2 SERPL-SCNC: 30 MMOL/L (ref 23–29)
COLOR UR: YELLOW
CREAT SERPL-MCNC: 0.7 MG/DL (ref 0.5–1.4)
DIFFERENTIAL METHOD: ABNORMAL
EOSINOPHIL # BLD AUTO: 0.3 K/UL (ref 0–0.5)
EOSINOPHIL NFR BLD: 2.2 % (ref 0–8)
ERYTHROCYTE [DISTWIDTH] IN BLOOD BY AUTOMATED COUNT: 15.2 % (ref 11.5–14.5)
EST. GFR  (NO RACE VARIABLE): >60 ML/MIN/1.73 M^2
GLUCOSE SERPL-MCNC: 164 MG/DL (ref 70–110)
GLUCOSE UR QL STRIP: NEGATIVE
HCT VFR BLD AUTO: 35.5 % (ref 40–54)
HGB BLD-MCNC: 11.3 G/DL (ref 14–18)
HGB UR QL STRIP: NEGATIVE
IMM GRANULOCYTES # BLD AUTO: 0.1 K/UL (ref 0–0.04)
IMM GRANULOCYTES NFR BLD AUTO: 0.8 % (ref 0–0.5)
KETONES UR QL STRIP: NEGATIVE
LEUKOCYTE ESTERASE UR QL STRIP: NEGATIVE
LYMPHOCYTES # BLD AUTO: 1.6 K/UL (ref 1–4.8)
LYMPHOCYTES NFR BLD: 12.8 % (ref 18–48)
MAGNESIUM SERPL-MCNC: 1.6 MG/DL (ref 1.6–2.6)
MCH RBC QN AUTO: 27.6 PG (ref 27–31)
MCHC RBC AUTO-ENTMCNC: 31.8 G/DL (ref 32–36)
MCV RBC AUTO: 87 FL (ref 82–98)
MONOCYTES # BLD AUTO: 1.1 K/UL (ref 0.3–1)
MONOCYTES NFR BLD: 8.5 % (ref 4–15)
NEUTROPHILS # BLD AUTO: 9.7 K/UL (ref 1.8–7.7)
NEUTROPHILS NFR BLD: 75.5 % (ref 38–73)
NITRITE UR QL STRIP: NEGATIVE
NRBC BLD-RTO: 0 /100 WBC
PH UR STRIP: 7 [PH] (ref 5–8)
PLATELET # BLD AUTO: 719 K/UL (ref 150–450)
PMV BLD AUTO: 9.4 FL (ref 9.2–12.9)
POTASSIUM SERPL-SCNC: 3.2 MMOL/L (ref 3.5–5.1)
PROT SERPL-MCNC: 7.2 G/DL (ref 6–8.4)
PROT UR QL STRIP: ABNORMAL
RBC # BLD AUTO: 4.1 M/UL (ref 4.6–6.2)
SODIUM SERPL-SCNC: 140 MMOL/L (ref 136–145)
SP GR UR STRIP: 1.02 (ref 1–1.03)
URN SPEC COLLECT METH UR: ABNORMAL
UROBILINOGEN UR STRIP-ACNC: NEGATIVE EU/DL
WBC # BLD AUTO: 12.78 K/UL (ref 3.9–12.7)

## 2022-10-20 PROCEDURE — 25000003 PHARM REV CODE 250: Performed by: STUDENT IN AN ORGANIZED HEALTH CARE EDUCATION/TRAINING PROGRAM

## 2022-10-20 PROCEDURE — 97110 THERAPEUTIC EXERCISES: CPT

## 2022-10-20 PROCEDURE — 80053 COMPREHEN METABOLIC PANEL: CPT | Performed by: STUDENT IN AN ORGANIZED HEALTH CARE EDUCATION/TRAINING PROGRAM

## 2022-10-20 PROCEDURE — 90792 PR PSYCHIATRIC DIAGNOSTIC EVALUATION W/MEDICAL SERVICES: ICD-10-PCS | Mod: ,,, | Performed by: PSYCHIATRY & NEUROLOGY

## 2022-10-20 PROCEDURE — 97116 GAIT TRAINING THERAPY: CPT

## 2022-10-20 PROCEDURE — 90792 PSYCH DIAG EVAL W/MED SRVCS: CPT | Mod: ,,, | Performed by: PSYCHIATRY & NEUROLOGY

## 2022-10-20 PROCEDURE — 36415 COLL VENOUS BLD VENIPUNCTURE: CPT | Performed by: STUDENT IN AN ORGANIZED HEALTH CARE EDUCATION/TRAINING PROGRAM

## 2022-10-20 PROCEDURE — 82140 ASSAY OF AMMONIA: CPT | Performed by: STUDENT IN AN ORGANIZED HEALTH CARE EDUCATION/TRAINING PROGRAM

## 2022-10-20 PROCEDURE — 83735 ASSAY OF MAGNESIUM: CPT | Performed by: STUDENT IN AN ORGANIZED HEALTH CARE EDUCATION/TRAINING PROGRAM

## 2022-10-20 PROCEDURE — 11000001 HC ACUTE MED/SURG PRIVATE ROOM

## 2022-10-20 PROCEDURE — 97530 THERAPEUTIC ACTIVITIES: CPT

## 2022-10-20 PROCEDURE — 81003 URINALYSIS AUTO W/O SCOPE: CPT | Performed by: STUDENT IN AN ORGANIZED HEALTH CARE EDUCATION/TRAINING PROGRAM

## 2022-10-20 PROCEDURE — 25000003 PHARM REV CODE 250: Performed by: ORTHOPAEDIC SURGERY

## 2022-10-20 PROCEDURE — 63600175 PHARM REV CODE 636 W HCPCS: Performed by: STUDENT IN AN ORGANIZED HEALTH CARE EDUCATION/TRAINING PROGRAM

## 2022-10-20 PROCEDURE — 85025 COMPLETE CBC W/AUTO DIFF WBC: CPT | Performed by: STUDENT IN AN ORGANIZED HEALTH CARE EDUCATION/TRAINING PROGRAM

## 2022-10-20 PROCEDURE — 63600175 PHARM REV CODE 636 W HCPCS: Performed by: ORTHOPAEDIC SURGERY

## 2022-10-20 RX ORDER — MAGNESIUM SULFATE HEPTAHYDRATE 40 MG/ML
2 INJECTION, SOLUTION INTRAVENOUS ONCE
Status: COMPLETED | OUTPATIENT
Start: 2022-10-20 | End: 2022-10-20

## 2022-10-20 RX ORDER — POTASSIUM CHLORIDE 20 MEQ/1
40 TABLET, EXTENDED RELEASE ORAL ONCE
Status: COMPLETED | OUTPATIENT
Start: 2022-10-20 | End: 2022-10-20

## 2022-10-20 RX ORDER — QUETIAPINE FUMARATE 25 MG/1
25 TABLET, FILM COATED ORAL NIGHTLY
Status: DISCONTINUED | OUTPATIENT
Start: 2022-10-20 | End: 2022-10-21 | Stop reason: HOSPADM

## 2022-10-20 RX ADMIN — AZELASTINE 137 MCG: 1 SPRAY, METERED NASAL at 09:10

## 2022-10-20 RX ADMIN — SENNOSIDES 8.6 MG: 8.6 TABLET, FILM COATED ORAL at 08:10

## 2022-10-20 RX ADMIN — TIMOLOL MALEATE 1 DROP: 5 SOLUTION/ DROPS OPHTHALMIC at 09:10

## 2022-10-20 RX ADMIN — ACETAMINOPHEN 650 MG: 325 TABLET ORAL at 09:10

## 2022-10-20 RX ADMIN — POTASSIUM CHLORIDE 40 MEQ: 1500 TABLET, EXTENDED RELEASE ORAL at 01:10

## 2022-10-20 RX ADMIN — LACTOBACILLUS ACIDOPHILUS / LACTOBACILLUS BULGARICUS 1 EACH: 100 MILLION CFU STRENGTH GRANULES at 09:10

## 2022-10-20 RX ADMIN — POTASSIUM CHLORIDE 10 MEQ: 750 TABLET, EXTENDED RELEASE ORAL at 09:10

## 2022-10-20 RX ADMIN — AZELASTINE 137 MCG: 1 SPRAY, METERED NASAL at 08:10

## 2022-10-20 RX ADMIN — POLYETHYLENE GLYCOL 3350 17 G: 17 POWDER, FOR SOLUTION ORAL at 09:10

## 2022-10-20 RX ADMIN — CETIRIZINE HYDROCHLORIDE 5 MG: 5 TABLET ORAL at 08:10

## 2022-10-20 RX ADMIN — AMOXICILLIN AND CLAVULANATE POTASSIUM 1 TABLET: 875; 125 TABLET, FILM COATED ORAL at 09:10

## 2022-10-20 RX ADMIN — Medication 400 MG: at 08:10

## 2022-10-20 RX ADMIN — MAGNESIUM SULFATE HEPTAHYDRATE 2 G: 40 INJECTION, SOLUTION INTRAVENOUS at 06:10

## 2022-10-20 RX ADMIN — LACTOBACILLUS ACIDOPHILUS / LACTOBACILLUS BULGARICUS 1 EACH: 100 MILLION CFU STRENGTH GRANULES at 08:10

## 2022-10-20 RX ADMIN — Medication 400 MG: at 09:10

## 2022-10-20 RX ADMIN — QUETIAPINE FUMARATE 25 MG: 25 TABLET ORAL at 08:10

## 2022-10-20 RX ADMIN — TIMOLOL MALEATE 1 DROP: 5 SOLUTION/ DROPS OPHTHALMIC at 08:10

## 2022-10-20 RX ADMIN — SENNOSIDES 8.6 MG: 8.6 TABLET, FILM COATED ORAL at 09:10

## 2022-10-20 RX ADMIN — LATANOPROST 1 DROP: 50 SOLUTION OPHTHALMIC at 08:10

## 2022-10-20 RX ADMIN — AMOXICILLIN AND CLAVULANATE POTASSIUM 1 TABLET: 875; 125 TABLET, FILM COATED ORAL at 08:10

## 2022-10-20 RX ADMIN — ALLOPURINOL 100 MG: 100 TABLET ORAL at 09:10

## 2022-10-20 RX ADMIN — OXYBUTYNIN CHLORIDE 5 MG: 5 TABLET ORAL at 08:10

## 2022-10-20 RX ADMIN — ENOXAPARIN SODIUM 40 MG: 100 INJECTION SUBCUTANEOUS at 05:10

## 2022-10-20 RX ADMIN — OXYBUTYNIN CHLORIDE 5 MG: 5 TABLET ORAL at 09:10
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
## 2022-10-20 NOTE — ASSESSMENT & PLAN NOTE
"-At home ambulates with a walker  -Notes he "Fell or slid" out of a chair on night prior to admit  PT/OT evaluation recommending home health, Requesting re-evaluation as pt appears to weak to be able to ambulate well at home. Pt still only qualifying for home health  "

## 2022-10-20 NOTE — CONSULTS
"[facility] Surg  Psychiatry  Consult Note    Patient Name: Mark Church  MRN: [id number]   Code Status: Full Code  Admission Date: 10/2/2022  Hospital Length of Stay: 18 days  Attending Physician: Mauricio Torres III, MD  Primary Care Provider: [facility] [place]     Current Legal Status: Uncontested    Patient information was obtained from patient, ER records and chart review, nursing.   Inpatient consult to Psychiatry  Consult performed by: Christian Berrios MD  Consult ordered by: Mauricio Torres III, MD        Subjective:     Principal Problem:Sepsis    Chief Complaint:  Episode of altered mental status     HPI: Patient Mark Church present to the hospital a couple weeks ago with cellulitis of his leg which has required extensive surgical intervention and treatment.  Yesterday evening he had a change of mentation and was aggressive towards staff.  He kicked one staff and was screaming.  He threatened to leave and come back and kill everyone.  Also threatened to cook one of the nurses for a local barbeque in [place], [place].  He was restrained and given an PRN olanzapine to put him to sleep.  When he woke up today, he is calm, oriented, and apologetic for his actions.  He tells me that this happened to him in the past when he was at [facility] as an inpatient.  He says that they placed in on a psychiatric unit there but he "woke up" the next day all better.  States that when this happens, he is unable to control it.  He describes it as a "fever" and his body gets hot but he does not have a temperature.  He says that he can feel jittery and as if the room is closing in on him.  He then "blanks out" and doesn't recall much.  He is very remorseful and states that he did not mean anything that was said.  He denies any prior history of depression, anxiety, berto, or psychosis.  Denies the need for psychiatric medications.  Feels that he is not sleeping well in the hospital.        Hospital Course: No notes on " file         Patient History               Medical as of 10/20/2022       Past Medical History       Diagnosis Date Comments Source    Arthritis -- -- Provider    Bartter syndrome -- -- Provider    Blind left eye -- -- Provider    Cancer -- -- Provider    Cataract -- -- Provider    Glaucoma -- -- Provider    History of psychiatric hospitalization -- -- Provider    Normocytic anemia 10/02/2022 -- Provider    Prostate cancer -- -- Provider    Psychiatric problem -- -- Provider    Urinary incontinence -- -- Provider              Pertinent Negatives       Diagnosis Date Noted Comments Source    Acute pancreatitis 03/14/2016 -- Provider    Alcohol dependence 03/14/2016 -- Provider    Alzheimer's disease 03/14/2016 -- Provider    Amblyopia 04/04/2016 -- Provider    Angina pectoris 03/14/2016 -- Provider    Aplasia bone marrow 03/14/2016 -- Provider    Asthma 03/14/2016 -- Provider    Atrial fibrillation 03/14/2016 -- Provider    Back pain 03/14/2016 -- Provider    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 -- Provider    Cerebral palsy 03/14/2016 -- Provider    Chronic bronchitis 03/14/2016 -- Provider    Chronic hepatitis 03/14/2016 -- Provider    Cirrhosis 03/14/2016 -- Provider    Complications of reattached extremity 03/14/2016 -- Provider    Coronary artery disease 03/14/2016 -- Provider    Cystic fibrosis 03/14/2016 -- Provider    Dependence on renal dialysis 03/14/2016 -- Provider    Diabetes mellitus 06/07/2021 -- Provider    Diabetes with neurologic complications 03/14/2016 -- Provider    Diabetic retinopathy 04/04/2016 -- Provider    Emphysema of lung 03/14/2016 -- Provider    Fibromyalgia 03/14/2016 -- Provider    Gastrostomy status 03/14/2016 -- Provider    Glomerulonephritis 03/14/2016 -- Provider    Heart failure 03/14/2016 -- Provider    Heart transplanted 03/14/2016 -- Provider    Hemolytic anemia 03/14/2016 -- Provider    Hepatitis B 03/14/2016 -- Provider    Hepatitis C 03/14/2016 -- Provider    HIV  infection 03/14/2016 -- Provider    Hx of psychiatric care 10/20/2022 -- Provider    Hyperlipidemia 03/14/2016 -- Provider    Hypertension 03/14/2016 -- Provider    Hypothyroidism 03/14/2016 -- Provider    Immune deficiency disorder 03/14/2016 -- Provider    Immune disorder 03/14/2016 -- Provider    Inflammatory bowel disease 03/14/2016 -- Provider    Intracranial hemorrhage 03/14/2016 -- Provider    Late complications of amputation stump 07/24/2018 -- Provider    Liver transplanted 03/14/2016 -- Provider    Lung transplanted 03/14/2016 -- Provider    Macular degeneration 04/04/2016 -- Provider    Malnutrition 03/14/2016 -- Provider    Multiple sclerosis 03/14/2016 -- Provider    Myocardial infarction 03/14/2016 -- Provider    Neoplasm of lip 03/14/2016 -- Provider    Obesity 03/14/2016 -- Provider    Osteoporosis 03/14/2016 -- Provider    Paranoia 03/14/2016 -- Provider    Parkinson disease 03/14/2016 -- Provider    Peripheral vascular disease 03/14/2016 -- Provider    Phantom limb syndrome 03/14/2016 -- Provider    Pneumonia 03/14/2016 -- Provider    Pneumonia due to other staphylococcus 03/14/2016 -- Provider    Polyneuropathy 03/14/2016 -- Provider    Pressure ulcer 12/14/2017 -- Provider    Pulmonary embolism 03/14/2016 -- Provider    Renal manifestation of secondary diabetes mellitus 03/14/2016 -- Provider    Respiratory failure 03/14/2016 -- Provider    Retinal detachment 04/04/2016 -- Provider    Rheumatoid arthritis 03/14/2016 -- Provider    Seizures 03/14/2016 -- Provider    Septic shock 03/14/2016 -- Provider    Sickle cell anemia 03/14/2016 -- Provider    Sickle cell trait 06/07/2021 -- Provider    Skin ulcer 03/14/2016 -- Provider    Sleep apnea 03/14/2016 -- Provider    Spinal cord disease 03/14/2016 -- Provider    Strabismus 04/04/2016 -- Provider    Stroke 03/14/2016 -- Provider    Suicide attempt 10/20/2022 -- Provider    Therapy 10/20/2022 -- Provider    Tobacco dependence 03/14/2016 -- Provider     Trouble in sleeping 03/14/2016 -- Provider    Type 2 diabetes mellitus 03/14/2016 -- Provider    Type 2 diabetes mellitus with ophthalmic manifestations 03/14/2016 -- Provider    Type 2 diabetes with peripheral circulatory disorder, controlled 03/14/2016 -- Provider    Uveitis 04/04/2016 -- Provider                          Surgical as of 10/20/2022       Past Surgical History       Procedure Laterality Date Comments Source    CHOLECYSTECTOMY -- -- -- Provider    PROSTATE SURGERY -- -- -- Provider    sinus polyp [Other] -- -- -- Provider    REFRACTIVE SURGERY Bilateral -- -- Provider    ROTATOR CUFF REPAIR Right -- -- Provider    JOINT REPLACEMENT -- -- -- Provider    ANKLE INCISION AND DRAINAGE Left 10/9/2022 Procedure: INCISION AND DRAINAGE, ANKLE;  Surgeon: Jamison Snyder MD;  Location: [facility] OR;  Service: Orthopedics;  Laterality: Left; Provider                          Family as of 10/20/2022       Problem Relation Name Age of Onset Comments Source    Cataracts Father -- -- -- Provider    No Known Problems Mother -- -- -- Provider    Blindness Maternal Aunt -- -- -- Provider    Glaucoma Maternal Aunt -- -- -- Provider    No Known Problems Sister Asia -- -- Provider    No Known Problems [place]th[place]o [place] -- -- Provider    No Known Problems Sister Estefany -- -- Provider    No Known Problems Sister Alexander -- -- Provider    No Known Problems [place]thwicho Vincent -- -- Provider    No Known Problems Maternal Uncle -- -- -- Provider    No Known Problems Paternal Aunt -- -- -- Provider    No Known Problems Paternal Uncle -- -- -- Provider    No Known Problems Maternal Grandmother -- -- -- Provider    No Known Problems Maternal Grandfather -- -- -- Provider    No Known Problems Paternal Grandmother -- -- -- Provider    No Known Problems Paternal Grandfather -- -- -- Provider    Amblyopia Neg Hx -- -- -- Provider    Macular degeneration Neg Hx -- -- -- Provider    Retinal detachment Neg Hx -- -- -- Provider    Strabismus Neg Hx  -- -- -- Provider    Cancer Neg Hx -- -- -- Provider    Diabetes Neg Hx -- -- -- Provider    Hypertension Neg Hx -- -- -- Provider    Stroke Neg Hx -- -- -- Provider    Thyroid disease Neg Hx -- -- -- Provider                  Tobacco Use as of 10/20/2022       Smoking Status Smoking Start Date Quit Date Smoking Frequency    Never -- --       Smokeless Status Smokeless Type Smokeless Quit Date    Never -- --      Source    Provider                  Alcohol Use as of 10/20/2022       Alcohol Use Drinks/Week Alcohol/Week Comments Source    No 0 Standard drinks or equivalent 0.0 standard drinks -- Provider                  Drug Use as of 10/20/2022       Drug Use Types Frequency Comments Source    No -- -- -- Provider                  Sexual Activity as of 10/20/2022       Sexually Active Birth Control Partners Comments Source    Not Asked -- -- -- Provider                  Activities of Daily Living as of 10/20/2022    None               Social Documentation as of 10/20/2022    None               Occupational as of 10/20/2022    None               Socioeconomic as of 10/20/2022       Marital Status Spouse Name Number of Children Years Education Education Level Preferred Language Ethnicity Race Source     -- 2 -- -- English Not  or /a Black or  Provider                  Pertinent History       Question Response Comments    Lives with family --    Place in Birth Order -- --    Lives in -- --    Number of Siblings -- --    Raised by -- grew up in [place], [place]    Legal Involvement -- --    Childhood Trauma -- --    Criminal History of -- --    Financial Status unemployed --    Highest Level of Education -- --    Does patient have access to a firearm? -- --     Service -- --    Primary Leisure Activity -- --    Spirituality -- --          Past Medical History:   Diagnosis Date    Arthritis     Bartter syndrome     Blind left eye     Cancer     Cataract     Glaucoma      History of psychiatric hospitalization     Normocytic anemia 10/02/2022    Prostate cancer     Psychiatric problem     Urinary incontinence      Past Surgical History:   Procedure Laterality Date    ANKLE INCISION AND DRAINAGE Left 10/9/2022    Procedure: INCISION AND DRAINAGE, ANKLE;  Surgeon: Jamison Snyder MD;  Location: [facility];  Service: Orthopedics;  Laterality: Left;    CHOLECYSTECTOMY      JOINT REPLACEMENT      PROSTATE SURGERY      REFRACTIVE SURGERY Bilateral     ROTATOR CUFF REPAIR Right     sinus polyp       Family History       Problem Relation (Age of Onset)    Blindness Maternal Aunt    Cataracts Father    Glaucoma Maternal Aunt    No Known Problems Mother, Sister, Brother, Sister, Sister, Brother, Maternal Uncle, Paternal Aunt, Paternal Uncle, Maternal Grandmother, Maternal Grandfather, Paternal Grandmother, Paternal Grandfather          Tobacco Use    Smoking status: Never    Smokeless tobacco: Never   Substance and Sexual Activity    Alcohol use: No     Alcohol/week: 0.0 standard drinks    Drug use: No    Sexual activity: Not on file     Review of patient's allergies indicates:   Allergen Reactions    Compazine [prochlorperazine edisylate]        No current facility-administered medications on file prior to encounter.     Current Outpatient Medications on File Prior to Encounter   Medication Sig    allopurinol (ZYLOPRIM) 100 MG tablet TAKE 1 TABLET EVERY DAY    latanoprost 0.005 % ophthalmic solution Place 1 drop into both eyes every evening.    multivit-minerals/folic acid (MULTIVITAMIN GUMMIES ORAL) Take by mouth.    oxybutynin (DITROPAN) 5 MG Tab 5 mg once daily.     potassium chloride (MICRO-K) 10 MEQ CpSR TAKE 2 CAPSULES TWICE DAILY    timolol maleate 0.5% (TIMOPTIC) 0.5 % Drop Place 1 drop into both eyes 2 (two) times daily.    aspirin 81 MG Chew Take 81 mg by mouth every other day.     levocetirizine (XYZAL) 5 MG tablet Take 1 tablet (5 mg total) by mouth every  "evening.     Psychotherapeutics (From admission, onward)      Start     Stop Route Frequency Ordered    10/19/22 2007  OLANZapine zydis disintegrating tablet 10 mg         -- Oral Daily PRN 10/19/22 1907          Review of Systems   Constitutional:  Positive for fatigue. Negative for activity change.   Respiratory:  Negative for shortness of breath.    Cardiovascular:  Negative for chest pain.   Gastrointestinal:  Negative for nausea.   Musculoskeletal:  Positive for myalgias.   Skin:  Positive for wound.   Neurological:  Positive for weakness.   Psychiatric/Behavioral:  Positive for sleep disturbance. Negative for dysphoric mood, hallucinations and suicidal ideas. The patient is not nervous/anxious.    Strengths and Liabilities: Strength: Patient is expressive/articulate., Liability: Patient has poor health.    Objective:     Vital Signs (Most Recent):  Temp: 97.7 °F (36.5 °C) (10/20/22 1120)  Pulse: 78 (10/20/22 1120)  Resp: 16 (10/20/22 1120)  BP: (!) 115/59 (10/20/22 1120)  SpO2: 97 % (10/20/22 1120)   Vital Signs (24h Range):  Temp:  [97.7 °F (36.5 °C)-98.8 °F (37.1 °C)] 97.7 °F (36.5 °C)  Pulse:  [] 78  Resp:  [16-25] 16  SpO2:  [94 %-99 %] 97 %  BP: (103-142)/(59-85) 115/59     Height: 5' 7" (170.2 cm)  Weight: 61.6 kg (135 lb 12.9 oz)  Body mass index is 21.27 kg/m².      Intake/Output Summary (Last 24 hours) at 10/20/2022 1503  Last data filed at 10/20/2022 1000  Gross per 24 hour   Intake 240 ml   Output 600 ml   Net -360 ml       Physical Exam  Psychiatric:      Comments: EXAMINATION    CONSTITUTIONAL  General Appearance:  Hospital attire    MUSCULOSKELETAL  Muscle Strength and Tone:  Normal  Abnormal Involuntary Movements:  None noted  Gait and Station:  Not observed    PSYCHIATRIC MENTAL STATUS EXAM   Level of Consciousness:  Awake and alert  Orientation:  Name, place, date, situation  Grooming:  Fair  Psychomotor Behavior:  Normal  Speech:  Normal rate, tone, volume  Language:  No " "abnormality  Mood:  "Good"  Affect:  Full  Thought Process:  Linear  Associations:  Intact  Thought Content:  Denies suicidal/homicidal/psychosis  Memory:  Intact to recent and remote  Attention:  Full  Fund of Knowledge:  Appropriate for conversation  Insight:  Fair into current medical problems  Judgment:  Fair to cooperation with care          Significant Labs: All pertinent labs within the past 24 hours have been reviewed.    Significant Imaging: I have reviewed all pertinent imaging results/findings within the past 24 hours.    Assessment/Plan:     Acute encephalopathy  Patient with an acute encephalopathic episode, likely delirium, which was short lived but with potential for return.  Likely "ICU psychosis" or infectious delirium or medication delirium.  Being that it happened in the evening, it may be linked to sundowning.  Given that he is having poor sleep, can start very lowe dose quetiapine 25 mg nightly, which should help with sleep and delirium.  It should also help lessen any further risk of a delirious outburst.  Utilize restraints as needed for patient and staff safety.           Total Time:  60 minutes      Christian Berrios MD   Psychiatry  [facility] - Med Surg  "

## 2022-10-20 NOTE — PLAN OF CARE
Patient lying in bed, continues to state that he is going to kill everyone. NAD. Patient awake and alert. No s/s of pain noted. Patient does not want staff to touch him to insert IV access. Telesitter at BS. Bilateral soft wrist restraints in place, no injuries noted. Bed in lowest position. Bed alarm set. Will continue to monitor.  Problem: Skin Injury Risk Increased  Goal: Skin Health and Integrity  Outcome: Ongoing, Progressing     Problem: Impaired Wound Healing  Goal: Optimal Wound Healing  Outcome: Ongoing, Progressing

## 2022-10-20 NOTE — PLAN OF CARE
Problem: Physical Therapy  Goal: Physical Therapy Goal  Description: Goals to be met by: 10/31/22     Patient will increase functional independence with mobility by performin. Pt to be mod I with bed mobility.  2. Pt to transfer with mod I.  3. Pt to ambulate >150' w/RW and mod I.  4. Pt to be (I) with written HEP.  5. Pt to ascend/descend ~6 MINDY with R HR and mod I.    Outcome: Ongoing, Progressing     Pt ambulated ~80 ft with CGA using RW.

## 2022-10-20 NOTE — HPI
"Patient Mark Church present to the hospital a couple weeks ago with cellulitis of his leg which has required extensive surgical intervention and treatment.  Yesterday evening he had a change of mentation and was aggressive towards staff.  He kicked one staff and was screaming.  He threatened to leave and come back and kill everyone.  Also threatened to cook one of the nurses for a local barbeque in [place], [place].  He was restrained and given an PRN olanzapine to put him to sleep.  When he woke up today, he is calm, oriented, and apologetic for his actions.  He tells me that this happened to him in the past when he was at [facility] as an inpatient.  He says that they placed in on a psychiatric unit there but he "woke up" the next day all better.  States that when this happens, he is unable to control it.  He describes it as a "fever" and his body gets hot but he does not have a temperature.  He says that he can feel jittery and as if the room is closing in on him.  He then "blanks out" and doesn't recall much.  He is very remorseful and states that he did not mean anything that was said.  He denies any prior history of depression, anxiety, berto, or psychosis.  Denies the need for psychiatric medications.  Feels that he is not sleeping well in the hospital.    "

## 2022-10-20 NOTE — ASSESSMENT & PLAN NOTE
"Patient with an acute encephalopathic episode, likely delirium, which was short lived but with potential for return.  Likely "ICU psychosis" or infectious delirium or medication delirium.  Being that it happened in the evening, it may be linked to sundowning.  Given that he is having poor sleep, can start very lowe dose quetiapine 25 mg nightly, which should help with sleep and delirium.  It should also help lessen any further risk of a delirious outburst.  Utilize restraints as needed for patient and staff safety.    "

## 2022-10-20 NOTE — NURSING
[facility] MEWS TRIGGER FOLLOW UP       MEWS Monitoring, Score is: 4  Indication for review:     Charge Nurse, rashad Burns, MD aware/ following, instructed to call [phone] for further concerns or assistance.

## 2022-10-20 NOTE — SUBJECTIVE & OBJECTIVE
Patient History               Medical as of 10/20/2022       Past Medical History       Diagnosis Date Comments Source    Arthritis -- -- Provider    Bartter syndrome -- -- Provider    Blind left eye -- -- Provider    Cancer -- -- Provider    Cataract -- -- Provider    Glaucoma -- -- Provider    History of psychiatric hospitalization -- -- Provider    Normocytic anemia 10/02/2022 -- Provider    Prostate cancer -- -- Provider    Psychiatric problem -- -- Provider    Urinary incontinence -- -- Provider              Pertinent Negatives       Diagnosis Date Noted Comments Source    Acute pancreatitis 03/14/2016 -- Provider    Alcohol dependence 03/14/2016 -- Provider    Alzheimer's disease 03/14/2016 -- Provider    Amblyopia 04/04/2016 -- Provider    Angina pectoris 03/14/2016 -- Provider    Aplasia bone marrow 03/14/2016 -- Provider    Asthma 03/14/2016 -- Provider    Atrial fibrillation 03/14/2016 -- Provider    Back pain 03/14/2016 -- Provider    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 -- Provider    Cerebral palsy 03/14/2016 -- Provider    Chronic bronchitis 03/14/2016 -- Provider    Chronic hepatitis 03/14/2016 -- Provider    Cirrhosis 03/14/2016 -- Provider    Complications of reattached extremity 03/14/2016 -- Provider    Coronary artery disease 03/14/2016 -- Provider    Cystic fibrosis 03/14/2016 -- Provider    Dependence on renal dialysis 03/14/2016 -- Provider    Diabetes mellitus 06/07/2021 -- Provider    Diabetes with neurologic complications 03/14/2016 -- Provider    Diabetic retinopathy 04/04/2016 -- Provider    Emphysema of lung 03/14/2016 -- Provider    Fibromyalgia 03/14/2016 -- Provider    Gastrostomy status 03/14/2016 -- Provider    Glomerulonephritis 03/14/2016 -- Provider    Heart failure 03/14/2016 -- Provider    Heart transplanted 03/14/2016 -- Provider    Hemolytic anemia 03/14/2016 -- Provider    Hepatitis B 03/14/2016 -- Provider    Hepatitis C 03/14/2016 -- Provider    HIV infection  03/14/2016 -- Provider    Hx of psychiatric care 10/20/2022 -- Provider    Hyperlipidemia 03/14/2016 -- Provider    Hypertension 03/14/2016 -- Provider    Hypothyroidism 03/14/2016 -- Provider    Immune deficiency disorder 03/14/2016 -- Provider    Immune disorder 03/14/2016 -- Provider    Inflammatory bowel disease 03/14/2016 -- Provider    Intracranial hemorrhage 03/14/2016 -- Provider    Late complications of amputation stump 07/24/2018 -- Provider    Liver transplanted 03/14/2016 -- Provider    Lung transplanted 03/14/2016 -- Provider    Macular degeneration 04/04/2016 -- Provider    Malnutrition 03/14/2016 -- Provider    Multiple sclerosis 03/14/2016 -- Provider    Myocardial infarction 03/14/2016 -- Provider    Neoplasm of lip 03/14/2016 -- Provider    Obesity 03/14/2016 -- Provider    Osteoporosis 03/14/2016 -- Provider    Paranoia 03/14/2016 -- Provider    Parkinson disease 03/14/2016 -- Provider    Peripheral vascular disease 03/14/2016 -- Provider    Phantom limb syndrome 03/14/2016 -- Provider    Pneumonia 03/14/2016 -- Provider    Pneumonia due to other staphylococcus 03/14/2016 -- Provider    Polyneuropathy 03/14/2016 -- Provider    Pressure ulcer 12/14/2017 -- Provider    Pulmonary embolism 03/14/2016 -- Provider    Renal manifestation of secondary diabetes mellitus 03/14/2016 -- Provider    Respiratory failure 03/14/2016 -- Provider    Retinal detachment 04/04/2016 -- Provider    Rheumatoid arthritis 03/14/2016 -- Provider    Seizures 03/14/2016 -- Provider    Septic shock 03/14/2016 -- Provider    Sickle cell anemia 03/14/2016 -- Provider    Sickle cell trait 06/07/2021 -- Provider    Skin ulcer 03/14/2016 -- Provider    Sleep apnea 03/14/2016 -- Provider    Spinal cord disease 03/14/2016 -- Provider    Strabismus 04/04/2016 -- Provider    Stroke 03/14/2016 -- Provider    Suicide attempt 10/20/2022 -- Provider    Therapy 10/20/2022 -- Provider    Tobacco dependence 03/14/2016 -- Provider    Trouble  in sleeping 03/14/2016 -- Provider    Type 2 diabetes mellitus 03/14/2016 -- Provider    Type 2 diabetes mellitus with ophthalmic manifestations 03/14/2016 -- Provider    Type 2 diabetes with peripheral circulatory disorder, controlled 03/14/2016 -- Provider    Uveitis 04/04/2016 -- Provider                          Surgical as of 10/20/2022       Past Surgical History       Procedure Laterality Date Comments Source    CHOLECYSTECTOMY -- -- -- Provider    PROSTATE SURGERY -- -- -- Provider    sinus polyp [Other] -- -- -- Provider    REFRACTIVE SURGERY Bilateral -- -- Provider    ROTATOR CUFF REPAIR Right -- -- Provider    JOINT REPLACEMENT -- -- -- Provider    ANKLE INCISION AND DRAINAGE Left 10/9/2022 Procedure: INCISION AND DRAINAGE, ANKLE;  Surgeon: Jamison Snyder MD;  Location: [facility] OR;  Service: Orthopedics;  Laterality: Left; Provider                          Family as of 10/20/2022       Problem Relation Name Age of Onset Comments Source    Cataracts Father -- -- -- Provider    No Known Problems Mother -- -- -- Provider    Blindness Maternal Aunt -- -- -- Provider    Glaucoma Maternal Aunt -- -- -- Provider    No Known Problems Sister Asia -- -- Provider    No Known Problems Broth[place]o [place] -- -- Provider    No Known Problems Sister Estefany -- -- Provider    No Known Problems Sister Alexander -- -- Provider    No Known Problems Brother Carlton -- -- Provider    No Known Problems Maternal Uncle -- -- -- Provider    No Known Problems Paternal Aunt -- -- -- Provider    No Known Problems Paternal Uncle -- -- -- Provider    No Known Problems Maternal Grandmother -- -- -- Provider    No Known Problems Maternal Grandfather -- -- -- Provider    No Known Problems Paternal Grandmother -- -- -- Provider    No Known Problems Paternal Grandfather -- -- -- Provider    Amblyopia Neg Hx -- -- -- Provider    Macular degeneration Neg Hx -- -- -- Provider    Retinal detachment Neg Hx -- -- -- Provider    Strabismus Neg Hx -- --  -- Provider    Cancer Neg Hx -- -- -- Provider    Diabetes Neg Hx -- -- -- Provider    Hypertension Neg Hx -- -- -- Provider    Stroke Neg Hx -- -- -- Provider    Thyroid disease Neg Hx -- -- -- Provider                  Tobacco Use as of 10/20/2022       Smoking Status Smoking Start Date Quit Date Smoking Frequency    Never -- --       Smokeless Status Smokeless Type Smokeless Quit Date    Never -- --      Source    Provider                  Alcohol Use as of 10/20/2022       Alcohol Use Drinks/Week Alcohol/Week Comments Source    No 0 Standard drinks or equivalent 0.0 standard drinks -- Provider                  Drug Use as of 10/20/2022       Drug Use Types Frequency Comments Source    No -- -- -- Provider                  Sexual Activity as of 10/20/2022       Sexually Active Birth Control Partners Comments Source    Not Asked -- -- -- Provider                  Activities of Daily Living as of 10/20/2022    None               Social Documentation as of 10/20/2022    None               Occupational as of 10/20/2022    None               Socioeconomic as of 10/20/2022       Marital Status Spouse Name Number of Children Years Education Education Level Preferred Language Ethnicity Race Source     -- 2 -- -- English Not  or /a Black or  Provider                  Pertinent History       Question Response Comments    Lives with family --    Place in Birth Order -- --    Lives in -- --    Number of Siblings -- --    Raised by -- grew up in [place], [place]    Legal Involvement -- --    Childhood Trauma -- --    Criminal History of -- --    Financial Status unemployed --    Highest Level of Education -- --    Does patient have access to a firearm? -- --     Service -- --    Primary Leisure Activity -- --    Spirituality -- --          Past Medical History:   Diagnosis Date    Arthritis     Bartter syndrome     Blind left eye     Cancer     Cataract     Glaucoma     History of  psychiatric hospitalization     Normocytic anemia 10/02/2022    Prostate cancer     Psychiatric problem     Urinary incontinence      Past Surgical History:   Procedure Laterality Date    ANKLE INCISION AND DRAINAGE Left 10/9/2022    Procedure: INCISION AND DRAINAGE, ANKLE;  Surgeon: Jamison Snyder MD;  Location: [facility];  Service: Orthopedics;  Laterality: Left;    CHOLECYSTECTOMY      JOINT REPLACEMENT      PROSTATE SURGERY      REFRACTIVE SURGERY Bilateral     ROTATOR CUFF REPAIR Right     sinus polyp       Family History       Problem Relation (Age of Onset)    Blindness Maternal Aunt    Cataracts Father    Glaucoma Maternal Aunt    No Known Problems Mother, Sister, Brother, Sister, Sister, Brother, Maternal Uncle, Paternal Aunt, Paternal Uncle, Maternal Grandmother, Maternal Grandfather, Paternal Grandmother, Paternal Grandfather          Tobacco Use    Smoking status: Never    Smokeless tobacco: Never   Substance and Sexual Activity    Alcohol use: No     Alcohol/week: 0.0 standard drinks    Drug use: No    Sexual activity: Not on file     Review of patient's allergies indicates:   Allergen Reactions    Compazine [prochlorperazine edisylate]        No current facility-administered medications on file prior to encounter.     Current Outpatient Medications on File Prior to Encounter   Medication Sig    allopurinol (ZYLOPRIM) 100 MG tablet TAKE 1 TABLET EVERY DAY    latanoprost 0.005 % ophthalmic solution Place 1 drop into both eyes every evening.    multivit-minerals/folic acid (MULTIVITAMIN GUMMIES ORAL) Take by mouth.    oxybutynin (DITROPAN) 5 MG Tab 5 mg once daily.     potassium chloride (MICRO-K) 10 MEQ CpSR TAKE 2 CAPSULES TWICE DAILY    timolol maleate 0.5% (TIMOPTIC) 0.5 % Drop Place 1 drop into both eyes 2 (two) times daily.    aspirin 81 MG Chew Take 81 mg by mouth every other day.     levocetirizine (XYZAL) 5 MG tablet Take 1 tablet (5 mg total) by mouth every evening.     Psychotherapeutics  "(From admission, onward)      Start     Stop Route Frequency Ordered    10/19/22 2007  OLANZapine zydis disintegrating tablet 10 mg         -- Oral Daily PRN 10/19/22 1907          Review of Systems   Constitutional:  Positive for fatigue. Negative for activity change.   Respiratory:  Negative for shortness of breath.    Cardiovascular:  Negative for chest pain.   Gastrointestinal:  Negative for nausea.   Musculoskeletal:  Positive for myalgias.   Skin:  Positive for wound.   Neurological:  Positive for weakness.   Psychiatric/Behavioral:  Positive for sleep disturbance. Negative for dysphoric mood, hallucinations and suicidal ideas. The patient is not nervous/anxious.    Strengths and Liabilities: Strength: Patient is expressive/articulate., Liability: Patient has poor health.    Objective:     Vital Signs (Most Recent):  Temp: 97.7 °F (36.5 °C) (10/20/22 1120)  Pulse: 78 (10/20/22 1120)  Resp: 16 (10/20/22 1120)  BP: (!) 115/59 (10/20/22 1120)  SpO2: 97 % (10/20/22 1120)   Vital Signs (24h Range):  Temp:  [97.7 °F (36.5 °C)-98.8 °F (37.1 °C)] 97.7 °F (36.5 °C)  Pulse:  [] 78  Resp:  [16-25] 16  SpO2:  [94 %-99 %] 97 %  BP: (103-142)/(59-85) 115/59     Height: 5' 7" (170.2 cm)  Weight: 61.6 kg (135 lb 12.9 oz)  Body mass index is 21.27 kg/m².      Intake/Output Summary (Last 24 hours) at 10/20/2022 1503  Last data filed at 10/20/2022 1000  Gross per 24 hour   Intake 240 ml   Output 600 ml   Net -360 ml       Physical Exam  Psychiatric:      Comments: EXAMINATION    CONSTITUTIONAL  General Appearance:  Hospital attire    MUSCULOSKELETAL  Muscle Strength and Tone:  Normal  Abnormal Involuntary Movements:  None noted  Gait and Station:  Not observed    PSYCHIATRIC MENTAL STATUS EXAM   Level of Consciousness:  Awake and alert  Orientation:  Name, place, date, situation  Grooming:  Fair  Psychomotor Behavior:  Normal  Speech:  Normal rate, tone, volume  Language:  No abnormality  Mood:  "Good"  Affect:  " Full  Thought Process:  Linear  Associations:  Intact  Thought Content:  Denies suicidal/homicidal/psychosis  Memory:  Intact to recent and remote  Attention:  Full  Fund of Knowledge:  Appropriate for conversation  Insight:  Fair into current medical problems  Judgment:  Fair to cooperation with care          Significant Labs: All pertinent labs within the past 24 hours have been reviewed.    Significant Imaging: I have reviewed all pertinent imaging results/findings within the past 24 hours.
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
## 2022-10-20 NOTE — NURSING
Patient awaken. Oriented to person, place and thing. Following simple command. Calm at this time. Restraints removed at this release trial.  Patient states he had a nightmare and was fighting.I called his sister for patient and talked to her

## 2022-10-20 NOTE — PLAN OF CARE
Problem: Occupational Therapy  Goal: Occupational Therapy Goal  Description: Goals to be met by: 10/31/2022     Patient will increase functional independence with ADL's by performing:    UE Dressing with Modified [place].  LE Dressing with Supervision.  Grooming while standing with Supervision.  Toileting from bedside commode with Supervision for hygiene and clothing management.   Step transfer with Supervision.  Toilet transfer to bedside commode with Supervision.  Upper extremity exercise program x 10 reps per handout, with [place].    Outcome: Ongoing, Progressing     Patient was pleasant and agreeable today; his AMS appeared to have resolved.  Patient apologized for not being himself yesterday and stated that he felt jittery but would like to try to work with OT today.  Patient required CGA for supine to sitting at EOB.  Patient's SpO2 was then 98% and his HR was 90 bpm.  He reported significant pain at L foot and declined standing from EOB because he didn't quite feel right.  He again stated that he felt jittery but did not feel dizzy.  Patient requested to return to supine, for which he required CGA.  Patient stated that he would like to work with therapy again tomorrow.

## 2022-10-20 NOTE — SUBJECTIVE & OBJECTIVE
"Interval History: Pt now A&Ox3 stating he can somewhat remember the events from the previous day, but was "not able to stop himself from becoming agitated". Pt now out of restraints and just had a bath. Pt denies any issues with his wife and states he is feeling a lot better in general. Pt denies cp, sob, fever, chills, n/v at this time    Review of Systems  Objective:     Vital Signs (Most Recent):  Temp: 97.7 °F (36.5 °C) (10/20/22 1120)  Pulse: 78 (10/20/22 1120)  Resp: 16 (10/20/22 1120)  BP: (!) 115/59 (10/20/22 1120)  SpO2: 97 % (10/20/22 1120)   Vital Signs (24h Range):  Temp:  [97.7 °F (36.5 °C)-98.8 °F (37.1 °C)] 97.7 °F (36.5 °C)  Pulse:  [] 78  Resp:  [16-25] 16  SpO2:  [94 %-99 %] 97 %  BP: (103-142)/(59-85) 115/59     Weight: 61.6 kg (135 lb 12.9 oz)  Body mass index is 21.27 kg/m².    Intake/Output Summary (Last 24 hours) at 10/20/2022 1214  Last data filed at 10/20/2022 1000  Gross per 24 hour   Intake 410 ml   Output 600 ml   Net -190 ml      Physical Exam  Vitals reviewed.   Constitutional:       General: He is not in acute distress.  HENT:      Head: Normocephalic and atraumatic.      Nose: No congestion or rhinorrhea.      Mouth/Throat:      Mouth: Mucous membranes are moist.      Pharynx: Oropharynx is clear.   Eyes:      General: No scleral icterus.     Extraocular Movements: Extraocular movements intact.   Cardiovascular:      Rate and Rhythm: Normal rate and regular rhythm.   Pulmonary:      Effort: Pulmonary effort is normal. No respiratory distress.   Abdominal:      Palpations: Abdomen is soft.      Tenderness: There is no abdominal tenderness.   Musculoskeletal:         General: No swelling or tenderness.      Cervical back: Neck supple. No rigidity.   Skin:     General: Skin is warm and dry.   Neurological:      General: No focal deficit present.      Mental Status: He is alert and oriented to person, place, and time.   Psychiatric:         Mood and Affect: Mood normal.         " Behavior: Behavior normal.       Significant Labs: All pertinent labs within the past 24 hours have been reviewed.    Significant Imaging: I have reviewed all pertinent imaging results/findings within the past 24 hours.

## 2022-10-20 NOTE — PT/OT/SLP PROGRESS
Physical Therapy Treatment    Patient Name:  Mark Church   MRN:  [id number]    Recommendations:     Discharge Recommendations:  home health PT with family assistance  Discharge Equipment Recommendations: bedside commode, bath bench   Barriers to discharge: Inaccessible home    Assessment:     Mark Church is a 72 y.o. male admitted with a medical diagnosis of Sepsis.  He presents with the following impairments/functional limitations:  weakness, impaired endurance, impaired self care skills, impaired functional mobility, gait instability, impaired balance, decreased lower extremity function, pain, decreased ROM, impaired skin, edema.    Rehab Prognosis: Good; patient would benefit from acute skilled PT services to address these deficits and reach maximum level of function.    Recent Surgery: Procedure(s) (LRB):  INCISION AND DRAINAGE, ANKLE (Left) 11 Days Post-Op    Plan:     During this hospitalization, patient to be seen 3 x/week to address the identified rehab impairments via gait training, therapeutic activities, therapeutic exercises and progress toward the following goals:    Plan of Care Expires:  10/31/22    Subjective     Chief Complaint: LLE pain  Patient/Family Comments/goals: Pt agreeable to ambulation in the hallway.   Pain/Comfort:  Pain Rating 1:  (Pt c/o LLE pain.)  Pain Addressed 1: Reposition, Distraction, Cessation of Activity      Objective:     Communicated with nurse Dupree prior to session.  Patient found HOB elevated with peripheral IV and Avasys monitor upon PT entry to room.     General Precautions: Standard, fall (Orthostatic hypotension)   Orthopedic Precautions:LLE weight bearing as tolerated   Braces: N/A  Respiratory Status: Room air     Functional Mobility:  Pt was sleeping upon PT arrival, required extra time to awake and participate with therapy.  Pt A+Ox3 (person, place, and time), reported that he was very confused yesterday however much more oriented since this morning.  Pt required  extra time to complete tasks.   Bed Mobility:     Scooting: stand by assistance and contact guard assistance  Supine to Sit: minimum assistance with HOB elevated   Transfers:     Sit to Stand:  contact guard assistance with rolling walker  Bed to Chair: contact guard assistance with  rolling walker  using  Step Transfer  Gait: Pt ambulated ~80 ft with CGA using RW/gait belt and chair to follow.  Pt declined further ambulation 2* onset of LLE pain.  Pt with decreased step length and zeynep.   Balance: Pt with fair dynamic standing balance.       AM-PAC 6 CLICK MOBILITY  Turning over in bed (including adjusting bedclothes, sheets and blankets)?: 4  Sitting down on and standing up from a chair with arms (e.g., wheelchair, bedside commode, etc.): 3  Moving from lying on back to sitting on the side of the bed?: 3  Moving to and from a bed to a chair (including a wheelchair)?: 3  Need to walk in hospital room?: 3  Climbing 3-5 steps with a railing?: 3  Basic Mobility Total Score: 19       Treatment & Education:  BLE seated therex 2 sets x10 reps: AP, LAQ, hip flex  BLE supine therex x10 reps: AP, QS, hip abd/add, and SLR    Pt re-educated on HEP for seated/supine LE therex, no family present.  Pt verbalized understanding, will need reinforcement.     Patient left up in chair on seat cushion reclined with LLE elevated on pillow with all lines intact, call button in reach, and Avasys present.  Dinner tray set-up and door left opened, room across from nurse's station.     GOALS:   Multidisciplinary Problems       Physical Therapy Goals          Problem: Physical Therapy    Goal Priority Disciplines Outcome Goal Variances Interventions   Physical Therapy Goal     PT, PT/OT Ongoing, Progressing     Description: Goals to be met by: 10/31/22     Patient will increase functional independence with mobility by performin. Pt to be mod I with bed mobility.  2. Pt to transfer with mod I.  3. Pt to ambulate >150' w/RW and mod  I.  4. Pt to be (I) with written HEP.  5. Pt to ascend/descend ~6 MINDY with R HR and mod I.                         Time Tracking:     PT Received On: 10/20/22  PT Start Time: 1650     PT Stop Time: 1713  PT Total Time (min): 23 min     Billable Minutes: Gait Training 13 min and Therapeutic Exercise 10 min    Treatment Type: Treatment  PT/PTA: PT     PTA Visit Number: 0     10/20/2022

## 2022-10-20 NOTE — PT/OT/SLP PROGRESS
Occupational Therapy   Treatment    Name: Mark Church  MRN: [id number]  Admitting Diagnosis:  Sepsis  11 Days Post-Op    Recommendations:     Discharge Recommendations: home health OT  Discharge Equipment Recommendations: bedside commode, bath bench  Barriers to Discharge: Other (Comment) (Low BP at times)    Assessment:     Mark Church is a 72 y.o. male with a medical diagnosis of sepsis.  He presents with less confusion and a return to his normal (pleasant) affect today.  Performance deficits affecting function are weakness, impaired endurance, impaired self-care skills, gait instability, impaired cognition, impaired cardiopulmonary response to activity, decreased lower extremity function,and impaired functional mobility.     Rehab Prognosis:  Good; patient would benefit from acute skilled OT services to address these deficits and reach maximum level of function.       Plan:     Patient to be seen 3 x/week to address the above listed problems via self-care/home management, therapeutic activities, and therapeutic exercises.  Plan of Care Expires: 11/10/22  Plan of Care Reviewed with: patient    Subjective     Pain/Comfort:  Pain Rating 1: (Patient reported significant pain at L foot.)  Location - Side 1: Left  Location - Orientation 1: Distal  Location 1: Foot  Pain Addressed 1: Reposition, Distraction, Nurse notified, Cessation of activity  Pain Rating Post-Intervention 1: (Not specified)    Objective:     Communicated with: Nurse, Cousin, prior to session.  Patient found supine in bed with HOB elevated with telemetry, bed alarm, pressure-relief boot and pillow supporting LLE, LLE wrapped, and telesitter present upon OT entry to room.    General Precautions: Standard, fall, and orthostatic hypotension precautions apply.   Orthopedic Precautions: LLE weight-bearing as tolerated   Braces: N/A  Respiratory Status: Room air     Occupational Performance:     Bed Mobility:    CGA for supine <---> sitting at EOB       Functional Mobility/Transfers:  Transfers: N/A (patient declined)  Functional Mobility: N/A    Activities of Daily Living:  N/A for today's session       [facility] 6 Click ADL: 19    Treatment & Education:  Patient was pleasant and agreeable today; his AMS appeared to have resolved.  Patient apologized for not being himself yesterday and stated that he felt jittery but would like to try to work with OT today.  Patient required CGA for supine to sitting at EOB.  Patient's SpO2 was then 98% and his HR was 90 bpm.  He reported significant pain at L foot and declined standing from EOB because he didn't quite feel right.  He again stated that he felt jittery but did not feel dizzy.  Patient requested to return to supine, for which he required CGA.  Patient stated that he would like to work with therapy again tomorrow.      Patient left in bed with HOB/FOB elevated with nurse informed of his status, call button left within his reach, bed alarm reset, lines intact as previously stated, pressure-relief boot and pillow under LLE for support, LLE wrapping in place, and telesitter present.     GOALS:   Multidisciplinary Problems       Occupational Therapy Goals          Problem: Occupational Therapy    Goal Priority Disciplines Outcome Interventions   Occupational Therapy Goal     OT, PT/OT Ongoing, Progressing    Description: Goals to be met by: 10/31/2022     Patient will increase functional independence with ADL's by performing:    UE Dressing with Modified [place].  LE Dressing with Supervision.  Grooming while standing with Supervision.  Toileting from bedside commode with Supervision for hygiene and clothing management.   Step transfer with Supervision.  Toilet transfer to bedside commode with Supervision.  Upper extremity exercise program x 10 reps per handout, with [place].                         Time Tracking:     OT Date of Treatment: 10/20/22  OT Start Time: 1402  OT Stop Time: 1419  OT Total Time (min):  17 min    Billable Minutes:Therapeutic Activity 17 mins     OT/JOSELO: OT          10/20/2022

## 2022-10-20 NOTE — PROGRESS NOTES
"Routine dressing change to left lower leg.   Incisions on left lateral ankle and left medial leg-proximal site healing with wound faith and resurfacing. Scant drainage from two incisions without surrounding erythema. Edema in lower leg resolved. Dry skin remains.   Left medial malleolus- Open wound continues to drain a moderate amount serosanguineous fluid without odor. White ischemic tissue in center of wound softening and  exposing wound with red base. Opening continues to undermine below sutured area.   Cleansed wounds and lower leg with Vashe. Filled medial ankle wound with wick of Aquacel and covered with Aquacel and Mepilex foam Covered two other incisions with Mepilex foam. Moisturized lower leg with Remedy moisturizer and wrapped leg from toes to knee with Kerlix roll and 4" Coban to control/manage edema.   Nursing to continue performing wound care every other day and prn.   Patient sleeping today. Arouses, but falls back to sleep.   Leg elevated and wearing EHOB boot.  "

## 2022-10-20 NOTE — PLAN OF CARE
Problem: Skin Injury Risk Increased  Goal: Skin Health and Integrity  Outcome: Ongoing, Progressing  Intervention: Optimize Skin Protection  Flowsheets (Taken 10/20/2022 1136)  Pressure Reduction Techniques:   frequent weight shift encouraged   heels elevated off bed   pressure points protected  Pressure Reduction Devices:   foam padding utilized   heel offloading device utilized   positioning supports utilized  Skin Protection:   adhesive use limited   protective footwear used   tubing/devices free from skin contact  Head of Bed (HOB) Positioning: HOB at 30-45 degrees  Intervention: Promote and Optimize Oral Intake  Flowsheets (Taken 10/20/2022 1136)  Oral Nutrition Promotion: calorie-dense foods provided     Problem: Impaired Wound Healing  Goal: Optimal Wound Healing  Outcome: Ongoing, Progressing  Intervention: Promote Wound Healing  Flowsheets (Taken 10/20/2022 1136)  Oral Nutrition Promotion: calorie-dense foods provided  Sleep/Rest Enhancement:   regular sleep/rest pattern promoted   natural light exposure provided  Activity Management:   Up to bedside commode - L3   Rolling - L1  Pain Management Interventions:   around-the-clock dosing utilized   awakened for pain meds per patient request   quiet environment facilitated   pillow support provided   position adjusted     Problem: Pain Acute  Goal: Acceptable Pain Control and Functional Ability  Outcome: Ongoing, Progressing  Intervention: Develop Pain Management Plan  Flowsheets (Taken 10/20/2022 1136)  Pain Management Interventions:   around-the-clock dosing utilized   awakened for pain meds per patient request   quiet environment facilitated   pillow support provided   position adjusted  Intervention: Prevent or Manage Pain  Flowsheets (Taken 10/20/2022 1136)  Sleep/Rest Enhancement:   regular sleep/rest pattern promoted   natural light exposure provided  Sensory Stimulation Regulation: television on  Bowel Elimination Promotion:   adequate fluid intake  promoted   commode/bedpan at bedside  Medication Review/Management: medications reviewed  Intervention: Optimize Psychosocial Wellbeing  Flowsheets (Taken 10/20/2022 1136)  Spiritual Activities Assistance: spiritual support provided  Supportive Measures: active listening utilized  Diversional Activities: television     Problem: Fall Injury Risk  Goal: Absence of Fall and Fall-Related Injury  Outcome: Ongoing, Progressing  Intervention: Identify and Manage Contributors  Flowsheets (Taken 10/20/2022 1136)  Medication Review/Management: medications reviewed  Intervention: Promote Injury-Free Environment  Flowsheets (Taken 10/20/2022 1136)  Safety Promotion/Fall Prevention:   assistive device/personal item within reach   bed alarm set   bedside commode chair   Fall Risk reviewed with patient/family   medications reviewed   side rails raised x 2   toileting scheduled   instructed to call staff for mobility     Problem: Mobility Impairment  Goal: Optimal Mobility  Outcome: Ongoing, Progressing  Intervention: Optimize Mobility  Flowsheets (Taken 10/20/2022 1136)  Assistive Device Utilized: walker  Activity Management:   Up to bedside commode - L3   Rolling - L1  Positioning/Transfer Devices:   pillows   wedge     Problem: Restraint, Nonbehavioral (Nonviolent)  Goal: Absence of Harm or Injury  Outcome: Ongoing, Progressing  Intervention: Implement Least Restrictive Safety Strategies  Flowsheets (Taken 10/20/2022 1136)  Less Restrictive Alternative:   bed alarm in use   emotional support provided   safety enhancements provided  Diversional Activities: television  De-Escalation Techniques: increased round frequency  Intervention: Protect Dignity, Rights, and Personal Wellbeing  Flowsheets (Taken 10/20/2022 1136)  Trust Relationship/Rapport:   care explained   emotional support provided   questions answered   questions encouraged   thoughts/feelings acknowledged   reassurance provided  Intervention: Protect Skin and Joint  Integrity  Flowsheets (Taken 10/20/2022 1136)  Body Position:   position changed independently   weight shifting  Intervention: Education  Flowsheets (Taken 10/20/2022 1136)  Discontinuation Criteria:   Verbalizes not to harm self   Verbalizes not to harm others  Criteria Explained: Yes  Patient / Family Teaching: ([place], will contiune to monitor) Other (comment)

## 2022-10-21 VITALS
DIASTOLIC BLOOD PRESSURE: 59 MMHG | BODY MASS INDEX: 21.31 KG/M2 | SYSTOLIC BLOOD PRESSURE: 127 MMHG | WEIGHT: 135.81 LBS | RESPIRATION RATE: 18 BRPM | TEMPERATURE: 99 F | OXYGEN SATURATION: 98 % | HEIGHT: 67 IN | HEART RATE: 113 BPM

## 2022-10-21 LAB
ANION GAP SERPL CALC-SCNC: 9 MMOL/L (ref 8–16)
BASOPHILS # BLD AUTO: 0.05 K/UL (ref 0–0.2)
BASOPHILS NFR BLD: 0.4 % (ref 0–1.9)
BUN SERPL-MCNC: 17 MG/DL (ref 8–23)
CALCIUM SERPL-MCNC: 9.6 MG/DL (ref 8.7–10.5)
CHLORIDE SERPL-SCNC: 100 MMOL/L (ref 95–110)
CO2 SERPL-SCNC: 32 MMOL/L (ref 23–29)
CREAT SERPL-MCNC: 0.7 MG/DL (ref 0.5–1.4)
DIFFERENTIAL METHOD: ABNORMAL
EOSINOPHIL # BLD AUTO: 1.1 K/UL (ref 0–0.5)
EOSINOPHIL NFR BLD: 8.8 % (ref 0–8)
ERYTHROCYTE [DISTWIDTH] IN BLOOD BY AUTOMATED COUNT: 15.5 % (ref 11.5–14.5)
EST. GFR  (NO RACE VARIABLE): >60 ML/MIN/1.73 M^2
GLUCOSE SERPL-MCNC: 114 MG/DL (ref 70–110)
HCT VFR BLD AUTO: 34.6 % (ref 40–54)
HGB BLD-MCNC: 11 G/DL (ref 14–18)
IMM GRANULOCYTES # BLD AUTO: 0.09 K/UL (ref 0–0.04)
IMM GRANULOCYTES NFR BLD AUTO: 0.7 % (ref 0–0.5)
LYMPHOCYTES # BLD AUTO: 1.4 K/UL (ref 1–4.8)
LYMPHOCYTES NFR BLD: 11.9 % (ref 18–48)
MAGNESIUM SERPL-MCNC: 1.8 MG/DL (ref 1.6–2.6)
MCH RBC QN AUTO: 28.1 PG (ref 27–31)
MCHC RBC AUTO-ENTMCNC: 31.8 G/DL (ref 32–36)
MCV RBC AUTO: 89 FL (ref 82–98)
MONOCYTES # BLD AUTO: 1.2 K/UL (ref 0.3–1)
MONOCYTES NFR BLD: 9.6 % (ref 4–15)
NEUTROPHILS # BLD AUTO: 8.3 K/UL (ref 1.8–7.7)
NEUTROPHILS NFR BLD: 68.6 % (ref 38–73)
NRBC BLD-RTO: 0 /100 WBC
PLATELET # BLD AUTO: 694 K/UL (ref 150–450)
PMV BLD AUTO: 9.3 FL (ref 9.2–12.9)
POTASSIUM SERPL-SCNC: 3.5 MMOL/L (ref 3.5–5.1)
RBC # BLD AUTO: 3.91 M/UL (ref 4.6–6.2)
SODIUM SERPL-SCNC: 141 MMOL/L (ref 136–145)
WBC # BLD AUTO: 12.14 K/UL (ref 3.9–12.7)

## 2022-10-21 PROCEDURE — 25000003 PHARM REV CODE 250: Performed by: STUDENT IN AN ORGANIZED HEALTH CARE EDUCATION/TRAINING PROGRAM

## 2022-10-21 PROCEDURE — 85025 COMPLETE CBC W/AUTO DIFF WBC: CPT | Performed by: STUDENT IN AN ORGANIZED HEALTH CARE EDUCATION/TRAINING PROGRAM

## 2022-10-21 PROCEDURE — 25000003 PHARM REV CODE 250: Performed by: ORTHOPAEDIC SURGERY

## 2022-10-21 PROCEDURE — 80048 BASIC METABOLIC PNL TOTAL CA: CPT | Performed by: STUDENT IN AN ORGANIZED HEALTH CARE EDUCATION/TRAINING PROGRAM

## 2022-10-21 PROCEDURE — 97535 SELF CARE MNGMENT TRAINING: CPT

## 2022-10-21 PROCEDURE — 83735 ASSAY OF MAGNESIUM: CPT | Performed by: STUDENT IN AN ORGANIZED HEALTH CARE EDUCATION/TRAINING PROGRAM

## 2022-10-21 PROCEDURE — 36415 COLL VENOUS BLD VENIPUNCTURE: CPT | Performed by: STUDENT IN AN ORGANIZED HEALTH CARE EDUCATION/TRAINING PROGRAM

## 2022-10-21 PROCEDURE — 97530 THERAPEUTIC ACTIVITIES: CPT

## 2022-10-21 RX ORDER — LANOLIN ALCOHOL/MO/W.PET/CERES
400 CREAM (GRAM) TOPICAL 2 TIMES DAILY
Qty: 14 TABLET | Refills: 0 | Status: SHIPPED | OUTPATIENT
Start: 2022-10-21

## 2022-10-21 RX ADMIN — AZELASTINE 137 MCG: 1 SPRAY, METERED NASAL at 09:10

## 2022-10-21 RX ADMIN — TIMOLOL MALEATE 1 DROP: 5 SOLUTION/ DROPS OPHTHALMIC at 09:10

## 2022-10-21 RX ADMIN — AMOXICILLIN AND CLAVULANATE POTASSIUM 1 TABLET: 875; 125 TABLET, FILM COATED ORAL at 09:10

## 2022-10-21 RX ADMIN — LACTOBACILLUS ACIDOPHILUS / LACTOBACILLUS BULGARICUS 1 EACH: 100 MILLION CFU STRENGTH GRANULES at 09:10

## 2022-10-21 RX ADMIN — SENNOSIDES 8.6 MG: 8.6 TABLET, FILM COATED ORAL at 09:10

## 2022-10-21 RX ADMIN — ASPIRIN 81 MG CHEWABLE TABLET 81 MG: 81 TABLET CHEWABLE at 09:10

## 2022-10-21 RX ADMIN — ALLOPURINOL 100 MG: 100 TABLET ORAL at 09:10

## 2022-10-21 RX ADMIN — POTASSIUM CHLORIDE 10 MEQ: 750 TABLET, EXTENDED RELEASE ORAL at 09:10

## 2022-10-21 RX ADMIN — Medication 400 MG: at 09:10

## 2022-10-21 RX ADMIN — POLYETHYLENE GLYCOL 3350 17 G: 17 POWDER, FOR SOLUTION ORAL at 09:10

## 2022-10-21 RX ADMIN — OXYBUTYNIN CHLORIDE 5 MG: 5 TABLET ORAL at 09:10

## 2022-10-21 NOTE — NURSING
Pt discharged per MD order. IV removed. Catheter tip intact. No distress noted. Discharge instructions reviewed with pt and family. Given the opportunity for questions. All questions answered. AVS given to pt and placed in blue folder. Patient verbalized understanding of all instructions. Vitals per chart. afebrile. No complaints of pain, N/V, diarrhea, or SOB. Dressing changed

## 2022-10-21 NOTE — PLAN OF CARE
Problem: Occupational Therapy  Goal: Occupational Therapy Goal  Description: Goals to be met by: 10/31/2022     Patient will increase functional independence with ADL's by performing:    UE Dressing with Modified [place].  LE Dressing with Supervision.  Grooming while standing with Supervision.  Toileting from bedside commode with Supervision for hygiene and clothing management.   Step transfer with Supervision.  Toilet transfer to bedside commode with Supervision.  Upper extremity exercise program x 10 reps per handout, with [place].    Outcome: Ongoing, Progressing     Patient was feeling well (pain: 0/10 per his verbal report) and demonstrated excellent participation today.  Discussed D/C planning and DME rec's with patient and his , Anna.  Patient will require a BSC and a bath bench as well as a new RW.  Patient was reminded of his LLE weight-bearing precautions of WBAT.  He adhered to these precautions well and performed RW transfers with SBA today.  Patient with no significant incidents of orthostatic hypotension today; he had no c/o of dizziness during transitional movements.  He required CGA to roll to L side and sit upright at EOB.  BP was 105/55 and SpO2 was 100% while seated at EOB.  He performed grooming tasks while seated with Set-Up A.     Patient is pending D/C to home today with home health and assistance from his wife as needed.

## 2022-10-21 NOTE — PLAN OF CARE
10/21/22 1146   Medicare Message   Important Message from Medicare regarding Discharge Appeal Rights Given to patient/caregiver;Explained to patient/caregiver;Other (comments)  (TN spoke with patient's spouse Winifred Church via phone, [phone] verbalized understanding copy mailed certified to address in chart. [phone])   Date IMM was signed 10/21/22   Time IMM was signed [id number]

## 2022-10-21 NOTE — PLAN OF CARE
[facility] - Med Surg  Discharge Final Note    Patient clear to discharge from case management stand point. Reviewed appointments with pt spouse, verbalized understanding, and accepting to [facility] and care at home.     Primary Care Provider: [facility]     Expected Discharge Date: 10/21/2022    Final Discharge Note (most recent)       Final Note - 10/21/22 1124          Post-Acute Status    Post-Acute Authorization Home Health     Home Health Status Pending post-acute provider review/more information requested     Coverage [employer]     Discharge Delays None known at this time                     Important Message from Medicare  Important Message from Medicare regarding Discharge Appeal Rights: Given to patient/caregiver, Explained to patient/caregiver, Other (comments) (TN spoke with patient's spouse Winifred Church via phone, [phone] verbalized understanding copy mailed certified to address in chart. [phone])     Date IMM was signed: 10/21/22  Time IMM was signed: 1145    Contact Info       [facility]   Relationship: PCP - General    [address]  [address]  [place] [address]   Phone: [phone]       Next Steps: Follow up    Instructions: Please schedule a hospital follow up with your PCP at [facility] within 1 week for medical care.    [facility]   Specialty: Home Health Services    [address]  [address]  [place] [address]   Phone: [phone]       Next Steps: Follow up    Instructions: Home Health

## 2022-10-21 NOTE — PLAN OF CARE
[facility]      HOME HEALTH ORDERS  FACE TO FACE ENCOUNTER    Patient Name: Mark Church  YOB: 1950    PCP: [facility]    PCP Address: [address] [place] [address] [place] / [place]  PCP Phone Number: [phone]  PCP Fax: [phone]    Encounter Date: 10/2/22    Admit to Home Health    Diagnoses:  Active Hospital Problems    Diagnosis  POA    *Sepsis [A41.9]  Yes     Priority: 2     Acute encephalopathy [G93.40]  Yes     Priority: 1 - High    Hypotension [I95.9]  Yes     Priority: 1 - High    Debility [R53.81]  Yes     Priority: 2     Hypokalemia [E87.6]  Yes     Priority: 3     Cellulitis [L03.90]  Yes     Priority: 4     Cellulitis of left lower extremity [L03.116]  Yes     Priority: 4     Normocytic anemia [D64.9]  Yes    Hyponatremia [E87.1]  Yes    Chronic gout [M1A.9XX0]  Yes    Chronic cough [R05.3]  Yes    Primary open angle glaucoma of both eyes, severe stage [H40.1133]  Yes    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       Follow Up Appointments:  Future Appointments   Date Time Provider Department Center   12/5/2022  8:30 AM Rikki Tyson MD [facility]       Allergies:  Review of patient's allergies indicates:   Allergen Reactions    Compazine [prochlorperazine edisylate]        Medications: Review discharge medications with patient and family and provide education.    Current Facility-Administered Medications   Medication Dose Route Frequency Provider Last Rate Last Admin    acetaminophen tablet 650 mg  650 mg Oral Q6H PRN Christian Esquivel MD   650 mg at 10/20/22 0931    allopurinoL tablet 100 mg  100 mg Oral Daily Jamison Snyder MD   100 mg at 10/21/22 0919    amoxicillin-clavulanate 875-125mg per tablet 1 tablet  1 tablet Oral Q12H Estella Valadez MD   1 tablet at 10/21/22 0919    aspirin chewable tablet 81 mg  81 mg Oral Every  other day Jamison Snyder MD   81 mg at 10/21/22 0919    azelastine 137 mcg (0.1 %) nasal spray 137 mcg  1 spray Nasal BID Jamison Snyder MD   137 mcg at 10/21/22 0920    benzonatate capsule 100 mg  100 mg Oral TID PRN Jamison Snyder MD        cetirizine tablet 5 mg  5 mg Oral QHS Jamison Snyder MD   5 mg at 10/20/22 2029    dextrose 10% bolus 125 mL  12.5 g Intravenous PRN Jamison Snyder MD        dextrose 10% bolus 250 mL  25 g Intravenous PRN Jamison Snyder MD        enoxaparin injection 40 mg  40 mg Subcutaneous Daily Jamison Snyder MD   40 mg at 10/20/22 1755    glucagon (human recombinant) injection 1 mg  1 mg Intramuscular PRN Jamison Snyder MD        glucose chewable tablet 16 g  16 g Oral PRN Jamison Snyder MD        glucose chewable tablet 24 g  24 g Oral PRN Jamison Snyder MD        lactobacillus acidophilus & bulgar 100 million cell packet 1 each  1 packet Oral BID Jamison Snyder MD   1 each at 10/21/22 0920    latanoprost 0.005 % ophthalmic solution 1 drop  1 drop Both Eyes QHS Jamison Snyder MD   1 drop at 10/20/22 2029    loperamide capsule 2 mg  2 mg Oral QID PRN Jamison Snyder MD        magnesium oxide tablet 400 mg  400 mg Oral BID Christian Esquivel MD   400 mg at 10/21/22 0919    melatonin tablet 6 mg  6 mg Oral Nightly PRN Jamison Snyder MD   6 mg at 10/12/22 2212    OLANZapine zydis disintegrating tablet 10 mg  10 mg Oral Daily PRN Yonathan White PA-C        ondansetron injection 4 mg  4 mg Intravenous Q4H PRN Jamison Snyder MD        oxybutynin tablet 5 mg  5 mg Oral BID Jamison Snyder MD   5 mg at 10/21/22 0919    polyethylene glycol packet 17 g  17 g Oral Daily Jamison Snyder MD   17 g at 10/21/22 0920    potassium chloride SA CR tablet 10 mEq  10 mEq Oral Daily Christian Esquivel MD   10 mEq at 10/21/22 0919    QUEtiapine tablet 25 mg  25 mg Oral QHS Mauricio Torres III, MD   25 mg at 10/20/22 2029    senna tablet 8.6 mg  8.6 mg  Oral BID Jamison Snyder MD   8.6 mg at 10/21/22 0919    simethicone chewable tablet 80 mg  1 tablet Oral TID PRN Jamison Snyder MD   80 mg at 10/05/22 1041    sodium chloride 0.9% flush 10 mL  10 mL Intravenous PRN Lovely Jones MD   10 mL at 10/12/22 2200    timolol maleate 0.5% ophthalmic solution 1 drop  1 drop Both Eyes BID Jamison Snyder MD   1 drop at 10/21/22 0920     Current Discharge Medication List        START taking these medications    Details   amoxicillin-clavulanate 875-125mg (AUGMENTIN) 875-125 mg per tablet Take 1 tablet by mouth every 12 (twelve) hours. for 6 days  Qty: 12 tablet, Refills: 0      magnesium oxide (MAG-OX) 400 mg (241.3 mg magnesium) tablet Take 1 tablet (400 mg total) by mouth 2 (two) times daily.  Qty: 14 tablet, Refills: 0      potassium bicarbonate (K-LYTE) disintegrating tablet Take 1 tablet (25 mEq total) by mouth once daily.  Qty: 10 tablet, Refills: 0           CONTINUE these medications which have NOT CHANGED    Details   allopurinol (ZYLOPRIM) 100 MG tablet TAKE 1 TABLET EVERY DAY  Qty: 90 tablet, Refills: 0    Associated Diagnoses: Joint inflammation      latanoprost 0.005 % ophthalmic solution Place 1 drop into both eyes every evening.  Qty: 7.5 mL, Refills: 2    Associated Diagnoses: Primary open angle glaucoma (POAG) of both eyes, severe stage      multivit-minerals/folic acid (MULTIVITAMIN GUMMIES ORAL) Take by mouth.      oxybutynin (DITROPAN) 5 MG Tab 5 mg once daily.       potassium chloride (MICRO-K) 10 MEQ CpSR TAKE 2 CAPSULES TWICE DAILY  Qty: 360 capsule, Refills: 3    Associated Diagnoses: Bartter syndrome      timolol maleate 0.5% (TIMOPTIC) 0.5 % Drop Place 1 drop into both eyes 2 (two) times daily.  Qty: 20 mL, Refills: 1      aspirin 81 MG Chew Take 81 mg by mouth every other day.       levocetirizine (XYZAL) 5 MG tablet Take 1 tablet (5 mg total) by mouth every evening.  Qty: 30 tablet, Refills: 2           STOP taking these medications        clindamycin (CLEOCIN) 300 MG capsule Comments:   Reason for Stopping:         HYDROcodone-acetaminophen (NORCO) 5-325 mg per tablet Comments:   Reason for Stopping:         mupirocin (BACTROBAN) 2 % ointment Comments:   Reason for Stopping:         azelastine (ASTELIN) 137 mcg (0.1 %) nasal spray Comments:   Reason for Stopping:                 I have seen and examined this patient within the last 30 days. My clinical findings that support the need for the home health skilled services and home bound status are the following:no   Weakness/numbness causing balance and gait disturbance due to Infection making it taxing to leave home.     Diet:   regular diet      Referrals/ Consults  Physical Therapy to evaluate and treat. Evaluate for home safety and equipment needs; Establish/upgrade home exercise program. Perform / instruct on therapeutic exercises, gait training, transfer training, and Range of Motion.  Occupational Therapy to evaluate and treat. Evaluate home environment for safety and equipment needs. Perform/Instruct on transfers, ADL training, ROM, and therapeutic exercises.  Aide to provide assistance with personal care, ADLs, and vital signs.    Activities:   activity as tolerated    Nursing:   Agency to admit patient within 24 hours of hospital discharge unless specified on physician order or at patient request    SN to complete comprehensive assessment including routine vital signs. Instruct on disease process and s/s of complications to report to MD. Review/verify medication list sent home with the patient at time of discharge  and instruct patient/caregiver as needed. Frequency may be adjusted depending on start of care date.     Skilled nurse to perform up to 3 visits PRN for symptoms related to diagnosis      Ok to schedule additional visits based on staff availability and patient request on consecutive days within the home health episode.    When multiple disciplines ordered:    Start of Care occurs  "on Sunday - Wednesday schedule remaining discipline evaluations as ordered on separate consecutive days following the start of care.    Thursday SOC -schedule subsequent evaluations Friday and Monday the following week.     Friday - Saturday SOC - schedule subsequent discipline evaluations on consecutive days starting Monday of the following week.        Home Health Aide:  Nursing Three times weekly, Physical Therapy Three times weekly, Occupational Therapy Three times weekly, and Home Health Aide Three times weekly    Wound Care Orders  yes:  Cleanse wounds and lower leg with Vashe. Filled medial ankle wound with wick of Aquacel and covered with Aquacel and Mepilex foam Covered two other incisions with Mepilex foam. Moisturized lower leg with Remedy moisturizer and wrapped leg from toes to knee with Kerlix roll and 4" Coban to control/manage edema.  Perform every other day    I certify that this patient is confined to his home and needs intermittent skilled nursing care, physical therapy, and occupational therapy.          "

## 2022-10-21 NOTE — DISCHARGE SUMMARY
"[facility] Medicine  Discharge Summary      Patient Name: Mark Church  MRN: [id number]  Patient Class: IP- Inpatient  Admission Date: 10/2/2022  Hospital Length of Stay: 19 days  Discharge Date and Time: 10/21/2022  4:28 PM  Attending Physician: Mauricio Torres III, MD   Discharging Provider: Mauricio Torres III, MD  Primary Care Provider: [facility] [place]       HPI:   Mr. 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  He states this all started about 4 days ago when he was walking in a field. He states he felt some significant itching in his lower leg.  Later that night his leg started to swell and hurt.  He assumed this was caused by an insect bite on his leg, but he never saw a specific bite or insect.  He states that the 2nd night the swelling and pain became severe and he was having fevers and chills which prompted him to visit his primary care provider the next day.  He was prescribed an antibiotic, not sure which one, and despite taking this his leg has continued to get worse.  He notes he has been having fevers and chills and last night he "slid or fell" out of his chair when he was trying to get up because the pain was so bad.  He notes he uses a walker to ambulate at home.  He also notes a chronic cough ascociated wiith allergic rhinitis and had an episode of nausea and vomiting one week ago.  He reports diminished oral intake due to decreased appetite, but no nausea or vomiting since this all started 4 days ago.  He denies chest pain, shortness of breath, headache, palpitations, dysuria and diarrhea.      Procedure(s) (LRB):  INCISION AND DRAINAGE, ANKLE (Left)      Hospital Course:   Admitted with sepsis secondary to left lower extremity cellulitis. Started on IV antibiotics, IVF and potassium replacement (has Bartter syndrome). Surgery consulted for concern for nec " "fasc/bone involvement. CT did not show any evidence of abscess/gas. Seems to be improving though still significant swelling and blistering. ID consulted for antibiotic recommendations. Repeat CT ordered for worsening white count which did not show a clear abscess. Patient still with poor improvement, he underwent I&D with Orthopedic surgery and however no abscess was found but he did have "murky fluid" in tissue planes which were irrigated. He has been doing well since, WBC resolved and pain improving. PT/OT has been consulted and patient has been having difficulty participating due to orthostatic hypotension. Fluid ws given and low dose midodrine added which seems to have improved some but still limiting patient.         Goals of Care Treatment Preferences:  Code Status: Full Code      Consults:   Consults (From admission, onward)        Status Ordering Provider     Inpatient consult to Psychiatry  Once        Provider:  Ronaldo Berrios MD    Completed RULA NORIEGA III     Inpatient virtual consult to Hospital Medicine  Once        Provider:  Layla Agosto MD    Completed RULA NORIEGA III     Inpatient consult to Orthopedic Surgery  Once        Provider:  Sergei Hanna MD    Completed RACHID BAH     Inpatient consult to Infectious Diseases  Once        Provider:  (Not yet assigned)    Completed RONALDO HART     Inpatient consult to General Surgery  Once        Provider:  Edgardo Siddiqui MD    Completed ISABELLE MORATAYA          No new Assessment & Plan notes have been filed under this hospital service since the last note was generated.  Service: Hospital Medicine    Final Active Diagnoses:    Diagnosis Date Noted POA    PRINCIPAL PROBLEM:  Sepsis [A41.9] 10/02/2022 Yes    Acute encephalopathy [G93.40] 10/19/2022 Yes    Hypotension [I95.9] 10/18/2022 Yes    Debility [R53.81] 10/02/2022 Yes    Hypokalemia [E87.6] 10/02/2022 Yes    Cellulitis [L03.90] 10/03/2022 Yes " "   Cellulitis of left lower extremity [L03.116] 10/02/2022 Yes    Normocytic anemia [D64.9] 10/02/2022 Yes    Hyponatremia [E87.1] 10/02/2022 Yes    Chronic gout [M1A.9XX0] 10/02/2022 Yes    Chronic cough [R05.3] 10/02/2022 Yes    Primary open angle glaucoma of both eyes, severe stage [H40.1133] 05/18/2018 Yes    Bartter syndrome [E26.81] 02/02/2016 Yes    H/O prostate cancer [Z85.46] 02/02/2016 Not Applicable      Problems Resolved During this Admission:       Discharged Condition: fair    Disposition: Home or Self Care    Follow Up:   Follow-up Information     [facility] [place]  Follow up.    Why: Please schedule a hospital follow up with your PCP at [facility] within 1 week for medical care.  Contact information:  [address]  [facility] [address]  [phone]             [facility] Follow up.    Specialty: Home Health Services  Why: Home Health  Contact information:  [address]  [address]  [place] [address]  [phone]                       Patient Instructions:      HOSPITAL BED FOR HOME USE     Order Specific Question Answer Comments   Type: Semi-electric    Length of need (1-99 months): 99    Does patient have medical equipment at home? grab bar    Height: 5' 7" (1.702 m)    Weight: 61.6 kg (135 lb 12.9 oz)    Please check all that apply: Patient requires positioning of the body in ways not feasible in an ordinary bed due to a medical condition which is expected to last at least one month.    Please check all that apply: Patient requires frequent changes in body position and/or has an immediate need for a change in body position.      3 IN 1 COMMODE FOR HOME USE     Order Specific Question Answer Comments   Type: Standard    Height: 5' 7" (1.702 m)    Weight: 61.6 kg (135 lb 12.9 oz)    Does patient have medical equipment at home? grab bar    Length of need (1-99 months): 99      WALKER FOR HOME USE     Order Specific Question " "Answer Comments   Type of Walker: Adult (5'4"-6'6")    With wheels? Yes    Height: 5' 7" (1.702 m)    Weight: 61.6 kg (135 lb 12.9 oz)    Length of need (1-99 months): 99    Does patient have medical equipment at home? grab bar    Please check all that apply: Patient's condition impairs ambulation.      Ambulatory referral/consult to Wound Clinic   Standing Status: Future   Referral Priority: Routine Referral Type: Consultation   Referral Reason: Specialty Services Required   Requested Specialty: Wound Care   Number of Visits Requested: 1     Ambulatory referral/consult to [facility]   Standing Status: Future   Referral Priority: Routine Referral Type: Consultation   Referral Reason: Specialty Services Required   Number of Visits Requested: 1     Ambulatory referral/consult to Hematology / Oncology   Standing Status: Future   Referral Priority: Routine Referral Type: Consultation   Referral Reason: Specialty Services Required   Requested Specialty: Hematology and Oncology   Number of Visits Requested: 1     Diet Cardiac     Notify your health care provider if you experience any of the following:  increased confusion or weakness     Notify your health care provider if you experience any of the following:  persistent dizziness, light-headedness, or visual disturbances     Notify your health care provider if you experience any of the following:  worsening rash     Notify your health care provider if you experience any of the following:  severe persistent headache     Notify your health care provider if you experience any of the following:  difficulty breathing or increased cough     Notify your health care provider if you experience any of the following:  redness, tenderness, or signs of infection (pain, swelling, redness, odor or green/yellow discharge around incision site)     Notify your health care provider if you experience any of the following:  severe uncontrolled pain     Notify your health care " provider if you experience any of the following:  persistent nausea and vomiting or diarrhea     Notify your health care provider if you experience any of the following:  temperature >100.4     Activity as tolerated       Significant Diagnostic Studies: Labs: All labs within the past 24 hours have been reviewed    Pending Diagnostic Studies:     None         Medications:  Reconciled Home Medications:      Medication List      START taking these medications    amoxicillin-clavulanate 875-125mg 875-125 mg per tablet  Commonly known as: AUGMENTIN  Take 1 tablet by mouth every 12 (twelve) hours. for 6 days     magnesium oxide 400 mg (241.3 mg magnesium) tablet  Commonly known as: MAG-OX  Take 1 tablet (400 mg total) by mouth 2 (two) times daily.     potassium bicarbonate disintegrating tablet  Commonly known as: K-LYTE  Take 1 tablet (25 mEq total) by mouth once daily.        CONTINUE taking these medications    allopurinoL 100 MG tablet  Commonly known as: ZYLOPRIM  TAKE 1 TABLET EVERY DAY     aspirin 81 MG Chew  Take 81 mg by mouth every other day.     latanoprost 0.005 % ophthalmic solution  Place 1 drop into both eyes every evening.     levocetirizine 5 MG tablet  Commonly known as: XYZAL  Take 1 tablet (5 mg total) by mouth every evening.     MULTIVITAMIN GUMMIES ORAL  Take by mouth.     oxybutynin 5 MG Tab  Commonly known as: DITROPAN  5 mg once daily.     potassium chloride 10 MEQ Cpsr  Commonly known as: MICRO-K  TAKE 2 CAPSULES TWICE DAILY     timolol maleate 0.5% 0.5 % Drop  Commonly known as: TIMOPTIC  Place 1 drop into both eyes 2 (two) times daily.        STOP taking these medications    azelastine 137 mcg (0.1 %) nasal spray  Commonly known as: ASTELIN     clindamycin 300 MG capsule  Commonly known as: CLEOCIN     HYDROcodone-acetaminophen 5-325 mg per tablet  Commonly known as: NORCO     mupirocin 2 % ointment  Commonly known as: BACTROBAN            Indwelling Lines/Drains at time of discharge:    Lines/Drains/Airways     None                 Time spent on the discharge of patient: >35 minutes         Mauricio Torres III, MD  Department of Hospital Medicine  [facility]

## 2022-10-21 NOTE — PT/OT/SLP PROGRESS
Occupational Therapy   Treatment    Name: Mark Church  MRN: [id number]  Admitting Diagnosis:  Sepsis  12 Days Post-Op    Recommendations:     Discharge Recommendations: home health OT  Discharge Equipment Recommendations: bedside commode, bath bench, walker, rolling  Barriers to Discharge: None    Assessment:     Mark Church is a 72 y.o. male with a medical diagnosis of sepsis.  He presents with improved clarity of thought, reduced pain, and stabilized BP today.  Performance deficits affecting function are weakness, impaired endurance, impaired self-care skills, gait instability, impaired cognition, impaired cardiopulmonary response to activity, decreased lower extremity function, and impaired functional mobility.     Rehab Prognosis:  Good; patient would benefit from acute skilled OT services to address these deficits and reach maximum level of function.       Plan:     Patient to be seen (N/A; patient to D/C to home with home health) to address the above listed problems via (N/A; patient to D/C to home with home health).  Plan of Care Expires: 11/10/22  Plan of Care Reviewed with: patient, other (see comments) ()    Subjective     Pain/Comfort:  Pain Rating 1: 0/10  Pain Rating Post-Intervention 1: 0/10    Objective:     Communicated with: NurseAnn, prior to session.  Patient found in bed with HOB elevated with bed alarm, pressure-relief boot and wrap to LLE, telesitter present, and IV site at L hand upon OT entry to room.    General Precautions: Standard, fall, other (see comments) (orthostatic hypotension)   Orthopedic Precautions: LLE weight-bearing as tolerated   Braces: N/A  Respiratory Status: Room air     Occupational Performance:     Bed Mobility:    CGA to roll to L side and sit upright at EOB     Functional Mobility/Transfers:  Transfers: Patient stood from EOB with RW and SBA today.    Functional Mobility: Patient ambulated from bed to recliner with use of RW and SBA.     Activities of  Daily Living:  Grooming: Set-Up A while seated in recliner       [facility] 6 Click ADL: 20    Treatment & Education:  Patient was feeling well (pain: 0/10 per his verbal report) and demonstrated excellent participation today.  Discussed D/C planning and DME rec's with patient and his , Anna.  Patient will require a BSC and a bath bench as well as a new RW.  Patient was reminded of his LLE weight-bearing precautions of WBAT.  He adhered to these precautions well and performed RW transfers with SBA today.  Patient with no significant incidents of orthostatic hypotension today; he had no c/o of dizziness during transitional movements.  He required CGA to roll to L side and sit upright at EOB.  BP was 105/55 and SpO2 was 100% while seated at EOB.  He performed grooming tasks while seated with Set-Up A.      Patient is pending D/C to home today with home health and assistance from his wife as needed.               Patient left seated in recliner with BLE's elevated with call button within reach, nurse informed, BLE's supported with pillow, telesitter present, and IV site intact.     GOALS:   Multidisciplinary Problems       Occupational Therapy Goals          Problem: Occupational Therapy    Goal Priority Disciplines Outcome Interventions   Occupational Therapy Goal     OT, PT/OT Ongoing, Progressing    Description: Goals to be met by: 10/31/2022     Patient will increase functional independence with ADL's by performing:    UE Dressing with Modified Collier.  LE Dressing with Supervision.  Grooming while standing with Supervision.  Toileting from bedside commode with Supervision for hygiene and clothing management.   Step transfer with Supervision.  Toilet transfer to bedside commode with Supervision.  Upper extremity exercise program x 10 reps per handout, with Collier.                         Time Tracking:     OT Date of Treatment: 10/21/22  OT Start Time: 1118  OT Stop Time: 1141  OT Total Time  (min): 23 min    Billable Minutes:Self Care/Home Management 8 mins  Therapeutic Activity 15 mins    OT/JOSELO: OT          10/21/2022

## 2022-10-21 NOTE — PLAN OF CARE
Patient had an uneventful night. NAD. VSS. Patient denies pain. AAO4. Telestitter at . Dressing to LLE dry and intact. Discussed POC, all questions answered, verbalized understanding. Will continue to monitor.  Problem: Adult Inpatient Plan of Care  Goal: Plan of Care Review  Outcome: Ongoing, Progressing  Goal: Patient-Specific Goal (Individualized)  Outcome: Ongoing, Progressing  Goal: Absence of Hospital-Acquired Illness or Injury  Outcome: Ongoing, Progressing     Problem: Glycemic Control Impaired (Sepsis/Septic Shock)  Goal: Blood Glucose Level Within Desired Range  Outcome: Ongoing, Progressing     Problem: Infection Progression (Sepsis/Septic Shock)  Goal: Absence of Infection Signs and Symptoms  Outcome: Ongoing, Progressing     Problem: Skin Injury Risk Increased  Goal: Skin Health and Integrity  Outcome: Ongoing, Progressing     Problem: Impaired Wound Healing  Goal: Optimal Wound Healing  Outcome: Ongoing, Progressing     Problem: Pain Acute  Goal: Acceptable Pain Control and Functional Ability  Outcome: Ongoing, Progressing

## 2022-10-21 NOTE — PLAN OF CARE
Plan for discharge with [facility]. Plan of care sent to [facility] via care port. Per Hermelinda with [facility], approved for bsc to be delivered to patient bedside. Referral placed for [facility] per physician.     Spoke with patient's spouse regarding discharge planning. Pt's spouse accepting to [facility] and care at home. Stated pt has a bsc at home.     Ambulatory referral for out patient wound care clinic placed per physician, listed on avs.     Per Tamar with [facility], pt received rollator in 2019 and does not qualify for .        10/21/22 1124   Post-Acute Status   Post-Acute Authorization Home Health   Home Health Status Pending post-acute provider review/more information requested   Coverage [employer]   Discharge Delays None known at this time   Discharge Plan   Discharge Plan A Home Health   Discharge Plan B Home with family

## 2022-10-21 NOTE — PLAN OF CARE
Problem: Adult Inpatient Plan of Care  Goal: Plan of Care Review  Outcome: Met  Goal: Patient-Specific Goal (Individualized)  Outcome: Met  Goal: Absence of Hospital-Acquired Illness or Injury  Outcome: Met  Goal: Optimal Comfort and Wellbeing  Outcome: Met  Goal: Readiness for Transition of Care  Outcome: Met     Problem: Adjustment to Illness (Sepsis/Septic Shock)  Goal: Optimal Coping  Outcome: Met     Problem: Bleeding (Sepsis/Septic Shock)  Goal: Absence of Bleeding  Outcome: Met     Problem: Glycemic Control Impaired (Sepsis/Septic Shock)  Goal: Blood Glucose Level Within Desired Range  Outcome: Met     Problem: Infection Progression (Sepsis/Septic Shock)  Goal: Absence of Infection Signs and Symptoms  Outcome: Met     Problem: Nutrition Impaired (Sepsis/Septic Shock)  Goal: Optimal Nutrition Intake  Outcome: Met     Problem: Skin Injury Risk Increased  Goal: Skin Health and Integrity  Outcome: Met     Problem: Impaired Wound Healing  Goal: Optimal Wound Healing  Outcome: Met     Problem: Pain Acute  Goal: Acceptable Pain Control and Functional Ability  Outcome: Met     Problem: Fall Injury Risk  Goal: Absence of Fall and Fall-Related Injury  Outcome: Met     Problem: Mobility Impairment  Goal: Optimal Mobility  Outcome: Met     Problem: Restraint, Nonbehavioral (Nonviolent)  Goal: Absence of Harm or Injury  Outcome: Met

## 2022-10-24 ENCOUNTER — PATIENT OUTREACH (OUTPATIENT)
Dept: ADMINISTRATIVE | Facility: CLINIC | Age: 72
End: 2022-10-24
Payer: MEDICARE

## 2022-10-24 NOTE — PROGRESS NOTES
C3 nurse attempted to contact Mark Church for a TCC post hospital discharge follow up call. No answer. The patient does not have a scheduled HOSFU appointment, and the pt does not have an [facility] PCP.

## 2022-10-27 ENCOUNTER — PES CALL (OUTPATIENT)
Dept: ADMINISTRATIVE | Facility: CLINIC | Age: 72
End: 2022-10-27
Payer: MEDICARE

## 2022-10-28 ENCOUNTER — PES CALL (OUTPATIENT)
Dept: ADMINISTRATIVE | Facility: CLINIC | Age: 72
End: 2022-10-28
Payer: MEDICARE

## 2022-10-31 ENCOUNTER — PES CALL (OUTPATIENT)
Dept: ADMINISTRATIVE | Facility: CLINIC | Age: 72
End: 2022-10-31
Payer: MEDICARE

## 2022-11-03 ENCOUNTER — TELEPHONE (OUTPATIENT)
Dept: EMERGENCY MEDICINE | Facility: HOSPITAL | Age: 72
End: 2022-11-03
Payer: MEDICARE

## 2022-11-09 LAB
FUNGUS SPEC CULT: NORMAL
FUNGUS SPEC CULT: NORMAL

## 2022-11-15 ENCOUNTER — OFFICE VISIT (OUTPATIENT)
Dept: HEMATOLOGY/ONCOLOGY | Facility: CLINIC | Age: 72
End: 2022-11-15
Payer: MEDICARE

## 2022-11-15 ENCOUNTER — LAB VISIT (OUTPATIENT)
Dept: LAB | Facility: HOSPITAL | Age: 72
End: 2022-11-15
Attending: STUDENT IN AN ORGANIZED HEALTH CARE EDUCATION/TRAINING PROGRAM
Payer: MEDICARE

## 2022-11-15 VITALS
DIASTOLIC BLOOD PRESSURE: 65 MMHG | HEART RATE: 70 BPM | BODY MASS INDEX: 20.55 KG/M2 | OXYGEN SATURATION: 99 % | WEIGHT: 130.94 LBS | HEIGHT: 67 IN | SYSTOLIC BLOOD PRESSURE: 110 MMHG

## 2022-11-15 DIAGNOSIS — L03.90 CELLULITIS: ICD-10-CM

## 2022-11-15 DIAGNOSIS — D46.4 REFRACTORY ANEMIA, UNSPECIFIED: ICD-10-CM

## 2022-11-15 DIAGNOSIS — D75.839 THROMBOCYTHEMIA: ICD-10-CM

## 2022-11-15 DIAGNOSIS — R20.9 UNSPECIFIED DISTURBANCES OF SKIN SENSATION: ICD-10-CM

## 2022-11-15 LAB
BASOPHILS # BLD AUTO: 0.02 K/UL (ref 0–0.2)
BASOPHILS NFR BLD: 0.2 % (ref 0–1.9)
DIFFERENTIAL METHOD: ABNORMAL
EOSINOPHIL # BLD AUTO: 0.6 K/UL (ref 0–0.5)
EOSINOPHIL NFR BLD: 4.7 % (ref 0–8)
ERYTHROCYTE [DISTWIDTH] IN BLOOD BY AUTOMATED COUNT: 16.1 % (ref 11.5–14.5)
ERYTHROCYTE [SEDIMENTATION RATE] IN BLOOD BY WESTERGREN METHOD: 100 MM/HR (ref 0–10)
FOLATE SERPL-MCNC: 14.3 NG/ML (ref 4–24)
HCT VFR BLD AUTO: 35.4 % (ref 40–54)
HGB BLD-MCNC: 11.8 G/DL (ref 14–18)
IMM GRANULOCYTES # BLD AUTO: 0.07 K/UL (ref 0–0.04)
IMM GRANULOCYTES NFR BLD AUTO: 0.6 % (ref 0–0.5)
LYMPHOCYTES # BLD AUTO: 2.9 K/UL (ref 1–4.8)
LYMPHOCYTES NFR BLD: 23.8 % (ref 18–48)
MCH RBC QN AUTO: 28.5 PG (ref 27–31)
MCHC RBC AUTO-ENTMCNC: 33.3 G/DL (ref 32–36)
MCV RBC AUTO: 86 FL (ref 82–98)
MONOCYTES # BLD AUTO: 1 K/UL (ref 0.3–1)
MONOCYTES NFR BLD: 7.7 % (ref 4–15)
NEUTROPHILS # BLD AUTO: 7.7 K/UL (ref 1.8–7.7)
NEUTROPHILS NFR BLD: 63 % (ref 38–73)
NRBC BLD-RTO: 0 /100 WBC
PATH REV BLD -IMP: NORMAL
PATH REV BLD -IMP: NORMAL
PLATELET # BLD AUTO: 431 K/UL (ref 150–450)
PMV BLD AUTO: 10.5 FL (ref 9.2–12.9)
RBC # BLD AUTO: 4.14 M/UL (ref 4.6–6.2)
RETICS/RBC NFR AUTO: 2.9 % (ref 0.4–2)
WBC # BLD AUTO: 12.29 K/UL (ref 3.9–12.7)

## 2022-11-15 PROCEDURE — 1159F PR MEDICATION LIST DOCUMENTED IN MEDICAL RECORD: ICD-10-PCS | Mod: CPTII,S$GLB,, | Performed by: STUDENT IN AN ORGANIZED HEALTH CARE EDUCATION/TRAINING PROGRAM

## 2022-11-15 PROCEDURE — 81219 CALR GENE COM VARIANTS: CPT | Performed by: STUDENT IN AN ORGANIZED HEALTH CARE EDUCATION/TRAINING PROGRAM

## 2022-11-15 PROCEDURE — 81270 JAK2 GENE: CPT | Performed by: STUDENT IN AN ORGANIZED HEALTH CARE EDUCATION/TRAINING PROGRAM

## 2022-11-15 PROCEDURE — 3078F PR MOST RECENT DIASTOLIC BLOOD PRESSURE < 80 MM HG: ICD-10-PCS | Mod: CPTII,S$GLB,, | Performed by: STUDENT IN AN ORGANIZED HEALTH CARE EDUCATION/TRAINING PROGRAM

## 2022-11-15 PROCEDURE — 81403 MOPATH PROCEDURE LEVEL 4: CPT | Performed by: STUDENT IN AN ORGANIZED HEALTH CARE EDUCATION/TRAINING PROGRAM

## 2022-11-15 PROCEDURE — 85060 BLOOD SMEAR INTERPRETATION: CPT | Mod: ,,, | Performed by: PATHOLOGY

## 2022-11-15 PROCEDURE — 99205 PR OFFICE/OUTPT VISIT, NEW, LEVL V, 60-74 MIN: ICD-10-PCS | Mod: S$GLB,,, | Performed by: STUDENT IN AN ORGANIZED HEALTH CARE EDUCATION/TRAINING PROGRAM

## 2022-11-15 PROCEDURE — 85060 PATHOLOGIST REVIEW: ICD-10-PCS | Mod: ,,, | Performed by: PATHOLOGY

## 2022-11-15 PROCEDURE — 3078F DIAST BP <80 MM HG: CPT | Mod: CPTII,S$GLB,, | Performed by: STUDENT IN AN ORGANIZED HEALTH CARE EDUCATION/TRAINING PROGRAM

## 2022-11-15 PROCEDURE — 83921 ORGANIC ACID SINGLE QUANT: CPT | Performed by: STUDENT IN AN ORGANIZED HEALTH CARE EDUCATION/TRAINING PROGRAM

## 2022-11-15 PROCEDURE — 99999 PR PBB SHADOW E&M-EST. PATIENT-LVL IV: CPT | Mod: PBBFAC,,, | Performed by: STUDENT IN AN ORGANIZED HEALTH CARE EDUCATION/TRAINING PROGRAM

## 2022-11-15 PROCEDURE — 99999 PR PBB SHADOW E&M-EST. PATIENT-LVL IV: ICD-10-PCS | Mod: PBBFAC,,, | Performed by: STUDENT IN AN ORGANIZED HEALTH CARE EDUCATION/TRAINING PROGRAM

## 2022-11-15 PROCEDURE — 3288F FALL RISK ASSESSMENT DOCD: CPT | Mod: CPTII,S$GLB,, | Performed by: STUDENT IN AN ORGANIZED HEALTH CARE EDUCATION/TRAINING PROGRAM

## 2022-11-15 PROCEDURE — 85652 RBC SED RATE AUTOMATED: CPT | Performed by: STUDENT IN AN ORGANIZED HEALTH CARE EDUCATION/TRAINING PROGRAM

## 2022-11-15 PROCEDURE — 84238 ASSAY NONENDOCRINE RECEPTOR: CPT | Performed by: STUDENT IN AN ORGANIZED HEALTH CARE EDUCATION/TRAINING PROGRAM

## 2022-11-15 PROCEDURE — 3288F PR FALLS RISK ASSESSMENT DOCUMENTED: ICD-10-PCS | Mod: CPTII,S$GLB,, | Performed by: STUDENT IN AN ORGANIZED HEALTH CARE EDUCATION/TRAINING PROGRAM

## 2022-11-15 PROCEDURE — 1126F AMNT PAIN NOTED NONE PRSNT: CPT | Mod: CPTII,S$GLB,, | Performed by: STUDENT IN AN ORGANIZED HEALTH CARE EDUCATION/TRAINING PROGRAM

## 2022-11-15 PROCEDURE — 3008F PR BODY MASS INDEX (BMI) DOCUMENTED: ICD-10-PCS | Mod: CPTII,S$GLB,, | Performed by: STUDENT IN AN ORGANIZED HEALTH CARE EDUCATION/TRAINING PROGRAM

## 2022-11-15 PROCEDURE — 1101F PT FALLS ASSESS-DOCD LE1/YR: CPT | Mod: CPTII,S$GLB,, | Performed by: STUDENT IN AN ORGANIZED HEALTH CARE EDUCATION/TRAINING PROGRAM

## 2022-11-15 PROCEDURE — 1111F PR DISCHARGE MEDS RECONCILED W/ CURRENT OUTPATIENT MED LIST: ICD-10-PCS | Mod: CPTII,S$GLB,, | Performed by: STUDENT IN AN ORGANIZED HEALTH CARE EDUCATION/TRAINING PROGRAM

## 2022-11-15 PROCEDURE — 1111F DSCHRG MED/CURRENT MED MERGE: CPT | Mod: CPTII,S$GLB,, | Performed by: STUDENT IN AN ORGANIZED HEALTH CARE EDUCATION/TRAINING PROGRAM

## 2022-11-15 PROCEDURE — 82746 ASSAY OF FOLIC ACID SERUM: CPT | Performed by: STUDENT IN AN ORGANIZED HEALTH CARE EDUCATION/TRAINING PROGRAM

## 2022-11-15 PROCEDURE — 3008F BODY MASS INDEX DOCD: CPT | Mod: CPTII,S$GLB,, | Performed by: STUDENT IN AN ORGANIZED HEALTH CARE EDUCATION/TRAINING PROGRAM

## 2022-11-15 PROCEDURE — 81339 MPL GENE SEQ ALYS EXON 10: CPT | Performed by: STUDENT IN AN ORGANIZED HEALTH CARE EDUCATION/TRAINING PROGRAM

## 2022-11-15 PROCEDURE — 1101F PR PT FALLS ASSESS DOC 0-1 FALLS W/OUT INJ PAST YR: ICD-10-PCS | Mod: CPTII,S$GLB,, | Performed by: STUDENT IN AN ORGANIZED HEALTH CARE EDUCATION/TRAINING PROGRAM

## 2022-11-15 PROCEDURE — 85025 COMPLETE CBC W/AUTO DIFF WBC: CPT | Performed by: STUDENT IN AN ORGANIZED HEALTH CARE EDUCATION/TRAINING PROGRAM

## 2022-11-15 PROCEDURE — 3074F SYST BP LT 130 MM HG: CPT | Mod: CPTII,S$GLB,, | Performed by: STUDENT IN AN ORGANIZED HEALTH CARE EDUCATION/TRAINING PROGRAM

## 2022-11-15 PROCEDURE — 1159F MED LIST DOCD IN RCRD: CPT | Mod: CPTII,S$GLB,, | Performed by: STUDENT IN AN ORGANIZED HEALTH CARE EDUCATION/TRAINING PROGRAM

## 2022-11-15 PROCEDURE — 3074F PR MOST RECENT SYSTOLIC BLOOD PRESSURE < 130 MM HG: ICD-10-PCS | Mod: CPTII,S$GLB,, | Performed by: STUDENT IN AN ORGANIZED HEALTH CARE EDUCATION/TRAINING PROGRAM

## 2022-11-15 PROCEDURE — 3044F PR MOST RECENT HEMOGLOBIN A1C LEVEL <7.0%: ICD-10-PCS | Mod: CPTII,S$GLB,, | Performed by: STUDENT IN AN ORGANIZED HEALTH CARE EDUCATION/TRAINING PROGRAM

## 2022-11-15 PROCEDURE — 36415 COLL VENOUS BLD VENIPUNCTURE: CPT | Performed by: STUDENT IN AN ORGANIZED HEALTH CARE EDUCATION/TRAINING PROGRAM

## 2022-11-15 PROCEDURE — 1126F PR PAIN SEVERITY QUANTIFIED, NO PAIN PRESENT: ICD-10-PCS | Mod: CPTII,S$GLB,, | Performed by: STUDENT IN AN ORGANIZED HEALTH CARE EDUCATION/TRAINING PROGRAM

## 2022-11-15 PROCEDURE — 99205 OFFICE O/P NEW HI 60 MIN: CPT | Mod: S$GLB,,, | Performed by: STUDENT IN AN ORGANIZED HEALTH CARE EDUCATION/TRAINING PROGRAM

## 2022-11-15 PROCEDURE — 3044F HG A1C LEVEL LT 7.0%: CPT | Mod: CPTII,S$GLB,, | Performed by: STUDENT IN AN ORGANIZED HEALTH CARE EDUCATION/TRAINING PROGRAM

## 2022-11-15 PROCEDURE — 85045 AUTOMATED RETICULOCYTE COUNT: CPT | Performed by: STUDENT IN AN ORGANIZED HEALTH CARE EDUCATION/TRAINING PROGRAM

## 2022-11-15 NOTE — PROGRESS NOTES
Hematology- Oncology Clinic Note :     11/15/2022    Chief Complaint   Patient presents with    Establish Care    thrombocytopenia    Cellulitis         HPI    Pt is a 72 year old man with past medical history significant for Bartter syndrome, gout, glaucoma and urinary incontinence who presented for evaluation of abnormal CBC.  Of note pt was admitted recently for swelling and pain in his left leg.  He states this all started about 4 days prior to presentation when he was walking in a field.  Pt was then admitted for sepsis secondary to left lower extremity cellulitis and started on IV antibiotics. Surgery consulted for concern for nec fasc/bone involvement. CT did not show any evidence of abscess/gas.  He underwent I&D with Orthopedic surgery and however no abscess but area was irrigated. Pt was discharged with follow up to heme clinic.      Active Problem List with Overview Notes    Diagnosis Date Noted    Acute encephalopathy 10/19/2022    Hypotension 10/18/2022    Cellulitis 10/03/2022    Sepsis 10/02/2022    Cellulitis of left lower extremity 10/02/2022    Normocytic anemia 10/02/2022    Hyponatremia 10/02/2022    Hypokalemia 10/02/2022    Chronic gout 10/02/2022    Chronic cough 10/02/2022    Debility 10/02/2022    Blindness of left eye with normal vision in contralateral eye 02/14/2020    Primary open angle glaucoma of both eyes, severe stage 05/18/2018    Left hip pain 03/07/2018    Primary osteoarthritis of left shoulder 08/31/2017    Joint inflammation 08/31/2017    Thrombocythemia 08/31/2017    Bilateral leg edema 07/24/2017    Chronic pain syndrome 03/23/2017    Post-traumatic osteoarthritis of multiple joints 03/23/2017    Nuclear sclerosis, bilateral 04/04/2016    Refractive error 04/04/2016    Blind left eye 04/04/2016    H/O prostate cancer 02/02/2016    Bartter syndrome 02/02/2016    Glaucoma 02/02/2016    Urinary incontinence 02/02/2016       Patient Active Problem List    Diagnosis Date Noted     Acute encephalopathy 10/19/2022    Hypotension 10/18/2022    Cellulitis 10/03/2022    Sepsis 10/02/2022    Cellulitis of left lower extremity 10/02/2022    Normocytic anemia 10/02/2022    Hyponatremia 10/02/2022    Hypokalemia 10/02/2022    Chronic gout 10/02/2022    Chronic cough 10/02/2022    Debility 10/02/2022    Blindness of left eye with normal vision in contralateral eye 02/14/2020    Primary open angle glaucoma of both eyes, severe stage 05/18/2018    Left hip pain 03/07/2018    Primary osteoarthritis of left shoulder 08/31/2017    Joint inflammation 08/31/2017    Thrombocythemia 08/31/2017    Bilateral leg edema 07/24/2017    Chronic pain syndrome 03/23/2017    Post-traumatic osteoarthritis of multiple joints 03/23/2017    Nuclear sclerosis, bilateral 04/04/2016    Refractive error 04/04/2016    Blind left eye 04/04/2016    H/O prostate cancer 02/02/2016    Bartter syndrome 02/02/2016    Glaucoma 02/02/2016    Urinary incontinence 02/02/2016     Past Medical History:   Diagnosis Date    Arthritis     Bartter syndrome     Blind left eye     Cancer     Cataract     Glaucoma     History of psychiatric hospitalization     Normocytic anemia 10/02/2022    Prostate cancer     Psychiatric problem     Urinary incontinence       Past Surgical History:   Procedure Laterality Date    ANKLE INCISION AND DRAINAGE Left 10/9/2022    Procedure: INCISION AND DRAINAGE, ANKLE;  Surgeon: Jamison Snyder MD;  Location: [facility];  Service: Orthopedics;  Laterality: Left;    CHOLECYSTECTOMY      JOINT REPLACEMENT      PROSTATE SURGERY      REFRACTIVE SURGERY Bilateral     ROTATOR CUFF REPAIR Right     sinus polyp        (Not in a hospital admission)    Review of patient's allergies indicates:   Allergen Reactions    Compazine [prochlorperazine edisylate]       Social History     Tobacco Use    Smoking status: Never    Smokeless tobacco: Never   Substance Use Topics    Alcohol use: No     Alcohol/week: 0.0 standard drinks       Family History   Problem Relation Age of Onset    Cataracts Father     No Known Problems Mother     Blindness Maternal Aunt     Glaucoma Maternal Aunt     No Known Problems Sister     No Known Problems Brother     No Known Problems Sister     No Known Problems Sister     No Known Problems Brother     No Known Problems Maternal Uncle     No Known Problems Paternal Aunt     No Known Problems Paternal Uncle     No Known Problems Maternal Grandmother     No Known Problems Maternal Grandfather     No Known Problems Paternal Grandmother     No Known Problems Paternal Grandfather     Amblyopia Neg Hx     Macular degeneration Neg Hx     Retinal detachment Neg Hx     Strabismus Neg Hx     Cancer Neg Hx     Diabetes Neg Hx     Hypertension Neg Hx     Stroke Neg Hx     Thyroid disease Neg Hx         Review of Systems :  Review of Systems   Constitutional:  Negative for fever.   HENT:  Negative for hearing loss.    Eyes:  Negative for blurred vision.   Respiratory:  Negative for cough.    Cardiovascular:  Negative for chest pain.   Gastrointestinal:  Negative for heartburn.   Genitourinary:  Negative for dysuria.   Musculoskeletal:  Negative for myalgias.        Feet wrapped due to cellulitis   Skin:  Negative for itching and rash.   Neurological:  Negative for dizziness and focal weakness.   Endo/Heme/Allergies:  Does not bruise/bleed easily.   Psychiatric/Behavioral:  Negative for depression. The patient is not nervous/anxious.      Physical Exam :  Wt Readings from Last 3 Encounters:   11/15/22 59.4 kg (130 lb 15.3 oz)   10/02/22 61.6 kg (135 lb 12.9 oz)   07/21/22 61.8 kg (136 lb 3.2 oz)     Temp Readings from Last 3 Encounters:   10/21/22 98.9 °F (37.2 °C) (Oral)   07/21/22 97.8 °F (36.6 °C) (Skin)   06/16/22 97.9 °F (36.6 °C) (Skin)     BP Readings from Last 3 Encounters:   11/15/22 110/65   10/21/22 (!) 127/59   07/21/22 116/66     Pulse Readings from Last 3 Encounters:   11/15/22 70   10/21/22 (!) 113   07/21/22 78      Body mass index is 20.51 kg/m².    Physical Exam  Vitals reviewed.   HENT:      Head: Normocephalic and atraumatic.      Nose: Nose normal.      Mouth/Throat:      Mouth: Mucous membranes are moist.   Eyes:      Pupils: Pupils are equal, round, and reactive to light.   Cardiovascular:      Rate and Rhythm: Normal rate and regular rhythm.      Heart sounds: Normal heart sounds.   Pulmonary:      Breath sounds: Normal breath sounds.   Abdominal:      General: Abdomen is flat.      Palpations: Abdomen is soft.   Musculoskeletal:         General: Normal range of motion.      Cervical back: Normal range of motion and neck supple.   Skin:     General: Skin is warm and dry.      Comments: Feet in bandages   Neurological:      Mental Status: He is alert and oriented to person, place, and time.   Psychiatric:         Mood and Affect: Mood normal.         Behavior: Behavior normal.         Thought Content: Thought content normal.         Judgment: Judgment normal.         Pertinent Diagnostic studies:    Recent Results (from the past 24 hour(s))   CBC W/ AUTO DIFFERENTIAL    Collection Time: 11/15/22 11:00 AM   Result Value Ref Range    WBC 12.29 3.90 - 12.70 K/uL    RBC 4.14 (L) 4.60 - 6.20 M/uL    Hemoglobin 11.8 (L) 14.0 - 18.0 g/dL    Hematocrit 35.4 (L) 40.0 - 54.0 %    MCV 86 82 - 98 fL    MCH 28.5 27.0 - 31.0 pg    MCHC 33.3 32.0 - 36.0 g/dL    RDW 16.1 (H) 11.5 - 14.5 %    Platelets 431 150 - 450 K/uL    MPV 10.5 9.2 - 12.9 fL    Immature Granulocytes 0.6 (H) 0.0 - 0.5 %    Gran # (ANC) 7.7 1.8 - 7.7 K/uL    Immature Grans (Abs) 0.07 (H) 0.00 - 0.04 K/uL    Lymph # 2.9 1.0 - 4.8 K/uL    Mono # 1.0 0.3 - 1.0 K/uL    Eos # 0.6 (H) 0.0 - 0.5 K/uL    Baso # 0.02 0.00 - 0.20 K/uL    nRBC 0 0 /100 WBC    Gran % 63.0 38.0 - 73.0 %    Lymph % 23.8 18.0 - 48.0 %    Mono % 7.7 4.0 - 15.0 %    Eosinophil % 4.7 0.0 - 8.0 %    Basophil % 0.2 0.0 - 1.9 %    Differential Method Automated    MPN (JAK2 V617F)WITH REFLEX TO  CALR,MPL    Collection Time: 11/15/22 11:00 AM   Result Value Ref Range    MPNR  Specimen type na    Sedimentation rate    Collection Time: 11/15/22 11:00 AM   Result Value Ref Range    Sed Rate 100 (H) 0 - 10 mm/Hr   RETICULOCYTES    Collection Time: 11/15/22 11:00 AM   Result Value Ref Range    Retic 2.9 (H) 0.4 - 2.0 %   Pathologist Interpretation Differential    Collection Time: 11/15/22 11:00 AM   Result Value Ref Range    Pathologist Review Review required        Assessment/Plan :         Abnormal CBC  -Most likely form chronic inflammation and cellulitis  -Labs improving today but will follow up remaining workup  -Will follow up remaining labs and see pt back in 1 month      Hx of  Cellulitis  -pt went to OR for I&D but no abscess found on 10/9  -resolving       Chronic gout  -On home allopurinol.       Hyponatremia  -On admit mild and likely due to diminished oral intake and mild dehydration  -stable        Primary open angle glaucoma of both eyes, severe stage  -Continue home timolol and lantanoprost drops       Bartter syndrome  -On K supplements        H/O prostate cancer  -Continue home oxybutynin  -Continue urology follow up    Summary of orders placed this encounter:  Orders Placed This Encounter   Procedures    CBC W/ AUTO DIFFERENTIAL    MPN (JAK2 V617F)WITH REFLEX TO CALR,MPL    JAK2 Exon 12 And Other Non-V617 Mutation Detection, Bld    Immature Platelet Fraction    METHYLMALONIC ACID, SERUM    Folate    Sedimentation rate    RETICULOCYTES    Soluble Transferrin Receptor    Pathologist Interpretation Differential       Future Appointments   Date Time Provider Department Center   12/5/2022  8:30 AM Rikki Tyson MD [facility] [place]   12/16/2022  9:00 AM Keily Mcginnis MD [facility] HEM ONC [facility]         Keily Mcginnis MD   Hematology/oncology, [facility] [place]

## 2022-11-16 LAB — STFR SERPL-MCNC: 3.7 MG/L (ref 1.8–4.6)

## 2022-11-18 LAB
JAK2 EXON 12 MUTATION DETECTION BLOOD: NORMAL
PATH REPORT.FINAL DX SPEC: NORMAL

## 2022-11-19 LAB — METHYLMALONATE SERPL-SCNC: 0.21 UMOL/L

## 2022-11-22 LAB
MPNR  FINAL DIAGNOSIS: NORMAL
MPNR  SPECIMEN TYPE: NORMAL
MPNR RESULT: NORMAL

## 2022-11-28 LAB
ACID FAST MOD KINY STN SPEC: NORMAL
ACID FAST MOD KINY STN SPEC: NORMAL
MYCOBACTERIUM SPEC QL CULT: NORMAL
MYCOBACTERIUM SPEC QL CULT: NORMAL

## 2022-12-05 ENCOUNTER — OFFICE VISIT (OUTPATIENT)
Dept: OPHTHALMOLOGY | Facility: CLINIC | Age: 72
End: 2022-12-05
Payer: MEDICARE

## 2022-12-05 DIAGNOSIS — H52.7 REFRACTIVE ERROR: ICD-10-CM

## 2022-12-05 DIAGNOSIS — H54.40 BLINDNESS OF LEFT EYE WITH NORMAL VISION IN CONTRALATERAL EYE: ICD-10-CM

## 2022-12-05 DIAGNOSIS — H40.1133 PRIMARY OPEN ANGLE GLAUCOMA (POAG) OF BOTH EYES, SEVERE STAGE: Primary | ICD-10-CM

## 2022-12-05 DIAGNOSIS — H25.13 NUCLEAR SCLEROSIS, BILATERAL: ICD-10-CM

## 2022-12-05 PROCEDURE — 99999 PR PBB SHADOW E&M-EST. PATIENT-LVL III: ICD-10-PCS | Mod: PBBFAC,,, | Performed by: OPHTHALMOLOGY

## 2022-12-05 PROCEDURE — 3044F HG A1C LEVEL LT 7.0%: CPT | Mod: CPTII,S$GLB,, | Performed by: OPHTHALMOLOGY

## 2022-12-05 PROCEDURE — 1100F PTFALLS ASSESS-DOCD GE2>/YR: CPT | Mod: CPTII,S$GLB,, | Performed by: OPHTHALMOLOGY

## 2022-12-05 PROCEDURE — 1100F PR PT FALLS ASSESS DOC 2+ FALLS/FALL W/INJURY/YR: ICD-10-PCS | Mod: CPTII,S$GLB,, | Performed by: OPHTHALMOLOGY

## 2022-12-05 PROCEDURE — 1126F AMNT PAIN NOTED NONE PRSNT: CPT | Mod: CPTII,S$GLB,, | Performed by: OPHTHALMOLOGY

## 2022-12-05 PROCEDURE — 99999 PR PBB SHADOW E&M-EST. PATIENT-LVL III: CPT | Mod: PBBFAC,,, | Performed by: OPHTHALMOLOGY

## 2022-12-05 PROCEDURE — 1160F PR REVIEW ALL MEDS BY PRESCRIBER/CLIN PHARMACIST DOCUMENTED: ICD-10-PCS | Mod: CPTII,S$GLB,, | Performed by: OPHTHALMOLOGY

## 2022-12-05 PROCEDURE — 3288F PR FALLS RISK ASSESSMENT DOCUMENTED: ICD-10-PCS | Mod: CPTII,S$GLB,, | Performed by: OPHTHALMOLOGY

## 2022-12-05 PROCEDURE — 1160F RVW MEDS BY RX/DR IN RCRD: CPT | Mod: CPTII,S$GLB,, | Performed by: OPHTHALMOLOGY

## 2022-12-05 PROCEDURE — 92012 INTRM OPH EXAM EST PATIENT: CPT | Mod: S$GLB,,, | Performed by: OPHTHALMOLOGY

## 2022-12-05 PROCEDURE — 1126F PR PAIN SEVERITY QUANTIFIED, NO PAIN PRESENT: ICD-10-PCS | Mod: CPTII,S$GLB,, | Performed by: OPHTHALMOLOGY

## 2022-12-05 PROCEDURE — 3044F PR MOST RECENT HEMOGLOBIN A1C LEVEL <7.0%: ICD-10-PCS | Mod: CPTII,S$GLB,, | Performed by: OPHTHALMOLOGY

## 2022-12-05 PROCEDURE — 1159F PR MEDICATION LIST DOCUMENTED IN MEDICAL RECORD: ICD-10-PCS | Mod: CPTII,S$GLB,, | Performed by: OPHTHALMOLOGY

## 2022-12-05 PROCEDURE — 3288F FALL RISK ASSESSMENT DOCD: CPT | Mod: CPTII,S$GLB,, | Performed by: OPHTHALMOLOGY

## 2022-12-05 PROCEDURE — 92012 PR EYE EXAM, EST PATIENT,INTERMED: ICD-10-PCS | Mod: S$GLB,,, | Performed by: OPHTHALMOLOGY

## 2022-12-05 PROCEDURE — 1159F MED LIST DOCD IN RCRD: CPT | Mod: CPTII,S$GLB,, | Performed by: OPHTHALMOLOGY

## 2022-12-05 NOTE — PROGRESS NOTES
Subjective:       Patient ID: Mark Church is a 72 y.o. male.    Chief Complaint: Glaucoma (Patient Mark Church is a 72 year old male.)    HPI     Glaucoma     Additional comments: Patient Mark Church is a 72 year old male.           Comments    Pt here for 6 month IOP check. Pt states that he has had trouble with   near[facility]distance [facility] since [facility] for cellulitis. Pt states sachin the has   trouble seeing small items and captions on the TV in the distance. Pt   denies any eye pain.    DLS: 04/22/2022 with Dr. Tyson    Gtts:  1. Latanoprost qhs OU, needs refills  2. Timolol 0.5% BID OU, needs refills    POHx:  1. Primary open angle glaucoma of both eyes, severe stage   2. Blindness of left eye with normal vision in contralateral eye   3. Nuclear sclerosis, bilateral   4. Refractive error               Last edited by Valorie Carter on 12/5/2022  9:08 AM.             Assessment:       1. Primary open angle glaucoma (POAG) of both eyes, severe stage    2. Blindness of left eye with normal vision in contralateral eye    3. Nuclear sclerosis, bilateral    4. Refractive error          Plan:       POAG OS>>OD-IOP's are acceptable OU.  Blind eye OS from POAG-Stable.  Cataracts- Not visually significant.  RE-Pt wants MRx OD.      CPM OU.  Give MRx OD.  RTC 6 mos for IOP's, HVF OD & OCT's and DFE.

## 2022-12-08 RX ORDER — TIMOLOL MALEATE 5 MG/ML
1 SOLUTION/ DROPS OPHTHALMIC 2 TIMES DAILY
Qty: 20 ML | Refills: 1 | Status: SHIPPED | OUTPATIENT
Start: 2022-12-08 | End: 2023-02-28 | Stop reason: SDUPTHER

## 2022-12-09 ENCOUNTER — HOSPITAL ENCOUNTER (EMERGENCY)
Facility: HOSPITAL | Age: 72
Discharge: HOME | End: 2022-12-09
Attending: EMERGENCY MEDICINE
Payer: MEDICARE

## 2022-12-09 VITALS
WEIGHT: 130 LBS | SYSTOLIC BLOOD PRESSURE: 116 MMHG | HEART RATE: 63 BPM | DIASTOLIC BLOOD PRESSURE: 58 MMHG | TEMPERATURE: 99 F | OXYGEN SATURATION: 100 % | HEIGHT: 67 IN | RESPIRATION RATE: 16 BRPM | BODY MASS INDEX: 20.4 KG/M2

## 2022-12-09 DIAGNOSIS — L03.116 LEFT LEG CELLULITIS: Primary | ICD-10-CM

## 2022-12-09 LAB
ALBUMIN SERPL BCP-MCNC: 3.6 G/DL (ref 3.5–5.2)
ALP SERPL-CCNC: 38 U/L (ref 55–135)
ALT SERPL W/O P-5'-P-CCNC: 18 U/L (ref 10–44)
ANION GAP SERPL CALC-SCNC: 13 MMOL/L (ref 8–16)
AST SERPL-CCNC: 35 U/L (ref 10–40)
BASOPHILS # BLD AUTO: 0.01 K/UL (ref 0–0.2)
BASOPHILS NFR BLD: 0.1 % (ref 0–1.9)
BILIRUB SERPL-MCNC: 0.4 MG/DL (ref 0.1–1)
BUN SERPL-MCNC: 19 MG/DL (ref 8–23)
CALCIUM SERPL-MCNC: 9.2 MG/DL (ref 8.7–10.5)
CHLORIDE SERPL-SCNC: 104 MMOL/L (ref 95–110)
CO2 SERPL-SCNC: 22 MMOL/L (ref 23–29)
CREAT SERPL-MCNC: 0.8 MG/DL (ref 0.5–1.4)
DIFFERENTIAL METHOD: ABNORMAL
EOSINOPHIL # BLD AUTO: 0.7 K/UL (ref 0–0.5)
EOSINOPHIL NFR BLD: 8 % (ref 0–8)
ERYTHROCYTE [DISTWIDTH] IN BLOOD BY AUTOMATED COUNT: 14.6 % (ref 11.5–14.5)
EST. GFR  (NO RACE VARIABLE): >60 ML/MIN/1.73 M^2
GLUCOSE SERPL-MCNC: 87 MG/DL (ref 70–110)
HCT VFR BLD AUTO: 32.5 % (ref 40–54)
HGB BLD-MCNC: 11.3 G/DL (ref 14–18)
IMM GRANULOCYTES # BLD AUTO: 0.02 K/UL (ref 0–0.04)
IMM GRANULOCYTES NFR BLD AUTO: 0.2 % (ref 0–0.5)
LACTATE SERPL-SCNC: 0.6 MMOL/L (ref 0.5–2.2)
LYMPHOCYTES # BLD AUTO: 2.3 K/UL (ref 1–4.8)
LYMPHOCYTES NFR BLD: 27.7 % (ref 18–48)
MCH RBC QN AUTO: 29.7 PG (ref 27–31)
MCHC RBC AUTO-ENTMCNC: 34.8 G/DL (ref 32–36)
MCV RBC AUTO: 86 FL (ref 82–98)
MONOCYTES # BLD AUTO: 0.8 K/UL (ref 0.3–1)
MONOCYTES NFR BLD: 9.9 % (ref 4–15)
NEUTROPHILS # BLD AUTO: 4.5 K/UL (ref 1.8–7.7)
NEUTROPHILS NFR BLD: 54.1 % (ref 38–73)
NRBC BLD-RTO: 0 /100 WBC
PLATELET # BLD AUTO: 323 K/UL (ref 150–450)
PMV BLD AUTO: 11.3 FL (ref 9.2–12.9)
POTASSIUM SERPL-SCNC: 3.7 MMOL/L (ref 3.5–5.1)
PROT SERPL-MCNC: 7.7 G/DL (ref 6–8.4)
RBC # BLD AUTO: 3.8 M/UL (ref 4.6–6.2)
SODIUM SERPL-SCNC: 139 MMOL/L (ref 136–145)
WBC # BLD AUTO: 8.35 K/UL (ref 3.9–12.7)

## 2022-12-09 PROCEDURE — 99284 EMERGENCY DEPT VISIT MOD MDM: CPT | Mod: 25

## 2022-12-09 PROCEDURE — 80053 COMPREHEN METABOLIC PANEL: CPT | Performed by: EMERGENCY MEDICINE

## 2022-12-09 PROCEDURE — 85025 COMPLETE CBC W/AUTO DIFF WBC: CPT | Performed by: EMERGENCY MEDICINE

## 2022-12-09 PROCEDURE — 87040 BLOOD CULTURE FOR BACTERIA: CPT | Mod: 59 | Performed by: EMERGENCY MEDICINE

## 2022-12-09 PROCEDURE — 83605 ASSAY OF LACTIC ACID: CPT | Performed by: EMERGENCY MEDICINE

## 2022-12-09 RX ORDER — DOXYCYCLINE 100 MG/1
100 CAPSULE ORAL 2 TIMES DAILY
Qty: 20 CAPSULE | Refills: 0 | Status: SHIPPED | OUTPATIENT
Start: 2022-12-09 | End: 2022-12-16

## 2022-12-09 NOTE — ED PROVIDER NOTES
"---------------------------------------------------------------------------  BETTY Zaire Santiago, have reviewed the SORT/triage note.      History     Chief Complaint   Patient presents with    Leg Swelling     Patient reports left lower leg swelling an pain that has been ongoing but worsened over "the last couple of days". Redness and swelling noted to the leg. Patient's spouse states that the home health nurse came today and was concerned about the leg. He has a surgical would to left foot since 120/22. Denies fevers, chills. Wide states that wound was draining a few days ago.    Leg Pain       HPI: 72 y.o. male PMHx of Prostate Cancer and Cellulitis, who presents to the ED with left lower leg swelling and pain that has been ongoing, but worsened recently in the past couple of days. There is noticeably redness to the painful area. Patient notes he has a surgical wound on his left foot that has yellow drainage and appears infected. Patient denies being on any antibiotics recently.    Past Medical History:   Diagnosis Date    Arthritis     Bartter syndrome     Blind left eye     Cancer     Cataract     Glaucoma     History of psychiatric hospitalization     Normocytic anemia 10/02/2022    Prostate cancer     Psychiatric problem     Urinary incontinence      Past Surgical History:   Procedure Laterality Date    ANKLE INCISION AND DRAINAGE Left 10/9/2022    Procedure: INCISION AND DRAINAGE, ANKLE;  Surgeon: Jamison Snyder MD;  [facility];  Service: Orthopedics;  Laterality: Left;    CHOLECYSTECTOMY      JOINT REPLACEMENT      PROSTATE SURGERY      REFRACTIVE SURGERY Bilateral     ROTATOR CUFF REPAIR Right     sinus polyp       Social History     Tobacco Use    Smoking status: Never    Smokeless tobacco: Never   Substance Use Topics    Alcohol use: No     Alcohol/week: 0.0 standard drinks    Drug use: No     Family History   Problem Relation Age of Onset    Cataracts Father     No Known Problems Mother     " Blindness Maternal Aunt     Glaucoma Maternal Aunt     No Known Problems Sister     No Known Problems Brother     No Known Problems Sister     No Known Problems Sister     No Known Problems Brother     No Known Problems Maternal Uncle     No Known Problems Paternal Aunt     No Known Problems Paternal Uncle     No Known Problems Maternal Grandmother     No Known Problems Maternal Grandfather     No Known Problems Paternal Grandmother     No Known Problems Paternal Grandfather     Amblyopia Neg Hx     Macular degeneration Neg Hx     Retinal detachment Neg Hx     Strabismus Neg Hx     Cancer Neg Hx     Diabetes Neg Hx     Hypertension Neg Hx     Stroke Neg Hx     Thyroid disease Neg Hx      Review of patient's allergies indicates:   Allergen Reactions    Compazine [prochlorperazine edisylate]        Current Outpatient Medications:     allopurinol (ZYLOPRIM) 100 MG tablet, TAKE 1 TABLET EVERY DAY, Disp: 90 tablet, Rfl: 0    aspirin 81 MG Chew, Take 81 mg by mouth every other day. , Disp: , Rfl:     latanoprost 0.005 % ophthalmic solution, INSTILL 1 DROP INTO BOTH EYES EVERY EVENING, Disp: 7.5 mL, Rfl: 2    levocetirizine (XYZAL) 5 MG tablet, Take 1 tablet (5 mg total) by mouth every evening., Disp: 30 tablet, Rfl: 2    magnesium oxide (MAG-OX) 400 mg (241.3 mg magnesium) tablet, Take 1 tablet (400 mg total) by mouth 2 (two) times daily., Disp: 14 tablet, Rfl: 0    multivit-minerals/folic acid (MULTIVITAMIN GUMMIES ORAL), Take by mouth., Disp: , Rfl:     oxybutynin (DITROPAN) 5 MG Tab, 5 mg once daily. , Disp: , Rfl:     potassium bicarbonate (K-LYTE) disintegrating tablet, Take 1 tablet (25 mEq total) by mouth once daily., Disp: 10 tablet, Rfl: 0    potassium chloride (MICRO-K) 10 MEQ CpSR, TAKE 2 CAPSULES TWICE DAILY, Disp: 360 capsule, Rfl: 3    timolol maleate 0.5% (TIMOPTIC) 0.5 % Drop, Place 1 drop into both eyes 2 (two) times daily., Disp: 20 mL, Rfl: 1    Review of Systems as per HPI  Review of Systems  "  Cardiovascular:  Positive for leg swelling.   Musculoskeletal:         (+) positive for left lower leg swelling, pain  (+) positive for wound on left foot with drainage   Constitutional: Negative for activity change, appetite change, fatigue, diaphoresis, chills and fever.   Respiratory: Negative for apnea, choking, chest tightness and wheezing.    Gastrointestinal: Negative for abdominal distention, constipation, diarrhea and nausea.   Genitourinary: Negative for dysuria, flank pain, frequency and hematuria.   Skin: Negative for pallor, rash   Neurological: Negative for light-headedness, numbness, dizziness and headaches.   Psychiatric/Behavioral: Negative for agitation, behavioral problems, confusion, decreased concentration and dysphoric mood.     All other systems reviewed and are negative.    Physical Exam     ED Triage Vitals [12/09/22 1602]   Enc Vitals Group      BP (!) 116/57      Pulse 73      Resp 16      Temp 98.8 °F (37.1 °C)      Temp src Oral      SpO2 100 %      Weight 130 lb      Height 5' 7"      Head Circumference       Peak Flow       Pain Score       Pain Loc       Pain Edu?       Excl. in GC?          Vital signs and nursing assessment noted: relatively normal vitals    GEN:   NAD, A & Ox3, atraumatic, well appearing, nontoxic appearing  HEENT:  PERRLA, EOMI, moist membranes, nl conjunctiva, no scleral icterus, no nystagmus, no nodes/nodules, soft, supple, FROM, no trachial deviation  CV:   2+ dorsalis pedis pulses with doppler,  <2sec cap refill, no obvious JVD  RESP:  No obvious wheezing or stridor, equal and bilateral chest rise, no respiratory distress  ABD:   soft, Nontender, Nondistended, no guarding/rebound  :   Deferred  EXT:   FROM, NIEVES x 4, no edema, no calf tenderness, no bony tenderness, swelling noted to the left lower extremity with erythema and warmth, no crepitus, no obvious deformity  LYMPH:  no gross adenopathy  NEURO:  CN II-XII grossly intact, no obvious motor/sensory " deficit, no tremor  PSYCH:   no SI/HI, no anxiety, nl mood/affect, nl judgement/thought process  SKIN:  Warm, dry, no rashes/lesions or masses, evidence of venous stasis on left lower extremity, ulcer L medial mallous with suture, erythema distal to the knee of left lower extremity  Tests     Labs Reviewed   CBC W/ AUTO DIFFERENTIAL - Abnormal; Notable for the following components:       Result Value    RBC 3.80 (*)     Hemoglobin 11.3 (*)     Hematocrit 32.5 (*)     RDW 14.6 (*)     Eos # 0.7 (*)     All other components within normal limits   COMPREHENSIVE METABOLIC PANEL - Abnormal; Notable for the following components:    CO2 22 (*)     Alkaline Phosphatase 38 (*)     All other components within normal limits   CULTURE, BLOOD   CULTURE, BLOOD   LACTIC ACID, PLASMA     US Lower Extremity Veins Left   Final Result      No evidence of deep venous thrombosis in the left lower extremity.         Electronically signed by: Sebastián Rucker MD   Date:    12/09/2022   Time:    20:02          PROCEDURE(S):  NONE      MEDICAL DECISION MAKING:  History supplemented by old records which were checked/reviewed in EMR and summarized as notated in ED course.       This is an emergency evaluation of 72 y.o. male PMHx of Cancer and Cellulitis, who presents to the ED with left lower leg swelling and pain that has been ongoing, but worsened recently in the past couple of days. Exam shows difficulty to palpate pectoralis pedis pulse, erythema to the left lower extremity from the knee down, and evidence of erythema stasis.    Extremity pain differential includes but not exclusive to: abscess, cellulitis or DVT.  Unlikely sprain/strain, fracture, contusion, hematoma, tendonitis, arthritis, malingering.      Treatment plan includes history, physical exam, cardiac monitoring, labs, imaging studies, and supportive care.      ED Course     MDM  Reviewed: previous chart, vitals and nursing note  Reviewed previous: ultrasound and  labs  Interpretation: labs and ultrasound     ED Course as of 12/09/22 2313   Fri Dec 09, 2022   1720 HEMOGLOBIN(!): 11.3  Anemia otherwise relatively unremarkable CBC [NO]   1721 WBC: 8.35  unremarkable [NO]   1804 Lactate, Fernando: 0.6  unremarkable [NO]   1804 CO2(!): 22  Low otherwise relatively unremarkable CMP [NO]   1844 BP: 118/60 [NO]   1844 SpO2: 100 %  Interpreted as normal by emergency medicine physician.  Interventions none.   [NO]   1855 US RLE pending [NO]   1951 US Lower Extremity Veins Left  No DVT [NO]      ED Course User Index  [NO] Zaire Santiago MD     SEPSIS RE-EVALUATION:  Focus exam performed:  Vital signs reviewed, equal bilateral chest rise, 2+ DP pulses with doppler, less than 3 sec cap refill, no pallor.  Intervention performed:  Oxygen saturation measured.  Passive leg raise      REASSESSMENT: Patient is tolerating p.o. and ambulating with steady gait.   Sx improved after reassurance/observation and suture removal Medications - No data to display   The results of physical exam, lab and imaging findings were reviewed with the patient and family. This discussion included but not exclusive to the risk to the patient due to the potential underlying pathology, the testing that was required to make the diagnosis, and the treatment administered or prescribed.        Pt and family with assessment, disposition and treatment plan.  They are amenable to discharge. Precautions for return discussed at length. Further work up and treatment options offered to patient and family who declined. Patient and family informed and understand that patient should immediately return to emergency department with persistent or worsening symptoms or any other concerns.  Discharge and follow-up instructions discussed with the patient and family who expressed understanding.  A printed After Visit Summary, relating to diagnosis, concerns, and/or associated differentials, was given to the patient and family. All  questions asked and answered to the satisfaction of the patient and family.       For further evaluation, patient will follow up outpatient with:    Follow-up Information       [facility] . Call in 2 days.    Why: As needed, If symptoms worsen  Contact information:  [address] [facility]  [place] [address]  [phone]                             PRESCRIPTION GIVEN:  Discharge Medication List as of 12/9/2022  7:54 PM        START taking these medications    Details   doxycycline (VIBRAMYCIN) 100 MG Cap Take 1 capsule (100 mg total) by mouth 2 (two) times daily. for 7 days, Starting Fri 12/9/2022, Until Fri 12/16/2022, Print           Discharge Medication List as of 12/9/2022  7:54 PM        Clinical Impression     1. Left leg cellulitis         ED Disposition Condition    Discharge Stable            SCRIBE #1 NOTE: I, Brooklyn Hatfield, am scribing for, and in the presence of,  Zaire Santiago MD. I have scribed the following portions of the note - Other sections scribed: HPI, ROS, PE.     I, Dr. Zaire Santiago, personally performed the services described in this documentation. All medical record entries made by the scribe were at my direction and in my presence.  I have reviewed the chart and agree that the record reflects my personal performance and is accurate and complete. Zaire Santiago MD.  4:32 PM 12/09/2022       Zaire Santiago MD  12/09/22 [id number]

## 2022-12-09 NOTE — ED NOTES
Pt ambulatory to ED for worsening LLE swelling and pain.  Pt gets routine home health wound care for post op I&D of left ankle and calf.  Per wound care RN, swelling and redness to LLE became warm to touch today.3+ pitting edema to L foot, ankle and calf.  Open wound to inner L ankle with yellow slough to wound bed/ L pedal pulse strong with doppler.  Pt endorses intermittent tingling to LLE, denies numbness.  LLE ROM and mobility limited d/t pain and swelling. Pt denies N/V/D, denies fever/chills, skin warm/dry.  Pt A/Ox4, RR even/unlabored, VSS.  Bed low/locked, siderails upx2, pt agrees to plan of care.

## 2022-12-13 LAB
BACTERIA BLD CULT: NORMAL
BACTERIA BLD CULT: NORMAL

## 2022-12-16 ENCOUNTER — OFFICE VISIT (OUTPATIENT)
Dept: HEMATOLOGY/ONCOLOGY | Facility: CLINIC | Age: 72
End: 2022-12-16
Payer: MEDICARE

## 2022-12-16 ENCOUNTER — LAB VISIT (OUTPATIENT)
Dept: LAB | Facility: HOSPITAL | Age: 72
End: 2022-12-16
Attending: STUDENT IN AN ORGANIZED HEALTH CARE EDUCATION/TRAINING PROGRAM
Payer: MEDICARE

## 2022-12-16 VITALS
HEART RATE: 77 BPM | HEIGHT: 67 IN | DIASTOLIC BLOOD PRESSURE: 55 MMHG | WEIGHT: 129 LBS | OXYGEN SATURATION: 99 % | SYSTOLIC BLOOD PRESSURE: 104 MMHG | BODY MASS INDEX: 20.25 KG/M2

## 2022-12-16 DIAGNOSIS — D64.9 NORMOCYTIC ANEMIA: ICD-10-CM

## 2022-12-16 DIAGNOSIS — L03.90 CELLULITIS, UNSPECIFIED CELLULITIS SITE: ICD-10-CM

## 2022-12-16 DIAGNOSIS — Z09 FOLLOW-UP EXAM: ICD-10-CM

## 2022-12-16 DIAGNOSIS — D64.9 NORMOCYTIC ANEMIA: Primary | ICD-10-CM

## 2022-12-16 LAB
BASOPHILS # BLD AUTO: 0.01 K/UL (ref 0–0.2)
BASOPHILS NFR BLD: 0.1 % (ref 0–1.9)
DIFFERENTIAL METHOD: ABNORMAL
EOSINOPHIL # BLD AUTO: 0.8 K/UL (ref 0–0.5)
EOSINOPHIL NFR BLD: 8.9 % (ref 0–8)
ERYTHROCYTE [DISTWIDTH] IN BLOOD BY AUTOMATED COUNT: 13.5 % (ref 11.5–14.5)
ERYTHROCYTE [SEDIMENTATION RATE] IN BLOOD BY WESTERGREN METHOD: 80 MM/HR (ref 0–10)
FERRITIN SERPL-MCNC: 293 NG/ML (ref 20–300)
HCT VFR BLD AUTO: 38.5 % (ref 40–54)
HGB BLD-MCNC: 12.8 G/DL (ref 14–18)
IMM GRANULOCYTES # BLD AUTO: 0.02 K/UL (ref 0–0.04)
IMM GRANULOCYTES NFR BLD AUTO: 0.2 % (ref 0–0.5)
IRON SERPL-MCNC: 84 UG/DL (ref 45–160)
LYMPHOCYTES # BLD AUTO: 2 K/UL (ref 1–4.8)
LYMPHOCYTES NFR BLD: 23.4 % (ref 18–48)
MCH RBC QN AUTO: 28.1 PG (ref 27–31)
MCHC RBC AUTO-ENTMCNC: 33.2 G/DL (ref 32–36)
MCV RBC AUTO: 85 FL (ref 82–98)
MONOCYTES # BLD AUTO: 0.7 K/UL (ref 0.3–1)
MONOCYTES NFR BLD: 8 % (ref 4–15)
NEUTROPHILS # BLD AUTO: 5.1 K/UL (ref 1.8–7.7)
NEUTROPHILS NFR BLD: 59.4 % (ref 38–73)
NRBC BLD-RTO: 0 /100 WBC
PLATELET # BLD AUTO: 409 K/UL (ref 150–450)
PMV BLD AUTO: 10.6 FL (ref 9.2–12.9)
RBC # BLD AUTO: 4.55 M/UL (ref 4.6–6.2)
SATURATED IRON: 22 % (ref 20–50)
TOTAL IRON BINDING CAPACITY: 383 UG/DL (ref 250–450)
TRANSFERRIN SERPL-MCNC: 259 MG/DL (ref 200–375)
WBC # BLD AUTO: 8.55 K/UL (ref 3.9–12.7)

## 2022-12-16 PROCEDURE — 3074F PR MOST RECENT SYSTOLIC BLOOD PRESSURE < 130 MM HG: ICD-10-PCS | Mod: CPTII,S$GLB,, | Performed by: STUDENT IN AN ORGANIZED HEALTH CARE EDUCATION/TRAINING PROGRAM

## 2022-12-16 PROCEDURE — 99999 PR PBB SHADOW E&M-EST. PATIENT-LVL II: CPT | Mod: PBBFAC,,, | Performed by: STUDENT IN AN ORGANIZED HEALTH CARE EDUCATION/TRAINING PROGRAM

## 2022-12-16 PROCEDURE — 3288F FALL RISK ASSESSMENT DOCD: CPT | Mod: CPTII,S$GLB,, | Performed by: STUDENT IN AN ORGANIZED HEALTH CARE EDUCATION/TRAINING PROGRAM

## 2022-12-16 PROCEDURE — 1125F AMNT PAIN NOTED PAIN PRSNT: CPT | Mod: CPTII,S$GLB,, | Performed by: STUDENT IN AN ORGANIZED HEALTH CARE EDUCATION/TRAINING PROGRAM

## 2022-12-16 PROCEDURE — 3044F HG A1C LEVEL LT 7.0%: CPT | Mod: CPTII,S$GLB,, | Performed by: STUDENT IN AN ORGANIZED HEALTH CARE EDUCATION/TRAINING PROGRAM

## 2022-12-16 PROCEDURE — 3288F PR FALLS RISK ASSESSMENT DOCUMENTED: ICD-10-PCS | Mod: CPTII,S$GLB,, | Performed by: STUDENT IN AN ORGANIZED HEALTH CARE EDUCATION/TRAINING PROGRAM

## 2022-12-16 PROCEDURE — 1125F PR PAIN SEVERITY QUANTIFIED, PAIN PRESENT: ICD-10-PCS | Mod: CPTII,S$GLB,, | Performed by: STUDENT IN AN ORGANIZED HEALTH CARE EDUCATION/TRAINING PROGRAM

## 2022-12-16 PROCEDURE — 1101F PR PT FALLS ASSESS DOC 0-1 FALLS W/OUT INJ PAST YR: ICD-10-PCS | Mod: CPTII,S$GLB,, | Performed by: STUDENT IN AN ORGANIZED HEALTH CARE EDUCATION/TRAINING PROGRAM

## 2022-12-16 PROCEDURE — 3078F PR MOST RECENT DIASTOLIC BLOOD PRESSURE < 80 MM HG: ICD-10-PCS | Mod: CPTII,S$GLB,, | Performed by: STUDENT IN AN ORGANIZED HEALTH CARE EDUCATION/TRAINING PROGRAM

## 2022-12-16 PROCEDURE — 99214 OFFICE O/P EST MOD 30 MIN: CPT | Mod: S$GLB,,, | Performed by: STUDENT IN AN ORGANIZED HEALTH CARE EDUCATION/TRAINING PROGRAM

## 2022-12-16 PROCEDURE — 85025 COMPLETE CBC W/AUTO DIFF WBC: CPT | Performed by: STUDENT IN AN ORGANIZED HEALTH CARE EDUCATION/TRAINING PROGRAM

## 2022-12-16 PROCEDURE — 3008F BODY MASS INDEX DOCD: CPT | Mod: CPTII,S$GLB,, | Performed by: STUDENT IN AN ORGANIZED HEALTH CARE EDUCATION/TRAINING PROGRAM

## 2022-12-16 PROCEDURE — 84466 ASSAY OF TRANSFERRIN: CPT | Performed by: STUDENT IN AN ORGANIZED HEALTH CARE EDUCATION/TRAINING PROGRAM

## 2022-12-16 PROCEDURE — 3078F DIAST BP <80 MM HG: CPT | Mod: CPTII,S$GLB,, | Performed by: STUDENT IN AN ORGANIZED HEALTH CARE EDUCATION/TRAINING PROGRAM

## 2022-12-16 PROCEDURE — 82728 ASSAY OF FERRITIN: CPT | Performed by: STUDENT IN AN ORGANIZED HEALTH CARE EDUCATION/TRAINING PROGRAM

## 2022-12-16 PROCEDURE — 3008F PR BODY MASS INDEX (BMI) DOCUMENTED: ICD-10-PCS | Mod: CPTII,S$GLB,, | Performed by: STUDENT IN AN ORGANIZED HEALTH CARE EDUCATION/TRAINING PROGRAM

## 2022-12-16 PROCEDURE — 3074F SYST BP LT 130 MM HG: CPT | Mod: CPTII,S$GLB,, | Performed by: STUDENT IN AN ORGANIZED HEALTH CARE EDUCATION/TRAINING PROGRAM

## 2022-12-16 PROCEDURE — 3044F PR MOST RECENT HEMOGLOBIN A1C LEVEL <7.0%: ICD-10-PCS | Mod: CPTII,S$GLB,, | Performed by: STUDENT IN AN ORGANIZED HEALTH CARE EDUCATION/TRAINING PROGRAM

## 2022-12-16 PROCEDURE — 99214 PR OFFICE/OUTPT VISIT, EST, LEVL IV, 30-39 MIN: ICD-10-PCS | Mod: S$GLB,,, | Performed by: STUDENT IN AN ORGANIZED HEALTH CARE EDUCATION/TRAINING PROGRAM

## 2022-12-16 PROCEDURE — 99999 PR PBB SHADOW E&M-EST. PATIENT-LVL II: ICD-10-PCS | Mod: PBBFAC,,, | Performed by: STUDENT IN AN ORGANIZED HEALTH CARE EDUCATION/TRAINING PROGRAM

## 2022-12-16 PROCEDURE — 85652 RBC SED RATE AUTOMATED: CPT | Performed by: STUDENT IN AN ORGANIZED HEALTH CARE EDUCATION/TRAINING PROGRAM

## 2022-12-16 PROCEDURE — 84238 ASSAY NONENDOCRINE RECEPTOR: CPT | Performed by: STUDENT IN AN ORGANIZED HEALTH CARE EDUCATION/TRAINING PROGRAM

## 2022-12-16 PROCEDURE — 1101F PT FALLS ASSESS-DOCD LE1/YR: CPT | Mod: CPTII,S$GLB,, | Performed by: STUDENT IN AN ORGANIZED HEALTH CARE EDUCATION/TRAINING PROGRAM

## 2022-12-16 NOTE — PROGRESS NOTES
Hematology- Oncology Clinic Note :     12/16/2022    Chief Complaint   Patient presents with    Anemia    Follow-up    Recurrent Skin Infections     On doxycycline         HPI    Pt is a 72 year old man with past medical history significant for Bartter syndrome, gout, glaucoma and urinary incontinence who presented for evaluation of abnormal CBC.  Of note pt was admitted recently for swelling and pain in his left leg.  He states this all started about 4 days prior to presentation when he was walking in a field.  Pt was then admitted for sepsis secondary to left lower extremity cellulitis and started on IV antibiotics. Surgery consulted for concern for nec fasc/bone involvement. CT did not show any evidence of abscess/gas.  He underwent I&D with Orthopedic surgery and however no abscess but area was irrigated. Pt was discharged with follow up to heme clinic.    Interval hx:    Pt presents for follow up with his wife and is feeling better but is still having issues with cellulitis with his LLE and is on doxycyline.  Labs continue to show improvement and reviewed with pt.        Active Problem List with Overview Notes    Diagnosis Date Noted    Acute encephalopathy 10/19/2022    Hypotension 10/18/2022    Cellulitis 10/03/2022    Sepsis 10/02/2022    Cellulitis of left lower extremity 10/02/2022    Normocytic anemia 10/02/2022    Hyponatremia 10/02/2022    Hypokalemia 10/02/2022    Chronic gout 10/02/2022    Chronic cough 10/02/2022    Debility 10/02/2022    Blindness of left eye with normal vision in contralateral eye 02/14/2020    Primary open angle glaucoma of both eyes, severe stage 05/18/2018    Left hip pain 03/07/2018    Primary osteoarthritis of left shoulder 08/31/2017    Joint inflammation 08/31/2017    Thrombocythemia 08/31/2017    Bilateral leg edema 07/24/2017    Chronic pain syndrome 03/23/2017    Post-traumatic osteoarthritis of multiple joints 03/23/2017    Nuclear sclerosis, bilateral 04/04/2016     Refractive error 04/04/2016    Blind left eye 04/04/2016    H/O prostate cancer 02/02/2016    Bartter syndrome 02/02/2016    Glaucoma 02/02/2016    Urinary incontinence 02/02/2016       Patient Active Problem List    Diagnosis Date Noted    Acute encephalopathy 10/19/2022    Hypotension 10/18/2022    Cellulitis 10/03/2022    Sepsis 10/02/2022    Cellulitis of left lower extremity 10/02/2022    Normocytic anemia 10/02/2022    Hyponatremia 10/02/2022    Hypokalemia 10/02/2022    Chronic gout 10/02/2022    Chronic cough 10/02/2022    Debility 10/02/2022    Blindness of left eye with normal vision in contralateral eye 02/14/2020    Primary open angle glaucoma of both eyes, severe stage 05/18/2018    Left hip pain 03/07/2018    Primary osteoarthritis of left shoulder 08/31/2017    Joint inflammation 08/31/2017    Thrombocythemia 08/31/2017    Bilateral leg edema 07/24/2017    Chronic pain syndrome 03/23/2017    Post-traumatic osteoarthritis of multiple joints 03/23/2017    Nuclear sclerosis, bilateral 04/04/2016    Refractive error 04/04/2016    Blind left eye 04/04/2016    H/O prostate cancer 02/02/2016    Bartter syndrome 02/02/2016    Glaucoma 02/02/2016    Urinary incontinence 02/02/2016     Past Medical History:   Diagnosis Date    Arthritis     Bartter syndrome     Blind left eye     Cancer     Cataract     Glaucoma     History of psychiatric hospitalization     Normocytic anemia 10/02/2022    Prostate cancer     Psychiatric problem     Urinary incontinence       Past Surgical History:   Procedure Laterality Date    ANKLE INCISION AND DRAINAGE Left 10/9/2022    Procedure: INCISION AND DRAINAGE, ANKLE;  Surgeon: Jamison Snyder MD;  Location: [facility];  Service: Orthopedics;  Laterality: Left;    CHOLECYSTECTOMY      JOINT REPLACEMENT      PROSTATE SURGERY      REFRACTIVE SURGERY Bilateral     ROTATOR CUFF REPAIR Right     sinus polyp        (Not in a hospital admission)      Review of patient's allergies  indicates:   Allergen Reactions    Compazine [prochlorperazine edisylate]       Social History     Tobacco Use    Smoking status: Never    Smokeless tobacco: Never   Substance Use Topics    Alcohol use: No     Alcohol/week: 0.0 standard drinks      Family History   Problem Relation Age of Onset    Cataracts Father     No Known Problems Mother     Blindness Maternal Aunt     Glaucoma Maternal Aunt     No Known Problems Sister     No Known Problems Brother     No Known Problems Sister     No Known Problems Sister     No Known Problems Brother     No Known Problems Maternal Uncle     No Known Problems Paternal Aunt     No Known Problems Paternal Uncle     No Known Problems Maternal Grandmother     No Known Problems Maternal Grandfather     No Known Problems Paternal Grandmother     No Known Problems Paternal Grandfather     Amblyopia Neg Hx     Macular degeneration Neg Hx     Retinal detachment Neg Hx     Strabismus Neg Hx     Cancer Neg Hx     Diabetes Neg Hx     Hypertension Neg Hx     Stroke Neg Hx     Thyroid disease Neg Hx         Review of Systems :  Review of Systems   Constitutional:  Negative for fever.   HENT:  Negative for hearing loss.    Eyes:  Negative for blurred vision.   Respiratory:  Negative for cough.    Cardiovascular:  Negative for chest pain.   Gastrointestinal:  Negative for heartburn.   Genitourinary:  Negative for dysuria.   Musculoskeletal:  Negative for myalgias.        Feet wrapped due to cellulitis   Skin:  Negative for itching and rash.   Neurological:  Negative for dizziness and focal weakness.   Endo/Heme/Allergies:  Does not bruise/bleed easily.   Psychiatric/Behavioral:  Negative for depression. The patient is not nervous/anxious.      Physical Exam :  Wt Readings from Last 3 Encounters:   12/16/22 58.5 kg (129 lb)   12/09/22 59 kg (130 lb)   11/15/22 59.4 kg (130 lb 15.3 oz)     Temp Readings from Last 3 Encounters:   12/09/22 98.7 °F (37.1 °C) (Oral)   10/21/22 98.9 °F (37.2 °C)  (Oral)   07/21/22 97.8 °F (36.6 °C) (Skin)     BP Readings from Last 3 Encounters:   12/16/22 (!) 104/55   12/09/22 (!) 116/58   11/15/22 110/65     Pulse Readings from Last 3 Encounters:   12/16/22 77   12/09/22 63   11/15/22 70     Body mass index is 20.2 kg/m².    Physical Exam  Vitals reviewed.   HENT:      Head: Normocephalic and atraumatic.      Nose: Nose normal.      Mouth/Throat:      Mouth: Mucous membranes are moist.   Eyes:      Pupils: Pupils are equal, round, and reactive to light.   Cardiovascular:      Rate and Rhythm: Normal rate and regular rhythm.      Heart sounds: Normal heart sounds.   Pulmonary:      Breath sounds: Normal breath sounds.   Abdominal:      General: Abdomen is flat.      Palpations: Abdomen is soft.   Musculoskeletal:         General: Normal range of motion.      Cervical back: Normal range of motion and neck supple.   Skin:     General: Skin is warm and dry.      Comments: Feet in bandages   Neurological:      Mental Status: He is alert and oriented to person, place, and time.   Psychiatric:         Mood and Affect: Mood normal.         Behavior: Behavior normal.         Thought Content: Thought content normal.         Judgment: Judgment normal.         Pertinent Diagnostic studies:    No results found for this or any previous visit (from the past 24 hour(s)).      Assessment/Plan :         Anemia-improving  -Most likely form chronic inflammation and cellulitis  -Labs improving today but will follow up remaining workup  -Will follow up labs today and see pt back in 2 month      Hx of  Cellulitis-improving  -pt went to OR for I&D but no abscess found on 10/9  -on doxycycline        Chronic gout  -On home allopurinol.        Primary open angle glaucoma of both eyes, severe stage  -Continue home timolol and lantanoprost drops       Bartter syndrome  -On K supplements        H/O prostate cancer  -Continue home oxybutynin  -Continue urology follow up        Time spent with pt 30  minutes    Summary of orders placed this encounter:  Orders Placed This Encounter   Procedures    CBC W/ AUTO DIFFERENTIAL    IRON AND TIBC    Ferritin    Sedimentation rate    Soluble Transferrin Receptor       Future Appointments   Date Time Provider Department Center   12/16/2022  9:50 AM LAB, [facility] LAB [facility] [place]   2/17/2023  2:00 PM Keily Mcginnis MD [facility] HEM ONC [facility] [place] [facility]         Keily Mcginnis MD   Hematology/oncology, [facility] [place]

## 2022-12-17 LAB — STFR SERPL-MCNC: 3.5 MG/L (ref 1.8–4.6)

## 2023-01-23 PROBLEM — A41.9 SEPSIS: Status: RESOLVED | Noted: 2022-10-02 | Resolved: 2023-01-23

## 2023-02-14 ENCOUNTER — TELEPHONE (OUTPATIENT)
Dept: HEMATOLOGY/ONCOLOGY | Facility: CLINIC | Age: 73
End: 2023-02-14
Payer: MEDICARE

## 2023-02-14 NOTE — TELEPHONE ENCOUNTER
Patient called to cancel appointment for this Friday. Pt would like another appointment for next month please no evening appointment patient stated.

## 2023-02-28 RX ORDER — TIMOLOL MALEATE 5 MG/ML
1 SOLUTION/ DROPS OPHTHALMIC 2 TIMES DAILY
Qty: 20 ML | Refills: 1 | Status: SHIPPED | OUTPATIENT
Start: 2023-02-28 | End: 2023-03-09

## 2023-03-27 ENCOUNTER — DOCUMENT SCAN (OUTPATIENT)
Dept: HOME HEALTH SERVICES | Facility: HOSPITAL | Age: 73
End: 2023-03-27
Payer: MEDICARE

## 2023-04-05 ENCOUNTER — DOCUMENT SCAN (OUTPATIENT)
Dept: HOME HEALTH SERVICES | Facility: HOSPITAL | Age: 73
End: 2023-04-05
Payer: MEDICARE

## 2023-09-15 ENCOUNTER — OFFICE VISIT (OUTPATIENT)
Dept: OPHTHALMOLOGY | Facility: CLINIC | Age: 73
End: 2023-09-15
Payer: MEDICARE

## 2023-09-15 DIAGNOSIS — H25.13 NUCLEAR SCLEROSIS, BILATERAL: ICD-10-CM

## 2023-09-15 DIAGNOSIS — H54.40 BLINDNESS OF LEFT EYE WITH NORMAL VISION IN CONTRALATERAL EYE: ICD-10-CM

## 2023-09-15 DIAGNOSIS — H40.1133 PRIMARY OPEN ANGLE GLAUCOMA (POAG) OF BOTH EYES, SEVERE STAGE: Primary | ICD-10-CM

## 2023-09-15 PROCEDURE — 1159F PR MEDICATION LIST DOCUMENTED IN MEDICAL RECORD: ICD-10-PCS | Mod: CPTII,S$GLB,, | Performed by: OPHTHALMOLOGY

## 2023-09-15 PROCEDURE — 1126F PR PAIN SEVERITY QUANTIFIED, NO PAIN PRESENT: ICD-10-PCS | Mod: CPTII,S$GLB,, | Performed by: OPHTHALMOLOGY

## 2023-09-15 PROCEDURE — 3288F FALL RISK ASSESSMENT DOCD: CPT | Mod: CPTII,S$GLB,, | Performed by: OPHTHALMOLOGY

## 2023-09-15 PROCEDURE — 1126F AMNT PAIN NOTED NONE PRSNT: CPT | Mod: CPTII,S$GLB,, | Performed by: OPHTHALMOLOGY

## 2023-09-15 PROCEDURE — 92014 COMPRE OPH EXAM EST PT 1/>: CPT | Mod: S$GLB,,, | Performed by: OPHTHALMOLOGY

## 2023-09-15 PROCEDURE — 99999 PR PBB SHADOW E&M-EST. PATIENT-LVL III: CPT | Mod: PBBFAC,,, | Performed by: OPHTHALMOLOGY

## 2023-09-15 PROCEDURE — 1160F PR REVIEW ALL MEDS BY PRESCRIBER/CLIN PHARMACIST DOCUMENTED: ICD-10-PCS | Mod: CPTII,S$GLB,, | Performed by: OPHTHALMOLOGY

## 2023-09-15 PROCEDURE — 99999 PR PBB SHADOW E&M-EST. PATIENT-LVL III: ICD-10-PCS | Mod: PBBFAC,,, | Performed by: OPHTHALMOLOGY

## 2023-09-15 PROCEDURE — 1160F RVW MEDS BY RX/DR IN RCRD: CPT | Mod: CPTII,S$GLB,, | Performed by: OPHTHALMOLOGY

## 2023-09-15 PROCEDURE — 92014 PR EYE EXAM, EST PATIENT,COMPREHESV: ICD-10-PCS | Mod: S$GLB,,, | Performed by: OPHTHALMOLOGY

## 2023-09-15 PROCEDURE — 1101F PR PT FALLS ASSESS DOC 0-1 FALLS W/OUT INJ PAST YR: ICD-10-PCS | Mod: CPTII,S$GLB,, | Performed by: OPHTHALMOLOGY

## 2023-09-15 PROCEDURE — 3288F PR FALLS RISK ASSESSMENT DOCUMENTED: ICD-10-PCS | Mod: CPTII,S$GLB,, | Performed by: OPHTHALMOLOGY

## 2023-09-15 PROCEDURE — 1159F MED LIST DOCD IN RCRD: CPT | Mod: CPTII,S$GLB,, | Performed by: OPHTHALMOLOGY

## 2023-09-15 PROCEDURE — 1101F PT FALLS ASSESS-DOCD LE1/YR: CPT | Mod: CPTII,S$GLB,, | Performed by: OPHTHALMOLOGY

## 2023-09-16 NOTE — PROGRESS NOTES
Subjective:       Patient ID: Mark Church is a 73 y.o. male.    Chief Complaint: Glaucoma    HPI    DSL- 12/5/2022 Dr. Tyson    72 y/o male present to clinic for Glaucoma check. H/o of POAG, bilateral.   He states visual changes since last visit. He did not get last MRX filled.   He denies floaters and flashes of lights. He is using Glaucoma drops as   directed.     Eyemeds  Latanoprost OU QHS  Timolol 1/2 BID OU  Last edited by Cecile Taylor on 9/15/2023 10:09 AM.             Assessment:       1. Primary open angle glaucoma (POAG) of both eyes, severe stage    2. Blindness of left eye with normal vision in contralateral eye    3. Nuclear sclerosis, bilateral        Plan:       POAG OS>>OD-IOP's are higher OU but Pt admits to have difficulty with putting gtts in his eyes.  Blind eye OS from POAG-Stable.  Cataracts- Not visually significant.      Pt to have his wife put his gtts in.  RTC 4 wks for IOP's.

## 2023-10-18 DIAGNOSIS — H40.1133 PRIMARY OPEN ANGLE GLAUCOMA (POAG) OF BOTH EYES, SEVERE STAGE: ICD-10-CM

## 2023-10-18 RX ORDER — LATANOPROST 50 UG/ML
SOLUTION/ DROPS OPHTHALMIC
Qty: 25 ML | Refills: 2 | Status: SHIPPED | OUTPATIENT
Start: 2023-10-18

## 2023-10-18 RX ORDER — TIMOLOL MALEATE 5 MG/ML
SOLUTION/ DROPS OPHTHALMIC
Qty: 25 ML | Refills: 10 | Status: SHIPPED | OUTPATIENT
Start: 2023-10-18

## 2023-10-24 DIAGNOSIS — D72.119 HYPEREOSINOPHILIC SYNDROME: Primary | ICD-10-CM

## 2023-10-31 DIAGNOSIS — D72.119 HYPEREOSINOPHILIC SYNDROME: Primary | ICD-10-CM

## 2023-11-22 ENCOUNTER — HOSPITAL ENCOUNTER (OUTPATIENT)
Dept: RADIOLOGY | Facility: HOSPITAL | Age: 73
Discharge: HOME | End: 2023-11-22
Attending: NURSE PRACTITIONER
Payer: MEDICARE

## 2023-11-22 ENCOUNTER — HOSPITAL ENCOUNTER (OUTPATIENT)
Dept: CARDIOLOGY | Facility: HOSPITAL | Age: 73
Discharge: HOME | End: 2023-11-22
Attending: NURSE PRACTITIONER
Payer: MEDICARE

## 2023-11-22 DIAGNOSIS — D72.119 HYPEREOSINOPHILIC SYNDROME: ICD-10-CM

## 2023-11-22 PROCEDURE — 71250 CT THORAX DX C-: CPT | Mod: 26,,, | Performed by: RADIOLOGY

## 2023-11-22 PROCEDURE — 74150 CT ABDOMEN W/O CONTRAST: CPT | Mod: 26,,, | Performed by: RADIOLOGY

## 2023-11-22 PROCEDURE — 93306 TTE W/DOPPLER COMPLETE: CPT | Mod: 26,,, | Performed by: INTERNAL MEDICINE

## 2023-11-22 PROCEDURE — 74150 CT CHEST ABDOMEN WITHOUT CONTRAST (XPD): ICD-10-PCS | Mod: 26,,, | Performed by: RADIOLOGY

## 2023-11-22 PROCEDURE — 93306 TTE W/DOPPLER COMPLETE: CPT

## 2023-11-22 PROCEDURE — 74150 CT ABDOMEN W/O CONTRAST: CPT | Mod: TC

## 2023-11-22 PROCEDURE — 71250 CT CHEST ABDOMEN WITHOUT CONTRAST (XPD): ICD-10-PCS | Mod: 26,,, | Performed by: RADIOLOGY

## 2023-11-22 PROCEDURE — 93306 ECHO (CUPID ONLY): ICD-10-PCS | Mod: 26,,, | Performed by: INTERNAL MEDICINE

## 2023-11-23 LAB
AV INDEX (PROSTH): 0.66
AV MEAN GRADIENT: 2 MMHG
AV PEAK GRADIENT: 3 MMHG
AV VALVE AREA BY VELOCITY RATIO: 2.31 CM²
AV VALVE AREA: 2.21 CM²
AV VELOCITY RATIO: 0.69
CV ECHO LV RWT: 0.69 CM
DOP CALC AO PEAK VEL: 0.8 M/S
DOP CALC AO VTI: 15.8 CM
DOP CALC LVOT AREA: 3.4 CM2
DOP CALC LVOT DIAMETER: 2.07 CM
DOP CALC LVOT PEAK VEL: 0.55 M/S
DOP CALC LVOT STROKE VOLUME: 34.98 CM3
DOP CALCLVOT PEAK VEL VTI: 10.4 CM
E WAVE DECELERATION TIME: 294.46 MSEC
E/A RATIO: 0.63
E/E' RATIO: 5.5 M/S
ECHO LV POSTERIOR WALL: 1.06 CM (ref 0.6–1.1)
FRACTIONAL SHORTENING: 17 % (ref 28–44)
INTERVENTRICULAR SEPTUM: 1.09 CM (ref 0.6–1.1)
IVC DIAMETER: 0.87 CM
IVRT: 129.4 MSEC
LA MINOR: 3.91 CM
LEFT ATRIUM SIZE: 3.41 CM
LEFT INTERNAL DIMENSION IN SYSTOLE: 2.56 CM (ref 2.1–4)
LEFT VENTRICLE DIASTOLIC VOLUME: 37.02 ML
LEFT VENTRICLE SYSTOLIC VOLUME: 23.79 ML
LEFT VENTRICULAR INTERNAL DIMENSION IN DIASTOLE: 3.07 CM (ref 3.5–6)
LEFT VENTRICULAR MASS: 94.87 G
LV LATERAL E/E' RATIO: 4.89 M/S
LV SEPTAL E/E' RATIO: 6.29 M/S
LVOT MG: 0.64 MMHG
LVOT MV: 0.37 CM/S
MV PEAK A VEL: 0.7 M/S
MV PEAK E VEL: 0.44 M/S
MV STENOSIS PRESSURE HALF TIME: 85.39 MS
MV VALVE AREA P 1/2 METHOD: 2.58 CM2
PV PEAK GRADIENT: 4 MMHG
PV PEAK VELOCITY: 1 M/S
RA MAJOR: 4.62 CM
RA PRESSURE ESTIMATED: 3 MMHG
RA WIDTH: 3.59 CM
RIGHT VENTRICULAR END-DIASTOLIC DIMENSION: 3.76 CM
SINUS: 3.11 CM
STJ: 2.7 CM
TDI LATERAL: 0.09 M/S
TDI SEPTAL: 0.07 M/S
TDI: 0.08 M/S
TRICUSPID ANNULAR PLANE SYSTOLIC EXCURSION: 1.81 CM

## 2024-02-01 ENCOUNTER — OFFICE VISIT (OUTPATIENT)
Dept: OPHTHALMOLOGY | Facility: CLINIC | Age: 74
End: 2024-02-01
Payer: MEDICARE

## 2024-02-01 DIAGNOSIS — H54.40 BLINDNESS OF LEFT EYE WITH NORMAL VISION IN CONTRALATERAL EYE: ICD-10-CM

## 2024-02-01 DIAGNOSIS — H25.13 NUCLEAR SCLEROSIS, BILATERAL: ICD-10-CM

## 2024-02-01 DIAGNOSIS — H40.1133 PRIMARY OPEN ANGLE GLAUCOMA (POAG) OF BOTH EYES, SEVERE STAGE: Primary | ICD-10-CM

## 2024-02-01 PROCEDURE — 92012 INTRM OPH EXAM EST PATIENT: CPT | Mod: S$GLB,,, | Performed by: OPHTHALMOLOGY

## 2024-02-01 PROCEDURE — 99999 PR PBB SHADOW E&M-EST. PATIENT-LVL I: CPT | Mod: PBBFAC,,, | Performed by: OPHTHALMOLOGY

## 2024-02-01 PROCEDURE — 3288F FALL RISK ASSESSMENT DOCD: CPT | Mod: CPTII,S$GLB,, | Performed by: OPHTHALMOLOGY

## 2024-02-01 PROCEDURE — 1126F AMNT PAIN NOTED NONE PRSNT: CPT | Mod: CPTII,S$GLB,, | Performed by: OPHTHALMOLOGY

## 2024-02-01 PROCEDURE — 1101F PT FALLS ASSESS-DOCD LE1/YR: CPT | Mod: CPTII,S$GLB,, | Performed by: OPHTHALMOLOGY

## 2024-02-01 PROCEDURE — 76514 ECHO EXAM OF EYE THICKNESS: CPT | Mod: S$GLB,,, | Performed by: OPHTHALMOLOGY

## 2024-02-01 PROCEDURE — 1160F RVW MEDS BY RX/DR IN RCRD: CPT | Mod: CPTII,S$GLB,, | Performed by: OPHTHALMOLOGY

## 2024-02-01 PROCEDURE — 1159F MED LIST DOCD IN RCRD: CPT | Mod: CPTII,S$GLB,, | Performed by: OPHTHALMOLOGY

## 2024-02-01 NOTE — PROGRESS NOTES
Subjective:       Patient ID: Mrak Church is a 73 y.o. male.    Chief Complaint: No chief complaint on file.    HPI    74 y/o male present to clinic for IOP check. Difficulty focusing    He denies floaters and flashes of lights. He is using Glaucoma drops as   directed.      Eyemeds  Latanoprost OU QHS  Timolol 1/2 BID OU    Last edited by Monroe Ortiz on 2/1/2024  8:14 AM.             Assessment:       1. Primary open angle glaucoma (POAG) of both eyes, severe stage    2. Blindness of left eye with normal vision in contralateral eye    3. Nuclear sclerosis, bilateral        Plan:       POAG OU-IOP's are acceptable OU. OCT's: 583 OU (-3).  Blind eye OS from POAG-Stable.  Cataracts- Not visually significant.      CPM OU.  RTC 6 mos for IOP's, OCT's, HVF's & DFE.

## 2024-03-18 ENCOUNTER — HOSPITAL ENCOUNTER (OUTPATIENT)
Dept: RADIOLOGY | Facility: HOSPITAL | Age: 74
Discharge: HOME | End: 2024-03-18
Attending: NURSE PRACTITIONER
Payer: MEDICARE

## 2024-03-18 DIAGNOSIS — D72.119 HYPEREOSINOPHILIC SYNDROME: ICD-10-CM

## 2024-03-18 PROCEDURE — 71250 CT THORAX DX C-: CPT | Mod: TC

## 2024-03-18 PROCEDURE — 71250 CT THORAX DX C-: CPT | Mod: 26,,, | Performed by: RADIOLOGY

## 2024-03-18 PROCEDURE — 74150 CT ABDOMEN W/O CONTRAST: CPT | Mod: 26,,, | Performed by: RADIOLOGY

## 2025-04-16 ENCOUNTER — HOSPITAL ENCOUNTER (EMERGENCY)
Facility: HOSPITAL | Age: 75
Discharge: HOME | End: 2025-04-16
Attending: STUDENT IN AN ORGANIZED HEALTH CARE EDUCATION/TRAINING PROGRAM
Payer: MEDICARE

## 2025-04-16 VITALS
HEART RATE: 60 BPM | DIASTOLIC BLOOD PRESSURE: 59 MMHG | BODY MASS INDEX: 21.66 KG/M2 | SYSTOLIC BLOOD PRESSURE: 116 MMHG | TEMPERATURE: 99 F | OXYGEN SATURATION: 100 % | HEIGHT: 67 IN | RESPIRATION RATE: 16 BRPM | WEIGHT: 138 LBS

## 2025-04-16 DIAGNOSIS — I87.1 MAY-THURNER SYNDROME: ICD-10-CM

## 2025-04-16 DIAGNOSIS — R06.02 SOB (SHORTNESS OF BREATH): ICD-10-CM

## 2025-04-16 DIAGNOSIS — M79.89 LEG SWELLING: Primary | ICD-10-CM

## 2025-04-16 LAB
ALBUMIN SERPL-MCNC: 3.6 G/DL (ref 3.3–5.5)
ALP SERPL-CCNC: 61 U/L (ref 42–141)
BILIRUB SERPL-MCNC: 0.4 MG/DL (ref 0.2–1.6)
BUN SERPL-MCNC: 13 MG/DL (ref 7–22)
CALCIUM SERPL-MCNC: 9.7 MG/DL (ref 8–10.3)
CHLORIDE SERPL-SCNC: 102 MMOL/L (ref 98–108)
CREAT SERPL-MCNC: 0.8 MG/DL (ref 0.6–1.2)
GLUCOSE SERPL-MCNC: 85 MG/DL (ref 73–118)
HCT, POC: NORMAL
HGB, POC: NORMAL (ref 14–18)
MCH, POC: NORMAL
MCHC, POC: NORMAL
MCV, POC: NORMAL
MPV, POC: NORMAL
OHS QRS DURATION: 84 MS
OHS QTC CALCULATION: 410 MS
POC ALT (SGPT): 22 U/L (ref 10–47)
POC AST (SGOT): 54 U/L (ref 11–38)
POC B-TYPE NATRIURETIC PEPTIDE: 60.9 PG/ML (ref 0–100)
POC CARDIAC TROPONIN I: 0 NG/ML (ref 0–0.08)
POC PLATELET COUNT: NORMAL
POC TCO2: 29 MMOL/L (ref 18–33)
POTASSIUM BLD-SCNC: 4.4 MMOL/L (ref 3.6–5.1)
PROTEIN, POC: 7.1 G/DL (ref 6.4–8.1)
RBC, POC: NORMAL
RDW, POC: NORMAL
SAMPLE: NORMAL
SODIUM BLD-SCNC: 142 MMOL/L (ref 128–145)
WBC, POC: NORMAL

## 2025-04-16 PROCEDURE — 84484 ASSAY OF TROPONIN QUANT: CPT | Mod: ER

## 2025-04-16 PROCEDURE — 93005 ELECTROCARDIOGRAM TRACING: CPT | Mod: ER

## 2025-04-16 PROCEDURE — 83880 ASSAY OF NATRIURETIC PEPTIDE: CPT | Mod: ER

## 2025-04-16 PROCEDURE — 99285 EMERGENCY DEPT VISIT HI MDM: CPT | Mod: 25,ER

## 2025-04-16 PROCEDURE — 85025 COMPLETE CBC W/AUTO DIFF WBC: CPT | Mod: ER

## 2025-04-16 PROCEDURE — 80053 COMPREHEN METABOLIC PANEL: CPT | Mod: ER

## 2025-04-16 PROCEDURE — 93010 ELECTROCARDIOGRAM REPORT: CPT | Mod: ,,, | Performed by: INTERNAL MEDICINE

## 2025-04-16 RX ORDER — HYDROCODONE BITARTRATE AND ACETAMINOPHEN 5; 325 MG/1; MG/1
1 TABLET ORAL EVERY 6 HOURS PRN
Qty: 12 TABLET | Refills: 0 | Status: SHIPPED | OUTPATIENT
Start: 2025-04-16 | End: 2025-04-19

## 2025-04-16 RX ORDER — FAMOTIDINE 40 MG/1
40 TABLET, FILM COATED ORAL DAILY
COMMUNITY

## 2025-04-16 RX ORDER — CLOPIDOGREL BISULFATE 75 MG/1
75 TABLET ORAL DAILY
COMMUNITY

## 2025-04-16 RX ORDER — METOPROLOL TARTRATE 25 MG/1
25 TABLET, FILM COATED ORAL 2 TIMES DAILY
COMMUNITY

## 2025-04-16 NOTE — ED PROVIDER NOTES
Encounter Date: 4/16/2025    SCRIBE #1 NOTE: I, Nanette Trotter, am scribing for, and in the presence of,  Annamarie Rojas DO. I have scribed the following portions of the note - Other sections scribed: HPI,ROS,PE,MDM.       History     Chief Complaint   Patient presents with    Leg Swelling     Left foot and leg edema x one week      Mark Church is a 75 y.o. 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 Patient reports chronic history of swelling in left leg s/p gout surgery. He reports swelling would resolve after treatment via elevation and ice. However, he reports recently swelling remains despite treatment which is new. Patient further reports intermittent chest pain that began last night. States he followed with a cardiologist 1 year ago. Denies any recent long car/plane rides. Denies following with vascular surgery recently. No attempt at treatment prior to arrival. No other exacerbating or alleviating factors. Patient is complaint with prescribed medications(clopidogrel, and metoprolol.  Denies history of CHF. Denies dyspnea, or other associated symptoms. Patient reports PSHx of left ankle incision and drainage(2022) NKDA      The history is provided by the patient. No  was used.     Review of patient's allergies indicates:   Allergen Reactions    Compazine [prochlorperazine edisylate]      Past Medical History:   Diagnosis Date    Arthritis     Bartter syndrome     Blind left eye     Cancer     Cataract     Glaucoma     History of psychiatric hospitalization     Normocytic anemia 10/02/2022    Prostate cancer     Psychiatric problem     Urinary incontinence      Past Surgical History:   Procedure Laterality Date    ANKLE INCISION AND DRAINAGE Left 10/9/2022    Procedure: INCISION AND DRAINAGE, ANKLE;  Surgeon:  Jamison Snyder MD;  Location: [facility] OR;  Service: Orthopedics;  Laterality: Left;    CHOLECYSTECTOMY      JOINT REPLACEMENT      PROSTATE SURGERY      REFRACTIVE SURGERY Bilateral     ROTATOR CUFF REPAIR Right     sinus polyp       Family History   Problem Relation Name Age of Onset    Cataracts Father      No Known Problems Mother      Blindness Maternal Aunt      Glaucoma Maternal Aunt      No Known Problems Sister Asia     No Known Problems Brother Yuval     No Known Problems Sister Estefany     No Known Problems Sister Alexander     No Known Problems Brother Carlton     No Known Problems Maternal Uncle      No Known Problems Paternal Aunt      No Known Problems Paternal Uncle      No Known Problems Maternal Grandmother      No Known Problems Maternal Grandfather      No Known Problems Paternal Grandmother      No Known Problems Paternal Grandfather      Amblyopia Neg Hx      Macular degeneration Neg Hx      Retinal detachment Neg Hx      Strabismus Neg Hx      Cancer Neg Hx      Diabetes Neg Hx      Hypertension Neg Hx      Stroke Neg Hx      Thyroid disease Neg Hx       Social History[1]  Review of Systems   Constitutional:  Negative for activity change and fatigue.   HENT:  Negative for congestion, drooling and facial swelling.    Eyes:  Negative for pain and visual disturbance.   Respiratory:  Negative for chest tightness and shortness of breath.    Cardiovascular:  Positive for chest pain (intermittent) and leg swelling (left lower extremity).   Gastrointestinal:  Negative for abdominal pain, constipation, diarrhea, nausea and vomiting.   Genitourinary:  Negative for difficulty urinating and dysuria.   Musculoskeletal:  Positive for arthralgias (left lower extremity). Negative for back pain.   Skin:  Negative for rash.   Neurological:  Negative for weakness and headaches.   Hematological:  Negative for adenopathy.   Psychiatric/Behavioral:  Negative for confusion.      Physical Exam     Initial Vitals  [04/16/25 1048]   BP Pulse Resp Temp SpO2   111/66 76 18 98.5 °F (36.9 °C) 100 %      MAP       --         Physical Exam    Nursing note and vitals reviewed.  Constitutional: Vital signs are normal. He appears well-developed and well-nourished. He is not diaphoretic.  Non-toxic appearance. He does not have a sickly appearance. He does not appear ill.   HENT:   Head: Normocephalic and atraumatic.   Right Ear: External ear normal.   Left Ear: External ear normal. Mouth/Throat: Oropharynx is clear and moist.   Eyes: Conjunctivae are normal.   Neck: Neck supple. No JVD present.   Normal range of motion.  Cardiovascular:  Normal rate, regular rhythm, normal heart sounds and intact distal pulses.           Pulses:       Dorsalis pedis pulses are 1+ on the left side.        Posterior tibial pulses are 1+ on the left side.   Pulmonary/Chest: Breath sounds normal. No stridor. No respiratory distress. He has no wheezes.   Abdominal: Abdomen is soft. He exhibits no distension. There is no abdominal tenderness. There is no rebound and no guarding.   Musculoskeletal:         General: Edema present. No tenderness. Normal range of motion.      Cervical back: Normal range of motion and neck supple.      Left lower leg: 3+ Edema present.      Comments: 3+ non-pitting edema top left lower extremity from distal calf to foot. Chronic venous stasis changes.      Neurological: He is alert and oriented to person, place, and time. He has normal strength. He displays no atrophy and no tremor. He exhibits normal muscle tone. He displays no seizure activity. Gait normal. GCS score is 15. GCS eye subscore is 4. GCS verbal subscore is 5. GCS motor subscore is 6.   Moves all extremities, follows all commands, no focal neurologic deficits.       Skin: Skin is warm and dry. Capillary refill takes less than 2 seconds.   Psychiatric: He has a normal mood and affect. Thought content normal.         ED Course   Procedures      Labs Reviewed   POCT CMP  - Abnormal       Result Value    Albumin, POC 3.6      Alkaline Phosphatase, POC 61      ALT (SGPT), POC 22      AST (SGOT), POC 54 (*)     POC BUN 13      Calcium, POC 9.7      POC Chloride 102      POC Creatinine 0.8      POC Glucose 85      POC Potassium 4.4      POC Sodium 142      Bilirubin, POC 0.4      POC TCO2 29      Protein, POC 7.1     TROPONIN ISTAT    POC Cardiac Troponin I 0.00      Sample unknown     POCT CBC    Hematocrit        Hemoglobin        RBC        WBC        MCV        MCH, POC        MCHC        RDW-CV        Platelet Count, POC        MPV       POCT CMP   POCT TROPONIN   POCT B-TYPE NATRIURETIC PEPTIDE (BNP)   POCT B-TYPE NATRIURETIC PEPTIDE (BNP)    POC B-Type Natriuretic Peptide 60.9       EKG Readings: (Independently Interpreted)     EKG obtained at 1143 shows normal sinus rhythm with a heart rate of 64 beats per minute, T-wave flattening in the lateral leads.  No obvious acute ST segment changes.  Sinus tachycardia in left axis deviation improved when compared to previous EKG from 2022.     ECG Results              EKG 12-lead (Final result)        Collection Time Result Time QRS Duration OHS QTC Calculation    04/16/25 11:43:09 04/16/25 12:11:30 84 410                     Final result by Interface, Lab In [facility] (04/16/25 12:11:34)                   Narrative:    Test Reason : R06.02,    Vent. Rate :  64 BPM     Atrial Rate :  64 BPM     P-R Int : 184 ms          QRS Dur :  84 ms      QT Int : 398 ms       P-R-T Axes :  46  13 -10 degrees    QTcB Int : 410 ms    Normal sinus rhythm  Nonspecific T wave abnormality  Abnormal ECG  When compared with ECG of 02-Oct-2022 12:07,  Vent. rate has decreased by  44 bpm  The axis Shifted right  Criteria for Inferior infarct are no longer Present  Nonspecific T wave abnormality now evident in Lateral leads  Confirmed by Aaron St ([id number]) on 4/16/2025 12:11:28 PM    Referred By: AAAREFERRAL SELF           Confirmed By: Aaron St                                      EKG 12-lead (Final result)  Result time 04/30/25 12:56:14      Final result by Unknown User (04/30/25 12:56:14)                                      Imaging Results              US Lower Extremity Veins Left (Final result)  Result time 04/16/25 12:48:18      Final result by David Sanderson MD (04/16/25 12:48:18)                   Impression:      No evidence of deep venous thrombosis in the left lower extremity.      Electronically signed by: David Sanderson MD  Date:    04/16/2025  Time:    12:48               Narrative:    EXAMINATION:  US LOWER EXTREMITY VEINS LEFT    CLINICAL HISTORY:  Other specified soft tissue disorders    TECHNIQUE:  Duplex and color flow Doppler evaluation and graded compression of the left lower extremity veins was performed.    COMPARISON:  Left lower extremity venous Doppler ultrasound 12/09/2022    FINDINGS:  Left thigh veins: The common femoral, femoral, popliteal, upper greater saphenous, and deep femoral veins are patent and free of thrombus. The veins are normally compressible and have normal phasic flow and augmentation response.    Left calf veins: The visualized calf veins are patent.    Contralateral CFV: The contralateral (right) common femoral vein is patent and free of thrombus.    Miscellaneous: None                                       X-Ray Chest AP Portable (Final result)  Result time 04/16/25 12:16:00      Final result by Braydon Vasquez MD (04/16/25 12:16:00)                   Impression:      See above      Electronically signed by: Braydon Vasquez MD  Date:    04/16/2025  Time:    12:16               Narrative:    EXAMINATION:  XR CHEST AP PORTABLE    CLINICAL HISTORY:  CHF;    TECHNIQUE:  Single frontal view of the chest was performed.    COMPARISON:  Non 10/02/2022 e    FINDINGS:  Heart size normal.  No significant airspace consolidation or pleural effusion identified                                       Medications - No data to display  Medical  Decision Making   MDM  This is an emergent evaluation of a 75 y.o.  with a PMHx of normocytic anemia, arthritis, May Thurners syndrome, prostate cancer, who presents to the ED with complaint of acute on chronic left leg swelling with associated pain that worsened 2 weeks ago. Initial vitals in the ED [04/16/25 1048]  BP: 111/66  Pulse: 76  Resp: 18  Temp: 98.5 °F (36.9 °C)  SpO2: 100 % .       Physical exam noted above. DDx includes but is not limited to progression of known May Ayala's disorder, DVT, chronic lymphedema, acute renal failure, kidney failure, CHF. Also considered but clinically less likely to be compartment syndrome, septic joint. Will obtain labs and imaging including   bnp,troponin,cmp,cbc, CXR, US left lower extremity, EKG. Will also provide pain medication as needed. Will continue to monitor and frequently reassess pending results of labs, treatments and final disposition.    Patient is aware of plan and is amenable.     Annamarie Rojas D.O  EMERGENCY MEDICINE  11:46 AM 04/16/2025    UPDATE  Labs reveal minimally elevated AST.  Remainder of labs unremarkable.  Chest x-ray unremarkable.  EKG shows no acute STEMI.  Also obtain a left lower extremity ultrasound which revealed no evidence of acute DVT.  On reassessment, vitals reassuring.  Given history, presentation in the setting of a benign workup and exam here today, I feel symptoms are less likely due to a life-threatening cause at this time.  Patient discharge with cardiology follow-up and referral, vascular surgery follow-up and referral as well as a short course of p.o. pain medication.  He was given ED return precautions.  Patient is aware and agreeable to plan.    Annamarie Rojas D.O  EMERGENCY MEDICINE   1:18 PM 04/16/2025       This chart was completed using dictation software, as a result there may be some transcription errors      Amount and/or Complexity of Data Reviewed  External Data Reviewed: labs, radiology, ECG and  notes.  Labs: ordered. Decision-making details documented in ED Course.  Radiology: ordered and independent interpretation performed. Decision-making details documented in ED Course.  ECG/medicine tests: ordered and independent interpretation performed. Decision-making details documented in ED Course.    Risk  Prescription drug management.            Scribe Attestation:   Scribe #1: I performed the above scribed service and the documentation accurately describes the services I performed. I attest to the accuracy of the note.                             I, Annamarie Rojas DO, personally performed the services described in this documentation. All medical record entries made by the scribe were at my direction and in my presence. I have reviewed the chart and agree that the record reflects my personal performance and is accurate and complete.      DISCLAIMER: This note was prepared with [employer] voice recognition transcription software. Garbled syntax, mangled pronouns, and other bizarre constructions may be attributed to that software system.    Clinical Impression:  Final diagnoses:  [R06.02] SOB (shortness of breath)  [M79.89] Leg swelling (Primary)  [I87.1] May-Thurner syndrome          ED Disposition Condition    Discharge Stable          ED Prescriptions       Medication Sig Dispense Start Date End Date Auth. Provider    HYDROcodone-acetaminophen (NORCO) 5-325 mg per tablet () Take 1 tablet by mouth every 6 (six) hours as needed for Pain. 12 tablet 2025 Annamarie Rojas DO          Follow-up Information       Follow up With Specialties Details Why Contact Info    [facility] ED Emergency Medicine Go to  If symptoms worsen [address] [facility] [address] [phone]    [facility] VASCULAR SURGERY Vascular Surgery Schedule an appointment as soon as possible for a visit on 2025 Emergency Room Follow-up [address] [facility]  [facility] [id number] [phone]    [facility] CARDIOLOGY Cardiology Schedule an appointment as soon as possible for a visit on 4/21/2025 Emergency Room Follow-up [address] [facility] [place] [id number] [phone]                 [1]   Social History  Tobacco Use    Smoking status: Never    Smokeless tobacco: Never   Substance Use Topics    Alcohol use: No     Alcohol/week: 0.0 standard drinks of alcohol    Drug use: No        Annamarie Rojas, DO  05/03/25 1451

## 2025-04-16 NOTE — DISCHARGE INSTRUCTIONS
Thank you for coming to our Emergency Department today. It is important to remember that some problems or medical conditions are difficult to diagnose and may not be found during your Emergency Department visit.     Be sure to follow up with your primary care doctor and review all labs/imaging/tests that were performed during your ER visit with them. Some labs/tests may be outside of the normal range and require non-emergent follow-up and further investigation to help diagnose/exclude/prevent complications or other potentially serious medical conditions that were not addressed during your ER visit.    If you do not have a primary care doctor, you may contact the one listed on your discharge paperwork or you may also call the [facility] Appointment Desk at [phone] to schedule an appointment and establish care with one. It is important to your health that you have a primary care doctor.    Please take all medications as directed. All medications may potentially have side-effects and it is impossible to predict which medications may give you side-effects or what side-effects (if any) they will give you.. If you feel that you are having a negative effect or side-effect of any medication you should immediately stop taking them and seek medical attention. If you feel that you are having a life-threatening reaction call 911.    Return to the ER with any questions/concerns, new/concerning symptoms, worsening or failure to improve.     Do not drive, swim, climb to height, take a bath, operate heavy machinery, drink alcohol or take potentially sedating medications, sign any legal documents or make any important decisions for 24 hours if you have received any pain medications, sedatives or mood altering drugs during your ER visit or within 24 hours of taking them if they have been prescribed to you.     You can find additional resources for Dentists, hearing aids, durable medical equipment, low cost pharmacies and  other resources at https://[facility].org    BELOW THIS LINE ONLY APPLIES IF YOU HAVE A COVID TEST PENDING OR IF YOU HAVE BEEN DIAGNOSED WITH COVID:  Please access [facility] to review the results of your test. Until the results of your COVID test return, you should isolate yourself so as not to potentially spread illness to others.   If your COVID test returns positive, you should isolate yourself so as not to spread illness to others. After five full days, if you are feeling better and you have not had fever for 24 hours, you can return to your typical daily activities, but you must wear a mask around others for an additional 5 days.   If your COVID test returns negative and you are either unvaccinated or more than six months out from your two-dose vaccine and are not yet boosted, you should still quarantine for 5 full days followed by strict mask use for an additional 5 full days.   If your COVID test returns negative and you have received your 2-dose initial vaccine as well as a booster, you should continue strict mask use for 10 full days after the exposure.  For all those exposed, best practice includes a test at day 5 after the exposure. This can be a home test or a test through one of the many testing centers throughout our community.   Masking is always advised to limit the spread of COVID. Cdc.gov is an excellent site to obtain the latest up to date recommendations regarding COVID and COVID testing.     CDC Testing and Quarantine Guidelines for patients with exposure to a known-positive COVID-19 person:  A close exposure is defined as anyone who has had an exposure (masked or unmasked) to a known COVID -19 positive person within 6 feet of someone for a cumulative total of 15 minutes or more over a 24-hour period.   Vaccinated and/or if you recently had a positive covid test within 90 days do NOT need to quarantine after contact with someone who had COVID-19 unless you develop symptoms.   Fully vaccinated  people who have not had a positive test within 90 days, should get tested 3-5 days after their exposure, even if they don't have symptoms and wear a mask indoors in public for 14 days following exposure or until their test result is negative.      Unvaccinated and/or NOT had a positive test within 90 days and meet close exposure  You are required by CDC guidelines to quarantine for at least 5 days from time of exposure followed by 5 days of strict masking. It is recommended, but not required to test after 5 days, unless you develop symptoms, in which case you should test at that time.  If you get tested after 5 days and your test is positive, your 5 day period of isolation starts the day of the positive test.    If your exposure does not meet the above definition, you can return to your normal daily activities to include social distancing, wearing a mask and frequent handwashing.      Here is a link to guidance from the CDC:  https://www.cdc.gov/media/releases/2021/s1227-isolation-quarantine-guidance.html      [place] Dept Of Health Testing Sites:  https://ldh.la.gov/page/3934      [facility] website with testing locations and guidance:  [email or web address]

## 2025-04-17 ENCOUNTER — TELEPHONE (OUTPATIENT)
Dept: CARDIOLOGY | Facility: CLINIC | Age: 75
End: 2025-04-17
Payer: MEDICARE

## 2025-04-17 NOTE — TELEPHONE ENCOUNTER
----- Message from Carolin sent at 4/17/2025 12:01 PM CDT -----  Type:  Sooner Apoointment RequestCaller is requesting a sooner appointment.  Caller declined first available appointment listed below.  Caller will not accept being placed on the waitlist and is requesting a message be sent to doctor.Name of Caller: PtWhen is the first available appointment?Symptoms: referral from EDWould the patient rather a call back or a response via [facility]? CallBest Call Back Number: [phone] Information: Pt would like to speak with nurse in office for a sooner appt.

## 2025-04-17 NOTE — TELEPHONE ENCOUNTER
----- Message from Carolin sent at 4/17/2025 12:01 PM CDT -----  Type:  Sooner Apoointment RequestCaller is requesting a sooner appointment.  Caller declined first available appointment listed below.  Caller will not accept being placed on the waitlist and is requesting a message be sent to doctor.Name of Caller: PtWhen is the first available appointment?Symptoms: referral from EDWould the patient rather a call back or a response via [facility]? [facility]Best Call Back Number: [phone] Information: Pt would like to speak with nurse in office for a sooner appt.

## (undated) DEVICE — SWAB CULTURETTE II DUAL

## (undated) DEVICE — SOL IRR NACL .9% 3000ML

## (undated) DEVICE — PACK ARTHROSCOPY W/ISO BAC

## (undated) DEVICE — COLLECTOR SPECIMEN ANAEROBIC

## (undated) DEVICE — SEE MEDLINE ITEM 157110

## (undated) DEVICE — PULSAVAC ZIMMER

## (undated) DEVICE — ELECTRODE REM PLYHSV RETURN 9

## (undated) DEVICE — BANDAGE MATRIX HK LOOP 6IN 5YD

## (undated) DEVICE — COVER OVERHEAD SURG LT BLUE

## (undated) DEVICE — SUT ETHILON 2-0 FSLX 30 BLK

## (undated) DEVICE — PAD CAST SPECIALIST STRL 4

## (undated) DEVICE — GAUZE SPONGE 4X4 12PLY

## (undated) DEVICE — BNDG COFLEX FOAM LF2 ST 6X5YD

## (undated) DEVICE — PAD ABD 8X10 STERILE

## (undated) DEVICE — SPONGE DERMACEA GAUZE 4X4

## (undated) DEVICE — SHEET DRAPE FAN-FOLDED 3/4

## (undated) DEVICE — TOWEL OR DISP STRL BLUE 4/PK

## (undated) DEVICE — TOURNIQUET SB QC DP 24X4IN

## (undated) DEVICE — APPLICATOR CHLORAPREP ORN 26ML

## (undated) DEVICE — Device

## (undated) DEVICE — BANDAGE CONFORM 3IN STRL

## (undated) DEVICE — UNDERGLOVES BIOGEL PI SIZE 8

## (undated) DEVICE — SOL NS 1000CC

## (undated) DEVICE — GLOVE SURGICAL LATEX SZ 8

## (undated) DEVICE — CANISTER SUCTION 2 LTR